# Patient Record
Sex: FEMALE | Race: BLACK OR AFRICAN AMERICAN | NOT HISPANIC OR LATINO
[De-identification: names, ages, dates, MRNs, and addresses within clinical notes are randomized per-mention and may not be internally consistent; named-entity substitution may affect disease eponyms.]

---

## 2017-01-06 ENCOUNTER — APPOINTMENT (OUTPATIENT)
Dept: INTERNAL MEDICINE | Facility: CLINIC | Age: 67
End: 2017-01-06

## 2017-01-06 VITALS
TEMPERATURE: 98.5 F | WEIGHT: 226 LBS | DIASTOLIC BLOOD PRESSURE: 80 MMHG | HEIGHT: 63 IN | SYSTOLIC BLOOD PRESSURE: 136 MMHG | BODY MASS INDEX: 40.04 KG/M2 | OXYGEN SATURATION: 98 % | HEART RATE: 104 BPM

## 2017-01-08 ENCOUNTER — FORM ENCOUNTER (OUTPATIENT)
Age: 67
End: 2017-01-08

## 2017-01-09 ENCOUNTER — OUTPATIENT (OUTPATIENT)
Dept: OUTPATIENT SERVICES | Facility: HOSPITAL | Age: 67
LOS: 1 days | End: 2017-01-09
Payer: COMMERCIAL

## 2017-01-09 ENCOUNTER — APPOINTMENT (OUTPATIENT)
Dept: RADIOLOGY | Facility: IMAGING CENTER | Age: 67
End: 2017-01-09

## 2017-01-09 DIAGNOSIS — J45.909 UNSPECIFIED ASTHMA, UNCOMPLICATED: ICD-10-CM

## 2017-01-09 PROCEDURE — 71046 X-RAY EXAM CHEST 2 VIEWS: CPT

## 2017-01-10 ENCOUNTER — APPOINTMENT (OUTPATIENT)
Dept: INTERNAL MEDICINE | Facility: CLINIC | Age: 67
End: 2017-01-10

## 2017-01-24 ENCOUNTER — APPOINTMENT (OUTPATIENT)
Dept: PEDIATRIC ALLERGY IMMUNOLOGY | Facility: CLINIC | Age: 67
End: 2017-01-24

## 2017-01-24 ENCOUNTER — APPOINTMENT (OUTPATIENT)
Dept: ALLERGY | Facility: CLINIC | Age: 67
End: 2017-01-24

## 2017-01-24 VITALS
HEIGHT: 63 IN | SYSTOLIC BLOOD PRESSURE: 160 MMHG | BODY MASS INDEX: 39.87 KG/M2 | DIASTOLIC BLOOD PRESSURE: 90 MMHG | RESPIRATION RATE: 16 BRPM | WEIGHT: 225 LBS | HEART RATE: 88 BPM

## 2017-01-24 RX ORDER — AZITHROMYCIN 250 MG/1
250 TABLET, FILM COATED ORAL
Qty: 1 | Refills: 0 | Status: DISCONTINUED | COMMUNITY
Start: 2017-01-06 | End: 2017-01-24

## 2017-01-31 ENCOUNTER — OTHER (OUTPATIENT)
Age: 67
End: 2017-01-31

## 2017-02-07 ENCOUNTER — APPOINTMENT (OUTPATIENT)
Dept: OPHTHALMOLOGY | Facility: CLINIC | Age: 67
End: 2017-02-07

## 2017-02-09 ENCOUNTER — APPOINTMENT (OUTPATIENT)
Dept: ALLERGY | Facility: CLINIC | Age: 67
End: 2017-02-09

## 2017-02-14 ENCOUNTER — APPOINTMENT (OUTPATIENT)
Dept: INTERNAL MEDICINE | Facility: CLINIC | Age: 67
End: 2017-02-14

## 2017-02-14 VITALS
BODY MASS INDEX: 39.34 KG/M2 | SYSTOLIC BLOOD PRESSURE: 146 MMHG | TEMPERATURE: 98.3 F | HEART RATE: 90 BPM | HEIGHT: 63 IN | OXYGEN SATURATION: 98 % | WEIGHT: 222 LBS | DIASTOLIC BLOOD PRESSURE: 88 MMHG

## 2017-02-14 VITALS — SYSTOLIC BLOOD PRESSURE: 120 MMHG | DIASTOLIC BLOOD PRESSURE: 80 MMHG

## 2017-03-13 ENCOUNTER — MEDICATION RENEWAL (OUTPATIENT)
Age: 67
End: 2017-03-13

## 2017-03-15 ENCOUNTER — MEDICATION RENEWAL (OUTPATIENT)
Age: 67
End: 2017-03-15

## 2017-03-22 ENCOUNTER — RX RENEWAL (OUTPATIENT)
Age: 67
End: 2017-03-22

## 2017-03-24 LAB
ALBUMIN SERPL ELPH-MCNC: 4.2 G/DL
ALP BLD-CCNC: 126 U/L
ALT SERPL-CCNC: 19 U/L
ANION GAP SERPL CALC-SCNC: 12 MMOL/L
AST SERPL-CCNC: 21 U/L
BILIRUB SERPL-MCNC: 0.5 MG/DL
BUN SERPL-MCNC: 10 MG/DL
CALCIUM SERPL-MCNC: 8.9 MG/DL
CHLORIDE SERPL-SCNC: 102 MMOL/L
CO2 SERPL-SCNC: 30 MMOL/L
CREAT SERPL-MCNC: 0.76 MG/DL
GLUCOSE SERPL-MCNC: 89 MG/DL
POTASSIUM SERPL-SCNC: 3.7 MMOL/L
PROT SERPL-MCNC: 7.3 G/DL
SODIUM SERPL-SCNC: 144 MMOL/L

## 2017-03-27 ENCOUNTER — RX RENEWAL (OUTPATIENT)
Age: 67
End: 2017-03-27

## 2017-04-05 LAB
BASOPHILS # BLD AUTO: 0.01 K/UL
BASOPHILS NFR BLD AUTO: 0.1 %
CHOLEST SERPL-MCNC: 197 MG/DL
CHOLEST/HDLC SERPL: 2.3 RATIO
EOSINOPHIL # BLD AUTO: 0.4 K/UL
EOSINOPHIL NFR BLD AUTO: 5.7 %
HBA1C MFR BLD HPLC: 6.3 %
HCT VFR BLD CALC: 40.1 %
HDLC SERPL-MCNC: 87 MG/DL
HGB BLD-MCNC: 12.2 G/DL
IMM GRANULOCYTES NFR BLD AUTO: 0.3 %
LDLC SERPL CALC-MCNC: 97 MG/DL
LYMPHOCYTES # BLD AUTO: 2.34 K/UL
LYMPHOCYTES NFR BLD AUTO: 33.1 %
MAN DIFF?: NORMAL
MCHC RBC-ENTMCNC: 29.1 PG
MCHC RBC-ENTMCNC: 30.4 GM/DL
MCV RBC AUTO: 95.7 FL
MONOCYTES # BLD AUTO: 0.29 K/UL
MONOCYTES NFR BLD AUTO: 4.1 %
NEUTROPHILS # BLD AUTO: 4.01 K/UL
NEUTROPHILS NFR BLD AUTO: 56.7 %
PLATELET # BLD AUTO: 251 K/UL
RBC # BLD: 4.19 M/UL
RBC # FLD: 15.1 %
TRIGL SERPL-MCNC: 67 MG/DL
WBC # FLD AUTO: 7.07 K/UL

## 2017-04-17 ENCOUNTER — APPOINTMENT (OUTPATIENT)
Dept: CARDIOLOGY | Facility: CLINIC | Age: 67
End: 2017-04-17

## 2017-04-28 ENCOUNTER — APPOINTMENT (OUTPATIENT)
Dept: INTERNAL MEDICINE | Facility: CLINIC | Age: 67
End: 2017-04-28

## 2017-04-28 ENCOUNTER — NON-APPOINTMENT (OUTPATIENT)
Age: 67
End: 2017-04-28

## 2017-04-28 VITALS
OXYGEN SATURATION: 94 % | HEART RATE: 93 BPM | DIASTOLIC BLOOD PRESSURE: 80 MMHG | SYSTOLIC BLOOD PRESSURE: 120 MMHG | TEMPERATURE: 98.9 F | HEIGHT: 63 IN | BODY MASS INDEX: 40.22 KG/M2 | WEIGHT: 227 LBS

## 2017-04-28 DIAGNOSIS — J06.9 ACUTE UPPER RESPIRATORY INFECTION, UNSPECIFIED: ICD-10-CM

## 2017-05-08 LAB
25(OH)D3 SERPL-MCNC: 43.1 NG/ML
ANION GAP SERPL CALC-SCNC: 12 MMOL/L
APPEARANCE: CLEAR
BILIRUBIN URINE: NEGATIVE
BLOOD URINE: NEGATIVE
BUN SERPL-MCNC: 16 MG/DL
CALCIUM SERPL-MCNC: 9.7 MG/DL
CHLORIDE SERPL-SCNC: 101 MMOL/L
CO2 SERPL-SCNC: 29 MMOL/L
COLOR: YELLOW
CREAT SERPL-MCNC: 0.92 MG/DL
GLUCOSE QUALITATIVE U: NORMAL MG/DL
GLUCOSE SERPL-MCNC: 103 MG/DL
KETONES URINE: NEGATIVE
LEUKOCYTE ESTERASE URINE: NEGATIVE
NITRITE URINE: NEGATIVE
PH URINE: 6
POTASSIUM SERPL-SCNC: 4.1 MMOL/L
PROTEIN URINE: NEGATIVE MG/DL
SODIUM SERPL-SCNC: 142 MMOL/L
SPECIFIC GRAVITY URINE: 1.01
TSH SERPL-ACNC: 0.9 UIU/ML
UROBILINOGEN URINE: NORMAL MG/DL

## 2017-05-12 ENCOUNTER — NON-APPOINTMENT (OUTPATIENT)
Age: 67
End: 2017-05-12

## 2017-05-12 ENCOUNTER — APPOINTMENT (OUTPATIENT)
Dept: CARDIOLOGY | Facility: CLINIC | Age: 67
End: 2017-05-12

## 2017-05-12 VITALS — DIASTOLIC BLOOD PRESSURE: 82 MMHG | SYSTOLIC BLOOD PRESSURE: 139 MMHG | HEART RATE: 106 BPM

## 2017-05-12 RX ORDER — TRIAMCINOLONE ACETONIDE 55 MCG
AEROSOL WITH ADAPTER (GRAM) NASAL
Refills: 0 | Status: DISCONTINUED | COMMUNITY
End: 2017-05-12

## 2017-05-18 ENCOUNTER — APPOINTMENT (OUTPATIENT)
Dept: CV DIAGNOSITCS | Facility: HOSPITAL | Age: 67
End: 2017-05-18

## 2017-05-18 ENCOUNTER — OUTPATIENT (OUTPATIENT)
Dept: OUTPATIENT SERVICES | Facility: HOSPITAL | Age: 67
LOS: 1 days | End: 2017-05-18
Payer: MEDICARE

## 2017-05-18 DIAGNOSIS — R06.2 WHEEZING: ICD-10-CM

## 2017-05-18 DIAGNOSIS — R07.9 CHEST PAIN, UNSPECIFIED: ICD-10-CM

## 2017-05-18 DIAGNOSIS — R60.9 EDEMA, UNSPECIFIED: ICD-10-CM

## 2017-05-18 PROCEDURE — 93018 CV STRESS TEST I&R ONLY: CPT

## 2017-05-18 PROCEDURE — 93016 CV STRESS TEST SUPVJ ONLY: CPT

## 2017-05-22 ENCOUNTER — MEDICATION RENEWAL (OUTPATIENT)
Age: 67
End: 2017-05-22

## 2017-05-22 ENCOUNTER — APPOINTMENT (OUTPATIENT)
Dept: OPHTHALMOLOGY | Facility: CLINIC | Age: 67
End: 2017-05-22

## 2017-05-22 DIAGNOSIS — H40.10X1 UNSPECIFIED OPEN-ANGLE GLAUCOMA, MILD STAGE: ICD-10-CM

## 2017-05-23 ENCOUNTER — RX RENEWAL (OUTPATIENT)
Age: 67
End: 2017-05-23

## 2017-05-26 ENCOUNTER — APPOINTMENT (OUTPATIENT)
Dept: PULMONOLOGY | Facility: CLINIC | Age: 67
End: 2017-05-26

## 2017-05-26 VITALS
BODY MASS INDEX: 39.87 KG/M2 | DIASTOLIC BLOOD PRESSURE: 70 MMHG | TEMPERATURE: 98.7 F | WEIGHT: 225 LBS | RESPIRATION RATE: 16 BRPM | SYSTOLIC BLOOD PRESSURE: 110 MMHG | HEIGHT: 63 IN | HEART RATE: 94 BPM

## 2017-05-26 RX ORDER — ETODOLAC 500 MG/1
500 TABLET, FILM COATED ORAL TWICE DAILY
Qty: 60 | Refills: 0 | Status: DISCONTINUED | COMMUNITY
End: 2017-05-26

## 2017-06-06 ENCOUNTER — APPOINTMENT (OUTPATIENT)
Dept: INTERNAL MEDICINE | Facility: CLINIC | Age: 67
End: 2017-06-06

## 2017-06-06 VITALS
HEIGHT: 63 IN | BODY MASS INDEX: 39.51 KG/M2 | OXYGEN SATURATION: 97 % | DIASTOLIC BLOOD PRESSURE: 80 MMHG | SYSTOLIC BLOOD PRESSURE: 130 MMHG | TEMPERATURE: 98.4 F | HEART RATE: 80 BPM | WEIGHT: 223 LBS

## 2017-06-06 DIAGNOSIS — L72.9 FOLLICULAR CYST OF THE SKIN AND SUBCUTANEOUS TISSUE, UNSPECIFIED: ICD-10-CM

## 2017-06-06 DIAGNOSIS — L02.419 CUTANEOUS ABSCESS OF LIMB, UNSPECIFIED: ICD-10-CM

## 2017-06-06 DIAGNOSIS — L08.9 FOLLICULAR CYST OF THE SKIN AND SUBCUTANEOUS TISSUE, UNSPECIFIED: ICD-10-CM

## 2017-06-12 ENCOUNTER — APPOINTMENT (OUTPATIENT)
Dept: ORTHOPEDIC SURGERY | Facility: CLINIC | Age: 67
End: 2017-06-12

## 2017-06-12 VITALS
SYSTOLIC BLOOD PRESSURE: 125 MMHG | HEART RATE: 105 BPM | WEIGHT: 223 LBS | BODY MASS INDEX: 39.51 KG/M2 | HEIGHT: 63 IN | DIASTOLIC BLOOD PRESSURE: 80 MMHG

## 2017-06-15 ENCOUNTER — RX RENEWAL (OUTPATIENT)
Age: 67
End: 2017-06-15

## 2017-07-20 DIAGNOSIS — Z12.12 ENCOUNTER FOR SCREENING FOR MALIGNANT NEOPLASM OF COLON: ICD-10-CM

## 2017-07-20 DIAGNOSIS — Z12.11 ENCOUNTER FOR SCREENING FOR MALIGNANT NEOPLASM OF COLON: ICD-10-CM

## 2017-07-28 ENCOUNTER — RX RENEWAL (OUTPATIENT)
Age: 67
End: 2017-07-28

## 2017-08-09 ENCOUNTER — FORM ENCOUNTER (OUTPATIENT)
Age: 67
End: 2017-08-09

## 2017-08-10 ENCOUNTER — OUTPATIENT (OUTPATIENT)
Dept: OUTPATIENT SERVICES | Facility: HOSPITAL | Age: 67
LOS: 1 days | End: 2017-08-10
Payer: COMMERCIAL

## 2017-08-10 ENCOUNTER — APPOINTMENT (OUTPATIENT)
Dept: MAMMOGRAPHY | Facility: IMAGING CENTER | Age: 67
End: 2017-08-10
Payer: MEDICARE

## 2017-08-10 ENCOUNTER — APPOINTMENT (OUTPATIENT)
Dept: ULTRASOUND IMAGING | Facility: IMAGING CENTER | Age: 67
End: 2017-08-10
Payer: MEDICARE

## 2017-08-10 DIAGNOSIS — Z00.8 ENCOUNTER FOR OTHER GENERAL EXAMINATION: ICD-10-CM

## 2017-08-10 PROCEDURE — 77063 BREAST TOMOSYNTHESIS BI: CPT | Mod: 26

## 2017-08-10 PROCEDURE — 77067 SCR MAMMO BI INCL CAD: CPT

## 2017-08-10 PROCEDURE — 77063 BREAST TOMOSYNTHESIS BI: CPT

## 2017-08-10 PROCEDURE — G0202: CPT | Mod: 26

## 2017-08-11 ENCOUNTER — RX RENEWAL (OUTPATIENT)
Age: 67
End: 2017-08-11

## 2017-08-11 ENCOUNTER — MEDICATION RENEWAL (OUTPATIENT)
Age: 67
End: 2017-08-11

## 2017-08-28 ENCOUNTER — APPOINTMENT (OUTPATIENT)
Dept: OPHTHALMOLOGY | Facility: CLINIC | Age: 67
End: 2017-08-28

## 2017-09-06 ENCOUNTER — APPOINTMENT (OUTPATIENT)
Dept: INTERNAL MEDICINE | Facility: CLINIC | Age: 67
End: 2017-09-06

## 2017-09-08 ENCOUNTER — APPOINTMENT (OUTPATIENT)
Dept: INTERNAL MEDICINE | Facility: CLINIC | Age: 67
End: 2017-09-08
Payer: MEDICARE

## 2017-09-08 VITALS
HEART RATE: 90 BPM | DIASTOLIC BLOOD PRESSURE: 80 MMHG | BODY MASS INDEX: 39.16 KG/M2 | WEIGHT: 221 LBS | TEMPERATURE: 98.4 F | HEIGHT: 63 IN | SYSTOLIC BLOOD PRESSURE: 130 MMHG | OXYGEN SATURATION: 98 %

## 2017-09-08 PROCEDURE — 99213 OFFICE O/P EST LOW 20 MIN: CPT

## 2017-09-11 LAB
ANION GAP SERPL CALC-SCNC: 16 MMOL/L
BASOPHILS # BLD AUTO: 0.01 K/UL
BASOPHILS NFR BLD AUTO: 0.1 %
BUN SERPL-MCNC: 21 MG/DL
CALCIUM SERPL-MCNC: 9.2 MG/DL
CHLORIDE SERPL-SCNC: 99 MMOL/L
CO2 SERPL-SCNC: 27 MMOL/L
CREAT SERPL-MCNC: 1.01 MG/DL
EOSINOPHIL # BLD AUTO: 0.34 K/UL
EOSINOPHIL NFR BLD AUTO: 4.3 %
GLUCOSE SERPL-MCNC: 108 MG/DL
HBA1C MFR BLD HPLC: 6.4 %
HCT VFR BLD CALC: 37.3 %
HGB BLD-MCNC: 11.5 G/DL
IMM GRANULOCYTES NFR BLD AUTO: 0.1 %
LYMPHOCYTES # BLD AUTO: 2.85 K/UL
LYMPHOCYTES NFR BLD AUTO: 35.7 %
MAN DIFF?: NORMAL
MCHC RBC-ENTMCNC: 28.7 PG
MCHC RBC-ENTMCNC: 30.8 GM/DL
MCV RBC AUTO: 93 FL
MONOCYTES # BLD AUTO: 0.38 K/UL
MONOCYTES NFR BLD AUTO: 4.8 %
NEUTROPHILS # BLD AUTO: 4.4 K/UL
NEUTROPHILS NFR BLD AUTO: 55 %
PLATELET # BLD AUTO: 272 K/UL
POTASSIUM SERPL-SCNC: 3.8 MMOL/L
RBC # BLD: 4.01 M/UL
RBC # FLD: 15.7 %
SODIUM SERPL-SCNC: 142 MMOL/L
WBC # FLD AUTO: 7.99 K/UL

## 2017-09-21 ENCOUNTER — RX RENEWAL (OUTPATIENT)
Age: 67
End: 2017-09-21

## 2017-10-15 ENCOUNTER — MOBILE ON CALL (OUTPATIENT)
Age: 67
End: 2017-10-15

## 2017-10-16 ENCOUNTER — RX RENEWAL (OUTPATIENT)
Age: 67
End: 2017-10-16

## 2017-10-25 ENCOUNTER — APPOINTMENT (OUTPATIENT)
Dept: GASTROENTEROLOGY | Facility: CLINIC | Age: 67
End: 2017-10-25

## 2017-10-27 ENCOUNTER — NON-APPOINTMENT (OUTPATIENT)
Age: 67
End: 2017-10-27

## 2017-10-27 ENCOUNTER — APPOINTMENT (OUTPATIENT)
Dept: CARDIOLOGY | Facility: CLINIC | Age: 67
End: 2017-10-27
Payer: MEDICARE

## 2017-10-27 VITALS — SYSTOLIC BLOOD PRESSURE: 144 MMHG | HEIGHT: 72 IN | DIASTOLIC BLOOD PRESSURE: 78 MMHG | HEART RATE: 97 BPM

## 2017-10-27 PROCEDURE — 99214 OFFICE O/P EST MOD 30 MIN: CPT

## 2017-10-27 PROCEDURE — 93000 ELECTROCARDIOGRAM COMPLETE: CPT

## 2017-10-27 RX ORDER — LOSARTAN POTASSIUM 50 MG/1
50 TABLET, FILM COATED ORAL DAILY
Qty: 135 | Refills: 3 | Status: DISCONTINUED | COMMUNITY
End: 2017-10-27

## 2017-10-27 RX ORDER — MULTIVITAMIN
TABLET ORAL
Refills: 0 | Status: DISCONTINUED | COMMUNITY
End: 2017-10-27

## 2017-10-30 ENCOUNTER — APPOINTMENT (OUTPATIENT)
Dept: OPHTHALMOLOGY | Facility: CLINIC | Age: 67
End: 2017-10-30
Payer: MEDICARE

## 2017-10-30 ENCOUNTER — APPOINTMENT (OUTPATIENT)
Dept: ORTHOPEDIC SURGERY | Facility: CLINIC | Age: 67
End: 2017-10-30

## 2017-10-30 PROCEDURE — 92012 INTRM OPH EXAM EST PATIENT: CPT

## 2017-11-07 ENCOUNTER — TRANSCRIPTION ENCOUNTER (OUTPATIENT)
Age: 67
End: 2017-11-07

## 2017-11-13 ENCOUNTER — APPOINTMENT (OUTPATIENT)
Dept: ORTHOPEDIC SURGERY | Facility: CLINIC | Age: 67
End: 2017-11-13
Payer: MEDICARE

## 2017-11-13 PROCEDURE — 99214 OFFICE O/P EST MOD 30 MIN: CPT

## 2017-11-20 ENCOUNTER — FORM ENCOUNTER (OUTPATIENT)
Age: 67
End: 2017-11-20

## 2017-11-21 ENCOUNTER — APPOINTMENT (OUTPATIENT)
Dept: MRI IMAGING | Facility: CLINIC | Age: 67
End: 2017-11-21
Payer: MEDICARE

## 2017-11-21 ENCOUNTER — OUTPATIENT (OUTPATIENT)
Dept: OUTPATIENT SERVICES | Facility: HOSPITAL | Age: 67
LOS: 1 days | End: 2017-11-21
Payer: MEDICARE

## 2017-11-21 DIAGNOSIS — M54.12 RADICULOPATHY, CERVICAL REGION: ICD-10-CM

## 2017-11-21 PROCEDURE — 72141 MRI NECK SPINE W/O DYE: CPT | Mod: 26

## 2017-11-21 PROCEDURE — 72141 MRI NECK SPINE W/O DYE: CPT

## 2017-11-27 ENCOUNTER — APPOINTMENT (OUTPATIENT)
Dept: ORTHOPEDIC SURGERY | Facility: CLINIC | Age: 67
End: 2017-11-27
Payer: MEDICARE

## 2017-11-27 PROCEDURE — 99214 OFFICE O/P EST MOD 30 MIN: CPT

## 2017-11-28 ENCOUNTER — APPOINTMENT (OUTPATIENT)
Dept: INTERNAL MEDICINE | Facility: CLINIC | Age: 67
End: 2017-11-28
Payer: MEDICARE

## 2017-11-28 ENCOUNTER — NON-APPOINTMENT (OUTPATIENT)
Age: 67
End: 2017-11-28

## 2017-11-28 VITALS
HEIGHT: 62 IN | WEIGHT: 228 LBS | BODY MASS INDEX: 41.96 KG/M2 | SYSTOLIC BLOOD PRESSURE: 127 MMHG | DIASTOLIC BLOOD PRESSURE: 79 MMHG | HEART RATE: 96 BPM

## 2017-11-28 DIAGNOSIS — B35.1 TINEA UNGUIUM: ICD-10-CM

## 2017-11-28 PROCEDURE — 99387 INIT PM E/M NEW PAT 65+ YRS: CPT | Mod: 25

## 2017-11-28 PROCEDURE — 90686 IIV4 VACC NO PRSV 0.5 ML IM: CPT

## 2017-11-28 PROCEDURE — 36415 COLL VENOUS BLD VENIPUNCTURE: CPT

## 2017-11-28 PROCEDURE — G0008: CPT

## 2017-11-28 PROCEDURE — 93000 ELECTROCARDIOGRAM COMPLETE: CPT

## 2017-11-30 ENCOUNTER — FORM ENCOUNTER (OUTPATIENT)
Age: 67
End: 2017-11-30

## 2017-11-30 LAB
25(OH)D3 SERPL-MCNC: 48.9 NG/ML
ALBUMIN SERPL ELPH-MCNC: 4.1 G/DL
ALP BLD-CCNC: 106 U/L
ALT SERPL-CCNC: 15 U/L
ANION GAP SERPL CALC-SCNC: 14 MMOL/L
AST SERPL-CCNC: 27 U/L
BILIRUB SERPL-MCNC: 0.4 MG/DL
BUN SERPL-MCNC: 19 MG/DL
CALCIUM SERPL-MCNC: 9.4 MG/DL
CHLORIDE SERPL-SCNC: 101 MMOL/L
CHOLEST SERPL-MCNC: 193 MG/DL
CHOLEST/HDLC SERPL: 2.4 RATIO
CO2 SERPL-SCNC: 29 MMOL/L
CREAT SERPL-MCNC: 1.51 MG/DL
GLUCOSE SERPL-MCNC: 75 MG/DL
HBA1C MFR BLD HPLC: 6.4 %
HDLC SERPL-MCNC: 80 MG/DL
LDLC SERPL CALC-MCNC: 102 MG/DL
POTASSIUM SERPL-SCNC: 4.2 MMOL/L
PROT SERPL-MCNC: 8.3 G/DL
SAVE SPECIMEN: NORMAL
SODIUM SERPL-SCNC: 144 MMOL/L
T3FREE SERPL-MCNC: 2.13 PG/ML
T4 SERPL-MCNC: 6.7 UG/DL
TRIGL SERPL-MCNC: 53 MG/DL
TSH SERPL-ACNC: 1.6 UIU/ML

## 2017-12-01 ENCOUNTER — OUTPATIENT (OUTPATIENT)
Dept: OUTPATIENT SERVICES | Facility: HOSPITAL | Age: 67
LOS: 1 days | End: 2017-12-01
Payer: COMMERCIAL

## 2017-12-01 ENCOUNTER — APPOINTMENT (OUTPATIENT)
Dept: MRI IMAGING | Facility: CLINIC | Age: 67
End: 2017-12-01
Payer: MEDICARE

## 2017-12-01 DIAGNOSIS — Z00.8 ENCOUNTER FOR OTHER GENERAL EXAMINATION: ICD-10-CM

## 2017-12-01 PROCEDURE — 72148 MRI LUMBAR SPINE W/O DYE: CPT | Mod: 26

## 2017-12-01 PROCEDURE — 72148 MRI LUMBAR SPINE W/O DYE: CPT

## 2017-12-04 ENCOUNTER — APPOINTMENT (OUTPATIENT)
Dept: ORTHOPEDIC SURGERY | Facility: CLINIC | Age: 67
End: 2017-12-04
Payer: MEDICARE

## 2017-12-04 VITALS — DIASTOLIC BLOOD PRESSURE: 82 MMHG | HEART RATE: 99 BPM | SYSTOLIC BLOOD PRESSURE: 140 MMHG

## 2017-12-04 PROCEDURE — 99214 OFFICE O/P EST MOD 30 MIN: CPT

## 2017-12-04 PROCEDURE — 72040 X-RAY EXAM NECK SPINE 2-3 VW: CPT

## 2017-12-11 ENCOUNTER — OUTPATIENT (OUTPATIENT)
Dept: OUTPATIENT SERVICES | Facility: HOSPITAL | Age: 67
LOS: 1 days | End: 2017-12-11
Payer: MEDICARE

## 2017-12-11 VITALS
HEART RATE: 96 BPM | WEIGHT: 225.97 LBS | TEMPERATURE: 99 F | DIASTOLIC BLOOD PRESSURE: 84 MMHG | OXYGEN SATURATION: 97 % | HEIGHT: 62 IN | RESPIRATION RATE: 14 BRPM | SYSTOLIC BLOOD PRESSURE: 128 MMHG

## 2017-12-11 DIAGNOSIS — M50.90 CERVICAL DISC DISORDER, UNSPECIFIED, UNSPECIFIED CERVICAL REGION: ICD-10-CM

## 2017-12-11 DIAGNOSIS — G47.30 SLEEP APNEA, UNSPECIFIED: ICD-10-CM

## 2017-12-11 DIAGNOSIS — M12.9 ARTHROPATHY, UNSPECIFIED: Chronic | ICD-10-CM

## 2017-12-11 DIAGNOSIS — M48.02 SPINAL STENOSIS, CERVICAL REGION: ICD-10-CM

## 2017-12-11 DIAGNOSIS — Z90.89 ACQUIRED ABSENCE OF OTHER ORGANS: Chronic | ICD-10-CM

## 2017-12-11 DIAGNOSIS — R06.02 SHORTNESS OF BREATH: ICD-10-CM

## 2017-12-11 LAB
BASOPHILS # BLD AUTO: 0.01 K/UL
BASOPHILS NFR BLD AUTO: 0.1 %
BLD GP AB SCN SERPL QL: NEGATIVE — SIGNIFICANT CHANGE UP
BUN SERPL-MCNC: 14 MG/DL — SIGNIFICANT CHANGE UP (ref 7–23)
CALCIUM SERPL-MCNC: 9 MG/DL — SIGNIFICANT CHANGE UP (ref 8.4–10.5)
CHLORIDE SERPL-SCNC: 99 MMOL/L — SIGNIFICANT CHANGE UP (ref 98–107)
CO2 SERPL-SCNC: 29 MMOL/L — SIGNIFICANT CHANGE UP (ref 22–31)
CREAT SERPL-MCNC: 0.96 MG/DL — SIGNIFICANT CHANGE UP (ref 0.5–1.3)
EOSINOPHIL # BLD AUTO: 0.35 K/UL
EOSINOPHIL NFR BLD AUTO: 4.3 %
GLUCOSE SERPL-MCNC: 115 MG/DL — HIGH (ref 70–99)
HBA1C BLD-MCNC: 6.5 % — HIGH (ref 4–5.6)
HCT VFR BLD CALC: 36.8 %
HCT VFR BLD CALC: 37.7 % — SIGNIFICANT CHANGE UP (ref 34.5–45)
HGB BLD-MCNC: 11.4 G/DL
HGB BLD-MCNC: 11.9 G/DL — SIGNIFICANT CHANGE UP (ref 11.5–15.5)
IMM GRANULOCYTES NFR BLD AUTO: 0.1 %
LYMPHOCYTES # BLD AUTO: 2.7 K/UL
LYMPHOCYTES NFR BLD AUTO: 32.9 %
MAN DIFF?: NORMAL
MCHC RBC-ENTMCNC: 28.7 PG — SIGNIFICANT CHANGE UP (ref 27–34)
MCHC RBC-ENTMCNC: 29.4 PG
MCHC RBC-ENTMCNC: 31 GM/DL
MCHC RBC-ENTMCNC: 31.6 % — LOW (ref 32–36)
MCV RBC AUTO: 91.1 FL — SIGNIFICANT CHANGE UP (ref 80–100)
MCV RBC AUTO: 94.8 FL
MONOCYTES # BLD AUTO: 0.42 K/UL
MONOCYTES NFR BLD AUTO: 5.1 %
NEUTROPHILS # BLD AUTO: 4.72 K/UL
NEUTROPHILS NFR BLD AUTO: 57.5 %
NRBC # FLD: 0 — SIGNIFICANT CHANGE UP
PLATELET # BLD AUTO: 259 K/UL — SIGNIFICANT CHANGE UP (ref 150–400)
PLATELET # BLD AUTO: 279 K/UL
PMV BLD: 10.2 FL — SIGNIFICANT CHANGE UP (ref 7–13)
POTASSIUM SERPL-MCNC: 3.9 MMOL/L — SIGNIFICANT CHANGE UP (ref 3.5–5.3)
POTASSIUM SERPL-SCNC: 3.9 MMOL/L — SIGNIFICANT CHANGE UP (ref 3.5–5.3)
RBC # BLD: 3.88 M/UL
RBC # BLD: 4.14 M/UL — SIGNIFICANT CHANGE UP (ref 3.8–5.2)
RBC # FLD: 14.6 % — HIGH (ref 10.3–14.5)
RBC # FLD: 15.6 %
RH IG SCN BLD-IMP: POSITIVE — SIGNIFICANT CHANGE UP
SODIUM SERPL-SCNC: 142 MMOL/L — SIGNIFICANT CHANGE UP (ref 135–145)
WBC # BLD: 7.05 K/UL — SIGNIFICANT CHANGE UP (ref 3.8–10.5)
WBC # FLD AUTO: 7.05 K/UL — SIGNIFICANT CHANGE UP (ref 3.8–10.5)
WBC # FLD AUTO: 8.21 K/UL

## 2017-12-11 PROCEDURE — 93010 ELECTROCARDIOGRAM REPORT: CPT

## 2017-12-11 RX ORDER — SODIUM CHLORIDE 9 MG/ML
1000 INJECTION, SOLUTION INTRAVENOUS
Qty: 0 | Refills: 0 | Status: DISCONTINUED | OUTPATIENT
Start: 2017-12-12 | End: 2017-12-12

## 2017-12-11 NOTE — ASU PATIENT PROFILE, ADULT - PMH
Asthma    Cataracts, bilateral    Cervical disc disease  December 2017  Depression  never hospitalized  Gastric ulcer    Glaucoma    Hypercholesterolemia    Hypertension    Obesity    Prediabetes  diagnosed in early 2017  Seasonal allergies    Sleep apnea  supposed to use device at night but doesn't

## 2017-12-11 NOTE — H&P PST ADULT - MUSCULOSKELETAL COMMENTS
cervical disc disease with b/l leg weakness, b/l arm/hands/shoulder numb ambulates with walker due to c/o numbness/weakness of legs and arms ambulates with walker due to b/l leg, arm, shoulder, hand weakness

## 2017-12-11 NOTE — H&P PST ADULT - MOTOR
ambulates with walker due to c/o numbness/weakness of legs and arms ambulates with walker due to b/l leg, arm, shoulder, hand weakness

## 2017-12-11 NOTE — H&P PST ADULT - ACTIVITY
at PAST, patient SOB ambulating with walker down the burger at PAST, patient SOB ambulating with walker down the burger witnessed by RN, Tana and NP, Aleyda VALLE

## 2017-12-11 NOTE — H&P PST ADULT - PROBLEM SELECTOR PLAN 3
Await cardiac evaluation from cardiologist due to METS less than 4, h/o prediabetes, htn, hypercholesterolemia Await cardiac evaluation from cardiologist due to METS less than 4, h/o prediabetes, htn, hypercholesterolemia  * Need to notify surgeon of pre op cardiac evaluation Await cardiac evaluation from cardiologist due to METS less than 4, h/o prediabetes, htn, hypercholesterolemia  * Need to notify surgeon of pre op cardiac evaluation--notified Santa at surgeon's office Await cardiac evaluation from cardiologist due to METS less than 4, h/o prediabetes, htn, hypercholesterolemia  * Need to notify surgeon of pre op cardiac evaluation--notified Santa at surgeon's office  ADDENDUM: PAST NP spoke with Dr. Weber who stated that patient needed the surgery. Discussed with Dr. Ayala. MD to look at the patient's most recent stress test and echo to make a decision if patient can have surgery tomorrow due to severity of her symptoms without a cardiac evaluation. However, await Medical evaluation with pcp

## 2017-12-11 NOTE — H&P PST ADULT - PSH
Arthropathy  2013, right knee replacement  Arthropathy  2006, left knee replacement  Arthropathy  2005, left hip replacement  S/P tonsillectomy

## 2017-12-11 NOTE — H&P PST ADULT - HISTORY OF PRESENT ILLNESS
This is a 66 y/o female who presents with progressively worsening symptomatology of cervical disc disease. Subsequent xrays and MRI confirmed pathology. Scheduled for C3-4 posterior cervical decompression and fusion on 12-11-17 This is a 68 y/o female who presents with progressively worsening symptomatology of cervical disc disease. Subsequent xrays and MRI confirmed pathology. Scheduled for C3-4 posterior cervical decompression and fusion on 12-12-17

## 2017-12-11 NOTE — H&P PST ADULT - RESPIRATORY AND THORAX COMMENTS
patient SOB ambulating down hallway when she came to PAST; c/o wheezing in early Summer -- placed on inhalers with no further episode patient SOB ambulating down hallway when she came to PAST witnessed by RN, Tana and NP, Aleyda VALLE; c/o wheezing in early Summer -- placed on inhalers with no further episode

## 2017-12-11 NOTE — H&P PST ADULT - PROBLEM SELECTOR PLAN 2
Await sleep study from Notified OR booking via fax of CHANTELL precautions  *Await sleep study from pulmonologist

## 2017-12-12 ENCOUNTER — APPOINTMENT (OUTPATIENT)
Dept: ORTHOPEDIC SURGERY | Facility: HOSPITAL | Age: 67
End: 2017-12-12

## 2017-12-12 ENCOUNTER — INPATIENT (INPATIENT)
Facility: HOSPITAL | Age: 67
LOS: 0 days | Discharge: HOME | End: 2017-12-12
Attending: ORTHOPAEDIC SURGERY | Admitting: ORTHOPAEDIC SURGERY

## 2017-12-12 ENCOUNTER — OUTPATIENT (OUTPATIENT)
Dept: OUTPATIENT SERVICES | Facility: HOSPITAL | Age: 67
LOS: 1 days | End: 2017-12-12

## 2017-12-12 VITALS
HEART RATE: 102 BPM | TEMPERATURE: 98 F | WEIGHT: 225.97 LBS | HEIGHT: 62 IN | RESPIRATION RATE: 15 BRPM | OXYGEN SATURATION: 96 % | DIASTOLIC BLOOD PRESSURE: 84 MMHG | SYSTOLIC BLOOD PRESSURE: 121 MMHG

## 2017-12-12 VITALS
SYSTOLIC BLOOD PRESSURE: 152 MMHG | RESPIRATION RATE: 17 BRPM | DIASTOLIC BLOOD PRESSURE: 76 MMHG | OXYGEN SATURATION: 98 % | HEART RATE: 78 BPM

## 2017-12-12 DIAGNOSIS — M12.9 ARTHROPATHY, UNSPECIFIED: Chronic | ICD-10-CM

## 2017-12-12 DIAGNOSIS — Z90.89 ACQUIRED ABSENCE OF OTHER ORGANS: Chronic | ICD-10-CM

## 2017-12-12 DIAGNOSIS — M48.02 SPINAL STENOSIS, CERVICAL REGION: ICD-10-CM

## 2017-12-12 LAB — GLUCOSE BLDC GLUCOMTR-MCNC: 72 MG/DL — SIGNIFICANT CHANGE UP (ref 70–99)

## 2017-12-12 RX ORDER — ACETAMINOPHEN 500 MG
1000 TABLET ORAL ONCE
Qty: 0 | Refills: 0 | Status: COMPLETED | OUTPATIENT
Start: 2017-12-12 | End: 2017-12-12

## 2017-12-12 RX ADMIN — Medication 400 MILLIGRAM(S): at 11:45

## 2017-12-12 NOTE — ASU DISCHARGE PLAN (ADULT/PEDIATRIC). - NOTIFY
Inability to Tolerate Liquids or Foods/Fever greater than 101/Numbness, color, or temperature change to extremity/Numbness, tingling

## 2017-12-12 NOTE — PROVIDER CONTACT NOTE (OTHER) - ASSESSMENT
patient stable to go to home via ambulance at 3pm.  [facility] set up for 3pm transport.  trip # [id number].  Son Jag spoken to and will be calling into his job to stay with his mother at home during the night.  son will open the door for the patient to enter the apartment.

## 2017-12-12 NOTE — ASU DISCHARGE PLAN (ADULT/PEDIATRIC). - MEDICATION SUMMARY - MEDICATIONS TO TAKE
I will START or STAY ON the medications listed below when I get home from the hospital:    Medications logged in computer by Aurea JEWELL  -- Indication: For Home med    Lodine 500 mg oral tablet  -- 1 tab(s) by mouth 2 times a day last dose 12/8  -- Indication: For Home med    Cozaar  -- 75 milligram(s) by mouth once a day  -- Indication: For Home med    losartan 50 mg oral tablet  -- 11/2 tab(s) by mouth once a day (75mg)  -- Indication: For Home med    Cymbalta 60 mg oral delayed release capsule  -- 1 cap(s) by mouth once a day in am  -- Indication: For Home med    ZyrTEC 10 mg oral tablet  -- 1 tab(s) by mouth once a day (at bedtime)  -- Indication: For Home med    Lipitor 20 mg oral tablet  -- 1 tab(s) by mouth once a day (at bedtime)  -- Indication: For Home med    Advair Diskus 250 mcg-50 mcg inhalation powder  -- 1 puff(s) inhaled 2 times a day  -- Indication: For Home med    Ventolin HFA 90 mcg/inh inhalation aerosol  -- 2 puff(s) inhaled 4 times a day, As Needed  -- Indication: For Home med    hydroCHLOROthiazide 12.5 mg oral capsule  -- 1 cap(s) by mouth once a day  -- Indication: For Home med    raNITIdine 150 mg oral capsule  -- 1 cap(s) by mouth 2 times a day  -- Indication: For Home med    montelukast 10 mg oral tablet  -- 1 tab(s) by mouth once a day (at bedtime)  -- Indication: For Home med    Travatan 0.004% ophthalmic solution  -- 1 drop(s) to each affected eye once a day (in the evening)  -- Indication: For Home med    dorzolamide 2% ophthalmic solution  -- 1 drop(s) to each affected eye 2 times a day  -- Indication: For Home med    Probiotic Formula oral capsule  -- 1 cap(s) by mouth once a day  -- Indication: For Home med    omeprazole 40 mg oral delayed release capsule  -- 1 cap(s) by mouth once a day  -- Indication: For Home med    fluticasone 50 mcg inhalation powder  -- 1 puff(s) inhaled 2 times a day  -- Indication: For Home med    Calcium 600+D oral tablet  -- 1 tab(s) by mouth once a day  -- Indication: For Home med    Cod Liver oral oil  -- orally once a day last dose 12/11  -- Indication: For Home med    Multiple Vitamins oral capsule  -- 1 cap(s) by mouth once a day last dose 12/11  -- Indication: For Home medh    Vitamin B Complex 100  -- 1 tab(s) injectable once a day  -- Indication: For Home med    Vitamin D3 2000 intl units oral tablet  -- 1 tab(s) by mouth once a day  -- Indication: For Home med    Vitamin C 1000 mg oral tablet  -- 1 tab(s) by mouth once a day  -- Indication: For Home med

## 2017-12-13 ENCOUNTER — MEDICATION RENEWAL (OUTPATIENT)
Age: 67
End: 2017-12-13

## 2017-12-15 ENCOUNTER — RX RENEWAL (OUTPATIENT)
Age: 67
End: 2017-12-15

## 2017-12-15 ENCOUNTER — APPOINTMENT (OUTPATIENT)
Dept: ORTHOPEDIC SURGERY | Facility: CLINIC | Age: 67
End: 2017-12-15
Payer: MEDICARE

## 2017-12-15 VITALS — SYSTOLIC BLOOD PRESSURE: 133 MMHG | DIASTOLIC BLOOD PRESSURE: 86 MMHG | HEART RATE: 101 BPM

## 2017-12-15 PROCEDURE — 99214 OFFICE O/P EST MOD 30 MIN: CPT | Mod: 25

## 2017-12-15 PROCEDURE — 20552 NJX 1/MLT TRIGGER POINT 1/2: CPT

## 2017-12-18 ENCOUNTER — FORM ENCOUNTER (OUTPATIENT)
Age: 67
End: 2017-12-18

## 2018-01-19 ENCOUNTER — RX RENEWAL (OUTPATIENT)
Age: 68
End: 2018-01-19

## 2018-01-26 ENCOUNTER — APPOINTMENT (OUTPATIENT)
Dept: CARDIOTHORACIC SURGERY | Facility: CLINIC | Age: 68
End: 2018-01-26
Payer: MEDICARE

## 2018-01-26 ENCOUNTER — NON-APPOINTMENT (OUTPATIENT)
Age: 68
End: 2018-01-26

## 2018-01-26 VITALS
HEIGHT: 62 IN | RESPIRATION RATE: 16 BRPM | BODY MASS INDEX: 40.81 KG/M2 | WEIGHT: 221.78 LBS | TEMPERATURE: 98.1 F | DIASTOLIC BLOOD PRESSURE: 83 MMHG | HEART RATE: 76 BPM | OXYGEN SATURATION: 97 % | SYSTOLIC BLOOD PRESSURE: 128 MMHG

## 2018-01-26 VITALS — DIASTOLIC BLOOD PRESSURE: 80 MMHG | SYSTOLIC BLOOD PRESSURE: 128 MMHG

## 2018-01-26 PROCEDURE — 93000 ELECTROCARDIOGRAM COMPLETE: CPT

## 2018-01-26 PROCEDURE — 99215 OFFICE O/P EST HI 40 MIN: CPT

## 2018-01-26 RX ORDER — TERBINAFINE HYDROCHLORIDE 250 MG/1
250 TABLET ORAL DAILY
Qty: 14 | Refills: 0 | Status: DISCONTINUED | COMMUNITY
Start: 2017-11-28 | End: 2018-01-26

## 2018-01-26 RX ORDER — ALBUTEROL SULFATE 90 UG/1
AEROSOL, METERED RESPIRATORY (INHALATION)
Refills: 0 | Status: DISCONTINUED | COMMUNITY
End: 2018-01-26

## 2018-01-26 RX ORDER — MAG HYDROX/ALUMINUM HYD/SIMETH 400-400-40
SUSPENSION, ORAL (FINAL DOSE FORM) ORAL
Refills: 0 | Status: DISCONTINUED | COMMUNITY
End: 2018-01-26

## 2018-01-26 RX ORDER — DULOXETINE HYDROCHLORIDE 30 MG/1
30 CAPSULE, DELAYED RELEASE PELLETS ORAL
Qty: 90 | Refills: 0 | Status: DISCONTINUED | COMMUNITY
Start: 2017-09-12

## 2018-01-31 ENCOUNTER — RX RENEWAL (OUTPATIENT)
Age: 68
End: 2018-01-31

## 2018-02-12 ENCOUNTER — MEDICATION RENEWAL (OUTPATIENT)
Age: 68
End: 2018-02-12

## 2018-02-12 ENCOUNTER — APPOINTMENT (OUTPATIENT)
Dept: ORTHOPEDIC SURGERY | Facility: CLINIC | Age: 68
End: 2018-02-12

## 2018-02-15 ENCOUNTER — OUTPATIENT (OUTPATIENT)
Dept: OUTPATIENT SERVICES | Facility: HOSPITAL | Age: 68
LOS: 1 days | End: 2018-02-15

## 2018-02-15 VITALS
DIASTOLIC BLOOD PRESSURE: 74 MMHG | SYSTOLIC BLOOD PRESSURE: 152 MMHG | HEIGHT: 62 IN | HEART RATE: 95 BPM | WEIGHT: 220.02 LBS | RESPIRATION RATE: 16 BRPM | OXYGEN SATURATION: 96 % | TEMPERATURE: 98 F

## 2018-02-15 DIAGNOSIS — G47.30 SLEEP APNEA, UNSPECIFIED: ICD-10-CM

## 2018-02-15 DIAGNOSIS — M48.02 SPINAL STENOSIS, CERVICAL REGION: ICD-10-CM

## 2018-02-15 DIAGNOSIS — I10 ESSENTIAL (PRIMARY) HYPERTENSION: ICD-10-CM

## 2018-02-15 DIAGNOSIS — J45.909 UNSPECIFIED ASTHMA, UNCOMPLICATED: ICD-10-CM

## 2018-02-15 DIAGNOSIS — M12.9 ARTHROPATHY, UNSPECIFIED: Chronic | ICD-10-CM

## 2018-02-15 DIAGNOSIS — Z90.89 ACQUIRED ABSENCE OF OTHER ORGANS: Chronic | ICD-10-CM

## 2018-02-15 LAB
BLD GP AB SCN SERPL QL: NEGATIVE — SIGNIFICANT CHANGE UP
BUN SERPL-MCNC: 14 MG/DL — SIGNIFICANT CHANGE UP (ref 7–23)
CALCIUM SERPL-MCNC: 9.2 MG/DL — SIGNIFICANT CHANGE UP (ref 8.4–10.5)
CHLORIDE SERPL-SCNC: 100 MMOL/L — SIGNIFICANT CHANGE UP (ref 98–107)
CO2 SERPL-SCNC: 31 MMOL/L — SIGNIFICANT CHANGE UP (ref 22–31)
CREAT SERPL-MCNC: 0.84 MG/DL — SIGNIFICANT CHANGE UP (ref 0.5–1.3)
GLUCOSE SERPL-MCNC: 112 MG/DL — HIGH (ref 70–99)
HBA1C BLD-MCNC: 6.4 % — HIGH (ref 4–5.6)
HCT VFR BLD CALC: 38.8 % — SIGNIFICANT CHANGE UP (ref 34.5–45)
HGB BLD-MCNC: 12.2 G/DL — SIGNIFICANT CHANGE UP (ref 11.5–15.5)
MCHC RBC-ENTMCNC: 29.4 PG — SIGNIFICANT CHANGE UP (ref 27–34)
MCHC RBC-ENTMCNC: 31.4 % — LOW (ref 32–36)
MCV RBC AUTO: 93.5 FL — SIGNIFICANT CHANGE UP (ref 80–100)
NRBC # FLD: 0 — SIGNIFICANT CHANGE UP
PLATELET # BLD AUTO: 259 K/UL — SIGNIFICANT CHANGE UP (ref 150–400)
PMV BLD: 10.1 FL — SIGNIFICANT CHANGE UP (ref 7–13)
POTASSIUM SERPL-MCNC: 4.7 MMOL/L — SIGNIFICANT CHANGE UP (ref 3.5–5.3)
POTASSIUM SERPL-SCNC: 4.7 MMOL/L — SIGNIFICANT CHANGE UP (ref 3.5–5.3)
RBC # BLD: 4.15 M/UL — SIGNIFICANT CHANGE UP (ref 3.8–5.2)
RBC # FLD: 14.5 % — SIGNIFICANT CHANGE UP (ref 10.3–14.5)
RH IG SCN BLD-IMP: POSITIVE — SIGNIFICANT CHANGE UP
SODIUM SERPL-SCNC: 142 MMOL/L — SIGNIFICANT CHANGE UP (ref 135–145)
WBC # BLD: 8.08 K/UL — SIGNIFICANT CHANGE UP (ref 3.8–10.5)
WBC # FLD AUTO: 8.08 K/UL — SIGNIFICANT CHANGE UP (ref 3.8–10.5)

## 2018-02-15 RX ORDER — SODIUM CHLORIDE 9 MG/ML
1000 INJECTION, SOLUTION INTRAVENOUS
Qty: 0 | Refills: 0 | Status: DISCONTINUED | OUTPATIENT
Start: 2018-02-27 | End: 2018-02-28

## 2018-02-15 NOTE — H&P PST ADULT - GASTROINTESTINAL DETAILS
no masses palpable/no guarding/no rebound tenderness/soft/no rigidity/nontender/no distention/bowel sounds normal

## 2018-02-15 NOTE — H&P PST ADULT - MUSCULOSKELETAL
details… detailed exam ROM intact/normal strength/no joint warmth/no calf tenderness/no joint swelling/no joint erythema

## 2018-02-15 NOTE — H&P PST ADULT - HISTORY OF PRESENT ILLNESS
67 year old female presents to presurgical testing with diagnosis of spinal stenosis, cervical region scheduled for C3-4 cervical decompression fusion for 2/27/18. Pt reports chronic cervical spinal pain, complaining of bilateral upper extremities numbness, tingling. Pt reports symptoms are limiting quality of life, and are persistent despite conservative management. Surgery was cancelled on 12/12/17 due to "monitoring problem" as per Dr Weber. Pt denies changes in health since last pretesting visit.

## 2018-02-15 NOTE — H&P PST ADULT - RS GEN PE MLT RESP DETAILS PC
good air movement/no chest wall tenderness/no intercostal retractions/no wheezes/no rhonchi/breath sounds equal/no rales/respirations non-labored/clear to auscultation bilaterally/airway patent

## 2018-02-15 NOTE — H&P PST ADULT - PROBLEM SELECTOR PLAN 3
Instructed to take cozaar and losartan on the morning of procedure with a sip of water. Cardiac clearance in chart, with echo and stress test.

## 2018-02-15 NOTE — H&P PST ADULT - NEGATIVE GENERAL SYMPTOMS
no weight loss/no sweating/no anorexia/no malaise/no fatigue/no polydipsia/no chills/no weight gain/no fever/no polyphagia/no polyuria

## 2018-02-15 NOTE — H&P PST ADULT - PMH
Asthma    Carpal tunnel syndrome on left    Cataracts, bilateral    Cervical disc disease  December 2017  Depression  never hospitalized  Gastric ulcer    Glaucoma    Hypercholesterolemia    Hypertension    Lumbar stenosis    Obesity    Prediabetes  diagnosed in early 2017, on diet control  Seasonal allergies    Sleep apnea  supposed to use device at night but doesn't  Spinal stenosis in cervical region

## 2018-02-15 NOTE — H&P PST ADULT - NEGATIVE OPHTHALMOLOGIC SYMPTOMS
no pain R/no photophobia/no loss of vision L/no blurred vision L/no diplopia/no blurred vision R/no discharge L/no pain L/no discharge R/no loss of vision R

## 2018-02-15 NOTE — H&P PST ADULT - NEGATIVE ENMT SYMPTOMS
no hearing difficulty/no vertigo/no nasal congestion/no sinus symptoms/no nose bleeds/no throat pain/no ear pain/no tinnitus/no dysphagia

## 2018-02-15 NOTE — H&P PST ADULT - PROBLEM SELECTOR PLAN 1
Pt is scheduled for C3-4 cervical decompression fusion for 2/27/18. Preop instructions, surgical scrub provided. Pt will take own omeprazole for GI prophylaxis. Pt stated understanding. Pending medical evaluation, pt has appt tomorrow 2/16/18.

## 2018-02-16 ENCOUNTER — APPOINTMENT (OUTPATIENT)
Dept: INTERNAL MEDICINE | Facility: CLINIC | Age: 68
End: 2018-02-16
Payer: MEDICARE

## 2018-02-16 VITALS
HEART RATE: 101 BPM | WEIGHT: 215 LBS | HEIGHT: 62 IN | SYSTOLIC BLOOD PRESSURE: 157 MMHG | BODY MASS INDEX: 39.56 KG/M2 | DIASTOLIC BLOOD PRESSURE: 96 MMHG

## 2018-02-16 VITALS — SYSTOLIC BLOOD PRESSURE: 152 MMHG | DIASTOLIC BLOOD PRESSURE: 90 MMHG

## 2018-02-16 LAB
BILIRUB UR QL STRIP: NORMAL
CLARITY UR: CLEAR
COLLECTION METHOD: NORMAL
GLUCOSE UR-MCNC: NORMAL
HCG UR QL: 0.2 EU/DL
HGB UR QL STRIP.AUTO: NORMAL
KETONES UR-MCNC: NORMAL
LEUKOCYTE ESTERASE UR QL STRIP: NORMAL
NITRITE UR QL STRIP: NORMAL
PH UR STRIP: 5.5
PROT UR STRIP-MCNC: NORMAL
SP GR UR STRIP: 1.02
SPECIMEN SOURCE: SIGNIFICANT CHANGE UP

## 2018-02-16 PROCEDURE — 99214 OFFICE O/P EST MOD 30 MIN: CPT | Mod: 25

## 2018-02-16 PROCEDURE — 81003 URINALYSIS AUTO W/O SCOPE: CPT | Mod: QW

## 2018-02-16 RX ORDER — KETOROLAC TROMETHAMINE 10 MG/1
10 TABLET, FILM COATED ORAL 3 TIMES DAILY
Qty: 60 | Refills: 0 | Status: DISCONTINUED | COMMUNITY
Start: 2018-01-31 | End: 2018-02-16

## 2018-02-16 RX ORDER — KETOROLAC TROMETHAMINE 10 MG/1
10 TABLET, FILM COATED ORAL 3 TIMES DAILY
Qty: 15 | Refills: 0 | Status: DISCONTINUED | COMMUNITY
Start: 2018-01-31 | End: 2018-02-16

## 2018-02-17 LAB — BACTERIA NPH CULT: SIGNIFICANT CHANGE UP

## 2018-02-27 ENCOUNTER — INPATIENT (INPATIENT)
Facility: HOSPITAL | Age: 68
LOS: 5 days | Discharge: NURSING, REHAB OR LONG-TERM CARE | End: 2018-03-05
Attending: ORTHOPAEDIC SURGERY | Admitting: ORTHOPAEDIC SURGERY
Payer: MEDICARE

## 2018-02-27 ENCOUNTER — APPOINTMENT (OUTPATIENT)
Dept: ORTHOPEDIC SURGERY | Facility: HOSPITAL | Age: 68
End: 2018-02-27

## 2018-02-27 VITALS
DIASTOLIC BLOOD PRESSURE: 66 MMHG | TEMPERATURE: 100 F | OXYGEN SATURATION: 96 % | RESPIRATION RATE: 19 BRPM | SYSTOLIC BLOOD PRESSURE: 125 MMHG | HEART RATE: 81 BPM | WEIGHT: 220.02 LBS | HEIGHT: 62 IN

## 2018-02-27 DIAGNOSIS — M12.9 ARTHROPATHY, UNSPECIFIED: Chronic | ICD-10-CM

## 2018-02-27 DIAGNOSIS — Z90.89 ACQUIRED ABSENCE OF OTHER ORGANS: Chronic | ICD-10-CM

## 2018-02-27 DIAGNOSIS — M48.02 SPINAL STENOSIS, CERVICAL REGION: ICD-10-CM

## 2018-02-27 LAB
BASE EXCESS BLDA CALC-SCNC: 4.1 MMOL/L — SIGNIFICANT CHANGE UP
BASE EXCESS BLDA CALC-SCNC: 4.4 MMOL/L — SIGNIFICANT CHANGE UP
BASOPHILS # BLD AUTO: 0.02 K/UL — SIGNIFICANT CHANGE UP (ref 0–0.2)
BASOPHILS NFR BLD AUTO: 0.2 % — SIGNIFICANT CHANGE UP (ref 0–2)
BUN SERPL-MCNC: 12 MG/DL — SIGNIFICANT CHANGE UP (ref 7–23)
CA-I BLDA-SCNC: 1.15 MMOL/L — SIGNIFICANT CHANGE UP (ref 1.15–1.29)
CA-I BLDA-SCNC: 1.16 MMOL/L — SIGNIFICANT CHANGE UP (ref 1.15–1.29)
CALCIUM SERPL-MCNC: 8.6 MG/DL — SIGNIFICANT CHANGE UP (ref 8.4–10.5)
CHLORIDE SERPL-SCNC: 100 MMOL/L — SIGNIFICANT CHANGE UP (ref 98–107)
CO2 SERPL-SCNC: 26 MMOL/L — SIGNIFICANT CHANGE UP (ref 22–31)
CREAT SERPL-MCNC: 0.81 MG/DL — SIGNIFICANT CHANGE UP (ref 0.5–1.3)
EOSINOPHIL # BLD AUTO: 0.04 K/UL — SIGNIFICANT CHANGE UP (ref 0–0.5)
EOSINOPHIL NFR BLD AUTO: 0.4 % — SIGNIFICANT CHANGE UP (ref 0–6)
GLUCOSE BLDA-MCNC: 95 MG/DL — SIGNIFICANT CHANGE UP (ref 70–99)
GLUCOSE BLDA-MCNC: 99 MG/DL — SIGNIFICANT CHANGE UP (ref 70–99)
GLUCOSE BLDC GLUCOMTR-MCNC: 85 MG/DL — SIGNIFICANT CHANGE UP (ref 70–99)
GLUCOSE SERPL-MCNC: 115 MG/DL — HIGH (ref 70–99)
HCO3 BLDA-SCNC: 28 MMOL/L — HIGH (ref 22–26)
HCO3 BLDA-SCNC: 29 MMOL/L — HIGH (ref 22–26)
HCT VFR BLD CALC: 36 % — SIGNIFICANT CHANGE UP (ref 34.5–45)
HCT VFR BLDA CALC: 32.5 % — LOW (ref 34.5–46.5)
HCT VFR BLDA CALC: 33.2 % — LOW (ref 34.5–46.5)
HGB BLD-MCNC: 11.7 G/DL — SIGNIFICANT CHANGE UP (ref 11.5–15.5)
HGB BLDA-MCNC: 10.5 G/DL — LOW (ref 11.5–15.5)
HGB BLDA-MCNC: 10.8 G/DL — LOW (ref 11.5–15.5)
IMM GRANULOCYTES # BLD AUTO: 0.03 # — SIGNIFICANT CHANGE UP
IMM GRANULOCYTES NFR BLD AUTO: 0.3 % — SIGNIFICANT CHANGE UP (ref 0–1.5)
LYMPHOCYTES # BLD AUTO: 1.68 K/UL — SIGNIFICANT CHANGE UP (ref 1–3.3)
LYMPHOCYTES # BLD AUTO: 17.9 % — SIGNIFICANT CHANGE UP (ref 13–44)
MCHC RBC-ENTMCNC: 30.3 PG — SIGNIFICANT CHANGE UP (ref 27–34)
MCHC RBC-ENTMCNC: 32.5 % — SIGNIFICANT CHANGE UP (ref 32–36)
MCV RBC AUTO: 93.3 FL — SIGNIFICANT CHANGE UP (ref 80–100)
MONOCYTES # BLD AUTO: 0.28 K/UL — SIGNIFICANT CHANGE UP (ref 0–0.9)
MONOCYTES NFR BLD AUTO: 3 % — SIGNIFICANT CHANGE UP (ref 2–14)
NEUTROPHILS # BLD AUTO: 7.32 K/UL — SIGNIFICANT CHANGE UP (ref 1.8–7.4)
NEUTROPHILS NFR BLD AUTO: 78.2 % — HIGH (ref 43–77)
NRBC # FLD: 0 — SIGNIFICANT CHANGE UP
PCO2 BLDA: 35 MMHG — SIGNIFICANT CHANGE UP (ref 32–48)
PCO2 BLDA: 43 MMHG — SIGNIFICANT CHANGE UP (ref 32–48)
PH BLDA: 7.44 PH — SIGNIFICANT CHANGE UP (ref 7.35–7.45)
PH BLDA: 7.51 PH — HIGH (ref 7.35–7.45)
PLATELET # BLD AUTO: 227 K/UL — SIGNIFICANT CHANGE UP (ref 150–400)
PMV BLD: 10.2 FL — SIGNIFICANT CHANGE UP (ref 7–13)
PO2 BLDA: 377 MMHG — HIGH (ref 83–108)
PO2 BLDA: 406 MMHG — HIGH (ref 83–108)
POTASSIUM BLDA-SCNC: 3.5 MMOL/L — SIGNIFICANT CHANGE UP (ref 3.4–4.5)
POTASSIUM BLDA-SCNC: 3.7 MMOL/L — SIGNIFICANT CHANGE UP (ref 3.4–4.5)
POTASSIUM SERPL-MCNC: 3.9 MMOL/L — SIGNIFICANT CHANGE UP (ref 3.5–5.3)
POTASSIUM SERPL-SCNC: 3.9 MMOL/L — SIGNIFICANT CHANGE UP (ref 3.5–5.3)
RBC # BLD: 3.86 M/UL — SIGNIFICANT CHANGE UP (ref 3.8–5.2)
RBC # FLD: 14.5 % — SIGNIFICANT CHANGE UP (ref 10.3–14.5)
SAO2 % BLDA: 100 % — HIGH (ref 95–99)
SAO2 % BLDA: 99.8 % — HIGH (ref 95–99)
SODIUM BLDA-SCNC: 138 MMOL/L — SIGNIFICANT CHANGE UP (ref 136–146)
SODIUM BLDA-SCNC: 139 MMOL/L — SIGNIFICANT CHANGE UP (ref 136–146)
SODIUM SERPL-SCNC: 139 MMOL/L — SIGNIFICANT CHANGE UP (ref 135–145)
WBC # BLD: 9.2 K/UL — SIGNIFICANT CHANGE UP (ref 3.8–10.5)
WBC # FLD AUTO: 9.2 K/UL — SIGNIFICANT CHANGE UP (ref 3.8–10.5)

## 2018-02-27 PROCEDURE — 63045 LAM FACETEC & FORAMOT CRV: CPT

## 2018-02-27 PROCEDURE — 63045 LAM FACETEC & FORAMOT CRV: CPT | Mod: 82

## 2018-02-27 PROCEDURE — 22842 INSERT SPINE FIXATION DEVICE: CPT | Mod: 82

## 2018-02-27 PROCEDURE — 22600 ARTHRD PST TQ 1NTRSPC CRV: CPT | Mod: 82

## 2018-02-27 PROCEDURE — 63048 LAM FACETEC &FORAMOT EA ADDL: CPT | Mod: 82

## 2018-02-27 PROCEDURE — 63048 LAM FACETEC &FORAMOT EA ADDL: CPT

## 2018-02-27 PROCEDURE — 22600 ARTHRD PST TQ 1NTRSPC CRV: CPT

## 2018-02-27 PROCEDURE — 22614 ARTHRD PST TQ 1NTRSPC EA ADD: CPT | Mod: 82

## 2018-02-27 PROCEDURE — 70360 X-RAY EXAM OF NECK: CPT | Mod: 26

## 2018-02-27 PROCEDURE — 22842 INSERT SPINE FIXATION DEVICE: CPT

## 2018-02-27 PROCEDURE — 22614 ARTHRD PST TQ 1NTRSPC EA ADD: CPT

## 2018-02-27 RX ORDER — HYDROMORPHONE HYDROCHLORIDE 2 MG/ML
0.5 INJECTION INTRAMUSCULAR; INTRAVENOUS; SUBCUTANEOUS
Qty: 0 | Refills: 0 | Status: DISCONTINUED | OUTPATIENT
Start: 2018-02-27 | End: 2018-02-28

## 2018-02-27 RX ORDER — OXYCODONE HYDROCHLORIDE 5 MG/1
5 TABLET ORAL
Qty: 0 | Refills: 0 | Status: DISCONTINUED | OUTPATIENT
Start: 2018-02-27 | End: 2018-02-28

## 2018-02-27 RX ORDER — DULOXETINE HYDROCHLORIDE 30 MG/1
60 CAPSULE, DELAYED RELEASE ORAL DAILY
Qty: 0 | Refills: 0 | Status: DISCONTINUED | OUTPATIENT
Start: 2018-02-27 | End: 2018-03-05

## 2018-02-27 RX ORDER — DOCUSATE SODIUM 100 MG
100 CAPSULE ORAL THREE TIMES A DAY
Qty: 0 | Refills: 0 | Status: DISCONTINUED | OUTPATIENT
Start: 2018-02-27 | End: 2018-03-05

## 2018-02-27 RX ORDER — HYDROMORPHONE HYDROCHLORIDE 2 MG/ML
1 INJECTION INTRAMUSCULAR; INTRAVENOUS; SUBCUTANEOUS EVERY 4 HOURS
Qty: 0 | Refills: 0 | Status: DISCONTINUED | OUTPATIENT
Start: 2018-02-27 | End: 2018-02-28

## 2018-02-27 RX ORDER — ACETAMINOPHEN 500 MG
650 TABLET ORAL EVERY 6 HOURS
Qty: 0 | Refills: 0 | Status: DISCONTINUED | OUTPATIENT
Start: 2018-02-27 | End: 2018-03-05

## 2018-02-27 RX ORDER — FAMOTIDINE 10 MG/ML
20 INJECTION INTRAVENOUS EVERY 12 HOURS
Qty: 0 | Refills: 0 | Status: DISCONTINUED | OUTPATIENT
Start: 2018-02-27 | End: 2018-02-28

## 2018-02-27 RX ORDER — SENNA PLUS 8.6 MG/1
2 TABLET ORAL AT BEDTIME
Qty: 0 | Refills: 0 | Status: DISCONTINUED | OUTPATIENT
Start: 2018-02-27 | End: 2018-03-05

## 2018-02-27 RX ORDER — HYDROMORPHONE HYDROCHLORIDE 2 MG/ML
30 INJECTION INTRAMUSCULAR; INTRAVENOUS; SUBCUTANEOUS
Qty: 0 | Refills: 0 | Status: DISCONTINUED | OUTPATIENT
Start: 2018-02-27 | End: 2018-02-28

## 2018-02-27 RX ORDER — SODIUM CHLORIDE 9 MG/ML
1000 INJECTION INTRAMUSCULAR; INTRAVENOUS; SUBCUTANEOUS
Qty: 0 | Refills: 0 | Status: DISCONTINUED | OUTPATIENT
Start: 2018-02-27 | End: 2018-02-28

## 2018-02-27 RX ORDER — ONDANSETRON 8 MG/1
4 TABLET, FILM COATED ORAL ONCE
Qty: 0 | Refills: 0 | Status: DISCONTINUED | OUTPATIENT
Start: 2018-02-27 | End: 2018-02-27

## 2018-02-27 RX ORDER — ALBUTEROL 90 UG/1
2 AEROSOL, METERED ORAL EVERY 6 HOURS
Qty: 0 | Refills: 0 | Status: DISCONTINUED | OUTPATIENT
Start: 2018-02-27 | End: 2018-03-01

## 2018-02-27 RX ORDER — BUDESONIDE AND FORMOTEROL FUMARATE DIHYDRATE 160; 4.5 UG/1; UG/1
2 AEROSOL RESPIRATORY (INHALATION)
Qty: 0 | Refills: 0 | Status: DISCONTINUED | OUTPATIENT
Start: 2018-02-27 | End: 2018-03-05

## 2018-02-27 RX ORDER — HYDROCHLOROTHIAZIDE 25 MG
12.5 TABLET ORAL DAILY
Qty: 0 | Refills: 0 | Status: DISCONTINUED | OUTPATIENT
Start: 2018-02-27 | End: 2018-03-02

## 2018-02-27 RX ORDER — LOSARTAN POTASSIUM 100 MG/1
50 TABLET, FILM COATED ORAL DAILY
Qty: 0 | Refills: 0 | Status: DISCONTINUED | OUTPATIENT
Start: 2018-02-27 | End: 2018-03-05

## 2018-02-27 RX ORDER — FLUTICASONE PROPIONATE 220 MCG
1 AEROSOL WITH ADAPTER (GRAM) INHALATION
Qty: 0 | Refills: 0 | Status: DISCONTINUED | OUTPATIENT
Start: 2018-02-27 | End: 2018-03-05

## 2018-02-27 RX ORDER — LOSARTAN POTASSIUM 100 MG/1
1 TABLET, FILM COATED ORAL
Qty: 0 | Refills: 0 | COMMUNITY

## 2018-02-27 RX ORDER — DIAZEPAM 5 MG
5 TABLET ORAL EVERY 12 HOURS
Qty: 0 | Refills: 0 | Status: DISCONTINUED | OUTPATIENT
Start: 2018-02-27 | End: 2018-03-05

## 2018-02-27 RX ORDER — ONDANSETRON 8 MG/1
4 TABLET, FILM COATED ORAL EVERY 6 HOURS
Qty: 0 | Refills: 0 | Status: DISCONTINUED | OUTPATIENT
Start: 2018-02-27 | End: 2018-03-05

## 2018-02-27 RX ORDER — OXYCODONE HYDROCHLORIDE 5 MG/1
10 TABLET ORAL
Qty: 0 | Refills: 0 | Status: DISCONTINUED | OUTPATIENT
Start: 2018-02-27 | End: 2018-02-28

## 2018-02-27 RX ORDER — ACETAMINOPHEN 500 MG
650 TABLET ORAL EVERY 6 HOURS
Qty: 0 | Refills: 0 | Status: DISCONTINUED | OUTPATIENT
Start: 2018-02-27 | End: 2018-02-28

## 2018-02-27 RX ORDER — ATORVASTATIN CALCIUM 80 MG/1
20 TABLET, FILM COATED ORAL AT BEDTIME
Qty: 0 | Refills: 0 | Status: DISCONTINUED | OUTPATIENT
Start: 2018-02-27 | End: 2018-03-05

## 2018-02-27 RX ORDER — DORZOLAMIDE HYDROCHLORIDE 20 MG/ML
1 SOLUTION/ DROPS OPHTHALMIC
Qty: 0 | Refills: 0 | Status: DISCONTINUED | OUTPATIENT
Start: 2018-02-27 | End: 2018-03-05

## 2018-02-27 RX ORDER — LORATADINE 10 MG/1
10 TABLET ORAL DAILY
Qty: 0 | Refills: 0 | Status: DISCONTINUED | OUTPATIENT
Start: 2018-02-27 | End: 2018-03-05

## 2018-02-27 RX ORDER — RANITIDINE HYDROCHLORIDE 150 MG/1
1 TABLET, FILM COATED ORAL
Qty: 0 | Refills: 0 | COMMUNITY

## 2018-02-27 RX ORDER — ONDANSETRON 8 MG/1
4 TABLET, FILM COATED ORAL EVERY 6 HOURS
Qty: 0 | Refills: 0 | Status: DISCONTINUED | OUTPATIENT
Start: 2018-02-27 | End: 2018-02-28

## 2018-02-27 RX ORDER — NALOXONE HYDROCHLORIDE 4 MG/.1ML
0.1 SPRAY NASAL
Qty: 0 | Refills: 0 | Status: DISCONTINUED | OUTPATIENT
Start: 2018-02-27 | End: 2018-02-28

## 2018-02-27 RX ORDER — FENTANYL CITRATE 50 UG/ML
25 INJECTION INTRAVENOUS
Qty: 0 | Refills: 0 | Status: DISCONTINUED | OUTPATIENT
Start: 2018-02-27 | End: 2018-02-27

## 2018-02-27 RX ORDER — MONTELUKAST 4 MG/1
10 TABLET, CHEWABLE ORAL AT BEDTIME
Qty: 0 | Refills: 0 | Status: DISCONTINUED | OUTPATIENT
Start: 2018-02-27 | End: 2018-03-05

## 2018-02-27 RX ORDER — HYDROMORPHONE HYDROCHLORIDE 2 MG/ML
0.5 INJECTION INTRAMUSCULAR; INTRAVENOUS; SUBCUTANEOUS
Qty: 0 | Refills: 0 | Status: DISCONTINUED | OUTPATIENT
Start: 2018-02-27 | End: 2018-02-27

## 2018-02-27 RX ORDER — LATANOPROST 0.05 MG/ML
1 SOLUTION/ DROPS OPHTHALMIC; TOPICAL AT BEDTIME
Qty: 0 | Refills: 0 | Status: DISCONTINUED | OUTPATIENT
Start: 2018-02-27 | End: 2018-03-05

## 2018-02-27 RX ORDER — CEFAZOLIN SODIUM 1 G
1000 VIAL (EA) INJECTION EVERY 8 HOURS
Qty: 0 | Refills: 0 | Status: COMPLETED | OUTPATIENT
Start: 2018-02-27 | End: 2018-02-28

## 2018-02-27 RX ADMIN — LATANOPROST 1 DROP(S): 0.05 SOLUTION/ DROPS OPHTHALMIC; TOPICAL at 22:49

## 2018-02-27 RX ADMIN — HYDROMORPHONE HYDROCHLORIDE 30 MILLILITER(S): 2 INJECTION INTRAMUSCULAR; INTRAVENOUS; SUBCUTANEOUS at 18:21

## 2018-02-27 RX ADMIN — Medication 1 PUFF(S): at 23:17

## 2018-02-27 RX ADMIN — HYDROMORPHONE HYDROCHLORIDE 0.5 MILLIGRAM(S): 2 INJECTION INTRAMUSCULAR; INTRAVENOUS; SUBCUTANEOUS at 18:21

## 2018-02-27 RX ADMIN — MONTELUKAST 10 MILLIGRAM(S): 4 TABLET, CHEWABLE ORAL at 23:44

## 2018-02-27 RX ADMIN — HYDROMORPHONE HYDROCHLORIDE 0.5 MILLIGRAM(S): 2 INJECTION INTRAMUSCULAR; INTRAVENOUS; SUBCUTANEOUS at 18:00

## 2018-02-27 RX ADMIN — HYDROMORPHONE HYDROCHLORIDE 0.5 MILLIGRAM(S): 2 INJECTION INTRAMUSCULAR; INTRAVENOUS; SUBCUTANEOUS at 17:48

## 2018-02-27 RX ADMIN — SODIUM CHLORIDE 100 MILLILITER(S): 9 INJECTION INTRAMUSCULAR; INTRAVENOUS; SUBCUTANEOUS at 17:48

## 2018-02-27 RX ADMIN — SODIUM CHLORIDE 100 MILLILITER(S): 9 INJECTION INTRAMUSCULAR; INTRAVENOUS; SUBCUTANEOUS at 21:46

## 2018-02-27 RX ADMIN — Medication 100 MILLIGRAM(S): at 22:49

## 2018-02-27 RX ADMIN — ATORVASTATIN CALCIUM 20 MILLIGRAM(S): 80 TABLET, FILM COATED ORAL at 22:49

## 2018-02-27 RX ADMIN — DORZOLAMIDE HYDROCHLORIDE 1 DROP(S): 20 SOLUTION/ DROPS OPHTHALMIC at 19:17

## 2018-02-27 RX ADMIN — SENNA PLUS 2 TABLET(S): 8.6 TABLET ORAL at 22:49

## 2018-02-27 RX ADMIN — HYDROMORPHONE HYDROCHLORIDE 30 MILLILITER(S): 2 INJECTION INTRAMUSCULAR; INTRAVENOUS; SUBCUTANEOUS at 21:37

## 2018-02-27 RX ADMIN — BUDESONIDE AND FORMOTEROL FUMARATE DIHYDRATE 2 PUFF(S): 160; 4.5 AEROSOL RESPIRATORY (INHALATION) at 20:30

## 2018-02-27 NOTE — BRIEF OPERATIVE NOTE - PROCEDURE
<<-----Click on this checkbox to enter Procedure Posterior cervical fusion with instrumentation  02/27/2018  C3-7  Active  ROB

## 2018-02-27 NOTE — ASU PREOP CHECKLIST - SELECT TESTS ORDERED
CBC/EKG/Nasal Culture/Type and Cross/Type and Screen/BMP CBC/Type and Screen/BMP/Nasal Culture/Type and Cross/POCT Blood Glucose/EKG

## 2018-02-28 ENCOUNTER — TRANSCRIPTION ENCOUNTER (OUTPATIENT)
Age: 68
End: 2018-02-28

## 2018-02-28 DIAGNOSIS — I10 ESSENTIAL (PRIMARY) HYPERTENSION: ICD-10-CM

## 2018-02-28 DIAGNOSIS — H40.9 UNSPECIFIED GLAUCOMA: ICD-10-CM

## 2018-02-28 DIAGNOSIS — R73.03 PREDIABETES: ICD-10-CM

## 2018-02-28 DIAGNOSIS — J45.909 UNSPECIFIED ASTHMA, UNCOMPLICATED: ICD-10-CM

## 2018-02-28 DIAGNOSIS — D72.829 ELEVATED WHITE BLOOD CELL COUNT, UNSPECIFIED: ICD-10-CM

## 2018-02-28 LAB
BASOPHILS # BLD AUTO: 0.03 K/UL — SIGNIFICANT CHANGE UP (ref 0–0.2)
BASOPHILS NFR BLD AUTO: 0.2 % — SIGNIFICANT CHANGE UP (ref 0–2)
BUN SERPL-MCNC: 13 MG/DL — SIGNIFICANT CHANGE UP (ref 7–23)
CALCIUM SERPL-MCNC: 8.5 MG/DL — SIGNIFICANT CHANGE UP (ref 8.4–10.5)
CHLORIDE SERPL-SCNC: 103 MMOL/L — SIGNIFICANT CHANGE UP (ref 98–107)
CO2 SERPL-SCNC: 22 MMOL/L — SIGNIFICANT CHANGE UP (ref 22–31)
CREAT SERPL-MCNC: 0.74 MG/DL — SIGNIFICANT CHANGE UP (ref 0.5–1.3)
EOSINOPHIL # BLD AUTO: 0 K/UL — SIGNIFICANT CHANGE UP (ref 0–0.5)
EOSINOPHIL NFR BLD AUTO: 0 % — SIGNIFICANT CHANGE UP (ref 0–6)
GLUCOSE SERPL-MCNC: 128 MG/DL — HIGH (ref 70–99)
HCT VFR BLD CALC: 38 % — SIGNIFICANT CHANGE UP (ref 34.5–45)
HGB BLD-MCNC: 11.5 G/DL — SIGNIFICANT CHANGE UP (ref 11.5–15.5)
IMM GRANULOCYTES # BLD AUTO: 0.11 # — SIGNIFICANT CHANGE UP
IMM GRANULOCYTES NFR BLD AUTO: 0.8 % — SIGNIFICANT CHANGE UP (ref 0–1.5)
LYMPHOCYTES # BLD AUTO: 0.92 K/UL — LOW (ref 1–3.3)
LYMPHOCYTES # BLD AUTO: 6.7 % — LOW (ref 13–44)
MCHC RBC-ENTMCNC: 28.5 PG — SIGNIFICANT CHANGE UP (ref 27–34)
MCHC RBC-ENTMCNC: 30.3 % — LOW (ref 32–36)
MCV RBC AUTO: 94.1 FL — SIGNIFICANT CHANGE UP (ref 80–100)
MONOCYTES # BLD AUTO: 0.68 K/UL — SIGNIFICANT CHANGE UP (ref 0–0.9)
MONOCYTES NFR BLD AUTO: 5 % — SIGNIFICANT CHANGE UP (ref 2–14)
NEUTROPHILS # BLD AUTO: 11.97 K/UL — HIGH (ref 1.8–7.4)
NEUTROPHILS NFR BLD AUTO: 87.3 % — HIGH (ref 43–77)
NRBC # FLD: 0 — SIGNIFICANT CHANGE UP
PLATELET # BLD AUTO: 168 K/UL — SIGNIFICANT CHANGE UP (ref 150–400)
PMV BLD: 11.1 FL — SIGNIFICANT CHANGE UP (ref 7–13)
POTASSIUM SERPL-MCNC: 4.7 MMOL/L — SIGNIFICANT CHANGE UP (ref 3.5–5.3)
POTASSIUM SERPL-SCNC: 4.7 MMOL/L — SIGNIFICANT CHANGE UP (ref 3.5–5.3)
RBC # BLD: 4.04 M/UL — SIGNIFICANT CHANGE UP (ref 3.8–5.2)
RBC # FLD: 14.5 % — SIGNIFICANT CHANGE UP (ref 10.3–14.5)
SODIUM SERPL-SCNC: 141 MMOL/L — SIGNIFICANT CHANGE UP (ref 135–145)
WBC # BLD: 13.71 K/UL — HIGH (ref 3.8–10.5)
WBC # FLD AUTO: 13.71 K/UL — HIGH (ref 3.8–10.5)

## 2018-02-28 PROCEDURE — 99223 1ST HOSP IP/OBS HIGH 75: CPT

## 2018-02-28 RX ORDER — OXYCODONE HYDROCHLORIDE 5 MG/1
5 TABLET ORAL
Qty: 0 | Refills: 0 | Status: DISCONTINUED | OUTPATIENT
Start: 2018-02-28 | End: 2018-03-02

## 2018-02-28 RX ORDER — HYDROMORPHONE HYDROCHLORIDE 2 MG/ML
1 INJECTION INTRAMUSCULAR; INTRAVENOUS; SUBCUTANEOUS EVERY 4 HOURS
Qty: 0 | Refills: 0 | Status: DISCONTINUED | OUTPATIENT
Start: 2018-02-28 | End: 2018-03-05

## 2018-02-28 RX ORDER — LACTOBACILLUS ACIDOPHILUS 100MM CELL
1 CAPSULE ORAL DAILY
Qty: 0 | Refills: 0 | Status: DISCONTINUED | OUTPATIENT
Start: 2018-02-28 | End: 2018-03-05

## 2018-02-28 RX ORDER — TIZANIDINE 4 MG/1
2 TABLET ORAL EVERY 6 HOURS
Qty: 0 | Refills: 0 | Status: COMPLETED | OUTPATIENT
Start: 2018-02-28 | End: 2018-03-02

## 2018-02-28 RX ORDER — ACETAMINOPHEN 500 MG
650 TABLET ORAL EVERY 6 HOURS
Qty: 0 | Refills: 0 | Status: COMPLETED | OUTPATIENT
Start: 2018-02-28 | End: 2018-03-02

## 2018-02-28 RX ORDER — FAMOTIDINE 10 MG/ML
20 INJECTION INTRAVENOUS
Qty: 0 | Refills: 0 | Status: DISCONTINUED | OUTPATIENT
Start: 2018-02-28 | End: 2018-03-05

## 2018-02-28 RX ORDER — OXYCODONE HYDROCHLORIDE 5 MG/1
10 TABLET ORAL
Qty: 0 | Refills: 0 | Status: DISCONTINUED | OUTPATIENT
Start: 2018-02-28 | End: 2018-03-02

## 2018-02-28 RX ORDER — ACETAMINOPHEN 500 MG
1000 TABLET ORAL ONCE
Qty: 0 | Refills: 0 | Status: COMPLETED | OUTPATIENT
Start: 2018-02-28 | End: 2018-02-28

## 2018-02-28 RX ADMIN — TIZANIDINE 2 MILLIGRAM(S): 4 TABLET ORAL at 23:55

## 2018-02-28 RX ADMIN — LATANOPROST 1 DROP(S): 0.05 SOLUTION/ DROPS OPHTHALMIC; TOPICAL at 21:27

## 2018-02-28 RX ADMIN — LORATADINE 10 MILLIGRAM(S): 10 TABLET ORAL at 12:05

## 2018-02-28 RX ADMIN — OXYCODONE HYDROCHLORIDE 10 MILLIGRAM(S): 5 TABLET ORAL at 16:59

## 2018-02-28 RX ADMIN — SENNA PLUS 2 TABLET(S): 8.6 TABLET ORAL at 21:26

## 2018-02-28 RX ADMIN — DORZOLAMIDE HYDROCHLORIDE 1 DROP(S): 20 SOLUTION/ DROPS OPHTHALMIC at 18:12

## 2018-02-28 RX ADMIN — Medication 100 MILLIGRAM(S): at 21:27

## 2018-02-28 RX ADMIN — FAMOTIDINE 20 MILLIGRAM(S): 10 INJECTION INTRAVENOUS at 18:11

## 2018-02-28 RX ADMIN — BUDESONIDE AND FORMOTEROL FUMARATE DIHYDRATE 2 PUFF(S): 160; 4.5 AEROSOL RESPIRATORY (INHALATION) at 08:52

## 2018-02-28 RX ADMIN — Medication 650 MILLIGRAM(S): at 18:11

## 2018-02-28 RX ADMIN — HYDROMORPHONE HYDROCHLORIDE 0.5 MILLIGRAM(S): 2 INJECTION INTRAMUSCULAR; INTRAVENOUS; SUBCUTANEOUS at 04:03

## 2018-02-28 RX ADMIN — Medication 12.5 MILLIGRAM(S): at 05:11

## 2018-02-28 RX ADMIN — Medication 650 MILLIGRAM(S): at 23:55

## 2018-02-28 RX ADMIN — Medication 1 PUFF(S): at 21:26

## 2018-02-28 RX ADMIN — Medication 100 MILLIGRAM(S): at 05:01

## 2018-02-28 RX ADMIN — OXYCODONE HYDROCHLORIDE 10 MILLIGRAM(S): 5 TABLET ORAL at 14:18

## 2018-02-28 RX ADMIN — Medication 1 TABLET(S): at 12:05

## 2018-02-28 RX ADMIN — BUDESONIDE AND FORMOTEROL FUMARATE DIHYDRATE 2 PUFF(S): 160; 4.5 AEROSOL RESPIRATORY (INHALATION) at 21:26

## 2018-02-28 RX ADMIN — DORZOLAMIDE HYDROCHLORIDE 1 DROP(S): 20 SOLUTION/ DROPS OPHTHALMIC at 05:11

## 2018-02-28 RX ADMIN — Medication 100 MILLIGRAM(S): at 22:56

## 2018-02-28 RX ADMIN — Medication 650 MILLIGRAM(S): at 18:12

## 2018-02-28 RX ADMIN — MONTELUKAST 10 MILLIGRAM(S): 4 TABLET, CHEWABLE ORAL at 21:26

## 2018-02-28 RX ADMIN — OXYCODONE HYDROCHLORIDE 10 MILLIGRAM(S): 5 TABLET ORAL at 17:54

## 2018-02-28 RX ADMIN — TIZANIDINE 2 MILLIGRAM(S): 4 TABLET ORAL at 18:11

## 2018-02-28 RX ADMIN — OXYCODONE HYDROCHLORIDE 10 MILLIGRAM(S): 5 TABLET ORAL at 10:39

## 2018-02-28 RX ADMIN — ATORVASTATIN CALCIUM 20 MILLIGRAM(S): 80 TABLET, FILM COATED ORAL at 21:27

## 2018-02-28 RX ADMIN — OXYCODONE HYDROCHLORIDE 10 MILLIGRAM(S): 5 TABLET ORAL at 21:27

## 2018-02-28 RX ADMIN — LOSARTAN POTASSIUM 50 MILLIGRAM(S): 100 TABLET, FILM COATED ORAL at 05:11

## 2018-02-28 RX ADMIN — Medication 1000 MILLIGRAM(S): at 11:30

## 2018-02-28 RX ADMIN — Medication 100 MILLIGRAM(S): at 05:11

## 2018-02-28 RX ADMIN — Medication 1 PUFF(S): at 08:53

## 2018-02-28 RX ADMIN — TIZANIDINE 2 MILLIGRAM(S): 4 TABLET ORAL at 12:05

## 2018-02-28 RX ADMIN — Medication 100 MILLIGRAM(S): at 13:45

## 2018-02-28 RX ADMIN — OXYCODONE HYDROCHLORIDE 10 MILLIGRAM(S): 5 TABLET ORAL at 13:53

## 2018-02-28 RX ADMIN — Medication 5 MILLIGRAM(S): at 19:51

## 2018-02-28 RX ADMIN — DULOXETINE HYDROCHLORIDE 60 MILLIGRAM(S): 30 CAPSULE, DELAYED RELEASE ORAL at 12:05

## 2018-02-28 RX ADMIN — OXYCODONE HYDROCHLORIDE 10 MILLIGRAM(S): 5 TABLET ORAL at 21:57

## 2018-02-28 RX ADMIN — SODIUM CHLORIDE 100 MILLILITER(S): 9 INJECTION INTRAMUSCULAR; INTRAVENOUS; SUBCUTANEOUS at 08:51

## 2018-02-28 RX ADMIN — OXYCODONE HYDROCHLORIDE 10 MILLIGRAM(S): 5 TABLET ORAL at 11:30

## 2018-02-28 RX ADMIN — Medication 400 MILLIGRAM(S): at 10:39

## 2018-02-28 RX ADMIN — HYDROMORPHONE HYDROCHLORIDE 30 MILLILITER(S): 2 INJECTION INTRAMUSCULAR; INTRAVENOUS; SUBCUTANEOUS at 07:29

## 2018-02-28 NOTE — DISCHARGE NOTE ADULT - INSTRUCTIONS
Make a follow up appointment with Dr. Weber. Call MD if you develop a fever, or if there is redness, swelling, drainage or pain not relieved by pain medication. No heavy lifting, bending, or straining to move your bowels. Take over the counter stool softeners as needed to prevent constipation which may be caused by pain medication. resume same diet as prior to surgery

## 2018-02-28 NOTE — PROGRESS NOTE ADULT - SUBJECTIVE AND OBJECTIVE BOX
Day _2__ of Anesthesia Pain Management Service    SUBJECTIVE:    Therapy:	  [ x] IV PCA	   [ ] Epidural           [ ] s/p Spinal Opoid              [ ] Postpartum infusion	  [ ] Patient controlled regional anesthesia (PCRA)    [ ] prn Analgesics    OBJECTIVE:   [x ] No new signs     [ ] Other:    Side Effects:  [ x] None			[ ] Other:    Assessment of Catheter Site:		[ ] Intact		[ ] Other:    ASSESSMENT/PLAN  [ ] Continue current therapy    [ x] Therapy changed to:    [ ] IV PCA       [ ] Epidural     [x ] prn Analgesics     Comments:

## 2018-02-28 NOTE — DISCHARGE NOTE ADULT - CARE PROVIDERS DIRECT ADDRESSES
[email or web address]

## 2018-02-28 NOTE — PHYSICAL THERAPY INITIAL EVALUATION ADULT - GENERAL OBSERVATIONS, REHAB EVAL
Patient received supine in bed; No apparent distress. (+0 pulse Ox, IV fluids, cervical collar donned

## 2018-02-28 NOTE — DISCHARGE NOTE ADULT - HOSPITAL COURSE
66 yo is s/p  above without any intraoperative complications.  Pt is weight bear as tolerated , doing well and stable for discharge home. call surgeon's office one week to make appt. D/C sutures/staples POD 14 66yo female is s/p C3-7 posterior spinal fusion 2/27/2018 without any intraoperative complications.  Patient is stable for discharge as per surgeon.  Patient is tolerating Physical Therapy: weight bearing as tolerated, gait training, and no heavy lifting or bending.  Aspen neck collar to be worn when out of bed.  If applicable, Staples/sutures to be removed on post op day #14 in surgeon's office.  NO ADVIL, ASPIRIN, MOTRIN, IBUPROFEN until instructed otherwise by surgeon.  Follow up with Dr. Weber's office in 1week. 66yo female is s/p C3-7 posterior spinal fusion 2/27/2018 without any intraoperative complications.  Patient is stable for discharge as per surgeon.  Patient is tolerating Physical Therapy: weight bearing as tolerated, gait training, and no heavy lifting or bending.  Aspen neck collar to be worn when out of bed.  If applicable, Staples/sutures to be removed on post op day #14 in surgeon's office.  NO ADVIL, ASPIRIN, MOTRIN, IBUPROFEN until instructed otherwise by surgeon.  Follow up with Dr. Weber's office in 1week.   follow up with your PMD for general continuity of care and management and because medications have been adjusted.  hydrochlorthiazide held by medical team due to low PO intake. losartan dose lowered in setting of lower BP post op.  resume patient home dose of 75mg if BP increases persistently.

## 2018-02-28 NOTE — DISCHARGE NOTE ADULT - MEDICATION SUMMARY - MEDICATIONS TO CHANGE
I will SWITCH the dose or number of times a day I take the medications listed below when I get home from the hospital:    Cozaar  -- 75 milligram(s) by mouth once a day

## 2018-02-28 NOTE — PROGRESS NOTE ADULT - SUBJECTIVE AND OBJECTIVE BOX
Day _2_ of Anesthesia Pain Management Service    Allergies  clindamycin (Other)  clindamycin (Unknown)  strawberry (Rash)      SUBJECTIVE: "I'm in a lot of pain. Pressing the button helps, but only for a short period of time."    Pain Scale Score	At rest: _9/10_ 	With Activity: ___ 	[ ] Refer to charted pain scores    THERAPY:    [ ] IV PCA Morphine		[ ] 5 mg/mL	[ ] 1 mg/mL  [X] IV PCA Hydromorphone	[ ] 5 mg/mL	[X] 1 mg/mL  [ ] IV PCA Fentanyl		[ ] 50 micrograms/mL    Demand dose _0.2mg_ lockout _6_ (minutes) Continuous Rate _0_ Total: _4.3mg_  Daily      MEDICATIONS  (STANDING):  acetaminophen   Tablet. 650 milliGRAM(s) Oral every 6 hours  acetaminophen  IVPB. 1000 milliGRAM(s) IV Intermittent once  atorvastatin 20 milliGRAM(s) Oral at bedtime  buDESOnide 160 MICROgram(s)/formoterol 4.5 MICROgram(s) Inhaler 2 Puff(s) Inhalation two times a day  docusate sodium 100 milliGRAM(s) Oral three times a day  dorzolamide 2% Ophthalmic Solution 1 Drop(s) Both EYES two times a day  DULoxetine 60 milliGRAM(s) Oral daily  famotidine    Tablet 20 milliGRAM(s) Oral two times a day  fluticasone propionate   44 MICROgram(s) HFA Inhaler 1 Puff(s) Inhalation two times a day  hydrochlorothiazide 12.5 milliGRAM(s) Oral daily  lactobacillus acidophilus 1 Tablet(s) Oral daily  latanoprost 0.005% Ophthalmic Solution 1 Drop(s) Both EYES at bedtime  loratadine 10 milliGRAM(s) Oral daily  losartan 50 milliGRAM(s) Oral daily  montelukast 10 milliGRAM(s) Oral at bedtime  senna 2 Tablet(s) Oral at bedtime  sodium chloride 0.9%. 1000 milliLiter(s) (100 mL/Hr) IV Continuous <Continuous>  tiZANidine 2 milliGRAM(s) Oral every 6 hours    MEDICATIONS  (PRN):  acetaminophen   Tablet 650 milliGRAM(s) Oral every 6 hours PRN For Temp greater than 38 C (100.4 F)  ALBUTerol    90 MICROgram(s) HFA Inhaler 2 Puff(s) Inhalation every 6 hours PRN Shortness of Breath and/or Wheezing  aluminum hydroxide/magnesium hydroxide/simethicone Suspension 30 milliLiter(s) Oral every 12 hours PRN Indigestion  diazepam    Tablet 5 milliGRAM(s) Oral every 12 hours PRN Anxiety  HYDROmorphone  Injectable 1 milliGRAM(s) IV Push every 4 hours PRN Severe Pain unrelieved by Oxycodone IR  ondansetron Injectable 4 milliGRAM(s) IV Push every 6 hours PRN Nausea  oxyCODONE    IR 5 milliGRAM(s) Oral every 3 hours PRN Moderate Pain (4 - 6)  oxyCODONE    IR 10 milliGRAM(s) Oral every 3 hours PRN Severe Pain (7 - 10)      OBJECTIVE: A&Ox3, NAD, sitting up in bed    Sedation Score:	[X] Alert	[ ] Drowsy	[ ] Arousable	[ ] Asleep	[ ] Unresponsive    Side Effects:	[X] None	[ ] Nausea	[ ] Vomiting	[ ] Pruritus  		  [ ] Weakness		[ ] Numbness	[ ] Other:                              11.5   13.71 )-----------( 168      ( 28 Feb 2018 05:30 )             38.0       02-28    141  |  103  |  13  ----------------------------<  128<H>  4.7   |  22  |  0.74    Ca    8.5      28 Feb 2018 05:30        ASSESSMENT/ PLAN    Therapy to  be:	[] Continue   [x] Discontinued   [x] Change to prn Analgesics    Documentation and Verification of current medications:  [X] Done	[ ] Not done, not eligible  [ ] Not done, reason not given    Comments:  IV Tylenol x 1 dose now. Oral Tylenol q6hrs thereafter.  Oxycodone PRN analgesia. 1st dose now; please offer q4hrs thereafter. Patient may refuse.  Tizanidine q6hrs x 2 days for spasms, PRN thereafter  IV Dilaudid PRN severe pain.

## 2018-02-28 NOTE — DISCHARGE NOTE ADULT - CONDITIONS AT DISCHARGE
stable stable. Pt. is afebrile and offers no complaints. In no acute distress. Neck dressing with collar: clean, dry and intact. Pt is ambulating with a walker, tolerating diet well, and voiding in adequate amounts.

## 2018-02-28 NOTE — DISCHARGE NOTE ADULT - MEDICATION SUMMARY - MEDICATIONS TO STOP TAKING
I will STOP taking the medications listed below when I get home from the hospital:    Lodine 500 mg oral tablet  -- 1 tab(s) by mouth 2 times a day last dose 2/15/18    hydroCHLOROthiazide 12.5 mg oral capsule  -- 1 cap(s) by mouth once a day    Cod Liver oral oil  -- orally once a day last dose 2/15/18    methylPREDNISolone 4 mg oral tablet  -- 1 tab(s) orally  tapered dose    Tylenol with Codeine #3 oral tablet  -- 1 tab(s) by mouth every 6 hours, As Needed for pain

## 2018-02-28 NOTE — PATIENT PROFILE ADULT. - ASSIST WITH
HISTORY OF PRESENT ILLNESS  Brenda Torres is a 47 y.o. female.   HPI        ROS    Physical Exam    ASSESSMENT and PLAN  {ASSESSMENT/PLAN:99554} walking/toileting/standing

## 2018-02-28 NOTE — OCCUPATIONAL THERAPY INITIAL EVALUATION ADULT - PLANNED THERAPY INTERVENTIONS, OT EVAL
balance training/motor coordination training/parent/caregiver training.../transfer training/ADL retraining/bed mobility training/strengthening

## 2018-02-28 NOTE — PROGRESS NOTE ADULT - SUBJECTIVE AND OBJECTIVE BOX
ANESTHESIA POSTOP CHECK    67y Female POSTOP DAY 1 S/P   [x ] General Anesthesia  [ ] Gaetano Anesthesia  [ ] MAC    Vital Signs Last 24 Hrs  T(C): 36.6 (28 Feb 2018 12:11), Max: 37 (27 Feb 2018 18:00)  T(F): 97.9 (28 Feb 2018 12:11), Max: 98.6 (27 Feb 2018 18:00)  HR: 101 (28 Feb 2018 12:11) (92 - 103)  BP: 135/65 (28 Feb 2018 12:11) (130/72 - 167/83)  BP(mean): 80 (28 Feb 2018 12:11) (80 - 86)  RR: 16 (28 Feb 2018 12:11) (10 - 18)  SpO2: 96% (28 Feb 2018 12:11) (96% - 100%)  I&O's Summary    27 Feb 2018 07:01  -  28 Feb 2018 07:00  --------------------------------------------------------  IN: 1000 mL / OUT: 1320 mL / NET: -320 mL    28 Feb 2018 07:01  -  28 Feb 2018 14:37  --------------------------------------------------------  IN: 900 mL / OUT: 525 mL / NET: 375 mL        [x ] NO APPARENT ANESTHESIA COMPLICATIONS      Comments:

## 2018-02-28 NOTE — DISCHARGE NOTE ADULT - PATIENT PORTAL LINK FT
You can access the [facility] Patient Portal, offered by [facility], by registering with the following website: [email or web address]

## 2018-02-28 NOTE — CONSULT NOTE ADULT - PROBLEM SELECTOR RECOMMENDATION 3
BP slightly high  Continue current BP regimen  Ensure pain controlled  Will monitor and adjust meds prn

## 2018-02-28 NOTE — PROGRESS NOTE ADULT - SUBJECTIVE AND OBJECTIVE BOX
ORTHO/SPINE PA NOTE    SHABANA COLVIN 67yFemale was seen laying in bed comfortable in cervical collar   c/o incisional pain.  Overall doing well.  Denies any CP, SOB, HA's.    Neck-  dressing clean/dry/intact; Hemovac sewn and draining/serosanguinous  BUE- motor 5/5 delts, bicep, tricep, intrinsics  BLE- motor 5/5 quad, TA, EHL, GS          NVI distally and symmetrical    A/P-67y Female POD#1   s/p C3-7 PSIF  -Analgesia  -PT/OT/OOB ambulate  -cont IS  -cont DVT PPX  -TOV  -check labs  -F/u internal med recs  -D/C planning home with PT  -D/w [facility] ORTHO/SPINE PA NOTE    SHABANA COLVIN 67yFemale was seen laying in bed comfortable in cervical collar   c/o incisional pain.  Overall doing well.  Denies any CP, SOB, HA's.    Neck-  dressing clean/dry/intact; Hemovac sewn and draining/serosanguinous  BUE- motor 5/5 delts, bicep, tricep, intrinsics  BLE- motor 5/5 quad, TA, EHL, GS          NVI distally and symmetrical    A/P-67y Female POD#1   s/p C3-7 PSIF  -Analgesia  -PT/OT/OOB ambulate  -cont IS  -cont DVT PPX  -TOV  -check labs  -F/u internal med recs  -D/C planning home with PT  -D/w [facility]  Patient was seen and examined on rounds today, I discussed and I grew with the above, Dr. Skip Weber

## 2018-02-28 NOTE — OCCUPATIONAL THERAPY INITIAL EVALUATION ADULT - PERTINENT HX OF CURRENT PROBLEM, REHAB EVAL
67 year old female presents to presurgical testing with diagnosis of spinal stenosis, cervical region scheduled for C3-4 cervical decompression fusion for 2/27/18. Pt reports chronic cervical spinal pain, complaining of bilateral upper extremities numbness, tingling.

## 2018-02-28 NOTE — DISCHARGE NOTE ADULT - PLAN OF CARE
ambulation without pain weight bear as tolerated   resume same diet as prior to surgery   Dr Weber 1 week call for appt [phone] pain control-> pain med may cause drowsiness, refrain from activities require decision making; physical therapy to help resume activities of daily living follow up with Dr Weber call for appointment when discharged home [phone]  weight bearing as tolerated   wear neck collar when up and ambulating

## 2018-02-28 NOTE — PROGRESS NOTE ADULT - SUBJECTIVE AND OBJECTIVE BOX
Pt doing well. Pain controlled. Denies nausea/vomitting. Denies headache. Denies chest pain.    Vital Signs Last 24 Hrs  T(C): 36.3 (28 Feb 2018 00:30), Max: 37.6 (27 Feb 2018 09:08)  T(F): 97.4 (28 Feb 2018 00:30), Max: 99.7 (27 Feb 2018 09:08)  HR: 102 (28 Feb 2018 00:30) (81 - 103)  BP: 141/88 (28 Feb 2018 00:30) (125/66 - 167/83)  BP(mean): --  RR: 18 (28 Feb 2018 00:30) (10 - 19)  SpO2: 97% (28 Feb 2018 00:30) (96% - 100%)    Exam:   NAD, Alert  Dressing C/D/I, HV w/SS output  B/L UE: 4/5 delt/tri/bi/wrist/; SILT VSF/VIF/FDWS                          11.7   9.20  )-----------( 227      ( 27 Feb 2018 17:20 )             36.0     02-27    139  |  100  |  12  ----------------------------<  115<H>  3.9   |  26  |  0.81    Ca    8.6      27 Feb 2018 17:20    67F s/p C3-7 PSIF    -pain control  -WBAT  -PT/oob  -DVT ppx

## 2018-02-28 NOTE — CONSULT NOTE ADULT - PROBLEM SELECTOR RECOMMENDATION 9
s/p cervical decompression POD#1  Pain control  HV drain monitoring and removal as per ortho  PT/OT  Woo in place- TOV once mobilizing more

## 2018-02-28 NOTE — DISCHARGE NOTE ADULT - CARE PLAN
Principal Discharge DX:	Cervical disc disease  Goal:	ambulation without pain  Assessment and plan of treatment:	weight bear as tolerated   resume same diet as prior to surgery   Dr Weber 1 week call for appt [phone] Principal Discharge DX:	Spinal stenosis in cervical region  Goal:	pain control-> pain med may cause drowsiness, refrain from activities require decision making; physical therapy to help resume activities of daily living  Assessment and plan of treatment:	follow up with Dr Weber call for appointment when discharged home [phone]  weight bearing as tolerated   wear neck collar when up and ambulating

## 2018-02-28 NOTE — PATIENT PROFILE ADULT. - VISION (WITH CORRECTIVE LENSES IF THE PATIENT USUALLY WEARS THEM):
1 pair glasses/Partially impaired: cannot see medication labels or newsprint, but can see obstacles in path, and the surrounding layout; can count fingers at arm's length

## 2018-02-28 NOTE — CONSULT NOTE ADULT - ASSESSMENT
67 year old female PM asthma, carpal tunnel presents to presurgical testing with diagnosis of spinal stenosis, cervical region scheduled for C3-4 cervical decompression fusion for 2/27/18 now post op day 1

## 2018-02-28 NOTE — CONSULT NOTE ADULT - PROBLEM SELECTOR RECOMMENDATION 2
Continue home meds, nebs prn  Cough could be related to asthma  Lungs sound clear on exam, tessalon pearles prn

## 2018-02-28 NOTE — DISCHARGE NOTE ADULT - CARE PROVIDER_API CALL
Skip Weber (MD; DC), Orthopaedic Surgery  [address] [place] [address]  Phone: [phone]  Fax: [phone]

## 2018-02-28 NOTE — DISCHARGE NOTE ADULT - MEDICATION SUMMARY - MEDICATIONS TO TAKE
I will START or STAY ON the medications listed below when I get home from the hospital:    Dilaudid 2 mg oral tablet  -- 1 tab(s) by mouth every 3 hours, As needed, Moderate Pain (4 - 6)  -- Indication: For Pain med    Dilaudid 4 mg oral tablet  -- 1 tab(s) by mouth every 3 hours, As needed, Severe Pain (7 - 10)  -- Indication: For Pain med    losartan 50 mg oral tablet  -- 1 tab(s) by mouth once a day  -- Indication: For Home med but dose decreased    Cymbalta 60 mg oral delayed release capsule  -- 1 cap(s) by mouth once a day in am  -- Indication: For Home med    ZyrTEC 10 mg oral tablet  -- 1 tab(s) by mouth once a day (at bedtime)  -- Indication: For Home med    Lipitor 20 mg oral tablet  -- 1 tab(s) by mouth once a day (at bedtime)  -- Indication: For Home med    benzonatate 100 mg oral capsule  -- 1 cap(s) by mouth 3 times a day, As needed, Cough  -- Indication: For Cough med    metoprolol tartrate 25 mg oral tablet  -- 1 tab(s) by mouth 2 times a day  -- Indication: For Home med    Advair Diskus 250 mcg-50 mcg inhalation powder  -- 1 puff(s) inhaled 2 times a day  -- Indication: For Home med    Ventolin HFA 90 mcg/inh inhalation aerosol  -- 2 puff(s) inhaled 4 times a day, As Needed  -- Indication: For Home med    docusate sodium 100 mg oral capsule  -- 1 cap(s) by mouth 3 times a day  -- Indication: For Stool softener    senna oral tablet  -- 2 tab(s) by mouth once a day (at bedtime), As Needed - for constipation  -- Indication: For Constipation treatment if needed    montelukast 10 mg oral tablet  -- 1 tab(s) by mouth once a day (at bedtime)  -- Indication: For Home med    Travatan 0.004% ophthalmic solution  -- 1 drop(s) to each affected eye once a day (in the evening)  -- Indication: For Home med    dorzolamide 2% ophthalmic solution  -- 1 drop(s) to each affected eye 2 times a day  -- Indication: For Home med    Probiotic Formula oral capsule  -- 1 cap(s) by mouth once a day  -- Indication: For Home med    omeprazole 40 mg oral delayed release capsule  -- 1 cap(s) by mouth once a day  -- Indication: For Home med    fluticasone 50 mcg inhalation powder  -- 1 puff(s) inhaled 2 times a day  -- Indication: For Home med    Calcium 600+D oral tablet  -- 1 tab(s) by mouth once a day  -- Indication: For Home med    Multiple Vitamins oral capsule  -- 1 cap(s) by mouth once a day last dose 2/15/18  -- Indication: For Home med    Vitamin D3 2000 intl units oral tablet  -- 1 tab(s) by mouth once a day  -- Indication: For Home med    Vitamin C 1000 mg oral tablet  -- 1 tab(s) by mouth once a day  -- Indication: For Home med

## 2018-03-01 DIAGNOSIS — E87.3 ALKALOSIS: ICD-10-CM

## 2018-03-01 LAB
BASOPHILS # BLD AUTO: 0.01 K/UL — SIGNIFICANT CHANGE UP (ref 0–0.2)
BASOPHILS NFR BLD AUTO: 0.1 % — SIGNIFICANT CHANGE UP (ref 0–2)
BUN SERPL-MCNC: 12 MG/DL — SIGNIFICANT CHANGE UP (ref 7–23)
CALCIUM SERPL-MCNC: 8.5 MG/DL — SIGNIFICANT CHANGE UP (ref 8.4–10.5)
CHLORIDE SERPL-SCNC: 97 MMOL/L — LOW (ref 98–107)
CO2 SERPL-SCNC: 33 MMOL/L — HIGH (ref 22–31)
CREAT SERPL-MCNC: 0.65 MG/DL — SIGNIFICANT CHANGE UP (ref 0.5–1.3)
EOSINOPHIL # BLD AUTO: 0.06 K/UL — SIGNIFICANT CHANGE UP (ref 0–0.5)
EOSINOPHIL NFR BLD AUTO: 0.5 % — SIGNIFICANT CHANGE UP (ref 0–6)
GLUCOSE SERPL-MCNC: 106 MG/DL — HIGH (ref 70–99)
HCT VFR BLD CALC: 34.3 % — LOW (ref 34.5–45)
HGB BLD-MCNC: 11 G/DL — LOW (ref 11.5–15.5)
IMM GRANULOCYTES # BLD AUTO: 0.04 # — SIGNIFICANT CHANGE UP
IMM GRANULOCYTES NFR BLD AUTO: 0.3 % — SIGNIFICANT CHANGE UP (ref 0–1.5)
LYMPHOCYTES # BLD AUTO: 2.55 K/UL — SIGNIFICANT CHANGE UP (ref 1–3.3)
LYMPHOCYTES # BLD AUTO: 21.2 % — SIGNIFICANT CHANGE UP (ref 13–44)
MCHC RBC-ENTMCNC: 30.1 PG — SIGNIFICANT CHANGE UP (ref 27–34)
MCHC RBC-ENTMCNC: 32.1 % — SIGNIFICANT CHANGE UP (ref 32–36)
MCV RBC AUTO: 94 FL — SIGNIFICANT CHANGE UP (ref 80–100)
MONOCYTES # BLD AUTO: 0.96 K/UL — HIGH (ref 0–0.9)
MONOCYTES NFR BLD AUTO: 8 % — SIGNIFICANT CHANGE UP (ref 2–14)
NEUTROPHILS # BLD AUTO: 8.41 K/UL — HIGH (ref 1.8–7.4)
NEUTROPHILS NFR BLD AUTO: 69.9 % — SIGNIFICANT CHANGE UP (ref 43–77)
NRBC # FLD: 0 — SIGNIFICANT CHANGE UP
PLATELET # BLD AUTO: 202 K/UL — SIGNIFICANT CHANGE UP (ref 150–400)
PMV BLD: 10.5 FL — SIGNIFICANT CHANGE UP (ref 7–13)
POTASSIUM SERPL-MCNC: 3.8 MMOL/L — SIGNIFICANT CHANGE UP (ref 3.5–5.3)
POTASSIUM SERPL-SCNC: 3.8 MMOL/L — SIGNIFICANT CHANGE UP (ref 3.5–5.3)
RBC # BLD: 3.65 M/UL — LOW (ref 3.8–5.2)
RBC # FLD: 14.4 % — SIGNIFICANT CHANGE UP (ref 10.3–14.5)
SODIUM SERPL-SCNC: 139 MMOL/L — SIGNIFICANT CHANGE UP (ref 135–145)
WBC # BLD: 12.03 K/UL — HIGH (ref 3.8–10.5)
WBC # FLD AUTO: 12.03 K/UL — HIGH (ref 3.8–10.5)

## 2018-03-01 PROCEDURE — 99233 SBSQ HOSP IP/OBS HIGH 50: CPT

## 2018-03-01 RX ORDER — IPRATROPIUM/ALBUTEROL SULFATE 18-103MCG
3 AEROSOL WITH ADAPTER (GRAM) INHALATION EVERY 6 HOURS
Qty: 0 | Refills: 0 | Status: DISCONTINUED | OUTPATIENT
Start: 2018-03-01 | End: 2018-03-05

## 2018-03-01 RX ADMIN — OXYCODONE HYDROCHLORIDE 10 MILLIGRAM(S): 5 TABLET ORAL at 14:00

## 2018-03-01 RX ADMIN — MONTELUKAST 10 MILLIGRAM(S): 4 TABLET, CHEWABLE ORAL at 21:49

## 2018-03-01 RX ADMIN — HYDROMORPHONE HYDROCHLORIDE 1 MILLIGRAM(S): 2 INJECTION INTRAMUSCULAR; INTRAVENOUS; SUBCUTANEOUS at 07:32

## 2018-03-01 RX ADMIN — DORZOLAMIDE HYDROCHLORIDE 1 DROP(S): 20 SOLUTION/ DROPS OPHTHALMIC at 17:17

## 2018-03-01 RX ADMIN — OXYCODONE HYDROCHLORIDE 10 MILLIGRAM(S): 5 TABLET ORAL at 22:22

## 2018-03-01 RX ADMIN — FAMOTIDINE 20 MILLIGRAM(S): 10 INJECTION INTRAVENOUS at 05:48

## 2018-03-01 RX ADMIN — Medication 650 MILLIGRAM(S): at 05:47

## 2018-03-01 RX ADMIN — OXYCODONE HYDROCHLORIDE 10 MILLIGRAM(S): 5 TABLET ORAL at 21:52

## 2018-03-01 RX ADMIN — OXYCODONE HYDROCHLORIDE 10 MILLIGRAM(S): 5 TABLET ORAL at 06:17

## 2018-03-01 RX ADMIN — OXYCODONE HYDROCHLORIDE 10 MILLIGRAM(S): 5 TABLET ORAL at 05:47

## 2018-03-01 RX ADMIN — Medication 1 TABLET(S): at 12:31

## 2018-03-01 RX ADMIN — DORZOLAMIDE HYDROCHLORIDE 1 DROP(S): 20 SOLUTION/ DROPS OPHTHALMIC at 05:47

## 2018-03-01 RX ADMIN — TIZANIDINE 2 MILLIGRAM(S): 4 TABLET ORAL at 12:30

## 2018-03-01 RX ADMIN — Medication 1 PUFF(S): at 09:53

## 2018-03-01 RX ADMIN — OXYCODONE HYDROCHLORIDE 10 MILLIGRAM(S): 5 TABLET ORAL at 13:01

## 2018-03-01 RX ADMIN — SENNA PLUS 2 TABLET(S): 8.6 TABLET ORAL at 21:51

## 2018-03-01 RX ADMIN — BUDESONIDE AND FORMOTEROL FUMARATE DIHYDRATE 2 PUFF(S): 160; 4.5 AEROSOL RESPIRATORY (INHALATION) at 09:53

## 2018-03-01 RX ADMIN — LORATADINE 10 MILLIGRAM(S): 10 TABLET ORAL at 12:30

## 2018-03-01 RX ADMIN — BUDESONIDE AND FORMOTEROL FUMARATE DIHYDRATE 2 PUFF(S): 160; 4.5 AEROSOL RESPIRATORY (INHALATION) at 21:50

## 2018-03-01 RX ADMIN — OXYCODONE HYDROCHLORIDE 10 MILLIGRAM(S): 5 TABLET ORAL at 17:18

## 2018-03-01 RX ADMIN — Medication 100 MILLIGRAM(S): at 21:49

## 2018-03-01 RX ADMIN — TIZANIDINE 2 MILLIGRAM(S): 4 TABLET ORAL at 05:46

## 2018-03-01 RX ADMIN — OXYCODONE HYDROCHLORIDE 10 MILLIGRAM(S): 5 TABLET ORAL at 18:00

## 2018-03-01 RX ADMIN — ALBUTEROL 2 PUFF(S): 90 AEROSOL, METERED ORAL at 07:43

## 2018-03-01 RX ADMIN — Medication 100 MILLIGRAM(S): at 05:46

## 2018-03-01 RX ADMIN — HYDROMORPHONE HYDROCHLORIDE 1 MILLIGRAM(S): 2 INJECTION INTRAMUSCULAR; INTRAVENOUS; SUBCUTANEOUS at 07:47

## 2018-03-01 RX ADMIN — Medication 650 MILLIGRAM(S): at 17:18

## 2018-03-01 RX ADMIN — OXYCODONE HYDROCHLORIDE 10 MILLIGRAM(S): 5 TABLET ORAL at 09:56

## 2018-03-01 RX ADMIN — Medication 650 MILLIGRAM(S): at 23:36

## 2018-03-01 RX ADMIN — Medication 100 MILLIGRAM(S): at 13:01

## 2018-03-01 RX ADMIN — FAMOTIDINE 20 MILLIGRAM(S): 10 INJECTION INTRAVENOUS at 17:19

## 2018-03-01 RX ADMIN — LATANOPROST 1 DROP(S): 0.05 SOLUTION/ DROPS OPHTHALMIC; TOPICAL at 21:50

## 2018-03-01 RX ADMIN — TIZANIDINE 2 MILLIGRAM(S): 4 TABLET ORAL at 17:18

## 2018-03-01 RX ADMIN — OXYCODONE HYDROCHLORIDE 10 MILLIGRAM(S): 5 TABLET ORAL at 10:55

## 2018-03-01 RX ADMIN — ATORVASTATIN CALCIUM 20 MILLIGRAM(S): 80 TABLET, FILM COATED ORAL at 21:51

## 2018-03-01 RX ADMIN — Medication 650 MILLIGRAM(S): at 12:30

## 2018-03-01 RX ADMIN — Medication 1 PUFF(S): at 21:49

## 2018-03-01 RX ADMIN — DULOXETINE HYDROCHLORIDE 60 MILLIGRAM(S): 30 CAPSULE, DELAYED RELEASE ORAL at 12:30

## 2018-03-01 RX ADMIN — Medication 100 MILLIGRAM(S): at 23:36

## 2018-03-01 RX ADMIN — Medication 12.5 MILLIGRAM(S): at 05:47

## 2018-03-01 RX ADMIN — TIZANIDINE 2 MILLIGRAM(S): 4 TABLET ORAL at 23:36

## 2018-03-01 RX ADMIN — LOSARTAN POTASSIUM 50 MILLIGRAM(S): 100 TABLET, FILM COATED ORAL at 05:46

## 2018-03-01 NOTE — PROGRESS NOTE ADULT - PROBLEM SELECTOR PLAN 7
Pt noted to have rising bicarb. ABG from 2/27 showed no evidence of CO2 retention, pt had mild metabolic alkalosis  This could be 2/2 HCTZ, pt is constipated and has no diarrhea/vomiting  Pt is mentating well  Monitor bicarb and if continues to rise, hold HCTZ and check ABG Pt noted to have rising bicarb. ABG from 2/27 showed no evidence of CO2 retention, pt had mild metabolic alkalosis  This could be 2/2 HCTZ  Pt is has no diarrhea/vomiting and is mentating well  Monitor bicarb and if continues to rise, hold HCTZ and check ABG

## 2018-03-01 NOTE — PROGRESS NOTE ADULT - SUBJECTIVE AND OBJECTIVE BOX
Patient seen and examined. No acute events overnight. Pain is controlled. Walked with PT. denies new numbness/tingling/paresthesias/weakness. Denies headaches. Denies fevers/chills. No other complaints at this time.    HEALTH ISSUES - PROBLEM Dx:  Leukocytosis, unspecified type: Leukocytosis, unspecified type  Glaucoma of both eyes, unspecified glaucoma type: Glaucoma of both eyes, unspecified glaucoma type  Prediabetes: Prediabetes  Hypertension, unspecified type: Hypertension, unspecified type  Asthma, unspecified asthma severity, unspecified whether complicated, unspecified whether persistent: Asthma, unspecified asthma severity, unspecified whether complicated, unspecified whether persistent  Asthma: Asthma  Hypertension: Hypertension  Sleep apnea: Sleep apnea  Spinal stenosis in cervical region: Spinal stenosis in cervical region          MEDICATIONS  (STANDING):  acetaminophen   Tablet. 650 milliGRAM(s) Oral every 6 hours  atorvastatin 20 milliGRAM(s) Oral at bedtime  buDESOnide 160 MICROgram(s)/formoterol 4.5 MICROgram(s) Inhaler 2 Puff(s) Inhalation two times a day  docusate sodium 100 milliGRAM(s) Oral three times a day  dorzolamide 2% Ophthalmic Solution 1 Drop(s) Both EYES two times a day  DULoxetine 60 milliGRAM(s) Oral daily  famotidine    Tablet 20 milliGRAM(s) Oral two times a day  fluticasone propionate   44 MICROgram(s) HFA Inhaler 1 Puff(s) Inhalation two times a day  hydrochlorothiazide 12.5 milliGRAM(s) Oral daily  lactobacillus acidophilus 1 Tablet(s) Oral daily  latanoprost 0.005% Ophthalmic Solution 1 Drop(s) Both EYES at bedtime  loratadine 10 milliGRAM(s) Oral daily  losartan 50 milliGRAM(s) Oral daily  montelukast 10 milliGRAM(s) Oral at bedtime  senna 2 Tablet(s) Oral at bedtime  tiZANidine 2 milliGRAM(s) Oral every 6 hours      Allergies    clindamycin (Other)  clindamycin (Unknown)  strawberry (Rash)    Intolerances        PAST MEDICAL & SURGICAL HISTORY:  Carpal tunnel syndrome on left  Lumbar stenosis  Spinal stenosis in cervical region  Cervical disc disease: December 2017  Gastric ulcer  Sleep apnea: supposed to use device at night but doesn&#x27;t  Obesity  Cataracts, bilateral  Glaucoma  Depression: never hospitalized  Asthma  Seasonal allergies  Hypercholesterolemia  Hypertension  Prediabetes: diagnosed in early 2017, on diet control  S/P tonsillectomy  Arthropathy: 2013, right knee replacement  Arthropathy: 2006, left knee replacement  Arthropathy: 2005, left hip replacement                            11.5   13.71 )-----------( 168      ( 28 Feb 2018 05:30 )             38.0       28 Feb 2018 05:30    141    |  103    |  13     ----------------------------<  128    4.7     |  22     |  0.74     Ca    8.5        28 Feb 2018 05:30                Vital Signs Last 24 Hrs  T(C): 36.9 (03-01-18 @ 05:41), Max: 37.1 (02-28-18 @ 21:15)  T(F): 98.4 (03-01-18 @ 05:41), Max: 98.7 (02-28-18 @ 21:15)  HR: 98 (03-01-18 @ 05:41) (98 - 105)  BP: 145/74 (03-01-18 @ 05:41) (130/72 - 162/82)  BP(mean): 80 (02-28-18 @ 12:11) (80 - 86)  RR: 16 (03-01-18 @ 05:41) (16 - 18)  SpO2: 98% (03-01-18 @ 05:41) (94% - 98%)    Gen: NAD    Spine PE:  Dressing clean dry intact  HV drain serosang out put: 0/45  BL hoffmans    Motor:                   C5                C6              C7               C8           T1   R            5/5                5/5            5/5             5/5          5/5  L             5/5               5/5             5/5             5/5          5/5                L2             L3             L4               L5            S1  R         5/5           5/5          5/5             5/5           5/5  L          5/5          5/5           5/5             5/5           5/5    Sensory:            C5         C6         C7      C8       T1        (0=absent, 1=impaired, 2=normal, NT=not testable)  R         2            2           2        2         2  L          2            2           2        2         2               L2          L3         L4      L5       S1         (0=absent, 1=impaired, 2=normal, NT=not testable)  R         2            2            2        2        2  L          2            2           2        2         2      A/P: 67y Female POD2 C3-7  Pain control  Cont HV drain  WBAT/PT/OT/OOB  [place] collar  FU Labs  SCDs  Medical co-management appreciated  dispo planning Patient seen and examined. No acute events overnight. Pain is controlled. Walked with PT. denies new numbness/tingling/paresthesias/weakness. Denies headaches. Denies fevers/chills. No other complaints at this time.    HEALTH ISSUES - PROBLEM Dx:  Leukocytosis, unspecified type: Leukocytosis, unspecified type  Glaucoma of both eyes, unspecified glaucoma type: Glaucoma of both eyes, unspecified glaucoma type  Prediabetes: Prediabetes  Hypertension, unspecified type: Hypertension, unspecified type  Asthma, unspecified asthma severity, unspecified whether complicated, unspecified whether persistent: Asthma, unspecified asthma severity, unspecified whether complicated, unspecified whether persistent  Asthma: Asthma  Hypertension: Hypertension  Sleep apnea: Sleep apnea  Spinal stenosis in cervical region: Spinal stenosis in cervical region          MEDICATIONS  (STANDING):  acetaminophen   Tablet. 650 milliGRAM(s) Oral every 6 hours  atorvastatin 20 milliGRAM(s) Oral at bedtime  buDESOnide 160 MICROgram(s)/formoterol 4.5 MICROgram(s) Inhaler 2 Puff(s) Inhalation two times a day  docusate sodium 100 milliGRAM(s) Oral three times a day  dorzolamide 2% Ophthalmic Solution 1 Drop(s) Both EYES two times a day  DULoxetine 60 milliGRAM(s) Oral daily  famotidine    Tablet 20 milliGRAM(s) Oral two times a day  fluticasone propionate   44 MICROgram(s) HFA Inhaler 1 Puff(s) Inhalation two times a day  hydrochlorothiazide 12.5 milliGRAM(s) Oral daily  lactobacillus acidophilus 1 Tablet(s) Oral daily  latanoprost 0.005% Ophthalmic Solution 1 Drop(s) Both EYES at bedtime  loratadine 10 milliGRAM(s) Oral daily  losartan 50 milliGRAM(s) Oral daily  montelukast 10 milliGRAM(s) Oral at bedtime  senna 2 Tablet(s) Oral at bedtime  tiZANidine 2 milliGRAM(s) Oral every 6 hours      Allergies    clindamycin (Other)  clindamycin (Unknown)  strawberry (Rash)    Intolerances        PAST MEDICAL & SURGICAL HISTORY:  Carpal tunnel syndrome on left  Lumbar stenosis  Spinal stenosis in cervical region  Cervical disc disease: December 2017  Gastric ulcer  Sleep apnea: supposed to use device at night but doesn&#x27;t  Obesity  Cataracts, bilateral  Glaucoma  Depression: never hospitalized  Asthma  Seasonal allergies  Hypercholesterolemia  Hypertension  Prediabetes: diagnosed in early 2017, on diet control  S/P tonsillectomy  Arthropathy: 2013, right knee replacement  Arthropathy: 2006, left knee replacement  Arthropathy: 2005, left hip replacement                            11.5   13.71 )-----------( 168      ( 28 Feb 2018 05:30 )             38.0       28 Feb 2018 05:30    141    |  103    |  13     ----------------------------<  128    4.7     |  22     |  0.74     Ca    8.5        28 Feb 2018 05:30                Vital Signs Last 24 Hrs  T(C): 36.9 (03-01-18 @ 05:41), Max: 37.1 (02-28-18 @ 21:15)  T(F): 98.4 (03-01-18 @ 05:41), Max: 98.7 (02-28-18 @ 21:15)  HR: 98 (03-01-18 @ 05:41) (98 - 105)  BP: 145/74 (03-01-18 @ 05:41) (130/72 - 162/82)  BP(mean): 80 (02-28-18 @ 12:11) (80 - 86)  RR: 16 (03-01-18 @ 05:41) (16 - 18)  SpO2: 98% (03-01-18 @ 05:41) (94% - 98%)    Gen: NAD    Spine PE:  Dressing clean dry intact  HV drain serosang out put: 0/45  BL hoffmans    Motor:                   C5                C6              C7               C8           T1   R            5/5                5/5            5/5             5/5          5/5  L             5/5               5/5             5/5             5/5          5/5                L2             L3             L4               L5            S1  R         5/5           5/5          5/5             5/5           5/5  L          5/5          5/5           5/5             5/5           5/5    Sensory:            C5         C6         C7      C8       T1        (0=absent, 1=impaired, 2=normal, NT=not testable)  R         2            2           2        2         2  L          2            2           2        2         2               L2          L3         L4      L5       S1         (0=absent, 1=impaired, 2=normal, NT=not testable)  R         2            2            2        2        2  L          2            2           2        2         2      A/P: 67y Female POD2 C3-7  Pain control  Cont HV drain  WBAT/PT/OT/OOB  [place] collar  FU Labs  SCDs  Medical co-management appreciated  dispo planning  Patient seen and examined today. I discussed and agree with the above, Skip Greenwood

## 2018-03-01 NOTE — PROGRESS NOTE ADULT - PROBLEM SELECTOR PLAN 2
Albuterol INH changed to duonebs Albuterol INH changed to duonebs  Continue home meds, nebs prn  Cough could be related to asthma  Lungs sound clear on exam, tessalon pearles prn. Albuterol INH changed to duonebs  Continue home meds, nebs prn  Lungs sound clear on exam,   tessalon everardo prn.

## 2018-03-01 NOTE — PROGRESS NOTE ADULT - SUBJECTIVE AND OBJECTIVE BOX
Patient is a 67y old  Female who presents with a chief complaint of C3-7 PSF 2/27/18 (28 Feb 2018 08:31)      SUBJECTIVE / OVERNIGHT EVENTS: No overnight events. Pt reports uncontrolled pain overnight now better controlled w/ dilaudid. This am felt nauseous and a little lightheaded after eating then taking dilaudid and standing. vitals checked and stable. Continues to have dry cough, no fevers, chills. Passing gas. No BM    MEDICATIONS  (STANDING): reviewed  acetaminophen   Tablet. 650 milliGRAM(s) Oral every 6 hours  atorvastatin 20 milliGRAM(s) Oral at bedtime  buDESOnide 160 MICROgram(s)/formoterol 4.5 MICROgram(s) Inhaler 2 Puff(s) Inhalation two times a day  docusate sodium 100 milliGRAM(s) Oral three times a day  dorzolamide 2% Ophthalmic Solution 1 Drop(s) Both EYES two times a day  DULoxetine 60 milliGRAM(s) Oral daily  famotidine    Tablet 20 milliGRAM(s) Oral two times a day  fluticasone propionate   44 MICROgram(s) HFA Inhaler 1 Puff(s) Inhalation two times a day  hydrochlorothiazide 12.5 milliGRAM(s) Oral daily  lactobacillus acidophilus 1 Tablet(s) Oral daily  latanoprost 0.005% Ophthalmic Solution 1 Drop(s) Both EYES at bedtime  loratadine 10 milliGRAM(s) Oral daily  losartan 50 milliGRAM(s) Oral daily  montelukast 10 milliGRAM(s) Oral at bedtime  senna 2 Tablet(s) Oral at bedtime  tiZANidine 2 milliGRAM(s) Oral every 6 hours    MEDICATIONS  (PRN):  acetaminophen   Tablet 650 milliGRAM(s) Oral every 6 hours PRN For Temp greater than 38 C (100.4 F)  ALBUTerol/ipratropium for Nebulization 3 milliLiter(s) Nebulizer every 6 hours PRN Bronchospasm  aluminum hydroxide/magnesium hydroxide/simethicone Suspension 30 milliLiter(s) Oral every 12 hours PRN Indigestion  benzonatate 100 milliGRAM(s) Oral three times a day PRN Cough  diazepam    Tablet 5 milliGRAM(s) Oral every 12 hours PRN Anxiety  HYDROmorphone  Injectable 1 milliGRAM(s) IV Push every 4 hours PRN Severe Pain unrelieved by Oxycodone IR  ondansetron Injectable 4 milliGRAM(s) IV Push every 6 hours PRN Nausea  oxyCODONE    IR 5 milliGRAM(s) Oral every 3 hours PRN Moderate Pain (4 - 6)  oxyCODONE    IR 10 milliGRAM(s) Oral every 3 hours PRN Severe Pain (7 - 10)        CAPILLARY BLOOD GLUCOSE        I&O's Summary    28 Feb 2018 07:01  -  01 Mar 2018 07:00  --------------------------------------------------------  IN: 1200 mL / OUT: 850 mL / NET: 350 mL    Vital Signs Last 24 Hrs  T(C): 36.7 (01 Mar 2018 09:32), Max: 37.1 (28 Feb 2018 21:15)  T(F): 98.1 (01 Mar 2018 09:32), Max: 98.7 (28 Feb 2018 21:15)  HR: 100 (01 Mar 2018 09:32) (97 - 105)  BP: 135/82 (01 Mar 2018 09:32) (133/73 - 162/82)  BP(mean): 80 (28 Feb 2018 12:11) (80 - 80)  RR: 16 (01 Mar 2018 09:32) (16 - 16)  SpO2: 97% (01 Mar 2018 09:32) (94% - 98%)    PHYSICAL EXAM:  GENERAL: obese, Well-developed, well nourished, No acute distress  HEAD:  Atraumatic, Normocephalic  EYEENT: EOMI, PERRLA, conjunctiva and sclera clear  NECK: C- collar in place, cervical dressing in place HV w/ serosanguinous drainage  CHEST/LUNG: Clear to auscultation bilaterally  HEART: S1/S2, Regular rate and rhythm;  ABDOMEN: Nondistended, NABS, soft,  nontender,   EXTREMITIES:  2+ Peripheral Pulses, no clubbing, cyanosis, or edema  PSYCH: AAOx3, sleepy but arousable  NEUROLOGY: CN II-XII intact, 5/5 strength in upper and lower extremities  SKIN: No rashes or lesions    LABS: reviewed    rise in bicarb noted                        11.0   12.03 )-----------( 202      ( 01 Mar 2018 06:30 )             34.3     03-01    139  |  97<L>  |  12  ----------------------------<  106<H>  3.8   |  33<H>  |  0.65    Ca    8.5      01 Mar 2018 06:30      RADIOLOGY & ADDITIONAL TESTS:    Imaging Personally Reviewed:    Consultant(s) Notes Reviewed:      Care Discussed with Consultants/Other Providers:

## 2018-03-01 NOTE — PROGRESS NOTE ADULT - PROBLEM SELECTOR PLAN 3
BP slightly high  Continue current BP regimen  Ensure pain controlled  Will monitor and adjust meds prn. BP controlled  Continue current BP regimen  Ensure pain controlled

## 2018-03-02 LAB
ALBUMIN SERPL ELPH-MCNC: 3.2 G/DL — LOW (ref 3.3–5)
ALP SERPL-CCNC: 83 U/L — SIGNIFICANT CHANGE UP (ref 40–120)
ALT FLD-CCNC: 17 U/L — SIGNIFICANT CHANGE UP (ref 4–33)
APTT BLD: 25.2 SEC — LOW (ref 27.5–37.4)
AST SERPL-CCNC: 26 U/L — SIGNIFICANT CHANGE UP (ref 4–32)
BASE EXCESS BLDA CALC-SCNC: 10.3 MMOL/L — SIGNIFICANT CHANGE UP
BASOPHILS # BLD AUTO: 0.02 K/UL — SIGNIFICANT CHANGE UP (ref 0–0.2)
BASOPHILS NFR BLD AUTO: 0.2 % — SIGNIFICANT CHANGE UP (ref 0–2)
BILIRUB SERPL-MCNC: 0.4 MG/DL — SIGNIFICANT CHANGE UP (ref 0.2–1.2)
BLD GP AB SCN SERPL QL: NEGATIVE — SIGNIFICANT CHANGE UP
BUN SERPL-MCNC: 12 MG/DL — SIGNIFICANT CHANGE UP (ref 7–23)
BUN SERPL-MCNC: 12 MG/DL — SIGNIFICANT CHANGE UP (ref 7–23)
CA-I BLDA-SCNC: 1.13 MMOL/L — LOW (ref 1.15–1.29)
CALCIUM SERPL-MCNC: 8.6 MG/DL — SIGNIFICANT CHANGE UP (ref 8.4–10.5)
CALCIUM SERPL-MCNC: 8.7 MG/DL — SIGNIFICANT CHANGE UP (ref 8.4–10.5)
CHLORIDE SERPL-SCNC: 91 MMOL/L — LOW (ref 98–107)
CHLORIDE SERPL-SCNC: 92 MMOL/L — LOW (ref 98–107)
CO2 SERPL-SCNC: 30 MMOL/L — SIGNIFICANT CHANGE UP (ref 22–31)
CO2 SERPL-SCNC: 34 MMOL/L — HIGH (ref 22–31)
CREAT SERPL-MCNC: 0.6 MG/DL — SIGNIFICANT CHANGE UP (ref 0.5–1.3)
CREAT SERPL-MCNC: 0.62 MG/DL — SIGNIFICANT CHANGE UP (ref 0.5–1.3)
EOSINOPHIL # BLD AUTO: 0.16 K/UL — SIGNIFICANT CHANGE UP (ref 0–0.5)
EOSINOPHIL NFR BLD AUTO: 1.5 % — SIGNIFICANT CHANGE UP (ref 0–6)
GLUCOSE BLDA-MCNC: 128 MG/DL — HIGH (ref 70–99)
GLUCOSE BLDC GLUCOMTR-MCNC: 113 MG/DL — HIGH (ref 70–99)
GLUCOSE SERPL-MCNC: 112 MG/DL — HIGH (ref 70–99)
GLUCOSE SERPL-MCNC: 115 MG/DL — HIGH (ref 70–99)
HCO3 BLDA-SCNC: 33 MMOL/L — HIGH (ref 22–26)
HCT VFR BLD CALC: 34.2 % — LOW (ref 34.5–45)
HCT VFR BLD CALC: 35.6 % — SIGNIFICANT CHANGE UP (ref 34.5–45)
HCT VFR BLDA CALC: 36.7 % — SIGNIFICANT CHANGE UP (ref 34.5–46.5)
HGB BLD-MCNC: 11.2 G/DL — LOW (ref 11.5–15.5)
HGB BLD-MCNC: 11.4 G/DL — LOW (ref 11.5–15.5)
HGB BLDA-MCNC: 11.9 G/DL — SIGNIFICANT CHANGE UP (ref 11.5–15.5)
IMM GRANULOCYTES # BLD AUTO: 0.02 # — SIGNIFICANT CHANGE UP
IMM GRANULOCYTES NFR BLD AUTO: 0.2 % — SIGNIFICANT CHANGE UP (ref 0–1.5)
INR BLD: 1.05 — SIGNIFICANT CHANGE UP (ref 0.88–1.17)
LACTATE BLDA-SCNC: 0.8 MMOL/L — SIGNIFICANT CHANGE UP (ref 0.5–2)
LYMPHOCYTES # BLD AUTO: 2.12 K/UL — SIGNIFICANT CHANGE UP (ref 1–3.3)
LYMPHOCYTES # BLD AUTO: 20.2 % — SIGNIFICANT CHANGE UP (ref 13–44)
MAGNESIUM SERPL-MCNC: 1.7 MG/DL — SIGNIFICANT CHANGE UP (ref 1.6–2.6)
MCHC RBC-ENTMCNC: 28.8 PG — SIGNIFICANT CHANGE UP (ref 27–34)
MCHC RBC-ENTMCNC: 30.4 PG — SIGNIFICANT CHANGE UP (ref 27–34)
MCHC RBC-ENTMCNC: 31.5 % — LOW (ref 32–36)
MCHC RBC-ENTMCNC: 33.3 % — SIGNIFICANT CHANGE UP (ref 32–36)
MCV RBC AUTO: 91.2 FL — SIGNIFICANT CHANGE UP (ref 80–100)
MCV RBC AUTO: 91.5 FL — SIGNIFICANT CHANGE UP (ref 80–100)
MONOCYTES # BLD AUTO: 0.88 K/UL — SIGNIFICANT CHANGE UP (ref 0–0.9)
MONOCYTES NFR BLD AUTO: 8.4 % — SIGNIFICANT CHANGE UP (ref 2–14)
NEUTROPHILS # BLD AUTO: 7.29 K/UL — SIGNIFICANT CHANGE UP (ref 1.8–7.4)
NEUTROPHILS NFR BLD AUTO: 69.5 % — SIGNIFICANT CHANGE UP (ref 43–77)
NRBC # FLD: 0 — SIGNIFICANT CHANGE UP
NRBC # FLD: 0 — SIGNIFICANT CHANGE UP
PCO2 BLDA: 49 MMHG — HIGH (ref 32–48)
PH BLDA: 7.46 PH — HIGH (ref 7.35–7.45)
PLATELET # BLD AUTO: 216 K/UL — SIGNIFICANT CHANGE UP (ref 150–400)
PLATELET # BLD AUTO: 226 K/UL — SIGNIFICANT CHANGE UP (ref 150–400)
PMV BLD: 10 FL — SIGNIFICANT CHANGE UP (ref 7–13)
PMV BLD: 9.9 FL — SIGNIFICANT CHANGE UP (ref 7–13)
PO2 BLDA: 84 MMHG — SIGNIFICANT CHANGE UP (ref 83–108)
POTASSIUM BLDA-SCNC: 3.2 MMOL/L — LOW (ref 3.4–4.5)
POTASSIUM SERPL-MCNC: 3.6 MMOL/L — SIGNIFICANT CHANGE UP (ref 3.5–5.3)
POTASSIUM SERPL-MCNC: 3.9 MMOL/L — SIGNIFICANT CHANGE UP (ref 3.5–5.3)
POTASSIUM SERPL-SCNC: 3.6 MMOL/L — SIGNIFICANT CHANGE UP (ref 3.5–5.3)
POTASSIUM SERPL-SCNC: 3.9 MMOL/L — SIGNIFICANT CHANGE UP (ref 3.5–5.3)
PROT SERPL-MCNC: 7.5 G/DL — SIGNIFICANT CHANGE UP (ref 6–8.3)
PROTHROM AB SERPL-ACNC: 12.1 SEC — SIGNIFICANT CHANGE UP (ref 9.8–13.1)
RBC # BLD: 3.75 M/UL — LOW (ref 3.8–5.2)
RBC # BLD: 3.89 M/UL — SIGNIFICANT CHANGE UP (ref 3.8–5.2)
RBC # FLD: 14.2 % — SIGNIFICANT CHANGE UP (ref 10.3–14.5)
RBC # FLD: 14.3 % — SIGNIFICANT CHANGE UP (ref 10.3–14.5)
RH IG SCN BLD-IMP: POSITIVE — SIGNIFICANT CHANGE UP
SAO2 % BLDA: 96.6 % — SIGNIFICANT CHANGE UP (ref 95–99)
SODIUM BLDA-SCNC: 134 MMOL/L — LOW (ref 136–146)
SODIUM SERPL-SCNC: 135 MMOL/L — SIGNIFICANT CHANGE UP (ref 135–145)
SODIUM SERPL-SCNC: 136 MMOL/L — SIGNIFICANT CHANGE UP (ref 135–145)
WBC # BLD: 10.49 K/UL — SIGNIFICANT CHANGE UP (ref 3.8–10.5)
WBC # BLD: 9.86 K/UL — SIGNIFICANT CHANGE UP (ref 3.8–10.5)
WBC # FLD AUTO: 10.49 K/UL — SIGNIFICANT CHANGE UP (ref 3.8–10.5)
WBC # FLD AUTO: 9.86 K/UL — SIGNIFICANT CHANGE UP (ref 3.8–10.5)

## 2018-03-02 PROCEDURE — 99233 SBSQ HOSP IP/OBS HIGH 50: CPT

## 2018-03-02 RX ORDER — SODIUM CHLORIDE 9 MG/ML
1000 INJECTION, SOLUTION INTRAVENOUS ONCE
Qty: 0 | Refills: 0 | Status: COMPLETED | OUTPATIENT
Start: 2018-03-02 | End: 2018-03-02

## 2018-03-02 RX ORDER — HYDROMORPHONE HYDROCHLORIDE 2 MG/ML
1 INJECTION INTRAMUSCULAR; INTRAVENOUS; SUBCUTANEOUS
Qty: 0 | Refills: 0 | Status: DISCONTINUED | OUTPATIENT
Start: 2018-03-02 | End: 2018-03-05

## 2018-03-02 RX ORDER — HYDROMORPHONE HYDROCHLORIDE 2 MG/ML
2 INJECTION INTRAMUSCULAR; INTRAVENOUS; SUBCUTANEOUS
Qty: 0 | Refills: 0 | Status: DISCONTINUED | OUTPATIENT
Start: 2018-03-02 | End: 2018-03-05

## 2018-03-02 RX ORDER — HYDROMORPHONE HYDROCHLORIDE 2 MG/ML
4 INJECTION INTRAMUSCULAR; INTRAVENOUS; SUBCUTANEOUS
Qty: 0 | Refills: 0 | Status: DISCONTINUED | OUTPATIENT
Start: 2018-03-02 | End: 2018-03-05

## 2018-03-02 RX ORDER — ACETAMINOPHEN 500 MG
650 TABLET ORAL EVERY 6 HOURS
Qty: 0 | Refills: 0 | Status: DISCONTINUED | OUTPATIENT
Start: 2018-03-02 | End: 2018-03-05

## 2018-03-02 RX ADMIN — DORZOLAMIDE HYDROCHLORIDE 1 DROP(S): 20 SOLUTION/ DROPS OPHTHALMIC at 18:36

## 2018-03-02 RX ADMIN — SENNA PLUS 2 TABLET(S): 8.6 TABLET ORAL at 21:57

## 2018-03-02 RX ADMIN — LATANOPROST 1 DROP(S): 0.05 SOLUTION/ DROPS OPHTHALMIC; TOPICAL at 21:58

## 2018-03-02 RX ADMIN — SODIUM CHLORIDE 1000 MILLILITER(S): 9 INJECTION, SOLUTION INTRAVENOUS at 18:56

## 2018-03-02 RX ADMIN — Medication 650 MILLIGRAM(S): at 06:14

## 2018-03-02 RX ADMIN — MONTELUKAST 10 MILLIGRAM(S): 4 TABLET, CHEWABLE ORAL at 21:57

## 2018-03-02 RX ADMIN — HYDROMORPHONE HYDROCHLORIDE 2 MILLIGRAM(S): 2 INJECTION INTRAMUSCULAR; INTRAVENOUS; SUBCUTANEOUS at 22:27

## 2018-03-02 RX ADMIN — BUDESONIDE AND FORMOTEROL FUMARATE DIHYDRATE 2 PUFF(S): 160; 4.5 AEROSOL RESPIRATORY (INHALATION) at 21:57

## 2018-03-02 RX ADMIN — Medication 12.5 MILLIGRAM(S): at 06:15

## 2018-03-02 RX ADMIN — OXYCODONE HYDROCHLORIDE 10 MILLIGRAM(S): 5 TABLET ORAL at 02:54

## 2018-03-02 RX ADMIN — Medication 5 MILLIGRAM(S): at 03:57

## 2018-03-02 RX ADMIN — HYDROMORPHONE HYDROCHLORIDE 2 MILLIGRAM(S): 2 INJECTION INTRAMUSCULAR; INTRAVENOUS; SUBCUTANEOUS at 18:36

## 2018-03-02 RX ADMIN — HYDROMORPHONE HYDROCHLORIDE 2 MILLIGRAM(S): 2 INJECTION INTRAMUSCULAR; INTRAVENOUS; SUBCUTANEOUS at 21:57

## 2018-03-02 RX ADMIN — TIZANIDINE 2 MILLIGRAM(S): 4 TABLET ORAL at 06:14

## 2018-03-02 RX ADMIN — FAMOTIDINE 20 MILLIGRAM(S): 10 INJECTION INTRAVENOUS at 18:36

## 2018-03-02 RX ADMIN — Medication 1 PUFF(S): at 21:57

## 2018-03-02 RX ADMIN — DORZOLAMIDE HYDROCHLORIDE 1 DROP(S): 20 SOLUTION/ DROPS OPHTHALMIC at 06:16

## 2018-03-02 RX ADMIN — DULOXETINE HYDROCHLORIDE 60 MILLIGRAM(S): 30 CAPSULE, DELAYED RELEASE ORAL at 11:58

## 2018-03-02 RX ADMIN — Medication 1 PUFF(S): at 11:38

## 2018-03-02 RX ADMIN — FAMOTIDINE 20 MILLIGRAM(S): 10 INJECTION INTRAVENOUS at 06:16

## 2018-03-02 RX ADMIN — ATORVASTATIN CALCIUM 20 MILLIGRAM(S): 80 TABLET, FILM COATED ORAL at 21:57

## 2018-03-02 RX ADMIN — Medication 100 MILLIGRAM(S): at 21:52

## 2018-03-02 RX ADMIN — Medication 1 TABLET(S): at 11:58

## 2018-03-02 RX ADMIN — OXYCODONE HYDROCHLORIDE 10 MILLIGRAM(S): 5 TABLET ORAL at 03:24

## 2018-03-02 RX ADMIN — Medication 100 MILLIGRAM(S): at 06:15

## 2018-03-02 RX ADMIN — Medication 10 MILLIGRAM(S): at 15:58

## 2018-03-02 RX ADMIN — LORATADINE 10 MILLIGRAM(S): 10 TABLET ORAL at 11:58

## 2018-03-02 RX ADMIN — Medication 100 MILLIGRAM(S): at 11:58

## 2018-03-02 RX ADMIN — LOSARTAN POTASSIUM 50 MILLIGRAM(S): 100 TABLET, FILM COATED ORAL at 06:14

## 2018-03-02 RX ADMIN — BUDESONIDE AND FORMOTEROL FUMARATE DIHYDRATE 2 PUFF(S): 160; 4.5 AEROSOL RESPIRATORY (INHALATION) at 11:38

## 2018-03-02 NOTE — PROGRESS NOTE ADULT - PROBLEM SELECTOR PLAN 3
BP controlled  Continue current BP regimen  Ensure pain controlled BP controlled  Hold HCTZ , pt developing alkalosis  Ensure pain controlled  Monitor tachycardia w/ better pain control BP controlled  Hold HCTZ , pt developing alkalosis  Monitor BP off HCTZ. Can add amlodipine 5mg if becomes hypertensive  Ensure pain controlled  Monitor tachycardia w/ better pain control BP controlled  Hold HCTZ , pt developing alkalosis  Monitor BP off HCTZ. Can add amlodipine 5mg if becomes hypertensive  Ensure pain controlled  Monitor tachycardia w/ pain control

## 2018-03-02 NOTE — PROGRESS NOTE ADULT - SUBJECTIVE AND OBJECTIVE BOX
Patient is a 67y old  Female who presents with a chief complaint of C3-7 PSF 2/27/18 (28 Feb 2018 08:31)      SUBJECTIVE / OVERNIGHT EVENTS: No overnight events. Pt reports she has been sleeping a lot this am and feels sleepy. She reports she has been urinating a lot and wet the bed this am. Pain better controlled on dilaudid. Reports her strength in arms feel about the same. Eating well. No nausea/vomiting. Cough improving    MEDICATIONS  (STANDING):  atorvastatin 20 milliGRAM(s) Oral at bedtime  buDESOnide 160 MICROgram(s)/formoterol 4.5 MICROgram(s) Inhaler 2 Puff(s) Inhalation two times a day  docusate sodium 100 milliGRAM(s) Oral three times a day  dorzolamide 2% Ophthalmic Solution 1 Drop(s) Both EYES two times a day  DULoxetine 60 milliGRAM(s) Oral daily  famotidine    Tablet 20 milliGRAM(s) Oral two times a day  fluticasone propionate   44 MICROgram(s) HFA Inhaler 1 Puff(s) Inhalation two times a day  hydrochlorothiazide 12.5 milliGRAM(s) Oral daily  lactobacillus acidophilus 1 Tablet(s) Oral daily  latanoprost 0.005% Ophthalmic Solution 1 Drop(s) Both EYES at bedtime  loratadine 10 milliGRAM(s) Oral daily  losartan 50 milliGRAM(s) Oral daily  montelukast 10 milliGRAM(s) Oral at bedtime  senna 2 Tablet(s) Oral at bedtime    MEDICATIONS  (PRN):  acetaminophen   Tablet 650 milliGRAM(s) Oral every 6 hours PRN For Temp greater than 38 C (100.4 F)  ALBUTerol/ipratropium for Nebulization 3 milliLiter(s) Nebulizer every 6 hours PRN Bronchospasm  aluminum hydroxide/magnesium hydroxide/simethicone Suspension 30 milliLiter(s) Oral every 12 hours PRN Indigestion  benzonatate 100 milliGRAM(s) Oral three times a day PRN Cough  diazepam    Tablet 5 milliGRAM(s) Oral every 12 hours PRN Anxiety  HYDROmorphone   Tablet 2 milliGRAM(s) Oral every 3 hours PRN Moderate Pain (4 - 6)  HYDROmorphone   Tablet 4 milliGRAM(s) Oral every 3 hours PRN Severe Pain (7 - 10)  HYDROmorphone  Injectable 1 milliGRAM(s) IV Push every 3 hours PRN breakthrough  HYDROmorphone  Injectable 1 milliGRAM(s) IV Push every 4 hours PRN Severe Pain unrelieved by Oxycodone IR  ondansetron Injectable 4 milliGRAM(s) IV Push every 6 hours PRN Nausea        CAPILLARY BLOOD GLUCOSE        I&O's Summary    01 Mar 2018 07:01  -  02 Mar 2018 07:00  --------------------------------------------------------  IN: 0 mL / OUT: 12 mL / NET: -12 mL    Vital Signs Last 24 Hrs  T(C): 37.2 (02 Mar 2018 10:01), Max: 37.2 (02 Mar 2018 10:01)  T(F): 99 (02 Mar 2018 10:01), Max: 99 (02 Mar 2018 10:01)  HR: 100 (02 Mar 2018 10:01) (99 - 109)  BP: 128/75 (02 Mar 2018 10:01) (110/73 - 144/75)  BP(mean): --  RR: 17 (02 Mar 2018 10:01) (14 - 17)  SpO2: 96% (02 Mar 2018 10:01) (93% - 96%)    PHYSICAL EXAM:  GENERAL: Obese, Somnolent but easily arousable, NAD  HEENT: EOMI, PERRLA, conjunctiva and sclera clear  NECK: Supple,   CHEST/LUNG: Clear to auscultation bilaterally;   HEART: S1/S2, Regular rate and rhythm;  ABDOMEN: Nondistended, NABS, soft,  nontender, no organomegaly, no peritoneal signs  EXTREMITIES:  2+ Peripheral Pulses, no clubbing, cyanosis, or edema  PSYCH: AAOx3, normal affect  NEUROLOGY: CN II-XII intact, 5/5 strength in upper and lower extremities  SKIN: No rashes or lesions    LABS: reviewed                        11.2   10.49 )-----------( 226      ( 02 Mar 2018 07:20 )             35.6     03-02    136  |  92<L>  |  12  ----------------------------<  112<H>  3.6   |  34<H>  |  0.62    Ca    8.6      02 Mar 2018 07:20      RADIOLOGY & ADDITIONAL TESTS:    Imaging Personally Reviewed:      Consultant(s) Notes Reviewed:      Care Discussed with Consultants/Other Providers: Patient is a 67y old  Female who presents with a chief complaint of C3-7 PSF 2/27/18 (28 Feb 2018 08:31)      SUBJECTIVE / OVERNIGHT EVENTS: No overnight events. Pt reports she has been sleeping a lot this am and feels sleepy. She reports she has been urinating a lot and wet the bed this am. Pain better controlled on dilaudid. Reports her strength in arms feel about the same. Eating well. No nausea/vomiting. Cough improving    MEDICATIONS  (STANDING):  atorvastatin 20 milliGRAM(s) Oral at bedtime  buDESOnide 160 MICROgram(s)/formoterol 4.5 MICROgram(s) Inhaler 2 Puff(s) Inhalation two times a day  docusate sodium 100 milliGRAM(s) Oral three times a day  dorzolamide 2% Ophthalmic Solution 1 Drop(s) Both EYES two times a day  DULoxetine 60 milliGRAM(s) Oral daily  famotidine    Tablet 20 milliGRAM(s) Oral two times a day  fluticasone propionate   44 MICROgram(s) HFA Inhaler 1 Puff(s) Inhalation two times a day  hydrochlorothiazide 12.5 milliGRAM(s) Oral daily  lactobacillus acidophilus 1 Tablet(s) Oral daily  latanoprost 0.005% Ophthalmic Solution 1 Drop(s) Both EYES at bedtime  loratadine 10 milliGRAM(s) Oral daily  losartan 50 milliGRAM(s) Oral daily  montelukast 10 milliGRAM(s) Oral at bedtime  senna 2 Tablet(s) Oral at bedtime    MEDICATIONS  (PRN):  acetaminophen   Tablet 650 milliGRAM(s) Oral every 6 hours PRN For Temp greater than 38 C (100.4 F)  ALBUTerol/ipratropium for Nebulization 3 milliLiter(s) Nebulizer every 6 hours PRN Bronchospasm  aluminum hydroxide/magnesium hydroxide/simethicone Suspension 30 milliLiter(s) Oral every 12 hours PRN Indigestion  benzonatate 100 milliGRAM(s) Oral three times a day PRN Cough  diazepam    Tablet 5 milliGRAM(s) Oral every 12 hours PRN Anxiety  HYDROmorphone   Tablet 2 milliGRAM(s) Oral every 3 hours PRN Moderate Pain (4 - 6)  HYDROmorphone   Tablet 4 milliGRAM(s) Oral every 3 hours PRN Severe Pain (7 - 10)  HYDROmorphone  Injectable 1 milliGRAM(s) IV Push every 3 hours PRN breakthrough  HYDROmorphone  Injectable 1 milliGRAM(s) IV Push every 4 hours PRN Severe Pain unrelieved by Oxycodone IR  ondansetron Injectable 4 milliGRAM(s) IV Push every 6 hours PRN Nausea        CAPILLARY BLOOD GLUCOSE        I&O's Summary    01 Mar 2018 07:01  -  02 Mar 2018 07:00  --------------------------------------------------------  IN: 0 mL / OUT: 12 mL / NET: -12 mL    Vital Signs Last 24 Hrs  T(C): 37.2 (02 Mar 2018 10:01), Max: 37.2 (02 Mar 2018 10:01)  T(F): 99 (02 Mar 2018 10:01), Max: 99 (02 Mar 2018 10:01)  HR: 100 (02 Mar 2018 10:01) (99 - 109)  BP: 128/75 (02 Mar 2018 10:01) (110/73 - 144/75)  BP(mean): --  RR: 17 (02 Mar 2018 10:01) (14 - 17)  SpO2: 96% (02 Mar 2018 10:01) (93% - 96%)    PHYSICAL EXAM:  GENERAL: Obese, Somnolent but easily arousable, NAD  HEENT: EOMI, PERRLA, conjunctiva and sclera clear  NECK: Supple, dressing c/d/i  CHEST/LUNG: Clear to auscultation bilaterally;   HEART: S1/S2, Regular rate and rhythm;  ABDOMEN: Nondistended, NABS, soft,  nontender, no peritoneal signs  EXTREMITIES:  2+ Peripheral Pulses, no clubbing, cyanosis, or edema  PSYCH: AAOx3, normal affect  NEUROLOGY: CN II-XII intact, 5/5 strength in upper and lower extremities      LABS: reviewed                        11.2   10.49 )-----------( 226      ( 02 Mar 2018 07:20 )             35.6     03-02    136  |  92<L>  |  12  ----------------------------<  112<H>  3.6   |  34<H>  |  0.62    Ca    8.6      02 Mar 2018 07:20      RADIOLOGY & ADDITIONAL TESTS:    Imaging Personally Reviewed:      Consultant(s) Notes Reviewed:      Care Discussed with Consultants/Other Providers:

## 2018-03-02 NOTE — PROGRESS NOTE ADULT - PROBLEM SELECTOR PLAN 7
Pt noted to have rising bicarb. ABG from 2/27 showed no evidence of CO2 retention, pt had mild metabolic alkalosis  This could be 2/2 HCTZ  Pt is has no diarrhea/vomiting and is mentating well  Monitor bicarb and if continues to rise, hold HCTZ and check ABG Pt noted to have rising bicarb. ABG from 2/27 showed no evidence of CO2 retention, pt had mild metabolic alkalosis  This could be 2/2 HCTZ  Pt is has no diarrhea/vomiting and is mentating well  Hold HCTZ, check VBG/ABG to ensure pt has no CO2 retention

## 2018-03-02 NOTE — PROGRESS NOTE ADULT - PROBLEM SELECTOR PLAN 2
Albuterol INH changed to duonebs  Continue home meds, nebs prn  Lungs sound clear on exam,   tessalon everardo prn.

## 2018-03-02 NOTE — PROGRESS NOTE ADULT - SUBJECTIVE AND OBJECTIVE BOX
ORTHO PROGRESS NOTE     Patient c/p ain back of head and shoulders, taken off PCA was placed on oxycodone.  Walking with physical therapy       Vital Signs Last 24 Hrs  T(C): 36.8 (02 Mar 2018 06:11), Max: 36.9 (01 Mar 2018 21:44)  T(F): 98.3 (02 Mar 2018 06:11), Max: 98.4 (01 Mar 2018 21:44)  HR: 104 (02 Mar 2018 06:11) (97 - 109)  BP: 138/73 (02 Mar 2018 06:11) (110/73 - 144/75)  BP(mean): --  RR: 14 (02 Mar 2018 06:11) (14 - 16)  SpO2: 95% (02 Mar 2018 06:11) (93% - 97%)      Neck: Incision clean/ dry/intact HV= 5/12-> d/c'd              PAULIE drain:     UE: Deltoids: 5/5         Biceps: 5/5         Triceps: 5/5          Wrist ext: 5/5         : 5/5      A/P :  Stable              PT-WBAT             Pain control- oral meds changed to dilaudid             Incentive spirometer             Follow up AM labs ORTHO PROGRESS NOTE     Patient c/p ain back of head and shoulders, taken off PCA was placed on oxycodone.  Walking with physical therapy       Vital Signs Last 24 Hrs  T(C): 36.8 (02 Mar 2018 06:11), Max: 36.9 (01 Mar 2018 21:44)  T(F): 98.3 (02 Mar 2018 06:11), Max: 98.4 (01 Mar 2018 21:44)  HR: 104 (02 Mar 2018 06:11) (97 - 109)  BP: 138/73 (02 Mar 2018 06:11) (110/73 - 144/75)  BP(mean): --  RR: 14 (02 Mar 2018 06:11) (14 - 16)  SpO2: 95% (02 Mar 2018 06:11) (93% - 97%)      Neck: Incision clean/ dry/intact HV= 5/12-> d/c'd              PAULIE drain:     UE: Deltoids: 5/5         Biceps: 5/5         Triceps: 5/5          Wrist ext: 5/5         : 5/5      A/P :  Stable              PT-WBAT             Pain control- oral meds changed to dilaudid             Incentive spirometer             Follow up AM labs   Patient is doing well, she was examined on rounds today, I discussed and I agree with the above, Dr. Skip Weber

## 2018-03-03 DIAGNOSIS — R00.0 TACHYCARDIA, UNSPECIFIED: ICD-10-CM

## 2018-03-03 PROCEDURE — 99233 SBSQ HOSP IP/OBS HIGH 50: CPT

## 2018-03-03 RX ADMIN — LORATADINE 10 MILLIGRAM(S): 10 TABLET ORAL at 14:27

## 2018-03-03 RX ADMIN — Medication 1 PUFF(S): at 22:16

## 2018-03-03 RX ADMIN — LOSARTAN POTASSIUM 50 MILLIGRAM(S): 100 TABLET, FILM COATED ORAL at 06:19

## 2018-03-03 RX ADMIN — ATORVASTATIN CALCIUM 20 MILLIGRAM(S): 80 TABLET, FILM COATED ORAL at 22:17

## 2018-03-03 RX ADMIN — BUDESONIDE AND FORMOTEROL FUMARATE DIHYDRATE 2 PUFF(S): 160; 4.5 AEROSOL RESPIRATORY (INHALATION) at 10:02

## 2018-03-03 RX ADMIN — FAMOTIDINE 20 MILLIGRAM(S): 10 INJECTION INTRAVENOUS at 06:19

## 2018-03-03 RX ADMIN — Medication 100 MILLIGRAM(S): at 14:27

## 2018-03-03 RX ADMIN — SENNA PLUS 2 TABLET(S): 8.6 TABLET ORAL at 22:17

## 2018-03-03 RX ADMIN — HYDROMORPHONE HYDROCHLORIDE 2 MILLIGRAM(S): 2 INJECTION INTRAMUSCULAR; INTRAVENOUS; SUBCUTANEOUS at 22:47

## 2018-03-03 RX ADMIN — FAMOTIDINE 20 MILLIGRAM(S): 10 INJECTION INTRAVENOUS at 17:42

## 2018-03-03 RX ADMIN — DORZOLAMIDE HYDROCHLORIDE 1 DROP(S): 20 SOLUTION/ DROPS OPHTHALMIC at 17:42

## 2018-03-03 RX ADMIN — LATANOPROST 1 DROP(S): 0.05 SOLUTION/ DROPS OPHTHALMIC; TOPICAL at 22:17

## 2018-03-03 RX ADMIN — Medication 1 TABLET(S): at 14:27

## 2018-03-03 RX ADMIN — Medication 1 PUFF(S): at 10:04

## 2018-03-03 RX ADMIN — HYDROMORPHONE HYDROCHLORIDE 2 MILLIGRAM(S): 2 INJECTION INTRAMUSCULAR; INTRAVENOUS; SUBCUTANEOUS at 22:17

## 2018-03-03 RX ADMIN — MONTELUKAST 10 MILLIGRAM(S): 4 TABLET, CHEWABLE ORAL at 22:17

## 2018-03-03 RX ADMIN — DORZOLAMIDE HYDROCHLORIDE 1 DROP(S): 20 SOLUTION/ DROPS OPHTHALMIC at 06:18

## 2018-03-03 RX ADMIN — BUDESONIDE AND FORMOTEROL FUMARATE DIHYDRATE 2 PUFF(S): 160; 4.5 AEROSOL RESPIRATORY (INHALATION) at 22:16

## 2018-03-03 RX ADMIN — DULOXETINE HYDROCHLORIDE 60 MILLIGRAM(S): 30 CAPSULE, DELAYED RELEASE ORAL at 14:27

## 2018-03-03 RX ADMIN — Medication 100 MILLIGRAM(S): at 22:17

## 2018-03-03 NOTE — PROGRESS NOTE ADULT - SUBJECTIVE AND OBJECTIVE BOX
Patient is a 67y old  Female who presents with a chief complaint of C3-7 PSF 2/27/18    SUBJECTIVE / OVERNIGHT EVENTS:    feels ok today  had vasovagal event yesterday, today was able to move to chair with PT, no recurrence  denies CP or SOB  poor appetite  pain is controlled w/ pain meds   no new weakness, R hand remains more numb than left same as pre-op    MEDICATIONS  (STANDING):  atorvastatin 20 milliGRAM(s) Oral at bedtime  buDESOnide 160 MICROgram(s)/formoterol 4.5 MICROgram(s) Inhaler 2 Puff(s) Inhalation two times a day  docusate sodium 100 milliGRAM(s) Oral three times a day  dorzolamide 2% Ophthalmic Solution 1 Drop(s) Both EYES two times a day  DULoxetine 60 milliGRAM(s) Oral daily  famotidine    Tablet 20 milliGRAM(s) Oral two times a day  fluticasone propionate   44 MICROgram(s) HFA Inhaler 1 Puff(s) Inhalation two times a day  lactobacillus acidophilus 1 Tablet(s) Oral daily  latanoprost 0.005% Ophthalmic Solution 1 Drop(s) Both EYES at bedtime  loratadine 10 milliGRAM(s) Oral daily  losartan 50 milliGRAM(s) Oral daily  montelukast 10 milliGRAM(s) Oral at bedtime  senna 2 Tablet(s) Oral at bedtime    MEDICATIONS  (PRN):  acetaminophen   Tablet 650 milliGRAM(s) Oral every 6 hours PRN For Temp greater than 38 C (100.4 F)  acetaminophen   Tablet. 650 milliGRAM(s) Oral every 6 hours PRN Mild Pain (1 - 3)  ALBUTerol/ipratropium for Nebulization 3 milliLiter(s) Nebulizer every 6 hours PRN Bronchospasm  aluminum hydroxide/magnesium hydroxide/simethicone Suspension 30 milliLiter(s) Oral every 12 hours PRN Indigestion  benzonatate 100 milliGRAM(s) Oral three times a day PRN Cough  diazepam    Tablet 5 milliGRAM(s) Oral every 12 hours PRN Anxiety  HYDROmorphone   Tablet 2 milliGRAM(s) Oral every 3 hours PRN Moderate Pain (4 - 6)  HYDROmorphone   Tablet 4 milliGRAM(s) Oral every 3 hours PRN Severe Pain (7 - 10)  HYDROmorphone  Injectable 1 milliGRAM(s) IV Push every 3 hours PRN breakthrough  HYDROmorphone  Injectable 1 milliGRAM(s) IV Push every 4 hours PRN Severe Pain unrelieved by Oxycodone IR  ondansetron Injectable 4 milliGRAM(s) IV Push every 6 hours PRN Nausea    T(C): 37.5 (03-03-18 @ 17:42), Max: 37.7 (03-03-18 @ 06:15)  HR: 120 (03-03-18 @ 17:42) (111 - 120)  BP: 141/88 (03-03-18 @ 17:42) (132/74 - 151/84)  RR: 18 (03-03-18 @ 17:42) (16 - 18)  SpO2: 98% (03-03-18 @ 17:42) (96% - 100%)    I&O's Summary    02 Mar 2018 07:01  -  03 Mar 2018 07:00  --------------------------------------------------------  IN: 0 mL / OUT: 0 mL / NET: 0 mL    PHYSICAL EXAM:  GENERAL: Obese, NAD and conversant wearing collar   HEENT: EOMI, PERRLA, conjunctiva and sclera clear  CHEST/LUNG: Clear to auscultation bilaterally  HEART: S1/S2, Regular rate and rhythm  ABDOMEN: Nondistended, NABS, soft,  nontender, no peritoneal signs  EXTREMITIES:  2+ Peripheral Pulses, no clubbing, cyanosis, or edema  PSYCH: AAOx3, normal affect  NEUROLOGY: nonfocal     LABS:                        11.4   9.86  )-----------( 216      ( 02 Mar 2018 11:25 )             34.2     03-02    135  |  91<L>  |  12  ----------------------------<  115<H>  3.9   |  30  |  0.60    Ca    8.7      02 Mar 2018 11:25  Mg     1.7     03-02    TPro  7.5  /  Alb  3.2<L>  /  TBili  0.4  /  DBili  x   /  AST  26  /  ALT  17  /  AlkPhos  83  03-02    PT/INR - ( 02 Mar 2018 11:31 )   PT: 12.1 SEC;   INR: 1.05     PTT - ( 02 Mar 2018 11:31 )  PTT:25.2 SEC    ABG - ( 02 Mar 2018 11:25 )  pH: 7.46  /  pCO2: 49    /  pO2: 84    / HCO3: 33    / Base Excess: 10.3  /  SaO2: 96.6          Care Discussed with Consultants/Other Providers: ortho resident

## 2018-03-03 NOTE — PROGRESS NOTE ADULT - PROBLEM SELECTOR PLAN 3
Pt with worsening tachycardia, no hypoxia   -would fluid challege  -check orthostatics  -check TSH  -continue to monitor Pt with worsening tachycardia, no hypoxia   -would fluid challenge  -check orthostatics  -check TSH  -continue to monitor

## 2018-03-03 NOTE — PROGRESS NOTE ADULT - SUBJECTIVE AND OBJECTIVE BOX
ORTHO PROGRESS NOTE     Patient seen and examined  RRT called yesterday for vasovagal  Tachy to 120s, given 1L bolus yesterday with some improvement  Denies CP/SOB  Pain well ctonrolled    T(C): 37.7 (03-03-18 @ 06:15), Max: 37.8 (03-02-18 @ 18:40)  HR: 116 (03-03-18 @ 06:15) (100 - 129)  BP: 150/84 (03-03-18 @ 06:15) (111/82 - 151/84)  RR: 16 (03-03-18 @ 06:15) (16 - 18)  SpO2: 96% (03-03-18 @ 06:15) (96% - 100%)  Wt(kg): --                          11.4   9.86  )-----------( 216      ( 02 Mar 2018 11:25 )             34.2     03-02    135  |  91<L>  |  12  ----------------------------<  115<H>  3.9   |  30  |  0.60    Ca    8.7      02 Mar 2018 11:25  Mg     1.7     03-02    TPro  7.5  /  Alb  3.2<L>  /  TBili  0.4  /  DBili  x   /  AST  26  /  ALT  17  /  AlkPhos  83  03-02    PT/INR - ( 02 Mar 2018 11:31 )   PT: 12.1 SEC;   INR: 1.05          PTT - ( 02 Mar 2018 11:31 )  PTT:25.2 SEC    Neck: Incision clean/ dry/intact HV= 5/12-> d/c'd              PAULIE drain:     UE: Deltoids: 5/5         Biceps: 5/5         Triceps: 5/5          Wrist ext: 5/5         : 5/5      A/P :  Stable              PT-WBAT             Pain control- oral meds changed to dilaudid             Incentive spirometer             Follow up AM labs ORTHO PROGRESS NOTE     Patient seen and examined  RRT called yesterday for vasovagal  Tachy to 120s, given 1L bolus yesterday with some improvement  Denies CP/SOB  Pain well ctonrolled    T(C): 37.7 (03-03-18 @ 06:15), Max: 37.8 (03-02-18 @ 18:40)  HR: 116 (03-03-18 @ 06:15) (100 - 129)  BP: 150/84 (03-03-18 @ 06:15) (111/82 - 151/84)  RR: 16 (03-03-18 @ 06:15) (16 - 18)  SpO2: 96% (03-03-18 @ 06:15) (96% - 100%)  Wt(kg): --                          11.4   9.86  )-----------( 216      ( 02 Mar 2018 11:25 )             34.2     03-02    135  |  91<L>  |  12  ----------------------------<  115<H>  3.9   |  30  |  0.60    Ca    8.7      02 Mar 2018 11:25  Mg     1.7     03-02    TPro  7.5  /  Alb  3.2<L>  /  TBili  0.4  /  DBili  x   /  AST  26  /  ALT  17  /  AlkPhos  83  03-02    PT/INR - ( 02 Mar 2018 11:31 )   PT: 12.1 SEC;   INR: 1.05          PTT - ( 02 Mar 2018 11:31 )  PTT:25.2 SEC    Neck: Incision clean/ dry/intact HV= 5/12-> d/c'd              PAULIE drain:     UE: Deltoids: 5/5         Biceps: 5/5         Triceps: 5/5          Wrist ext: 5/5         : 5/5      A/P :  Stable              PT-WBAT             Pain control- oral meds changed to dilaudid             Incentive spirometer             Follow up AM labs   I agree and I discussed with the above, Dr. Skip Weber

## 2018-03-03 NOTE — PROGRESS NOTE ADULT - PROBLEM SELECTOR PLAN 4
BP controlled  Hold HCTZ , pt developing alkalosis  Monitor BP off HCTZ. Can add amlodipine 5mg if becomes hypertensive; will not add anything given recent syncopal event  check orthostatic   Ensure pain controlled  Monitor tachycardia w/ pain control BP controlled  Hold HCTZ , pt developing alkalosis  c/w losartan  Can add amlodipine 5mg if becomes hypertensive; will not add anything given recent syncopal event  check orthostatic   Ensure pain controlled  Monitor tachycardia w/ pain control

## 2018-03-04 LAB
BUN SERPL-MCNC: 20 MG/DL — SIGNIFICANT CHANGE UP (ref 7–23)
CALCIUM SERPL-MCNC: 9.1 MG/DL — SIGNIFICANT CHANGE UP (ref 8.4–10.5)
CHLORIDE SERPL-SCNC: 91 MMOL/L — LOW (ref 98–107)
CK SERPL-CCNC: 106 U/L — SIGNIFICANT CHANGE UP (ref 25–170)
CO2 SERPL-SCNC: 31 MMOL/L — SIGNIFICANT CHANGE UP (ref 22–31)
CREAT SERPL-MCNC: 0.75 MG/DL — SIGNIFICANT CHANGE UP (ref 0.5–1.3)
GLUCOSE SERPL-MCNC: 108 MG/DL — HIGH (ref 70–99)
HCT VFR BLD CALC: 37.5 % — SIGNIFICANT CHANGE UP (ref 34.5–45)
HGB BLD-MCNC: 12.4 G/DL — SIGNIFICANT CHANGE UP (ref 11.5–15.5)
MCHC RBC-ENTMCNC: 29.3 PG — SIGNIFICANT CHANGE UP (ref 27–34)
MCHC RBC-ENTMCNC: 33.1 % — SIGNIFICANT CHANGE UP (ref 32–36)
MCV RBC AUTO: 88.7 FL — SIGNIFICANT CHANGE UP (ref 80–100)
NRBC # FLD: 0 — SIGNIFICANT CHANGE UP
PLATELET # BLD AUTO: 263 K/UL — SIGNIFICANT CHANGE UP (ref 150–400)
PMV BLD: 10 FL — SIGNIFICANT CHANGE UP (ref 7–13)
POTASSIUM SERPL-MCNC: 3.4 MMOL/L — LOW (ref 3.5–5.3)
POTASSIUM SERPL-SCNC: 3.4 MMOL/L — LOW (ref 3.5–5.3)
RBC # BLD: 4.23 M/UL — SIGNIFICANT CHANGE UP (ref 3.8–5.2)
RBC # FLD: 14.4 % — SIGNIFICANT CHANGE UP (ref 10.3–14.5)
SODIUM SERPL-SCNC: 137 MMOL/L — SIGNIFICANT CHANGE UP (ref 135–145)
TSH SERPL-MCNC: 0.76 UIU/ML — SIGNIFICANT CHANGE UP (ref 0.27–4.2)
WBC # BLD: 12.43 K/UL — HIGH (ref 3.8–10.5)
WBC # FLD AUTO: 12.43 K/UL — HIGH (ref 3.8–10.5)

## 2018-03-04 PROCEDURE — 99233 SBSQ HOSP IP/OBS HIGH 50: CPT

## 2018-03-04 RX ORDER — POTASSIUM CHLORIDE 20 MEQ
20 PACKET (EA) ORAL ONCE
Qty: 0 | Refills: 0 | Status: COMPLETED | OUTPATIENT
Start: 2018-03-04 | End: 2018-03-04

## 2018-03-04 RX ORDER — SODIUM CHLORIDE 9 MG/ML
1000 INJECTION, SOLUTION INTRAVENOUS ONCE
Qty: 0 | Refills: 0 | Status: COMPLETED | OUTPATIENT
Start: 2018-03-04 | End: 2018-03-04

## 2018-03-04 RX ADMIN — ATORVASTATIN CALCIUM 20 MILLIGRAM(S): 80 TABLET, FILM COATED ORAL at 21:59

## 2018-03-04 RX ADMIN — FAMOTIDINE 20 MILLIGRAM(S): 10 INJECTION INTRAVENOUS at 16:59

## 2018-03-04 RX ADMIN — HYDROMORPHONE HYDROCHLORIDE 1 MILLIGRAM(S): 2 INJECTION INTRAMUSCULAR; INTRAVENOUS; SUBCUTANEOUS at 01:24

## 2018-03-04 RX ADMIN — SENNA PLUS 2 TABLET(S): 8.6 TABLET ORAL at 21:59

## 2018-03-04 RX ADMIN — HYDROMORPHONE HYDROCHLORIDE 2 MILLIGRAM(S): 2 INJECTION INTRAMUSCULAR; INTRAVENOUS; SUBCUTANEOUS at 09:30

## 2018-03-04 RX ADMIN — Medication 100 MILLIGRAM(S): at 11:42

## 2018-03-04 RX ADMIN — HYDROMORPHONE HYDROCHLORIDE 4 MILLIGRAM(S): 2 INJECTION INTRAMUSCULAR; INTRAVENOUS; SUBCUTANEOUS at 17:58

## 2018-03-04 RX ADMIN — LORATADINE 10 MILLIGRAM(S): 10 TABLET ORAL at 11:42

## 2018-03-04 RX ADMIN — FAMOTIDINE 20 MILLIGRAM(S): 10 INJECTION INTRAVENOUS at 05:42

## 2018-03-04 RX ADMIN — Medication 1 PUFF(S): at 21:58

## 2018-03-04 RX ADMIN — DORZOLAMIDE HYDROCHLORIDE 1 DROP(S): 20 SOLUTION/ DROPS OPHTHALMIC at 05:42

## 2018-03-04 RX ADMIN — HYDROMORPHONE HYDROCHLORIDE 4 MILLIGRAM(S): 2 INJECTION INTRAMUSCULAR; INTRAVENOUS; SUBCUTANEOUS at 13:31

## 2018-03-04 RX ADMIN — Medication 1 TABLET(S): at 11:42

## 2018-03-04 RX ADMIN — HYDROMORPHONE HYDROCHLORIDE 4 MILLIGRAM(S): 2 INJECTION INTRAMUSCULAR; INTRAVENOUS; SUBCUTANEOUS at 16:58

## 2018-03-04 RX ADMIN — Medication 100 MILLIGRAM(S): at 22:00

## 2018-03-04 RX ADMIN — DORZOLAMIDE HYDROCHLORIDE 1 DROP(S): 20 SOLUTION/ DROPS OPHTHALMIC at 16:59

## 2018-03-04 RX ADMIN — Medication 200 MILLIGRAM(S): at 22:57

## 2018-03-04 RX ADMIN — HYDROMORPHONE HYDROCHLORIDE 2 MILLIGRAM(S): 2 INJECTION INTRAMUSCULAR; INTRAVENOUS; SUBCUTANEOUS at 08:49

## 2018-03-04 RX ADMIN — LATANOPROST 1 DROP(S): 0.05 SOLUTION/ DROPS OPHTHALMIC; TOPICAL at 21:59

## 2018-03-04 RX ADMIN — HYDROMORPHONE HYDROCHLORIDE 4 MILLIGRAM(S): 2 INJECTION INTRAMUSCULAR; INTRAVENOUS; SUBCUTANEOUS at 21:59

## 2018-03-04 RX ADMIN — Medication 5 MILLIGRAM(S): at 11:42

## 2018-03-04 RX ADMIN — BUDESONIDE AND FORMOTEROL FUMARATE DIHYDRATE 2 PUFF(S): 160; 4.5 AEROSOL RESPIRATORY (INHALATION) at 21:58

## 2018-03-04 RX ADMIN — HYDROMORPHONE HYDROCHLORIDE 4 MILLIGRAM(S): 2 INJECTION INTRAMUSCULAR; INTRAVENOUS; SUBCUTANEOUS at 22:00

## 2018-03-04 RX ADMIN — SODIUM CHLORIDE 1000 MILLILITER(S): 9 INJECTION, SOLUTION INTRAVENOUS at 10:33

## 2018-03-04 RX ADMIN — LOSARTAN POTASSIUM 50 MILLIGRAM(S): 100 TABLET, FILM COATED ORAL at 05:42

## 2018-03-04 RX ADMIN — Medication 100 MILLIGRAM(S): at 05:42

## 2018-03-04 RX ADMIN — MONTELUKAST 10 MILLIGRAM(S): 4 TABLET, CHEWABLE ORAL at 21:59

## 2018-03-04 RX ADMIN — Medication 1 PUFF(S): at 08:50

## 2018-03-04 RX ADMIN — BUDESONIDE AND FORMOTEROL FUMARATE DIHYDRATE 2 PUFF(S): 160; 4.5 AEROSOL RESPIRATORY (INHALATION) at 08:49

## 2018-03-04 RX ADMIN — DULOXETINE HYDROCHLORIDE 60 MILLIGRAM(S): 30 CAPSULE, DELAYED RELEASE ORAL at 11:42

## 2018-03-04 RX ADMIN — HYDROMORPHONE HYDROCHLORIDE 4 MILLIGRAM(S): 2 INJECTION INTRAMUSCULAR; INTRAVENOUS; SUBCUTANEOUS at 14:30

## 2018-03-04 RX ADMIN — HYDROMORPHONE HYDROCHLORIDE 1 MILLIGRAM(S): 2 INJECTION INTRAMUSCULAR; INTRAVENOUS; SUBCUTANEOUS at 00:54

## 2018-03-04 RX ADMIN — Medication 20 MILLIEQUIVALENT(S): at 11:41

## 2018-03-04 NOTE — PROGRESS NOTE ADULT - SUBJECTIVE AND OBJECTIVE BOX
Patient is a 67y old  Female who presents with a chief complaint of C3-7 PSF 2/27/18    SUBJECTIVE / OVERNIGHT EVENTS:    No overnight events, able to get to chair  has generalized body pain  no CP, no SOB, ate ensure, still not great appetite   voiding, incontinence (not new to her)  no new numbness    MEDICATIONS  (STANDING):  atorvastatin 20 milliGRAM(s) Oral at bedtime  buDESOnide 160 MICROgram(s)/formoterol 4.5 MICROgram(s) Inhaler 2 Puff(s) Inhalation two times a day  docusate sodium 100 milliGRAM(s) Oral three times a day  dorzolamide 2% Ophthalmic Solution 1 Drop(s) Both EYES two times a day  DULoxetine 60 milliGRAM(s) Oral daily  famotidine    Tablet 20 milliGRAM(s) Oral two times a day  fluticasone propionate   44 MICROgram(s) HFA Inhaler 1 Puff(s) Inhalation two times a day  lactobacillus acidophilus 1 Tablet(s) Oral daily  latanoprost 0.005% Ophthalmic Solution 1 Drop(s) Both EYES at bedtime  loratadine 10 milliGRAM(s) Oral daily  losartan 50 milliGRAM(s) Oral daily  montelukast 10 milliGRAM(s) Oral at bedtime  senna 2 Tablet(s) Oral at bedtime    MEDICATIONS  (PRN):  acetaminophen   Tablet 650 milliGRAM(s) Oral every 6 hours PRN For Temp greater than 38 C (100.4 F)  acetaminophen   Tablet. 650 milliGRAM(s) Oral every 6 hours PRN Mild Pain (1 - 3)  ALBUTerol/ipratropium for Nebulization 3 milliLiter(s) Nebulizer every 6 hours PRN Bronchospasm  aluminum hydroxide/magnesium hydroxide/simethicone Suspension 30 milliLiter(s) Oral every 12 hours PRN Indigestion  benzonatate 100 milliGRAM(s) Oral three times a day PRN Cough  diazepam    Tablet 5 milliGRAM(s) Oral every 12 hours PRN Anxiety  HYDROmorphone   Tablet 2 milliGRAM(s) Oral every 3 hours PRN Moderate Pain (4 - 6)  HYDROmorphone   Tablet 4 milliGRAM(s) Oral every 3 hours PRN Severe Pain (7 - 10)  HYDROmorphone  Injectable 1 milliGRAM(s) IV Push every 3 hours PRN breakthrough  HYDROmorphone  Injectable 1 milliGRAM(s) IV Push every 4 hours PRN Severe Pain unrelieved by Oxycodone IR  ondansetron Injectable 4 milliGRAM(s) IV Push every 6 hours PRN Nausea    T(C): 37.3 (03-04-18 @ 13:14), Max: 37.5 (03-03-18 @ 17:42)  HR: 112 (03-04-18 @ 13:14) (110 - 130)  BP: 118/63 (03-04-18 @ 13:14) (108/58 - 141/88)  RR: 16 (03-04-18 @ 13:14) (16 - 18)  SpO2: 95% (03-04-18 @ 13:14) (95% - 98%)    I&O's Summary    03 Mar 2018 07:01  -  04 Mar 2018 07:00  --------------------------------------------------------  IN: 0 mL / OUT: 250 mL / NET: -250 mL    04 Mar 2018 07:01  -  04 Mar 2018 16:29  --------------------------------------------------------  IN: 0 mL / OUT: 150 mL / NET: -150 mL    PHYSICAL EXAM:  GENERAL: Obese, NAD and conversant wearing collar   HEENT: EOMI, PERRLA, conjunctiva and sclera clear  CHEST/LUNG: chest congestion, coarse BS suggestive of secretions in larger airways   HEART: S1/S2, Regular rate and rhythm  ABDOMEN: Nondistended, NABS, soft,  nontender, no peritoneal signs  EXTREMITIES:  2+ Peripheral Pulses, no clubbing, cyanosis, or edema  PSYCH: AAOx3, normal affect  NEUROLOGY: nonfocal     LABS:                        12.4   12.43 )-----------( 263      ( 04 Mar 2018 05:50 )             37.5     03-04    137  |  91<L>  |  20  ----------------------------<  108<H>  3.4<L>   |  31  |  0.75    Ca    9.1      04 Mar 2018 05:50      Care Discussed with Consultants/Other Providers: ortho resident

## 2018-03-04 NOTE — PROGRESS NOTE ADULT - PROBLEM SELECTOR PLAN 3
Pt with worsening tachycardia, no hypoxia, legs symmetric; poor PO, rising BUN/bicarb possibly dry  -fluid challenge today  -check TSH  -continue to monitor

## 2018-03-04 NOTE — PROGRESS NOTE ADULT - PROBLEM SELECTOR PLAN 2
Albuterol INH changed to duonebs  Continue home meds, nebs prn  tessalon pearles prn  start guaifenesin   advised to use incentive spirometer   if persists or febrile check cxr

## 2018-03-04 NOTE — PROGRESS NOTE ADULT - SUBJECTIVE AND OBJECTIVE BOX
No acute events overnight. Pain well controlled with pain medications.    Vital Signs Last 24 Hrs  T(C): 36.7 (04 Mar 2018 05:34), Max: 37.5 (03 Mar 2018 17:42)  T(F): 98.1 (04 Mar 2018 05:34), Max: 99.5 (03 Mar 2018 17:42)  HR: 112 (04 Mar 2018 05:34) (110 - 120)  BP: 133/69 (04 Mar 2018 05:34) (117/77 - 141/88)  BP(mean): --  RR: 18 (04 Mar 2018 05:34) (18 - 18)  SpO2: 95% (04 Mar 2018 05:34) (95% - 99%)    Exam:  Gen: NAD  Motor: 5/5 AIN/PIN/Median/Radial/Ulnar nerve distributions  Sensory: SILT Median/Radial/Ulnar nerve distributions, mild decreased sensation over R thumb/index finger  Dressing: Clean, Dry, Intact    A/P: 67 year old female s/p C3-7 PSF  - Pain Control  - Regular Diet  - PT/OT, OOB  - Discharge Planning No acute events overnight. Pain well controlled with pain medications.    Vital Signs Last 24 Hrs  T(C): 36.7 (04 Mar 2018 05:34), Max: 37.5 (03 Mar 2018 17:42)  T(F): 98.1 (04 Mar 2018 05:34), Max: 99.5 (03 Mar 2018 17:42)  HR: 112 (04 Mar 2018 05:34) (110 - 120)  BP: 133/69 (04 Mar 2018 05:34) (117/77 - 141/88)  BP(mean): --  RR: 18 (04 Mar 2018 05:34) (18 - 18)  SpO2: 95% (04 Mar 2018 05:34) (95% - 99%)    Exam:  Gen: NAD  Motor: 5/5 AIN/PIN/Median/Radial/Ulnar nerve distributions  Sensory: SILT Median/Radial/Ulnar nerve distributions, mild decreased sensation over R thumb/index finger  Dressing: Clean, Dry, Intact    A/P: 67 year old female s/p C3-7 PSF  - Pain Control  - Regular Diet  - PT/OT, OOB  - Discharge Planning   I agree and I discussed with the above, Dr. Skip Weber

## 2018-03-05 VITALS
DIASTOLIC BLOOD PRESSURE: 69 MMHG | OXYGEN SATURATION: 95 % | RESPIRATION RATE: 17 BRPM | SYSTOLIC BLOOD PRESSURE: 148 MMHG | TEMPERATURE: 99 F | HEART RATE: 117 BPM

## 2018-03-05 LAB
BUN SERPL-MCNC: 22 MG/DL — SIGNIFICANT CHANGE UP (ref 7–23)
CALCIUM SERPL-MCNC: 8.9 MG/DL — SIGNIFICANT CHANGE UP (ref 8.4–10.5)
CHLORIDE SERPL-SCNC: 95 MMOL/L — LOW (ref 98–107)
CO2 SERPL-SCNC: 33 MMOL/L — HIGH (ref 22–31)
CREAT SERPL-MCNC: 0.78 MG/DL — SIGNIFICANT CHANGE UP (ref 0.5–1.3)
GLUCOSE SERPL-MCNC: 121 MG/DL — HIGH (ref 70–99)
HCT VFR BLD CALC: 37.6 % — SIGNIFICANT CHANGE UP (ref 34.5–45)
HGB BLD-MCNC: 11.8 G/DL — SIGNIFICANT CHANGE UP (ref 11.5–15.5)
MCHC RBC-ENTMCNC: 28.6 PG — SIGNIFICANT CHANGE UP (ref 27–34)
MCHC RBC-ENTMCNC: 31.4 % — LOW (ref 32–36)
MCV RBC AUTO: 91.3 FL — SIGNIFICANT CHANGE UP (ref 80–100)
NRBC # FLD: 0 — SIGNIFICANT CHANGE UP
PLATELET # BLD AUTO: 276 K/UL — SIGNIFICANT CHANGE UP (ref 150–400)
PMV BLD: 9.8 FL — SIGNIFICANT CHANGE UP (ref 7–13)
POTASSIUM SERPL-MCNC: 3.9 MMOL/L — SIGNIFICANT CHANGE UP (ref 3.5–5.3)
POTASSIUM SERPL-SCNC: 3.9 MMOL/L — SIGNIFICANT CHANGE UP (ref 3.5–5.3)
RBC # BLD: 4.12 M/UL — SIGNIFICANT CHANGE UP (ref 3.8–5.2)
RBC # FLD: 14.3 % — SIGNIFICANT CHANGE UP (ref 10.3–14.5)
SODIUM SERPL-SCNC: 138 MMOL/L — SIGNIFICANT CHANGE UP (ref 135–145)
WBC # BLD: 11 K/UL — HIGH (ref 3.8–10.5)
WBC # FLD AUTO: 11 K/UL — HIGH (ref 3.8–10.5)

## 2018-03-05 PROCEDURE — 99233 SBSQ HOSP IP/OBS HIGH 50: CPT

## 2018-03-05 RX ORDER — DOCUSATE SODIUM 100 MG
1 CAPSULE ORAL
Qty: 0 | Refills: 0 | DISCHARGE
Start: 2018-03-05

## 2018-03-05 RX ORDER — METOPROLOL TARTRATE 50 MG
25 TABLET ORAL
Qty: 0 | Refills: 0 | Status: DISCONTINUED | OUTPATIENT
Start: 2018-03-05 | End: 2018-03-05

## 2018-03-05 RX ORDER — LOSARTAN POTASSIUM 100 MG/1
1 TABLET, FILM COATED ORAL
Qty: 0 | Refills: 0 | DISCHARGE
Start: 2018-03-05

## 2018-03-05 RX ORDER — HYDROMORPHONE HYDROCHLORIDE 2 MG/ML
1 INJECTION INTRAMUSCULAR; INTRAVENOUS; SUBCUTANEOUS
Qty: 0 | Refills: 0 | DISCHARGE
Start: 2018-03-05

## 2018-03-05 RX ORDER — ACETAMINOPHEN WITH CODEINE 300MG-30MG
1 TABLET ORAL
Qty: 0 | Refills: 0 | COMMUNITY

## 2018-03-05 RX ORDER — LOSARTAN POTASSIUM 100 MG/1
75 TABLET, FILM COATED ORAL
Qty: 0 | Refills: 0 | COMMUNITY

## 2018-03-05 RX ORDER — METOPROLOL TARTRATE 50 MG
1 TABLET ORAL
Qty: 0 | Refills: 0 | DISCHARGE
Start: 2018-03-05

## 2018-03-05 RX ORDER — ETODOLAC 400 MG/1
1 TABLET, FILM COATED ORAL
Qty: 0 | Refills: 0 | COMMUNITY

## 2018-03-05 RX ORDER — SENNA PLUS 8.6 MG/1
2 TABLET ORAL
Qty: 0 | Refills: 0 | DISCHARGE
Start: 2018-03-05

## 2018-03-05 RX ADMIN — Medication 100 MILLIGRAM(S): at 05:44

## 2018-03-05 RX ADMIN — Medication 1 TABLET(S): at 11:56

## 2018-03-05 RX ADMIN — BUDESONIDE AND FORMOTEROL FUMARATE DIHYDRATE 2 PUFF(S): 160; 4.5 AEROSOL RESPIRATORY (INHALATION) at 09:00

## 2018-03-05 RX ADMIN — HYDROMORPHONE HYDROCHLORIDE 4 MILLIGRAM(S): 2 INJECTION INTRAMUSCULAR; INTRAVENOUS; SUBCUTANEOUS at 14:50

## 2018-03-05 RX ADMIN — LORATADINE 10 MILLIGRAM(S): 10 TABLET ORAL at 11:56

## 2018-03-05 RX ADMIN — HYDROMORPHONE HYDROCHLORIDE 4 MILLIGRAM(S): 2 INJECTION INTRAMUSCULAR; INTRAVENOUS; SUBCUTANEOUS at 09:00

## 2018-03-05 RX ADMIN — Medication 200 MILLIGRAM(S): at 13:59

## 2018-03-05 RX ADMIN — DULOXETINE HYDROCHLORIDE 60 MILLIGRAM(S): 30 CAPSULE, DELAYED RELEASE ORAL at 11:56

## 2018-03-05 RX ADMIN — HYDROMORPHONE HYDROCHLORIDE 2 MILLIGRAM(S): 2 INJECTION INTRAMUSCULAR; INTRAVENOUS; SUBCUTANEOUS at 03:12

## 2018-03-05 RX ADMIN — HYDROMORPHONE HYDROCHLORIDE 4 MILLIGRAM(S): 2 INJECTION INTRAMUSCULAR; INTRAVENOUS; SUBCUTANEOUS at 05:47

## 2018-03-05 RX ADMIN — HYDROMORPHONE HYDROCHLORIDE 4 MILLIGRAM(S): 2 INJECTION INTRAMUSCULAR; INTRAVENOUS; SUBCUTANEOUS at 18:10

## 2018-03-05 RX ADMIN — FAMOTIDINE 20 MILLIGRAM(S): 10 INJECTION INTRAVENOUS at 05:44

## 2018-03-05 RX ADMIN — DORZOLAMIDE HYDROCHLORIDE 1 DROP(S): 20 SOLUTION/ DROPS OPHTHALMIC at 05:44

## 2018-03-05 RX ADMIN — Medication 1 PUFF(S): at 09:01

## 2018-03-05 RX ADMIN — LOSARTAN POTASSIUM 50 MILLIGRAM(S): 100 TABLET, FILM COATED ORAL at 05:44

## 2018-03-05 RX ADMIN — HYDROMORPHONE HYDROCHLORIDE 4 MILLIGRAM(S): 2 INJECTION INTRAMUSCULAR; INTRAVENOUS; SUBCUTANEOUS at 09:50

## 2018-03-05 RX ADMIN — Medication 200 MILLIGRAM(S): at 05:44

## 2018-03-05 RX ADMIN — HYDROMORPHONE HYDROCHLORIDE 4 MILLIGRAM(S): 2 INJECTION INTRAMUSCULAR; INTRAVENOUS; SUBCUTANEOUS at 14:03

## 2018-03-05 RX ADMIN — Medication 100 MILLIGRAM(S): at 13:59

## 2018-03-05 NOTE — PROGRESS NOTE ADULT - SUBJECTIVE AND OBJECTIVE BOX
Ortho Progress Note  SHABANA COLVIN   MRN-[id number]    67yFemale is s/p C3-7 PSF POD#6 w/out any c/o except for Aspen collar being uncomfortable.  Resting comfortably, NAD, ANO x 3    Vital Signs Last 24 Hrs  T(C): 37.2 (05 Mar 2018 05:40), Max: 37.4 (04 Mar 2018 17:55)  T(F): 99 (05 Mar 2018 05:40), Max: 99.4 (04 Mar 2018 17:55)  HR: 109 (05 Mar 2018 05:40) (109 - 130)  BP: 137/82 (05 Mar 2018 05:40) (107/53 - 140/75)  BP(mean): --  RR: 16 (05 Mar 2018 05:40) (16 - 16)  SpO2: 95% (05 Mar 2018 05:40) (94% - 97%)  clindamycin (Other)  clindamycin (Unknown)  strawberry (Rash)      S/P C3-7 PSF  [place] collar in place  Cervical wound dressing is C/D/I  motor BUE intact 5/5 against resistance symmetrically  sensation BUE intact to light touch                          12.4   12.43 )-----------( 263      ( 04 Mar 2018 05:50 )             37.5     03-04    137  |  91<L>  |  20  ----------------------------<  108<H>  3.4<L>   |  31  |  0.75    Ca    9.1      04 Mar 2018 05:50        A/P Ortho Stable s/p C3-7 PSF POD#6  continue Aspen collar when patient is out of bed - may have off while sleeping in bed  continue Physical Therapy BID: out of bed for gait training  OHOS note appreciated  discharge planning to Rehab when bed is available Ortho Progress Note  SHABANA COLVIN   MRN-[id number]    67yFemale is s/p C3-7 PSF POD#6 w/out any c/o except for Aspen collar being uncomfortable.  Resting comfortably, NAD, ANO x 3    Vital Signs Last 24 Hrs  T(C): 37.2 (05 Mar 2018 05:40), Max: 37.4 (04 Mar 2018 17:55)  T(F): 99 (05 Mar 2018 05:40), Max: 99.4 (04 Mar 2018 17:55)  HR: 109 (05 Mar 2018 05:40) (109 - 130)  BP: 137/82 (05 Mar 2018 05:40) (107/53 - 140/75)  BP(mean): --  RR: 16 (05 Mar 2018 05:40) (16 - 16)  SpO2: 95% (05 Mar 2018 05:40) (94% - 97%)  clindamycin (Other)  clindamycin (Unknown)  strawberry (Rash)      S/P C3-7 PSF  [place] collar in place  Cervical wound dressing is C/D/I  motor BUE intact 5/5 against resistance symmetrically  sensation BUE intact to light touch                          12.4   12.43 )-----------( 263      ( 04 Mar 2018 05:50 )             37.5     03-04    137  |  91<L>  |  20  ----------------------------<  108<H>  3.4<L>   |  31  |  0.75    Ca    9.1      04 Mar 2018 05:50        A/P Ortho Stable s/p C3-7 PSF POD#6  continue Aspen collar when patient is out of bed - may have off while sleeping in bed  continue Physical Therapy BID: out of bed for gait training  OHOS note appreciated  discharge planning to Rehab when bed is available  I agree I discussed with the team, Dr. Skip Weber

## 2018-03-05 NOTE — PROGRESS NOTE ADULT - PROBLEM SELECTOR PLAN 5
Monitor serum glucose
Monitor serum glucose
Continue eye drops
Monitor serum glucose
Continue eye drops

## 2018-03-05 NOTE — PROGRESS NOTE ADULT - PROBLEM SELECTOR PROBLEM 7
Leukocytosis, unspecified type
Metabolic alkalosis
Metabolic alkalosis

## 2018-03-05 NOTE — PROGRESS NOTE ADULT - PROBLEM SELECTOR PROBLEM 5
Prediabetes
Prediabetes
Glaucoma of both eyes, unspecified glaucoma type
Glaucoma of both eyes, unspecified glaucoma type
Prediabetes

## 2018-03-05 NOTE — PROGRESS NOTE ADULT - PROBLEM SELECTOR PROBLEM 2
Asthma, unspecified asthma severity, unspecified whether complicated, unspecified whether persistent

## 2018-03-05 NOTE — PROGRESS NOTE ADULT - PROBLEM SELECTOR PROBLEM 4
Hypertension, unspecified type
Hypertension, unspecified type
Prediabetes
Hypertension, unspecified type
Prediabetes

## 2018-03-05 NOTE — PROGRESS NOTE ADULT - PROBLEM SELECTOR PLAN 4
BP controlled on losartan 50mg daily. Reviewed outpatient antihypertensive regimen. Pt is on losartan 75mg daily, HCTZ 12.5mg daily, and metoprolol 25mg BID  - continue to hold HCTZ since pt was developing contraction alkalosis from diuretic  - c/w current regimen of losartan 50mg daily. Can likely increase to home dose of 75mg if pt needs better BP control while off HCTZ.   - resume metoprolol 25mg BID

## 2018-03-05 NOTE — PROGRESS NOTE ADULT - PROBLEM SELECTOR PLAN 6
Continue eye drops
Reactive 2/2 surgery  No acute sign of infection. Monitor fever curve
Reactive 2/2 surgery  No acute sign of infection. Monitor fever curve

## 2018-03-05 NOTE — PROGRESS NOTE ADULT - PROVIDER SPECIALTY LIST ADULT
Anesthesia
Anesthesia
Hospitalist
Orthopedics
Pain Medicine
Hospitalist
Hospitalist

## 2018-03-05 NOTE — PROGRESS NOTE ADULT - ASSESSMENT
66 yo F with asthma, carpal tunnel, spinal stenosis of cervical region scheduled for C3-4 cervical decompression fusion for 2/27/18 now post op day 5
67 year old female PM asthma, carpal tunnel presents to presurgical testing with diagnosis of spinal stenosis, cervical region scheduled for C3-4 cervical decompression fusion for 2/27/18 now post op day 2
67 year old female PM asthma, carpal tunnel presents to presurgical testing with diagnosis of spinal stenosis, cervical region scheduled for C3-4 cervical decompression fusion for 2/27/18 now post op day 3
66 yo F with asthma, carpal tunnel, spinal stenosis of cervical region scheduled for C3-4 cervical decompression fusion for 2/27/18 now post op day 4
Patient is a 68 yo F with asthma, carpal tunnel, spinal stenosis of cervical region s/p C3-4 cervical decompression and posterior spinal fusion on 2/27/18 now POD#6, found to have persistent tachycardia likely 2/2 mild dehydration and being of home dose of beta blocker. Patient otherwise hemodynamically stable.

## 2018-03-05 NOTE — PROGRESS NOTE ADULT - PROBLEM SELECTOR PROBLEM 1
Spinal stenosis in cervical region

## 2018-03-05 NOTE — PROGRESS NOTE ADULT - PROBLEM SELECTOR PLAN 8
Pt noted to have rising bicarb. ABG from 2/27 showed no evidence of CO2 retention, pt had mild metabolic alkalosis, thought 2/2 HCTZ  -c/w holding HCTZ  -mild rentention on ABG this am, c/w nebs  -if persistent can get RT to check NIF/VC
Pt noted to have rising bicarb. ABG from 2/27 showed no evidence of CO2 retention, pt had mild metabolic alkalosis, thought 2/2 HCTZ  -c/w holding HCTZ    DISPO: Patient is medically stable to be discharged to rehab facility today.
Pt noted to have rising bicarb. ABG from 2/27 showed no evidence of CO2 retention, pt had mild metabolic alkalosis, thought 2/2 HCTZ  -c/w holding HCTZ  -mild rentention on ABG, c/w nebs

## 2018-03-05 NOTE — PROGRESS NOTE ADULT - PROBLEM SELECTOR PLAN 7
Reactive 2/2 surgery  No acute sign of infection. Monitor fever curve  Improving spontaneously w/o intervention

## 2018-03-05 NOTE — PROGRESS NOTE ADULT - PROBLEM SELECTOR PLAN 1
s/p cervical decompression POD#3  Aspen collar as per ortho  Pain control. Monitor tachycardia  PT/OT
s/p cervical decompression POD#5  Aspen collar as per ortho  Pain control  PT/OT
s/p cervical decompression POD#2  Aspen collar as per ortho  Pain control  HV drain monitoring and removal as per ortho  PT/OT  passed TOV voiding well
s/p cervical decompression POD#4  Aspen collar as per ortho  Pain control  PT/OT
s/p cervical decompression and PSF POD#6. Some discomfort with Aspen collar, but otherwise no complications  - continue [place] collar as per ortho  - Pain control  - PT/OT --> pt planned to be discharged to rehab

## 2018-03-05 NOTE — PROGRESS NOTE ADULT - PROBLEM SELECTOR PROBLEM 6
Glaucoma of both eyes, unspecified glaucoma type
Glaucoma of both eyes, unspecified glaucoma type
Leukocytosis, unspecified type
Glaucoma of both eyes, unspecified glaucoma type
Leukocytosis, unspecified type

## 2018-03-05 NOTE — PROGRESS NOTE ADULT - SUBJECTIVE AND OBJECTIVE BOX
CHIEF COMPLAINT: f/u     SUBJECTIVE / OVERNIGHT EVENTS:   Patient seen and examined. No acute events overnight. Patient still complaints of discomfort from Aspen collar but tolerable on current pain regimen. Patient with neck discomfort when she coughs. Patient reports no chest pain, SOB, or palpitations despite persistent tachycardia. Patient admits to not eating and drinking as much as she should. Pt w/ no n/v or abdominal pain    MEDICATIONS  (STANDING):  atorvastatin 20 milliGRAM(s) Oral at bedtime  buDESOnide 160 MICROgram(s)/formoterol 4.5 MICROgram(s) Inhaler 2 Puff(s) Inhalation two times a day  docusate sodium 100 milliGRAM(s) Oral three times a day  dorzolamide 2% Ophthalmic Solution 1 Drop(s) Both EYES two times a day  DULoxetine 60 milliGRAM(s) Oral daily  famotidine    Tablet 20 milliGRAM(s) Oral two times a day  fluticasone propionate   44 MICROgram(s) HFA Inhaler 1 Puff(s) Inhalation two times a day  guaiFENesin    Syrup 200 milliGRAM(s) Oral every 8 hours  lactobacillus acidophilus 1 Tablet(s) Oral daily  latanoprost 0.005% Ophthalmic Solution 1 Drop(s) Both EYES at bedtime  loratadine 10 milliGRAM(s) Oral daily  losartan 50 milliGRAM(s) Oral daily  montelukast 10 milliGRAM(s) Oral at bedtime  senna 2 Tablet(s) Oral at bedtime    MEDICATIONS  (PRN):  acetaminophen   Tablet 650 milliGRAM(s) Oral every 6 hours PRN For Temp greater than 38 C (100.4 F)  acetaminophen   Tablet. 650 milliGRAM(s) Oral every 6 hours PRN Mild Pain (1 - 3)  ALBUTerol/ipratropium for Nebulization 3 milliLiter(s) Nebulizer every 6 hours PRN Bronchospasm  aluminum hydroxide/magnesium hydroxide/simethicone Suspension 30 milliLiter(s) Oral every 12 hours PRN Indigestion  benzonatate 100 milliGRAM(s) Oral three times a day PRN Cough  diazepam    Tablet 5 milliGRAM(s) Oral every 12 hours PRN Anxiety  HYDROmorphone   Tablet 2 milliGRAM(s) Oral every 3 hours PRN Moderate Pain (4 - 6)  HYDROmorphone   Tablet 4 milliGRAM(s) Oral every 3 hours PRN Severe Pain (7 - 10)  HYDROmorphone  Injectable 1 milliGRAM(s) IV Push every 3 hours PRN breakthrough  HYDROmorphone  Injectable 1 milliGRAM(s) IV Push every 4 hours PRN Severe Pain unrelieved by Oxycodone IR  ondansetron Injectable 4 milliGRAM(s) IV Push every 6 hours PRN Nausea      VITALS:  T(F): 98.6 (03-05-18 @ 13:57), Max: 99.4 (03-04-18 @ 17:55)  HR: 111 (03-05-18 @ 13:57) (105 - 113)  BP: 137/60 (03-05-18 @ 13:57) (107/53 - 140/75)  RR: 16 (03-05-18 @ 13:57) (16 - 16)  SpO2: 95% (03-05-18 @ 13:57)        CAPILLARY BLOOD GLUCOSE    PHYSICAL EXAM:  GENERAL: Obese woman in NAD, laying in bed  EYES: EOMI, normal conjunctiva and sclera clear  CHEST/LUNG: Normal respiratory effort. Patient with coarse BS  HEART: Regular rhythm and tachycardic; No murmurs, rubs, or gallops  ABDOMEN: Soft, Nontender, Nondistended; Bowel sounds present  EXTREMITIES:  2+ Peripheral Pulses, No clubbing, cyanosis, or edema  PSYCH: AAOx3          LABS:              11.8                 138  | 33   | 22           11.00 >-----------< 276     ------------------------< 121                   37.6                 3.9  | 95   | 0.78                                         Ca 8.9   Mg x     Ph x              [x ] Consultant(s) Notes Reviewed: Yes  [x] Care Discussed with Consultants/Other Providers: Orthopedic PA - discussed

## 2018-03-05 NOTE — PROGRESS NOTE ADULT - PROBLEM SELECTOR PLAN 3
Pt with persistent tachycardia, no hypoxia, legs symmetric making acute DVT not likely as etiology. Some improvement in HR with IV hydration. Suspect due to mild dehydration in addition to not being on home dose of metoprolol 25mg BID.  - spoke with pt --> informed her she needs to increase oral hydration. Pt expressed understanding and was agreeable   - resume home dose of metoprolol 25mg BID  - HOLD home dose of HCTZ 12.5mg in the setting of poor PO intake --> pt to f/u with PMD to know when or if she needs to resume  -continue to monitor

## 2018-03-13 ENCOUNTER — APPOINTMENT (OUTPATIENT)
Dept: OPHTHALMOLOGY | Facility: CLINIC | Age: 68
End: 2018-03-13

## 2018-03-16 ENCOUNTER — APPOINTMENT (OUTPATIENT)
Dept: ORTHOPEDIC SURGERY | Facility: CLINIC | Age: 68
End: 2018-03-16
Payer: MEDICARE

## 2018-03-16 PROCEDURE — 99024 POSTOP FOLLOW-UP VISIT: CPT

## 2018-03-16 PROCEDURE — 72040 X-RAY EXAM NECK SPINE 2-3 VW: CPT

## 2018-03-22 ENCOUNTER — RX RENEWAL (OUTPATIENT)
Age: 68
End: 2018-03-22

## 2018-03-23 ENCOUNTER — APPOINTMENT (OUTPATIENT)
Dept: GASTROENTEROLOGY | Facility: CLINIC | Age: 68
End: 2018-03-23

## 2018-04-11 ENCOUNTER — APPOINTMENT (OUTPATIENT)
Dept: INTERNAL MEDICINE | Facility: CLINIC | Age: 68
End: 2018-04-11
Payer: MEDICARE

## 2018-04-11 VITALS
DIASTOLIC BLOOD PRESSURE: 86 MMHG | SYSTOLIC BLOOD PRESSURE: 120 MMHG | HEIGHT: 62 IN | WEIGHT: 215 LBS | HEART RATE: 123 BPM | BODY MASS INDEX: 39.56 KG/M2

## 2018-04-11 PROCEDURE — 99214 OFFICE O/P EST MOD 30 MIN: CPT | Mod: 25

## 2018-04-11 PROCEDURE — 36415 COLL VENOUS BLD VENIPUNCTURE: CPT

## 2018-04-11 RX ORDER — METHYLPREDNISOLONE 4 MG/1
4 TABLET ORAL
Qty: 1 | Refills: 0 | Status: DISCONTINUED | COMMUNITY
Start: 2018-02-02 | End: 2018-04-11

## 2018-04-13 ENCOUNTER — MEDICATION RENEWAL (OUTPATIENT)
Age: 68
End: 2018-04-13

## 2018-04-13 LAB
ALBUMIN SERPL ELPH-MCNC: 3.9 G/DL
ALP BLD-CCNC: 87 U/L
ALT SERPL-CCNC: 4 U/L
ANION GAP SERPL CALC-SCNC: 17 MMOL/L
AST SERPL-CCNC: 20 U/L
BASOPHILS # BLD AUTO: 0.02 K/UL
BASOPHILS NFR BLD AUTO: 0.4 %
BILIRUB SERPL-MCNC: 0.3 MG/DL
BUN SERPL-MCNC: 7 MG/DL
CALCIUM SERPL-MCNC: 9.7 MG/DL
CHLORIDE SERPL-SCNC: 102 MMOL/L
CHOLEST SERPL-MCNC: 191 MG/DL
CHOLEST/HDLC SERPL: 2.7 RATIO
CO2 SERPL-SCNC: 21 MMOL/L
CREAT SERPL-MCNC: 0.83 MG/DL
EOSINOPHIL # BLD AUTO: 0.07 K/UL
EOSINOPHIL NFR BLD AUTO: 1.5 %
GLUCOSE SERPL-MCNC: 107 MG/DL
HBA1C MFR BLD HPLC: 6.2 %
HCT VFR BLD CALC: 38.9 %
HDLC SERPL-MCNC: 70 MG/DL
HGB BLD-MCNC: 12.1 G/DL
IMM GRANULOCYTES NFR BLD AUTO: 0 %
LDLC SERPL CALC-MCNC: 106 MG/DL
LYMPHOCYTES # BLD AUTO: 2.19 K/UL
LYMPHOCYTES NFR BLD AUTO: 45.5 %
MAN DIFF?: NORMAL
MCHC RBC-ENTMCNC: 28.9 PG
MCHC RBC-ENTMCNC: 31.1 GM/DL
MCV RBC AUTO: 92.8 FL
MONOCYTES # BLD AUTO: 0.31 K/UL
MONOCYTES NFR BLD AUTO: 6.4 %
NEUTROPHILS # BLD AUTO: 2.22 K/UL
NEUTROPHILS NFR BLD AUTO: 46.2 %
PLATELET # BLD AUTO: 319 K/UL
POTASSIUM SERPL-SCNC: 3 MMOL/L
PROT SERPL-MCNC: 8.2 G/DL
RBC # BLD: 4.19 M/UL
RBC # FLD: 14.2 %
SAVE SPECIMEN: NORMAL
SODIUM SERPL-SCNC: 140 MMOL/L
TRIGL SERPL-MCNC: 76 MG/DL
TSH SERPL-ACNC: 0.69 UIU/ML
WBC # FLD AUTO: 4.81 K/UL

## 2018-04-18 ENCOUNTER — MEDICATION RENEWAL (OUTPATIENT)
Age: 68
End: 2018-04-18

## 2018-04-20 ENCOUNTER — APPOINTMENT (OUTPATIENT)
Dept: ORTHOPEDIC SURGERY | Facility: CLINIC | Age: 68
End: 2018-04-20
Payer: MEDICARE

## 2018-04-20 PROCEDURE — 99024 POSTOP FOLLOW-UP VISIT: CPT

## 2018-04-20 PROCEDURE — 72040 X-RAY EXAM NECK SPINE 2-3 VW: CPT

## 2018-05-09 ENCOUNTER — RX RENEWAL (OUTPATIENT)
Age: 68
End: 2018-05-09

## 2018-05-11 ENCOUNTER — APPOINTMENT (OUTPATIENT)
Dept: INTERNAL MEDICINE | Facility: CLINIC | Age: 68
End: 2018-05-11
Payer: MEDICARE

## 2018-05-11 VITALS
HEART RATE: 98 BPM | BODY MASS INDEX: 37.19 KG/M2 | SYSTOLIC BLOOD PRESSURE: 137 MMHG | HEIGHT: 61 IN | DIASTOLIC BLOOD PRESSURE: 85 MMHG | WEIGHT: 197 LBS

## 2018-05-11 PROCEDURE — 36415 COLL VENOUS BLD VENIPUNCTURE: CPT

## 2018-05-11 PROCEDURE — 99214 OFFICE O/P EST MOD 30 MIN: CPT | Mod: 25

## 2018-05-14 LAB
ALBUMIN SERPL ELPH-MCNC: 4 G/DL
ALP BLD-CCNC: 101 U/L
ALT SERPL-CCNC: 17 U/L
ANION GAP SERPL CALC-SCNC: 18 MMOL/L
AST SERPL-CCNC: 27 U/L
BASOPHILS # BLD AUTO: 0.01 K/UL
BASOPHILS NFR BLD AUTO: 0.1 %
BILIRUB SERPL-MCNC: 0.3 MG/DL
BUN SERPL-MCNC: 10 MG/DL
CALCIUM SERPL-MCNC: 9.7 MG/DL
CHLORIDE SERPL-SCNC: 101 MMOL/L
CO2 SERPL-SCNC: 24 MMOL/L
CREAT SERPL-MCNC: 0.89 MG/DL
EOSINOPHIL # BLD AUTO: 0.38 K/UL
EOSINOPHIL NFR BLD AUTO: 5 %
GLUCOSE SERPL-MCNC: 72 MG/DL
HCT VFR BLD CALC: 38.4 %
HGB BLD-MCNC: 11.6 G/DL
IMM GRANULOCYTES NFR BLD AUTO: 0.3 %
LYMPHOCYTES # BLD AUTO: 2.38 K/UL
LYMPHOCYTES NFR BLD AUTO: 31.6 %
MAN DIFF?: NORMAL
MCHC RBC-ENTMCNC: 28.6 PG
MCHC RBC-ENTMCNC: 30.2 GM/DL
MCV RBC AUTO: 94.8 FL
MONOCYTES # BLD AUTO: 0.41 K/UL
MONOCYTES NFR BLD AUTO: 5.4 %
NEUTROPHILS # BLD AUTO: 4.33 K/UL
NEUTROPHILS NFR BLD AUTO: 57.6 %
PLATELET # BLD AUTO: 358 K/UL
POTASSIUM SERPL-SCNC: 3.5 MMOL/L
PROT SERPL-MCNC: 8.3 G/DL
RBC # BLD: 4.05 M/UL
RBC # FLD: 15.9 %
SAVE SPECIMEN: NORMAL
SODIUM SERPL-SCNC: 143 MMOL/L
WBC # FLD AUTO: 7.53 K/UL

## 2018-05-18 ENCOUNTER — RX RENEWAL (OUTPATIENT)
Age: 68
End: 2018-05-18

## 2018-05-21 ENCOUNTER — MESSAGE (OUTPATIENT)
Age: 68
End: 2018-05-21

## 2018-05-21 ENCOUNTER — RX RENEWAL (OUTPATIENT)
Age: 68
End: 2018-05-21

## 2018-05-21 RX ORDER — POTASSIUM CHLORIDE 750 MG/1
10 TABLET, FILM COATED, EXTENDED RELEASE ORAL TWICE DAILY
Qty: 60 | Refills: 0 | Status: DISCONTINUED | COMMUNITY
Start: 2018-04-18 | End: 2018-05-21

## 2018-05-22 ENCOUNTER — RX RENEWAL (OUTPATIENT)
Age: 68
End: 2018-05-22

## 2018-05-22 ENCOUNTER — MESSAGE (OUTPATIENT)
Age: 68
End: 2018-05-22

## 2018-05-23 ENCOUNTER — MEDICATION RENEWAL (OUTPATIENT)
Age: 68
End: 2018-05-23

## 2018-05-30 ENCOUNTER — RX RENEWAL (OUTPATIENT)
Age: 68
End: 2018-05-30

## 2018-05-30 ENCOUNTER — MEDICATION RENEWAL (OUTPATIENT)
Age: 68
End: 2018-05-30

## 2018-06-08 ENCOUNTER — APPOINTMENT (OUTPATIENT)
Dept: GASTROENTEROLOGY | Facility: CLINIC | Age: 68
End: 2018-06-08
Payer: MEDICARE

## 2018-06-08 VITALS
HEIGHT: 61 IN | SYSTOLIC BLOOD PRESSURE: 168 MMHG | BODY MASS INDEX: 37.19 KG/M2 | OXYGEN SATURATION: 98 % | HEART RATE: 97 BPM | DIASTOLIC BLOOD PRESSURE: 98 MMHG | WEIGHT: 197 LBS

## 2018-06-08 PROCEDURE — 99204 OFFICE O/P NEW MOD 45 MIN: CPT

## 2018-06-11 ENCOUNTER — RX RENEWAL (OUTPATIENT)
Age: 68
End: 2018-06-11

## 2018-06-17 NOTE — DISCHARGE NOTE ADULT - ADDITIONAL INSTRUCTIONS
17.9 post surgical care  66yo female is s/p C3-7 posterior spinal fusion 2/27/2018 without any intraoperative complications.  Patient is stable for discharge as per surgeon.  Patient is tolerating Physical Therapy: weight bearing as tolerated, gait training, and no heavy lifting or bending.  Aspen neck collar to be worn when out of bed.  If applicable, Staples/sutures to be removed on post op day #14 in surgeon's office.  NO ADVIL, ASPIRIN, MOTRIN, IBUPROFEN until instructed otherwise by surgeon.  Follow up with Dr. Weber's office in 1week. post surgical care  66yo female is s/p C3-7 posterior spinal fusion 2/27/2018 without any intraoperative complications.  Patient is stable for discharge as per surgeon.  Patient is tolerating Physical Therapy: weight bearing as tolerated, gait training, and no heavy lifting or bending.  Aspen neck collar to be worn when out of bed.  If applicable, Staples/sutures to be removed on post op day #14 in surgeon's office.  NO ADVIL, ASPIRIN, MOTRIN, IBUPROFEN until instructed otherwise by surgeon.  Follow up with Dr. Weber's office in 1week.   follow up with your PMD for general continuity of care and management and because medications have been adjusted.  hydrochlorthiazide held by medical team due to low PO intake. losartan dose lowered in setting of lower BP post op.  resume patient home dose of 75mg if BP increases persistently.

## 2018-06-20 ENCOUNTER — APPOINTMENT (OUTPATIENT)
Dept: OPHTHALMOLOGY | Facility: CLINIC | Age: 68
End: 2018-06-20
Payer: MEDICARE

## 2018-06-20 PROCEDURE — ZZZZZ: CPT

## 2018-06-20 PROCEDURE — 92012 INTRM OPH EXAM EST PATIENT: CPT

## 2018-06-20 PROCEDURE — 92083 EXTENDED VISUAL FIELD XM: CPT

## 2018-06-20 RX ORDER — DICLOFENAC SODIUM 75 MG/1
75 TABLET, DELAYED RELEASE ORAL
Qty: 40 | Refills: 1 | Status: DISCONTINUED | COMMUNITY
Start: 2018-04-20 | End: 2018-06-20

## 2018-07-11 ENCOUNTER — APPOINTMENT (OUTPATIENT)
Dept: INTERNAL MEDICINE | Facility: CLINIC | Age: 68
End: 2018-07-11
Payer: MEDICARE

## 2018-07-11 ENCOUNTER — LABORATORY RESULT (OUTPATIENT)
Age: 68
End: 2018-07-11

## 2018-07-11 VITALS
HEART RATE: 98 BPM | SYSTOLIC BLOOD PRESSURE: 136 MMHG | BODY MASS INDEX: 38.89 KG/M2 | WEIGHT: 206 LBS | DIASTOLIC BLOOD PRESSURE: 84 MMHG | HEIGHT: 61 IN

## 2018-07-11 PROCEDURE — 36415 COLL VENOUS BLD VENIPUNCTURE: CPT

## 2018-07-11 PROCEDURE — 99214 OFFICE O/P EST MOD 30 MIN: CPT | Mod: 25

## 2018-07-11 RX ORDER — MELOXICAM 15 MG/1
15 TABLET ORAL
Qty: 90 | Refills: 0 | Status: DISCONTINUED | COMMUNITY
Start: 2018-06-20 | End: 2018-07-11

## 2018-07-11 RX ORDER — CYCLOBENZAPRINE HYDROCHLORIDE 10 MG/1
10 TABLET, FILM COATED ORAL
Qty: 30 | Refills: 1 | Status: DISCONTINUED | COMMUNITY
Start: 2018-01-31 | End: 2018-07-11

## 2018-07-11 RX ORDER — LEVOFLOXACIN 500 MG/1
500 TABLET, FILM COATED ORAL DAILY
Qty: 7 | Refills: 0 | Status: DISCONTINUED | COMMUNITY
Start: 2018-04-11 | End: 2018-07-11

## 2018-07-11 RX ORDER — TERBINAFINE HYDROCHLORIDE 0.84 G/125ML
1 LIQUID TOPICAL TWICE DAILY
Qty: 125 | Refills: 0 | Status: DISCONTINUED | COMMUNITY
Start: 2017-11-28 | End: 2018-07-11

## 2018-07-12 NOTE — ASSESSMENT
[FreeTextEntry1] : Diarrhea improved, C diff resolved, hx of severe electrolyte abnormalities, recheck CMP.  Replete as necessary, continue potassium for now.\par \par [facility].  \par Blood pressure under better control.  \par Advair 500/50 seems to be helping much better than her old 250/50 dose.

## 2018-07-12 NOTE — HISTORY OF PRESENT ILLNESS
[de-identified] : Patient did see GI recommended not to do colonoscopy ,return in 2 years.  \par \par Severe constipation GI reccomended fiber.  I had recommended Benefiber over the phone.  \par \par Going to [facility].  Seeing Dr. Weber having wrist issues.  Right hand stiff.  \par \par Diclofinac helped not long enough meloicam did not help.  \par \par BM better with fiber supplements.  Gained 10 lbs back.  \par \par Amlodipine 500/50

## 2018-07-13 ENCOUNTER — MESSAGE (OUTPATIENT)
Age: 68
End: 2018-07-13

## 2018-07-13 LAB
25(OH)D3 SERPL-MCNC: 45.3 NG/ML
ALBUMIN SERPL ELPH-MCNC: 4.4 G/DL
ALP BLD-CCNC: 114 U/L
ALT SERPL-CCNC: 14 U/L
ANION GAP SERPL CALC-SCNC: 15 MMOL/L
AST SERPL-CCNC: 33 U/L
BASOPHILS # BLD AUTO: 0.01 K/UL
BASOPHILS NFR BLD AUTO: 0.1 %
BILIRUB SERPL-MCNC: 0.2 MG/DL
BUN SERPL-MCNC: 12 MG/DL
CALCIUM SERPL-MCNC: 9.6 MG/DL
CHLORIDE SERPL-SCNC: 102 MMOL/L
CHOLEST SERPL-MCNC: 214 MG/DL
CHOLEST/HDLC SERPL: 2.8 RATIO
CO2 SERPL-SCNC: 27 MMOL/L
CREAT SERPL-MCNC: 0.94 MG/DL
EOSINOPHIL # BLD AUTO: 0.64 K/UL
EOSINOPHIL NFR BLD AUTO: 7.9 %
GLUCOSE SERPL-MCNC: 77 MG/DL
HBA1C MFR BLD HPLC: 6.1 %
HCT VFR BLD CALC: 38.7 %
HDLC SERPL-MCNC: 77 MG/DL
HGB BLD-MCNC: 11.7 G/DL
IMM GRANULOCYTES NFR BLD AUTO: 0.1 %
LDLC SERPL CALC-MCNC: 122 MG/DL
LYMPHOCYTES # BLD AUTO: 2.75 K/UL
LYMPHOCYTES NFR BLD AUTO: 34 %
MAN DIFF?: NORMAL
MCHC RBC-ENTMCNC: 28.3 PG
MCHC RBC-ENTMCNC: 30.2 GM/DL
MCV RBC AUTO: 93.5 FL
MONOCYTES # BLD AUTO: 0.35 K/UL
MONOCYTES NFR BLD AUTO: 4.3 %
NEUTROPHILS # BLD AUTO: 4.32 K/UL
NEUTROPHILS NFR BLD AUTO: 53.6 %
PLATELET # BLD AUTO: 306 K/UL
POTASSIUM SERPL-SCNC: 4.3 MMOL/L
PROT SERPL-MCNC: 8.5 G/DL
RBC # BLD: 4.14 M/UL
RBC # FLD: 16 %
SODIUM SERPL-SCNC: 144 MMOL/L
TRIGL SERPL-MCNC: 75 MG/DL
TSH SERPL-ACNC: 1.49 UIU/ML
WBC # FLD AUTO: 8.08 K/UL

## 2018-07-16 PROBLEM — R73.03 PREDIABETES: Chronic | Status: ACTIVE | Noted: 2017-12-11

## 2018-07-20 ENCOUNTER — APPOINTMENT (OUTPATIENT)
Dept: ORTHOPEDIC SURGERY | Facility: CLINIC | Age: 68
End: 2018-07-20
Payer: MEDICARE

## 2018-07-20 VITALS
DIASTOLIC BLOOD PRESSURE: 73 MMHG | HEART RATE: 81 BPM | WEIGHT: 206 LBS | SYSTOLIC BLOOD PRESSURE: 113 MMHG | HEIGHT: 61 IN | BODY MASS INDEX: 38.89 KG/M2

## 2018-07-20 PROBLEM — E78.00 PURE HYPERCHOLESTEROLEMIA, UNSPECIFIED: Chronic | Status: ACTIVE | Noted: 2017-12-11

## 2018-07-20 PROBLEM — G56.02 CARPAL TUNNEL SYNDROME, LEFT UPPER LIMB: Chronic | Status: ACTIVE | Noted: 2018-02-15

## 2018-07-20 PROBLEM — J30.2 OTHER SEASONAL ALLERGIC RHINITIS: Chronic | Status: ACTIVE | Noted: 2017-12-11

## 2018-07-20 PROBLEM — G47.30 SLEEP APNEA, UNSPECIFIED: Chronic | Status: ACTIVE | Noted: 2017-12-11

## 2018-07-20 PROBLEM — M48.02 SPINAL STENOSIS, CERVICAL REGION: Chronic | Status: ACTIVE | Noted: 2018-02-15

## 2018-07-20 PROBLEM — M50.90 CERVICAL DISC DISORDER, UNSPECIFIED, UNSPECIFIED CERVICAL REGION: Chronic | Status: ACTIVE | Noted: 2017-12-11

## 2018-07-20 PROBLEM — I10 ESSENTIAL (PRIMARY) HYPERTENSION: Chronic | Status: ACTIVE | Noted: 2017-12-11

## 2018-07-20 PROBLEM — H40.9 UNSPECIFIED GLAUCOMA: Chronic | Status: ACTIVE | Noted: 2017-12-11

## 2018-07-20 PROBLEM — K25.9 GASTRIC ULCER, UNSPECIFIED AS ACUTE OR CHRONIC, WITHOUT HEMORRHAGE OR PERFORATION: Chronic | Status: ACTIVE | Noted: 2017-12-11

## 2018-07-20 PROBLEM — M48.061 SPINAL STENOSIS, LUMBAR REGION WITHOUT NEUROGENIC CLAUDICATION: Chronic | Status: ACTIVE | Noted: 2018-02-15

## 2018-07-20 PROBLEM — F32.9 MAJOR DEPRESSIVE DISORDER, SINGLE EPISODE, UNSPECIFIED: Chronic | Status: ACTIVE | Noted: 2017-12-11

## 2018-07-20 PROBLEM — J45.909 UNSPECIFIED ASTHMA, UNCOMPLICATED: Chronic | Status: ACTIVE | Noted: 2017-12-11

## 2018-07-20 PROBLEM — E66.9 OBESITY, UNSPECIFIED: Chronic | Status: ACTIVE | Noted: 2017-12-11

## 2018-07-20 PROCEDURE — 72040 X-RAY EXAM NECK SPINE 2-3 VW: CPT

## 2018-07-20 PROCEDURE — 99214 OFFICE O/P EST MOD 30 MIN: CPT

## 2018-08-07 ENCOUNTER — RX RENEWAL (OUTPATIENT)
Age: 68
End: 2018-08-07

## 2018-08-09 ENCOUNTER — APPOINTMENT (OUTPATIENT)
Dept: OPHTHALMOLOGY | Facility: CLINIC | Age: 68
End: 2018-08-09
Payer: MEDICARE

## 2018-08-09 PROCEDURE — 92133 CPTRZD OPH DX IMG PST SGM ON: CPT

## 2018-08-09 PROCEDURE — 92014 COMPRE OPH EXAM EST PT 1/>: CPT

## 2018-08-10 ENCOUNTER — MEDICATION RENEWAL (OUTPATIENT)
Age: 68
End: 2018-08-10

## 2018-09-03 ENCOUNTER — FORM ENCOUNTER (OUTPATIENT)
Age: 68
End: 2018-09-03

## 2018-09-04 ENCOUNTER — OUTPATIENT (OUTPATIENT)
Dept: OUTPATIENT SERVICES | Facility: HOSPITAL | Age: 68
LOS: 1 days | End: 2018-09-04
Payer: COMMERCIAL

## 2018-09-04 ENCOUNTER — RX RENEWAL (OUTPATIENT)
Age: 68
End: 2018-09-04

## 2018-09-04 ENCOUNTER — APPOINTMENT (OUTPATIENT)
Dept: MAMMOGRAPHY | Facility: IMAGING CENTER | Age: 68
End: 2018-09-04
Payer: MEDICARE

## 2018-09-04 DIAGNOSIS — M12.9 ARTHROPATHY, UNSPECIFIED: Chronic | ICD-10-CM

## 2018-09-04 DIAGNOSIS — Z90.89 ACQUIRED ABSENCE OF OTHER ORGANS: Chronic | ICD-10-CM

## 2018-09-04 DIAGNOSIS — Z00.00 ENCOUNTER FOR GENERAL ADULT MEDICAL EXAMINATION WITHOUT ABNORMAL FINDINGS: ICD-10-CM

## 2018-09-04 PROCEDURE — 77063 BREAST TOMOSYNTHESIS BI: CPT

## 2018-09-04 PROCEDURE — 77063 BREAST TOMOSYNTHESIS BI: CPT | Mod: 26

## 2018-09-04 PROCEDURE — 77067 SCR MAMMO BI INCL CAD: CPT | Mod: 26

## 2018-09-04 PROCEDURE — 77067 SCR MAMMO BI INCL CAD: CPT

## 2018-09-12 ENCOUNTER — RX RENEWAL (OUTPATIENT)
Age: 68
End: 2018-09-12

## 2018-10-01 ENCOUNTER — RX RENEWAL (OUTPATIENT)
Age: 68
End: 2018-10-01

## 2018-10-08 ENCOUNTER — RX RENEWAL (OUTPATIENT)
Age: 68
End: 2018-10-08

## 2018-10-10 ENCOUNTER — MESSAGE (OUTPATIENT)
Age: 68
End: 2018-10-10

## 2018-10-10 ENCOUNTER — APPOINTMENT (OUTPATIENT)
Dept: INTERNAL MEDICINE | Facility: CLINIC | Age: 68
End: 2018-10-10
Payer: MEDICARE

## 2018-10-10 VITALS
WEIGHT: 200 LBS | DIASTOLIC BLOOD PRESSURE: 78 MMHG | HEART RATE: 96 BPM | BODY MASS INDEX: 37.76 KG/M2 | HEIGHT: 61 IN | SYSTOLIC BLOOD PRESSURE: 118 MMHG

## 2018-10-10 DIAGNOSIS — Z86.19 PERSONAL HISTORY OF OTHER INFECTIOUS AND PARASITIC DISEASES: ICD-10-CM

## 2018-10-10 PROCEDURE — 99215 OFFICE O/P EST HI 40 MIN: CPT

## 2018-10-10 RX ORDER — KETOROLAC TROMETHAMINE 10 MG/1
10 TABLET, FILM COATED ORAL 3 TIMES DAILY
Qty: 30 | Refills: 0 | Status: DISCONTINUED | COMMUNITY
Start: 2018-01-31 | End: 2018-10-10

## 2018-10-10 NOTE — ASSESSMENT
[FreeTextEntry1] : C diff resulted no recurrent diarrhea, celsa t still having some dribbling and skid marks.  INformed her that this is probably form some decreased clearance of the sphincter canal, recommended kegel exercises twice a day until resolution. Hx of diabetes and hy\par Hypertension well controlled continue amlodipine 5mg.\par Hyperlipidemia continue lipitor.  \par Back pain, continue gabapentin and cymbalta. \par Chronic asthma continue advair 500/50.\par \par Spent > 40 minutes in direct patient care and and addressed all questions and concerns.  >50% of this time was in direct face to face contact with patient during exam and counseling.

## 2018-10-10 NOTE — HISTORY OF PRESENT ILLNESS
[de-identified] : Complains of a problem with the bowl oozing out some incontinence only a small amount\par Also a left ear pain only in the morning.  Not diarrhea not gel or wet consistency.  \par \par last colonoscopy 2010, found polyps.  \par \par nurse.  saw thrush in mouth was given an antifungal already.

## 2018-10-10 NOTE — PHYSICAL EXAM

## 2018-10-18 ENCOUNTER — RX RENEWAL (OUTPATIENT)
Age: 68
End: 2018-10-18

## 2018-10-19 ENCOUNTER — APPOINTMENT (OUTPATIENT)
Dept: ORTHOPEDIC SURGERY | Facility: CLINIC | Age: 68
End: 2018-10-19
Payer: MEDICARE

## 2018-10-19 VITALS
DIASTOLIC BLOOD PRESSURE: 84 MMHG | HEART RATE: 80 BPM | HEIGHT: 61 IN | WEIGHT: 200 LBS | BODY MASS INDEX: 37.76 KG/M2 | SYSTOLIC BLOOD PRESSURE: 131 MMHG

## 2018-10-19 PROCEDURE — 72040 X-RAY EXAM NECK SPINE 2-3 VW: CPT

## 2018-10-19 PROCEDURE — 99214 OFFICE O/P EST MOD 30 MIN: CPT

## 2018-10-24 ENCOUNTER — APPOINTMENT (OUTPATIENT)
Dept: GASTROENTEROLOGY | Facility: CLINIC | Age: 68
End: 2018-10-24

## 2018-10-31 ENCOUNTER — APPOINTMENT (OUTPATIENT)
Dept: GASTROENTEROLOGY | Facility: CLINIC | Age: 68
End: 2018-10-31

## 2018-11-06 ENCOUNTER — RX RENEWAL (OUTPATIENT)
Age: 68
End: 2018-11-06

## 2018-11-06 ENCOUNTER — MESSAGE (OUTPATIENT)
Age: 68
End: 2018-11-06

## 2018-11-06 ENCOUNTER — MEDICATION RENEWAL (OUTPATIENT)
Age: 68
End: 2018-11-06

## 2018-11-08 ENCOUNTER — APPOINTMENT (OUTPATIENT)
Dept: OPHTHALMOLOGY | Facility: CLINIC | Age: 68
End: 2018-11-08

## 2018-11-15 ENCOUNTER — APPOINTMENT (OUTPATIENT)
Dept: PULMONOLOGY | Facility: CLINIC | Age: 68
End: 2018-11-15

## 2018-11-15 ENCOUNTER — RX RENEWAL (OUTPATIENT)
Age: 68
End: 2018-11-15

## 2018-11-15 RX ORDER — CLOTRIMAZOLE 10 MG/1
10 LOZENGE ORAL DAILY
Qty: 30 | Refills: 1 | Status: DISCONTINUED | COMMUNITY
Start: 2018-04-11 | End: 2018-11-15

## 2018-11-15 RX ORDER — FLUTICASONE PROPIONATE 50 UG/1
50 SPRAY, METERED NASAL
Qty: 16 | Refills: 2 | Status: DISCONTINUED | COMMUNITY
Start: 2017-09-08 | End: 2018-11-15

## 2018-11-16 ENCOUNTER — APPOINTMENT (OUTPATIENT)
Dept: INTERNAL MEDICINE | Facility: CLINIC | Age: 68
End: 2018-11-16
Payer: MEDICARE

## 2018-11-16 VITALS
DIASTOLIC BLOOD PRESSURE: 79 MMHG | TEMPERATURE: 98.4 F | BODY MASS INDEX: 38.71 KG/M2 | SYSTOLIC BLOOD PRESSURE: 148 MMHG | HEART RATE: 81 BPM | HEIGHT: 61 IN | WEIGHT: 205 LBS

## 2018-11-16 PROCEDURE — 99214 OFFICE O/P EST MOD 30 MIN: CPT

## 2018-11-19 NOTE — PHYSICAL EXAM
[No Acute Distress] : no acute distress [Well Nourished] : well nourished [Well Developed] : well developed [Well-Appearing] : well-appearing [Normal Sclera/Conjunctiva] : normal sclera/conjunctiva [PERRL] : pupils equal round and reactive to light [EOMI] : extraocular movements intact [Normal Outer Ear/Nose] : the outer ears and nose were normal in appearance [Normal Oropharynx] : the oropharynx was normal [No JVD] : no jugular venous distention [Supple] : supple [No Lymphadenopathy] : no lymphadenopathy [Thyroid Normal, No Nodules] : the thyroid was normal and there were no nodules present [No Respiratory Distress] : no respiratory distress  [Clear to Auscultation] : lungs were clear to auscultation bilaterally [No Accessory Muscle Use] : no accessory muscle use [Normal Rate] : normal rate  [Regular Rhythm] : with a regular rhythm [Normal S1, S2] : normal S1 and S2 [No Murmur] : no murmur heard [No Carotid Bruits] : no carotid bruits [No Abdominal Bruit] : a ~M bruit was not heard ~T in the abdomen [No Varicosities] : no varicosities [Pedal Pulses Present] : the pedal pulses are present [No Edema] : there was no peripheral edema [No Extremity Clubbing/Cyanosis] : no extremity clubbing/cyanosis [No Palpable Aorta] : no palpable aorta [Soft] : abdomen soft [Non Tender] : non-tender [No HSM] : no HSM [Normal Bowel Sounds] : normal bowel sounds [No CVA Tenderness] : no CVA  tenderness [No Spinal Tenderness] : no spinal tenderness [No Rash] : no rash [Normal Gait] : normal gait [Coordination Grossly Intact] : coordination grossly intact [No Focal Deficits] : no focal deficits [Deep Tendon Reflexes (DTR)] : deep tendon reflexes were 2+ and symmetric [Speech Grossly Normal] : speech grossly normal [Normal Affect] : the affect was normal [Normal Mood] : the mood was normal [Normal Insight/Judgement] : insight and judgment were intact [de-identified] : increased bowel sounds.   [de-identified] : leg weakness walks with walker.

## 2018-11-19 NOTE — HISTORY OF PRESENT ILLNESS
[de-identified] : Patient presents for routine follow up.  \par Had a talk with her psychiatrist who recommended her to get a home health aide.  \par \par Has been having chills for about a week.   Finger tips got cold.  Freezing feeling.  \par \par Also very gassy and bloated.

## 2018-11-19 NOTE — ASSESSMENT
[FreeTextEntry1] : Patient suffering from increased gassiness and bloating, soft stools, not the diarrhea that she had months ago, but enough to worry and bother her. Reccomended patient try to follow a low fodmaps diet. Gave her a pamphlet about how to do this.\par \par Patient also seems to have a mild viral URI, should be self limiting. SHould resolve over time asked her to let me now if situation worstens. \par \par Can attempt to try to get her a a home health aide. Can put in the paperwork.  \par \par Spent > 25 minutes in direct patient care and and addressed all questions and concerns.  >50% of this time was in direct face to face contact with patient during exam and counseling.

## 2018-11-26 ENCOUNTER — APPOINTMENT (OUTPATIENT)
Dept: ORTHOPEDIC SURGERY | Facility: CLINIC | Age: 68
End: 2018-11-26
Payer: MEDICARE

## 2018-11-26 VITALS
HEART RATE: 76 BPM | DIASTOLIC BLOOD PRESSURE: 81 MMHG | SYSTOLIC BLOOD PRESSURE: 137 MMHG | HEIGHT: 61 IN | BODY MASS INDEX: 38.71 KG/M2 | WEIGHT: 205 LBS

## 2018-11-26 DIAGNOSIS — M65.321 TRIGGER FINGER, RIGHT INDEX FINGER: ICD-10-CM

## 2018-11-26 DIAGNOSIS — M19.049 PRIMARY OSTEOARTHRITIS, UNSPECIFIED HAND: ICD-10-CM

## 2018-11-26 PROCEDURE — 99214 OFFICE O/P EST MOD 30 MIN: CPT | Mod: 25

## 2018-11-26 PROCEDURE — 73130 X-RAY EXAM OF HAND: CPT | Mod: RT

## 2018-11-26 PROCEDURE — 20526 THER INJECTION CARP TUNNEL: CPT | Mod: 59,RT

## 2018-11-28 ENCOUNTER — APPOINTMENT (OUTPATIENT)
Dept: GASTROENTEROLOGY | Facility: CLINIC | Age: 68
End: 2018-11-28
Payer: MEDICARE

## 2018-11-28 VITALS
DIASTOLIC BLOOD PRESSURE: 73 MMHG | WEIGHT: 202 LBS | SYSTOLIC BLOOD PRESSURE: 113 MMHG | BODY MASS INDEX: 38.14 KG/M2 | HEART RATE: 77 BPM | HEIGHT: 61 IN

## 2018-11-28 DIAGNOSIS — Z12.11 ENCOUNTER FOR SCREENING FOR MALIGNANT NEOPLASM OF COLON: ICD-10-CM

## 2018-11-28 PROCEDURE — 99204 OFFICE O/P NEW MOD 45 MIN: CPT

## 2018-12-14 ENCOUNTER — APPOINTMENT (OUTPATIENT)
Dept: PULMONOLOGY | Facility: CLINIC | Age: 68
End: 2018-12-14
Payer: MEDICARE

## 2018-12-14 VITALS
DIASTOLIC BLOOD PRESSURE: 80 MMHG | HEIGHT: 61 IN | BODY MASS INDEX: 39.46 KG/M2 | SYSTOLIC BLOOD PRESSURE: 140 MMHG | OXYGEN SATURATION: 95 % | HEART RATE: 80 BPM | WEIGHT: 209 LBS | TEMPERATURE: 99 F | RESPIRATION RATE: 18 BRPM

## 2018-12-14 PROCEDURE — 99214 OFFICE O/P EST MOD 30 MIN: CPT

## 2018-12-14 RX ORDER — ACETAMINOPHEN AND CODEINE 300; 30 MG/1; MG/1
300-30 TABLET ORAL
Qty: 40 | Refills: 0 | Status: DISCONTINUED | COMMUNITY
Start: 2018-01-31 | End: 2018-12-14

## 2019-01-10 ENCOUNTER — APPOINTMENT (OUTPATIENT)
Dept: OPHTHALMOLOGY | Facility: CLINIC | Age: 69
End: 2019-01-10
Payer: MEDICARE

## 2019-01-10 PROCEDURE — 92012 INTRM OPH EXAM EST PATIENT: CPT

## 2019-01-18 ENCOUNTER — MESSAGE (OUTPATIENT)
Age: 69
End: 2019-01-18

## 2019-01-18 ENCOUNTER — RX RENEWAL (OUTPATIENT)
Age: 69
End: 2019-01-18

## 2019-01-25 ENCOUNTER — APPOINTMENT (OUTPATIENT)
Dept: CARDIOTHORACIC SURGERY | Facility: CLINIC | Age: 69
End: 2019-01-25

## 2019-02-04 ENCOUNTER — RX RENEWAL (OUTPATIENT)
Age: 69
End: 2019-02-04

## 2019-02-06 ENCOUNTER — APPOINTMENT (OUTPATIENT)
Dept: INTERNAL MEDICINE | Facility: CLINIC | Age: 69
End: 2019-02-06
Payer: MEDICARE

## 2019-02-06 VITALS
HEART RATE: 69 BPM | WEIGHT: 208 LBS | HEIGHT: 61.5 IN | BODY MASS INDEX: 38.77 KG/M2 | SYSTOLIC BLOOD PRESSURE: 109 MMHG | DIASTOLIC BLOOD PRESSURE: 69 MMHG

## 2019-02-06 PROCEDURE — 36415 COLL VENOUS BLD VENIPUNCTURE: CPT

## 2019-02-06 PROCEDURE — 99214 OFFICE O/P EST MOD 30 MIN: CPT | Mod: 25

## 2019-02-06 RX ORDER — LOPERAMIDE HYDROCHLORIDE 2 MG/1
2 CAPSULE ORAL 4 TIMES DAILY
Qty: 120 | Refills: 11 | Status: DISCONTINUED | COMMUNITY
Start: 2018-11-28 | End: 2019-02-06

## 2019-02-06 NOTE — REVIEW OF SYSTEMS
[Negative] : Psychiatric [FreeTextEntry9] : back pain, BILATERAL mild -moderate 2+ pitting edema.   [de-identified] : n

## 2019-02-06 NOTE — PHYSICAL EXAM
[No Acute Distress] : no acute distress [Well Nourished] : well nourished [Well Developed] : well developed [Well-Appearing] : well-appearing [Normal Sclera/Conjunctiva] : normal sclera/conjunctiva [PERRL] : pupils equal round and reactive to light [EOMI] : extraocular movements intact [Normal Outer Ear/Nose] : the outer ears and nose were normal in appearance [Normal Oropharynx] : the oropharynx was normal [No JVD] : no jugular venous distention [Supple] : supple [No Lymphadenopathy] : no lymphadenopathy [Thyroid Normal, No Nodules] : the thyroid was normal and there were no nodules present [No Respiratory Distress] : no respiratory distress  [Clear to Auscultation] : lungs were clear to auscultation bilaterally [No Accessory Muscle Use] : no accessory muscle use [Normal Rate] : normal rate  [Regular Rhythm] : with a regular rhythm [Normal S1, S2] : normal S1 and S2 [No Murmur] : no murmur heard [Soft] : abdomen soft [Non Tender] : non-tender [No HSM] : no HSM [Normal Bowel Sounds] : normal bowel sounds [No CVA Tenderness] : no CVA  tenderness [No Spinal Tenderness] : no spinal tenderness [No Rash] : no rash [Normal Gait] : normal gait [Speech Grossly Normal] : speech grossly normal [Normal Affect] : the affect was normal [Normal Mood] : the mood was normal [Normal Insight/Judgement] : insight and judgment were intact [de-identified] : 2 + mild pitting edema.   [de-identified] : increased bowel sounds.   [de-identified] : leg weakness walks with walker.

## 2019-02-06 NOTE — ASSESSMENT
[FreeTextEntry1] : Patient seeing GI for diarrhea and dyspepsia, on sucralfate.  Recommended strongly she avoid spicy foods and try to avoid wheats and glutens.  \par \par Patient having diarrhea still never returned to normal, taking loperamide. Reccomended she take 2 tablets at a time works better that way rather than being spread out throughout the day.  \par \par hx of hyperlipidemia continue atorvastatin 20mg.\par BP normotensive continue amlodipine 5mg.  and HCTZS 12.5mg\par \par Leg edema associated with eating salty foods, asked patient to be aware of salt content, some foods like cheese and sauces have a tremendous sult burden in them even if this not obvious to the taste.  Reccomended she take furosemide 20mg daily ofr 3 days before going to PRN, the HCTZ diuresis does not appear sufficient.  \par \par Informed patient that I will be transferring to a different department with the [facility] and can no longer be their primary.  There are alternative offices which they can continue their care at within the health system complete withe same medical records. \par

## 2019-02-06 NOTE — HISTORY OF PRESENT ILLNESS
[de-identified] : Patient had a spicy noodle and had diaphoreses chest pains abdominal pains next day had provolone cheese at a [employer], and developed then leg swelling.  No pain.  No history of heart disease or kidney problems.  \par \par Completed 5 days of prednisone from Dr Faust a couple of months ago.   no complaints of shortness of breath anymore.  \par \par Had diarrhea and took 2 pills twice a day of loperamide, and sucralfate for dyspepsia.  Diarrhea better now.  \par \par Thinking about [place]ica Uzair diet.  \par \par Patient seeing a new cardiologist.   Needed transport\par Also had issues getting to [place] for a cataract surgery. however also having problems with transport.\par \par Going to a see a new dentist soon.

## 2019-02-08 LAB
25(OH)D3 SERPL-MCNC: 45.7 NG/ML
ALBUMIN SERPL ELPH-MCNC: 4 G/DL
ALP BLD-CCNC: 96 U/L
ALT SERPL-CCNC: 15 U/L
ANION GAP SERPL CALC-SCNC: 11 MMOL/L
AST SERPL-CCNC: 26 U/L
BASOPHILS # BLD AUTO: 0.01 K/UL
BASOPHILS NFR BLD AUTO: 0.1 %
BILIRUB SERPL-MCNC: 0.3 MG/DL
BUN SERPL-MCNC: 21 MG/DL
CALCIUM SERPL-MCNC: 9.6 MG/DL
CHLORIDE SERPL-SCNC: 98 MMOL/L
CHOLEST SERPL-MCNC: 191 MG/DL
CHOLEST/HDLC SERPL: 3.3 RATIO
CO2 SERPL-SCNC: 30 MMOL/L
CREAT SERPL-MCNC: 0.74 MG/DL
EOSINOPHIL # BLD AUTO: 0.89 K/UL
EOSINOPHIL NFR BLD AUTO: 12.7 %
GLUCOSE SERPL-MCNC: 88 MG/DL
HBA1C MFR BLD HPLC: 6.3 %
HCT VFR BLD CALC: 36.7 %
HDLC SERPL-MCNC: 58 MG/DL
HGB BLD-MCNC: 11.2 G/DL
IMM GRANULOCYTES NFR BLD AUTO: 0.1 %
LDLC SERPL CALC-MCNC: 116 MG/DL
LYMPHOCYTES # BLD AUTO: 2.54 K/UL
LYMPHOCYTES NFR BLD AUTO: 36.1 %
MAN DIFF?: NORMAL
MCHC RBC-ENTMCNC: 28.5 PG
MCHC RBC-ENTMCNC: 30.5 GM/DL
MCV RBC AUTO: 93.4 FL
MONOCYTES # BLD AUTO: 0.41 K/UL
MONOCYTES NFR BLD AUTO: 5.8 %
NEUTROPHILS # BLD AUTO: 3.17 K/UL
NEUTROPHILS NFR BLD AUTO: 45.2 %
PLATELET # BLD AUTO: 293 K/UL
POTASSIUM SERPL-SCNC: 4 MMOL/L
PROT SERPL-MCNC: 7.7 G/DL
RBC # BLD: 3.93 M/UL
RBC # FLD: 14.8 %
SAVE SPECIMEN: NORMAL
SODIUM SERPL-SCNC: 139 MMOL/L
T3FREE SERPL-MCNC: 4.96 PG/ML
T4 SERPL-MCNC: 6.5 UG/DL
TRIGL SERPL-MCNC: 87 MG/DL
TSH SERPL-ACNC: 1.63 UIU/ML
URATE SERPL-MCNC: 4.1 MG/DL
WBC # FLD AUTO: 7.03 K/UL

## 2019-02-11 ENCOUNTER — MESSAGE (OUTPATIENT)
Age: 69
End: 2019-02-11

## 2019-02-11 ENCOUNTER — RX RENEWAL (OUTPATIENT)
Age: 69
End: 2019-02-11

## 2019-02-13 ENCOUNTER — MESSAGE (OUTPATIENT)
Age: 69
End: 2019-02-13

## 2019-02-14 ENCOUNTER — FORM ENCOUNTER (OUTPATIENT)
Age: 69
End: 2019-02-14

## 2019-02-15 ENCOUNTER — APPOINTMENT (OUTPATIENT)
Dept: RADIOLOGY | Facility: HOSPITAL | Age: 69
End: 2019-02-15

## 2019-02-15 ENCOUNTER — OUTPATIENT (OUTPATIENT)
Dept: OUTPATIENT SERVICES | Facility: HOSPITAL | Age: 69
LOS: 1 days | End: 2019-02-15
Payer: MEDICARE

## 2019-02-15 DIAGNOSIS — M12.9 ARTHROPATHY, UNSPECIFIED: Chronic | ICD-10-CM

## 2019-02-15 DIAGNOSIS — Z90.89 ACQUIRED ABSENCE OF OTHER ORGANS: Chronic | ICD-10-CM

## 2019-02-15 DIAGNOSIS — D72.1 EOSINOPHILIA: ICD-10-CM

## 2019-02-15 PROCEDURE — 71046 X-RAY EXAM CHEST 2 VIEWS: CPT | Mod: 26

## 2019-02-19 ENCOUNTER — RX RENEWAL (OUTPATIENT)
Age: 69
End: 2019-02-19

## 2019-02-21 ENCOUNTER — MEDICATION RENEWAL (OUTPATIENT)
Age: 69
End: 2019-02-21

## 2019-02-21 DIAGNOSIS — R93.89 ABNORMAL FINDINGS ON DIAGNOSTIC IMAGING OF OTHER SPECIFIED BODY STRUCTURES: ICD-10-CM

## 2019-02-25 ENCOUNTER — FORM ENCOUNTER (OUTPATIENT)
Age: 69
End: 2019-02-25

## 2019-02-26 ENCOUNTER — OUTPATIENT (OUTPATIENT)
Dept: OUTPATIENT SERVICES | Facility: HOSPITAL | Age: 69
LOS: 1 days | End: 2019-02-26
Payer: MEDICARE

## 2019-02-26 ENCOUNTER — MESSAGE (OUTPATIENT)
Age: 69
End: 2019-02-26

## 2019-02-26 ENCOUNTER — APPOINTMENT (OUTPATIENT)
Dept: CT IMAGING | Facility: IMAGING CENTER | Age: 69
End: 2019-02-26
Payer: MEDICARE

## 2019-02-26 DIAGNOSIS — M12.9 ARTHROPATHY, UNSPECIFIED: Chronic | ICD-10-CM

## 2019-02-26 DIAGNOSIS — R93.89 ABNORMAL FINDINGS ON DIAGNOSTIC IMAGING OF OTHER SPECIFIED BODY STRUCTURES: ICD-10-CM

## 2019-02-26 DIAGNOSIS — Z90.89 ACQUIRED ABSENCE OF OTHER ORGANS: Chronic | ICD-10-CM

## 2019-02-26 LAB
A FLAVUS AB FLD QL: NEGATIVE
A FUMIGATUS AB FLD QL: NEGATIVE
A NIGER AB FLD QL: NEGATIVE
B MICROTI AB SER QL: NORMAL
BABESIA ANTIBODIES, IGG: NORMAL
BABESIA ANTIBODIES, IGM: NORMAL
DEPRECATED KAPPA LC FREE/LAMBDA SER: 1.93 RATIO
DEPRECATED O AND P PREP STL: ABNORMAL
H PYLORI AG STL QL: NOT DETECTED
IGA SER QL IEP: 217 MG/DL
IGE SER-MCNC: 208 IU/ML
IGG SER QL IEP: 1364 MG/DL
IGM SER QL IEP: 49 MG/DL
KAPPA LC CSF-MCNC: 1.11 MG/DL
KAPPA LC SERPL-MCNC: 2.14 MG/DL
O+P BLD/T WRIGHT STN: NORMAL
STRONGYLOIDES AB SER IA-ACNC: NEGATIVE
TRYPTASE: 5.8 NG/ML

## 2019-02-26 PROCEDURE — 71250 CT THORAX DX C-: CPT | Mod: 26

## 2019-02-26 PROCEDURE — 71250 CT THORAX DX C-: CPT

## 2019-02-27 ENCOUNTER — MESSAGE (OUTPATIENT)
Age: 69
End: 2019-02-27

## 2019-02-27 ENCOUNTER — MEDICATION RENEWAL (OUTPATIENT)
Age: 69
End: 2019-02-27

## 2019-02-28 LAB
ANA PAT FLD IF-IMP: ABNORMAL
ANA SER IF-ACNC: ABNORMAL
M TB IFN-G BLD-IMP: NEGATIVE
QUANTIFERON TB PLUS MITOGEN MINUS NIL: 4.22 IU/ML
QUANTIFERON TB PLUS NIL: 0.06 IU/ML
QUANTIFERON TB PLUS TB1 MINUS NIL: -0.04 IU/ML
QUANTIFERON TB PLUS TB2 MINUS NIL: -0.04 IU/ML

## 2019-03-01 ENCOUNTER — RX RENEWAL (OUTPATIENT)
Age: 69
End: 2019-03-01

## 2019-03-04 LAB

## 2019-03-06 ENCOUNTER — APPOINTMENT (OUTPATIENT)
Dept: INTERNAL MEDICINE | Facility: CLINIC | Age: 69
End: 2019-03-06
Payer: MEDICARE

## 2019-03-06 VITALS
SYSTOLIC BLOOD PRESSURE: 111 MMHG | HEART RATE: 86 BPM | HEIGHT: 61.5 IN | TEMPERATURE: 98.7 F | DIASTOLIC BLOOD PRESSURE: 74 MMHG | OXYGEN SATURATION: 95 % | BODY MASS INDEX: 38.4 KG/M2 | WEIGHT: 206 LBS

## 2019-03-06 DIAGNOSIS — I99.8 OTHER DISORDER OF CIRCULATORY SYSTEM: ICD-10-CM

## 2019-03-06 PROCEDURE — 99387 INIT PM E/M NEW PAT 65+ YRS: CPT | Mod: 25

## 2019-03-06 PROCEDURE — 99213 OFFICE O/P EST LOW 20 MIN: CPT | Mod: 25

## 2019-03-06 RX ORDER — CYCLOBENZAPRINE HYDROCHLORIDE 10 MG/1
10 TABLET, FILM COATED ORAL 3 TIMES DAILY
Qty: 20 | Refills: 0 | Status: DISCONTINUED | COMMUNITY
Start: 2018-11-16 | End: 2019-03-06

## 2019-03-06 RX ORDER — FLUTICASONE PROPIONATE 50 UG/1
50 SPRAY, METERED NASAL TWICE DAILY
Qty: 1 | Refills: 3 | Status: DISCONTINUED | COMMUNITY
Start: 2018-10-10 | End: 2019-03-06

## 2019-03-06 RX ORDER — MONTELUKAST 10 MG/1
10 TABLET, FILM COATED ORAL
Refills: 3 | Status: DISCONTINUED | COMMUNITY
Start: 2017-05-26 | End: 2019-03-06

## 2019-03-06 RX ORDER — PREDNISONE 20 MG/1
20 TABLET ORAL DAILY
Qty: 5 | Refills: 0 | Status: DISCONTINUED | COMMUNITY
Start: 2018-12-14 | End: 2019-03-06

## 2019-03-06 NOTE — HISTORY OF PRESENT ILLNESS
[FreeTextEntry1] : establish care, swollen ankles [de-identified] : 67yo F presents to establish care, c/o swollen ankles. She was seen by prior PCP Dr Castillo last month and was started on lasix PRN for leg swelling which she states did not provide much relief. She states that she has had intermittent left ankle swelling since left knee surgery in 2006. But for the past year she has noted swelling in both of her ankles which occurs intermittently. States the swelling is not present when she wakes up in the morning but as the day progresses she notices it more. Denies pain with walking, no claudication symptoms. Denies CP, SOB, TRENT. \par She had full blood work done in Feb 2019 which showed eosinophilia for which she has been seen by hematology last week. She was advised she is anemic and had other testing done, she has follow up scheduled for next week with hematology.

## 2019-03-06 NOTE — PHYSICAL EXAM
[No Acute Distress] : no acute distress [Well Nourished] : well nourished [Well Developed] : well developed [Well-Appearing] : well-appearing [No Respiratory Distress] : no respiratory distress  [Clear to Auscultation] : lungs were clear to auscultation bilaterally [Normal Rate] : normal rate  [Regular Rhythm] : with a regular rhythm [Normal S1, S2] : normal S1 and S2 [Soft] : abdomen soft [Non Tender] : non-tender [Non-distended] : non-distended [Grossly Normal Strength/Tone] : grossly normal strength/tone [Normal Gait] : normal gait [Coordination Grossly Intact] : coordination grossly intact [No Focal Deficits] : no focal deficits [Normal Affect] : the affect was normal [Alert and Oriented x3] : oriented to person, place, and time [de-identified] : trace non pitting edema b/l LE, Homans sign negative

## 2019-03-06 NOTE — PLAN
[FreeTextEntry1] : HCM\par -recent blood work by prior PCP Feb 2019\par -routine cardio f/u scheduled for next month\par \par LE swelling likely due to venous insufficiency\par -recommend compression stockings, keep legs elevated\par -continue furosemide 20mg daily\par -referral to vascular surgery provided\par \par Eosinophilia\par -continue workup as per hematology\par \par Follow up in 3 months

## 2019-03-06 NOTE — REVIEW OF SYSTEMS
[Lower Ext Edema] : lower extremity edema [Joint Pain] : joint pain [Fever] : no fever [Chills] : no chills [Fatigue] : no fatigue [Vision Problems] : vision problems [Chest Pain] : no chest pain [Palpitations] : no palpitations [Shortness Of Breath] : no shortness of breath [Cough] : no cough [Dyspnea on Exertion] : no dyspnea on exertion [Abdominal Pain] : no abdominal pain [Nausea] : no nausea [Constipation] : no constipation [Diarrhea] : diarrhea [Vomiting] : no vomiting [Joint Swelling] : joint swelling [Back Pain] : back pain [Headache] : no headache [Dizziness] : no dizziness [FreeTextEntry3] : left cataract [FreeTextEntry9] : bilateral knee pain

## 2019-03-06 NOTE — HEALTH RISK ASSESSMENT
[] : No [Change in mental status noted] : No change in mental status noted [Language] : denies difficulty with language [None] : None [With Family] : lives with family [Retired] : retired [Feels Safe at Home] : Feels safe at home [Some assistance needed] : walking [Reports changes in hearing] : Reports no changes in hearing [Reports changes in vision] : Reports changes in vision [Smoke Detector] : smoke detector [Carbon Monoxide Detector] : carbon monoxide detector [Seat Belt] :  uses seat belt [MammogramDate] : 01/18 [PapSmearDate] : 01/14 [ColonoscopyDate] : 01/10 [ColonoscopyComments] : small hyperplastic polyp, diverticulosis and internal hemorrhoids [FreeTextEntry8] : uses walker [de-identified] : left eye cataract- being followed by ophtho

## 2019-03-07 ENCOUNTER — LABORATORY RESULT (OUTPATIENT)
Age: 69
End: 2019-03-07

## 2019-03-08 LAB
BASOPHILS # BLD AUTO: 0.03 K/UL
BASOPHILS NFR BLD AUTO: 0.4 %
EOSINOPHIL # BLD AUTO: 0.58 K/UL
EOSINOPHIL NFR BLD AUTO: 6.9 %
ERYTHROCYTE [SEDIMENTATION RATE] IN BLOOD BY WESTERGREN METHOD: 99 MM/HR
HCT VFR BLD CALC: 37.9 %
HGB BLD-MCNC: 11.5 G/DL
IGE SER-MCNC: 171 IU/ML
IMM GRANULOCYTES NFR BLD AUTO: 0.2 %
LYMPHOCYTES # BLD AUTO: 2.28 K/UL
LYMPHOCYTES NFR BLD AUTO: 27.1 %
MAN DIFF?: NORMAL
MCHC RBC-ENTMCNC: 28.4 PG
MCHC RBC-ENTMCNC: 30.3 GM/DL
MCV RBC AUTO: 93.6 FL
MONOCYTES # BLD AUTO: 0.69 K/UL
MONOCYTES NFR BLD AUTO: 8.2 %
NEUTROPHILS # BLD AUTO: 4.82 K/UL
NEUTROPHILS NFR BLD AUTO: 57.2 %
PLATELET # BLD AUTO: 284 K/UL
RBC # BLD: 4.05 M/UL
RBC # FLD: 14.7 %
WBC # FLD AUTO: 8.42 K/UL

## 2019-03-09 ENCOUNTER — TRANSCRIPTION ENCOUNTER (OUTPATIENT)
Age: 69
End: 2019-03-09

## 2019-03-11 ENCOUNTER — TRANSCRIPTION ENCOUNTER (OUTPATIENT)
Age: 69
End: 2019-03-11

## 2019-03-11 ENCOUNTER — MEDICATION RENEWAL (OUTPATIENT)
Age: 69
End: 2019-03-11

## 2019-03-11 LAB
ALBUMIN MFR SERPL ELPH: 50.8 %
ALBUMIN SERPL-MCNC: 3.8 G/DL
ALBUMIN/GLOB SERPL: 1 RATIO
ALPHA1 GLOB MFR SERPL ELPH: 4.9 %
ALPHA1 GLOB SERPL ELPH-MCNC: 0.4 G/DL
ALPHA2 GLOB MFR SERPL ELPH: 13.6 %
ALPHA2 GLOB SERPL ELPH-MCNC: 1 G/DL
B-GLOBULIN MFR SERPL ELPH: 12.3 %
B-GLOBULIN SERPL ELPH-MCNC: 0.9 G/DL
DEPRECATED KAPPA LC FREE/LAMBDA SER: 1.78 RATIO
GAMMA GLOB FLD ELPH-MCNC: 1.4 G/DL
GAMMA GLOB MFR SERPL ELPH: 18.4 %
IGA SER QL IEP: 225 MG/DL
IGG SER QL IEP: 1513 MG/DL
IGM SER QL IEP: 54 MG/DL
INTERPRETATION SERPL IEP-IMP: NORMAL
KAPPA LC CSF-MCNC: 1.29 MG/DL
KAPPA LC SERPL-MCNC: 2.3 MG/DL
M PROTEIN SPEC IFE-MCNC: NORMAL
PROT SERPL-MCNC: 7.5 G/DL
PROT SERPL-MCNC: 7.5 G/DL

## 2019-03-12 LAB
A FLAVUS AB FLD QL: NEGATIVE
A FUMIGATUS AB FLD QL: NEGATIVE
A NIGER AB FLD QL: NEGATIVE

## 2019-03-19 LAB
A FUMIGATUS IGE QN: 0.41 KUA/L
ASPERGILLUS FLAVUS PRECIPITINS: NEGATIVE
ASPERGILLUS FUMIGATES PRECIPTINS: NEGATIVE
ASPERGILLUS NIGER PRECIPITINS: NEGATIVE
DEPRECATED A FUMIGATUS IGE RAST QL: 1

## 2019-03-20 ENCOUNTER — APPOINTMENT (OUTPATIENT)
Dept: VASCULAR SURGERY | Facility: CLINIC | Age: 69
End: 2019-03-20
Payer: MEDICARE

## 2019-03-20 ENCOUNTER — APPOINTMENT (OUTPATIENT)
Age: 69
End: 2019-03-20
Payer: MEDICARE

## 2019-03-20 VITALS
HEART RATE: 75 BPM | DIASTOLIC BLOOD PRESSURE: 76 MMHG | BODY MASS INDEX: 38.4 KG/M2 | HEIGHT: 61.5 IN | SYSTOLIC BLOOD PRESSURE: 122 MMHG | WEIGHT: 206 LBS

## 2019-03-20 PROCEDURE — 93970 EXTREMITY STUDY: CPT

## 2019-03-20 PROCEDURE — 99202 OFFICE O/P NEW SF 15 MIN: CPT

## 2019-03-20 RX ORDER — MULTIVITAMIN
TABLET ORAL
Refills: 0 | Status: ACTIVE | COMMUNITY

## 2019-03-20 RX ORDER — FLUTICASONE PROPIONATE AND SALMETEROL 50; 500 UG/1; UG/1
500-50 POWDER RESPIRATORY (INHALATION) DAILY
Qty: 1 | Refills: 3 | Status: DISCONTINUED | COMMUNITY
Start: 2018-04-11 | End: 2019-03-20

## 2019-03-20 NOTE — HISTORY OF PRESENT ILLNESS
[FreeTextEntry1] : 69 yo female with history of htn, hld, sleep apnea, osteoarthritis s/p b/l hip replacement and bilateral knee replacement surgery presents for evaluation of b/l lower extremity edema.  pt states that she has had intermittent left lower extremity edema since her left knee surgery but has noted that over the past month she has now developed more persistent b/l lower extremity swelling.  pt states that there is no pain associated with the lower extremity edema.  she states that her walking is limited secondary to balance issues and her recent spinal surgery.  pt states that the edema improves with elevation and is not present in the morning after her legs are elevated overnight.  \par pt denies any history of dvt, ulcers or claudications.

## 2019-03-20 NOTE — CONSULT LETTER
[Dear  ___] : Dear  [unfilled], [Consult Letter:] : I had the pleasure of evaluating your patient, [unfilled]. [Referral Closing:] : Thank you very much for seeing this patient.  If you have any questions, please do not hesitate to contact me. [Please see my note below.] : Please see my note below. [Sincerely,] : Sincerely, [FreeTextEntry3] : Brittny Reddy PA-C\par [facility]\par

## 2019-03-20 NOTE — PHYSICAL EXAM
[JVD] : no jugular venous distention  [2+] : left 2+ [Ankle Swelling (On Exam)] : present [Ankle Swelling Bilaterally] : bilaterally  [Ankle Swelling On The Right] : mild [Varicose Veins Of Lower Extremities] : not present [] : not present [No Rash or Lesion] : No rash or lesion [Alert] : alert [Skin Ulcer] : no ulcer [Calm] : calm [de-identified] : appears well [de-identified] : no calf tenderness \par motor intact b/l lower extremities, muscle strength 5/5 with dorsi and plantar flexion\par  [de-identified] : sensation intact b/l lower extremities

## 2019-03-20 NOTE — REVIEW OF SYSTEMS
[Fever] : no fever [Chills] : no chills [Recent Weight Loss (___ Lbs)] : recent [unfilled] ~Ulb weight loss [Leg Claudication] : no intermittent leg claudication [Chest Pain] : no chest pain [Lower Ext Edema] : lower extremity edema [Shortness Of Breath] : no shortness of breath [As Noted in HPI] : as noted in HPI [Negative] : Neurological [FreeTextEntry5] : denies any history of chf

## 2019-03-20 NOTE — ASSESSMENT
[FreeTextEntry1] : 67 yo female with history of htn, hld, sleep apnea, osteoarthritis s/p b/l hip replacement and bilateral knee replacement surgery presents for evaluation of b/l lower extremity persistent edema x 1 month\par venous duplex shows no evidence of dvt or insufficiency\par pt advised that side effect of amlodipine is lower extremity edema and to follow up with her pcp regarding possibly changing to different anti-hypertensive medication\par pt to follow up with hematology (pt has results )\par recommend elevation and follow up as needed

## 2019-03-27 ENCOUNTER — RX CHANGE (OUTPATIENT)
Age: 69
End: 2019-03-27

## 2019-04-05 ENCOUNTER — RX RENEWAL (OUTPATIENT)
Age: 69
End: 2019-04-05

## 2019-04-11 ENCOUNTER — APPOINTMENT (OUTPATIENT)
Dept: OPHTHALMOLOGY | Facility: CLINIC | Age: 69
End: 2019-04-11
Payer: MEDICARE

## 2019-04-11 DIAGNOSIS — H40.9 UNSPECIFIED GLAUCOMA: ICD-10-CM

## 2019-04-11 PROCEDURE — 92012 INTRM OPH EXAM EST PATIENT: CPT

## 2019-04-11 RX ORDER — BRINZOLAMIDE 10 MG/ML
1 SUSPENSION/ DROPS OPHTHALMIC
Qty: 1 | Refills: 3 | Status: COMPLETED | COMMUNITY
Start: 2018-08-10 | End: 2019-04-11

## 2019-04-12 ENCOUNTER — APPOINTMENT (OUTPATIENT)
Dept: CARDIOTHORACIC SURGERY | Facility: CLINIC | Age: 69
End: 2019-04-12

## 2019-04-15 ENCOUNTER — APPOINTMENT (OUTPATIENT)
Dept: ORTHOPEDIC SURGERY | Facility: CLINIC | Age: 69
End: 2019-04-15
Payer: MEDICARE

## 2019-04-15 VITALS
DIASTOLIC BLOOD PRESSURE: 69 MMHG | HEART RATE: 86 BPM | WEIGHT: 206 LBS | HEIGHT: 61.5 IN | BODY MASS INDEX: 38.4 KG/M2 | SYSTOLIC BLOOD PRESSURE: 113 MMHG

## 2019-04-15 DIAGNOSIS — M25.552 PAIN IN LEFT HIP: ICD-10-CM

## 2019-04-15 PROCEDURE — 99214 OFFICE O/P EST MOD 30 MIN: CPT

## 2019-04-15 PROCEDURE — 73502 X-RAY EXAM HIP UNI 2-3 VIEWS: CPT | Mod: LT

## 2019-04-15 PROCEDURE — 73562 X-RAY EXAM OF KNEE 3: CPT | Mod: LT,RT

## 2019-04-17 ENCOUNTER — RX RENEWAL (OUTPATIENT)
Age: 69
End: 2019-04-17

## 2019-04-26 ENCOUNTER — APPOINTMENT (OUTPATIENT)
Dept: ORTHOPEDIC SURGERY | Facility: CLINIC | Age: 69
End: 2019-04-26
Payer: MEDICARE

## 2019-04-26 VITALS
DIASTOLIC BLOOD PRESSURE: 81 MMHG | WEIGHT: 206 LBS | SYSTOLIC BLOOD PRESSURE: 132 MMHG | HEIGHT: 61.5 IN | BODY MASS INDEX: 38.4 KG/M2 | HEART RATE: 81 BPM

## 2019-04-26 PROCEDURE — 72040 X-RAY EXAM NECK SPINE 2-3 VW: CPT

## 2019-04-26 PROCEDURE — 99214 OFFICE O/P EST MOD 30 MIN: CPT

## 2019-04-26 NOTE — HISTORY OF PRESENT ILLNESS
[de-identified] : post-op 14 months-PCF-doing well\par Has weakness in knees bilaterally, started in November  - has been dragging her left foot - bilateral TKR: left THR in 2005, left TKR in 2006 with Dr. Villalpando and right TKR in 2013 with Dr. Carver\par Left knee rin\par Sent for PT by Dr. Anthony - lower extremity strengthening. \par Dr. Anthony sent for additional imaging. \par Has not started PT yet for the knees, only started for the shoulders. \par C/o bilateral shoulder pain - describes it as "heaviness" on her shoulders. \par Taking Diclofenac DQ for DJD. \par Taking Amlodipine.  \par She is doing fantastic he states the surgery helped her tremendously with both her arm and leg symptoms.\par No fever chills sweats nausea vomiting no bowel,  no recent weight loss or gain no night pain. This history is in addition to the intake form that I personally reviewed.  \par

## 2019-04-26 NOTE — PHYSICAL EXAM
[de-identified] : Incision healed. + bilateral Roach's, pain in right shoulder\par Otherwise, 5 out of 5 motor strength, sensation is intact and symmetrical full range of motion flexion extension and rotation, no palpatory tenderness full range of motion of hips knees shoulders and elbows (all four extremities), no atrophy, negative straight leg raise, no pathological reflexes, no swelling, normal ambulation, no apparent distress skin is intact, no palpable lymph nodes, no upper or lower extremity instability, alert and oriented x3 and normal mood. Normal finger-to nose test. + hyperreflexia and no clonus. Ambulating normally.\par Positive impingement sign. [de-identified] : AP/lat cervical-adequate PCF-reviewed with the patient. \par \par AP, lateral, and sunrise x-rays of both knees on 04/15/2019 obtained from Dr. Anthony: Demonstrate bilateral total knee replacement. The prostheses are in place with no evidence of gross loosening.AP and lateral x-rays of the left hip demonstrate total hip replacement in good position. Previous x-rays are not available but there is no evidence of gross loosening.

## 2019-04-26 NOTE — CONSULT LETTER
[Dear  ___] : Dear  [unfilled], [Referral Letter:] : I am referring [unfilled] to you for further evaluation.  My most recent evaluation follows. [Please see my note below.] : Please see my note below. [Referral Closing:] : Thank you very much for seeing this patient.  If you have any questions, please do not hesitate to contact me. [Sincerely,] : Sincerely, [FreeTextEntry2] : Mat Us MD\par Pain Medicine, Neurology, Headache Medicine\par Phone: [phone] \par [facility] [place] [facility] [address] [place] [address], [address] [place], [place] [id number]  [FreeTextEntry3] : Skip Weber MD

## 2019-04-26 NOTE — ADDENDUM
[FreeTextEntry1] : This note was authored by Lucie Villar working as a medical scribe for Dr. Skip Weber. The note was reviewed, edited, and revised by Dr. Skip Weber whom is in agreement with the exam findings, imaging findings, and treatment plan. 04/26/2019.

## 2019-04-26 NOTE — DISCUSSION/SUMMARY
[de-identified] : 14 months s/p PCF-doing well.\par Continue with PT. \par Voltaren. \par Referred to Mat Us MD\par We discussed all options.\par She'll continue with home therapy.\par F/U 6 months. \par All questions were answered, all alternatives discussed and the patient is in complete agreement with that plan. Follow-up appointment as instructed. Any issues and the patient will call or come in sooner.

## 2019-05-05 ENCOUNTER — APPOINTMENT (OUTPATIENT)
Dept: SLEEP CENTER | Facility: CLINIC | Age: 69
End: 2019-05-05

## 2019-05-21 ENCOUNTER — APPOINTMENT (OUTPATIENT)
Dept: OPHTHALMOLOGY | Facility: CLINIC | Age: 69
End: 2019-05-21
Payer: MEDICARE

## 2019-05-21 PROCEDURE — 92136 OPHTHALMIC BIOMETRY: CPT

## 2019-05-21 PROCEDURE — 92014 COMPRE OPH EXAM EST PT 1/>: CPT

## 2019-05-24 ENCOUNTER — MESSAGE (OUTPATIENT)
Age: 69
End: 2019-05-24

## 2019-06-05 ENCOUNTER — APPOINTMENT (OUTPATIENT)
Dept: INTERNAL MEDICINE | Facility: CLINIC | Age: 69
End: 2019-06-05
Payer: MEDICARE

## 2019-06-05 VITALS
SYSTOLIC BLOOD PRESSURE: 105 MMHG | HEIGHT: 61 IN | BODY MASS INDEX: 38.89 KG/M2 | OXYGEN SATURATION: 93 % | TEMPERATURE: 98.5 F | WEIGHT: 206 LBS | DIASTOLIC BLOOD PRESSURE: 70 MMHG | HEART RATE: 96 BPM

## 2019-06-05 PROCEDURE — 99213 OFFICE O/P EST LOW 20 MIN: CPT

## 2019-06-05 RX ORDER — AMLODIPINE BESYLATE 5 MG/1
5 TABLET ORAL DAILY
Qty: 90 | Refills: 3 | Status: DISCONTINUED | COMMUNITY
Start: 2018-05-25 | End: 2019-06-05

## 2019-06-05 RX ORDER — DICLOFENAC SODIUM 75 MG/1
75 TABLET, DELAYED RELEASE ORAL
Qty: 60 | Refills: 1 | Status: DISCONTINUED | COMMUNITY
Start: 2018-06-25 | End: 2019-06-05

## 2019-06-05 RX ORDER — FUROSEMIDE 20 MG/1
20 TABLET ORAL
Qty: 30 | Refills: 3 | Status: DISCONTINUED | COMMUNITY
Start: 2019-02-06 | End: 2019-06-05

## 2019-06-05 RX ORDER — CHOLECALCIFEROL (VITAMIN D3)
CRYSTALS MISCELLANEOUS
Refills: 0 | Status: DISCONTINUED | COMMUNITY
End: 2019-06-05

## 2019-06-05 NOTE — PLAN
[FreeTextEntry1] : HTN\par -well controlled on HCTZ 25mg daily\par -continue metoprolol \par \par Postitive SILVIANO, elevated ESR on labs in Feb \par -given above labs and generalized fatigue, joint pain would recommend rheumatology evaluation\par \par Breast skin rash\par -likely fungal\par -nystatin cream prescribed\par \par Routine follow up in 4 months or sooner as needed

## 2019-06-05 NOTE — PHYSICAL EXAM
[No Acute Distress] : no acute distress [Well Nourished] : well nourished [Normal Sclera/Conjunctiva] : normal sclera/conjunctiva [Well Developed] : well developed [Normal Outer Ear/Nose] : the outer ears and nose were normal in appearance [Normal Oropharynx] : the oropharynx was normal [Supple] : supple [Clear to Auscultation] : lungs were clear to auscultation bilaterally [No Respiratory Distress] : no respiratory distress  [Regular Rhythm] : with a regular rhythm [No Focal Deficits] : no focal deficits [Normal Rate] : normal rate  [Alert and Oriented x3] : oriented to person, place, and time [de-identified] : dermatitis fungal appearing underneath left breast, very mild under right breast

## 2019-06-05 NOTE — HISTORY OF PRESENT ILLNESS
[FreeTextEntry1] : follow up [de-identified] : Patient presents for routine follow up. She does admit to still feeling sluggish and tired. She has been through a lot of stressors lately due to personal family issues. She sees hematology Dr Yao regularly, she was started on iron for anemia and has regular follow up with him next month. Denies acute complaints of HA, CP, palp. She has cardiology and pulmonology follow up next month.\par She does note a rash underneath both breasts, worse under left breast. She has been using hydrocortisone cream without relief.

## 2019-06-16 ENCOUNTER — OUTPATIENT (OUTPATIENT)
Dept: OUTPATIENT SERVICES | Facility: HOSPITAL | Age: 69
LOS: 1 days | End: 2019-06-16
Payer: COMMERCIAL

## 2019-06-16 ENCOUNTER — APPOINTMENT (OUTPATIENT)
Dept: SLEEP CENTER | Facility: CLINIC | Age: 69
End: 2019-06-16
Payer: MEDICARE

## 2019-06-16 DIAGNOSIS — Z90.89 ACQUIRED ABSENCE OF OTHER ORGANS: Chronic | ICD-10-CM

## 2019-06-16 DIAGNOSIS — M12.9 ARTHROPATHY, UNSPECIFIED: Chronic | ICD-10-CM

## 2019-06-16 PROCEDURE — 95811 POLYSOM 6/>YRS CPAP 4/> PARM: CPT

## 2019-06-16 PROCEDURE — 95811 POLYSOM 6/>YRS CPAP 4/> PARM: CPT | Mod: 26

## 2019-06-17 DIAGNOSIS — G47.33 OBSTRUCTIVE SLEEP APNEA (ADULT) (PEDIATRIC): ICD-10-CM

## 2019-07-02 ENCOUNTER — RX RENEWAL (OUTPATIENT)
Age: 69
End: 2019-07-02

## 2019-07-05 ENCOUNTER — APPOINTMENT (OUTPATIENT)
Dept: PULMONOLOGY | Facility: CLINIC | Age: 69
End: 2019-07-05
Payer: MEDICARE

## 2019-07-05 ENCOUNTER — MEDICATION RENEWAL (OUTPATIENT)
Age: 69
End: 2019-07-05

## 2019-07-05 VITALS
HEIGHT: 61 IN | RESPIRATION RATE: 15 BRPM | WEIGHT: 203 LBS | DIASTOLIC BLOOD PRESSURE: 69 MMHG | BODY MASS INDEX: 38.33 KG/M2 | OXYGEN SATURATION: 94 % | HEART RATE: 79 BPM | SYSTOLIC BLOOD PRESSURE: 107 MMHG | TEMPERATURE: 98.5 F

## 2019-07-05 PROCEDURE — 99215 OFFICE O/P EST HI 40 MIN: CPT

## 2019-07-05 NOTE — HISTORY OF PRESENT ILLNESS
[FreeTextEntry1] : This is a 68 y/o F non smoker, h/o obesity HTN hypercholesterolemia, s/p spinal fusion c3-7 on 2/27/18, OA s/p left hip surgery and left shoulder problem/pain, asthma and CHANTELL.\par \par Pt was dx'ed asthma 20 yrs ago, \par She is on Advair 500, Ventolin Singulair, Flonase for asthma\par PPI and Ranitidine 150 mg po bid and carafate for GERD\par \par She has + severe CHANTELL (AHI of 80), dx'ed 3/29/2010\par Underwent PAP titration in lab on 6/16/19 - report not received by me. \par \par Asthma - per patient, is under control. Mild sputum, worse lying down.  [Snoring] : no snoring [Frequent Nocturnal Awakening] : no nocturnal awakening [Awakes with Headache] : no headache upon awakening [Unintentional Sleep while Active] : no unintentional sleep while active [Awakening With Dry Mouth] : no dry mouth upon awakening [DIS] : no DIS [DMS] : no DMS [Unusual Sleep Behavior] : no unusual sleep behavior [Hypersomnolence] : no hypersomnolence [Cataplexy] : no cataplexy [Sleep Paralysis] : no sleep paralysis [Hypnagogic Hallucinations] : no hallucinations when falling asleep [Hypnopompic Hallucinations] : no hallucinations when awakening [Lower Extremity Discomfort] : no lower extremity discomfort in evening or at bedtime [Oxygen] : the patient uses no supplemental oxygen

## 2019-07-05 NOTE — REVIEW OF SYSTEMS
[Recent Wt Gain (___ Lbs)] : no recent weight gain [Sputum] : not coughing up ~M sputum [Nasal Congestion] : no nasal congestion [Panic Attacks] : no panic attacks [Difficulty Maintaining Sleep] : no difficulty maintaining sleep [Snoring] : no snoring [FreeTextEntry3] : gained 10 lbs in 4-5mo [FreeTextEntry7] : heartburn controlled

## 2019-07-05 NOTE — ASSESSMENT
[FreeTextEntry1] : The patient is a 69 year old female with a history of CHANTELL diagnosed 10 years ago, moderate severity, AHI of 19.8, was prescribed CPAP of 7 but didn't like it and returned it. She also has hx of asthma. patient has increased use of Ventolin nightly for past 2 weeks. Wheezing and cough occurring at night when lying down. Reports intermittent chills, but no documented fevers. S/p spinal fusion of c3-c7 and in rehab afterwards for 1 month.  Developed bronchitis per pt while in rehab that continued after discharge.  In April saw PCP DR. Castillo and prescribed Levaquin for 1 week and increased Advair from 250 to 500. \par \par pulmonary-asthma - currently well controlled. \par \par Continue Singulair, Reduce Advair 500/50 to 250/50, FLonase.\par \par Sleep - patient with severe CHANTELL on repeat sleep study AHI of 80. Had PAP titration. report not read yet, requested report from sleep lab and will f/u with pt. \par \par Pt to have cataract surgery. She does not know what kind of anesthesia she will be getting. I advised her to bring her PAP and let her doctor know if she is getting conscious sedation. Pt is optimized from a respiratory standpoint for anesthesia. Asthma is controlled. once I get her PAP report, then I can order her own device for her.

## 2019-07-07 PROBLEM — Z86.19 HISTORY OF CLOSTRIDIUM DIFFICILE COLITIS: Status: RESOLVED | Noted: 2018-10-10 | Resolved: 2019-07-07

## 2019-07-10 ENCOUNTER — APPOINTMENT (OUTPATIENT)
Dept: INTERNAL MEDICINE | Facility: CLINIC | Age: 69
End: 2019-07-10
Payer: MEDICARE

## 2019-07-10 ENCOUNTER — NON-APPOINTMENT (OUTPATIENT)
Age: 69
End: 2019-07-10

## 2019-07-10 VITALS
SYSTOLIC BLOOD PRESSURE: 104 MMHG | HEART RATE: 77 BPM | BODY MASS INDEX: 38.33 KG/M2 | WEIGHT: 203 LBS | OXYGEN SATURATION: 94 % | DIASTOLIC BLOOD PRESSURE: 66 MMHG | HEIGHT: 61 IN

## 2019-07-10 PROCEDURE — 99214 OFFICE O/P EST MOD 30 MIN: CPT | Mod: 25

## 2019-07-10 PROCEDURE — 36415 COLL VENOUS BLD VENIPUNCTURE: CPT

## 2019-07-10 PROCEDURE — 93000 ELECTROCARDIOGRAM COMPLETE: CPT

## 2019-07-10 RX ORDER — DICLOFENAC SODIUM 75 MG/1
75 TABLET, DELAYED RELEASE ORAL
Qty: 1 | Refills: 1 | Status: DISCONTINUED | COMMUNITY
Start: 2019-04-26 | End: 2019-07-10

## 2019-07-10 NOTE — PHYSICAL EXAM
[Well Nourished] : well nourished [No Acute Distress] : no acute distress [Well Developed] : well developed [No Respiratory Distress] : no respiratory distress  [Well-Appearing] : well-appearing [Clear to Auscultation] : lungs were clear to auscultation bilaterally [No Accessory Muscle Use] : no accessory muscle use [Normal Rate] : normal rate  [Regular Rhythm] : with a regular rhythm [Normal S1, S2] : normal S1 and S2 [Soft] : abdomen soft [No Edema] : there was no peripheral edema [Non Tender] : non-tender [Non-distended] : non-distended [Normal Bowel Sounds] : normal bowel sounds [No CVA Tenderness] : no CVA  tenderness [Grossly Normal Strength/Tone] : grossly normal strength/tone [Coordination Grossly Intact] : coordination grossly intact [No Focal Deficits] : no focal deficits [Normal Affect] : the affect was normal [Alert and Oriented x3] : oriented to person, place, and time

## 2019-07-11 LAB
ANION GAP SERPL CALC-SCNC: 12 MMOL/L
APTT BLD: 32 SEC
BASOPHILS # BLD AUTO: 0.02 K/UL
BASOPHILS NFR BLD AUTO: 0.3 %
BUN SERPL-MCNC: 23 MG/DL
CALCIUM SERPL-MCNC: 8.8 MG/DL
CHLORIDE SERPL-SCNC: 101 MMOL/L
CO2 SERPL-SCNC: 29 MMOL/L
CREAT SERPL-MCNC: 1.06 MG/DL
EOSINOPHIL # BLD AUTO: 0.37 K/UL
EOSINOPHIL NFR BLD AUTO: 5.3 %
GLUCOSE SERPL-MCNC: 118 MG/DL
HCT VFR BLD CALC: 36 %
HGB BLD-MCNC: 11 G/DL
IMM GRANULOCYTES NFR BLD AUTO: 0.1 %
INR PPP: 1.05 RATIO
LYMPHOCYTES # BLD AUTO: 2.66 K/UL
LYMPHOCYTES NFR BLD AUTO: 38 %
MAN DIFF?: NORMAL
MCHC RBC-ENTMCNC: 28.4 PG
MCHC RBC-ENTMCNC: 30.6 GM/DL
MCV RBC AUTO: 92.8 FL
MONOCYTES # BLD AUTO: 0.51 K/UL
MONOCYTES NFR BLD AUTO: 7.3 %
NEUTROPHILS # BLD AUTO: 3.43 K/UL
NEUTROPHILS NFR BLD AUTO: 49 %
PLATELET # BLD AUTO: 264 K/UL
POTASSIUM SERPL-SCNC: 3.5 MMOL/L
PT BLD: 12 SEC
RBC # BLD: 3.88 M/UL
RBC # FLD: 15 %
SODIUM SERPL-SCNC: 142 MMOL/L
WBC # FLD AUTO: 7 K/UL

## 2019-07-12 ENCOUNTER — NON-APPOINTMENT (OUTPATIENT)
Age: 69
End: 2019-07-12

## 2019-07-12 ENCOUNTER — APPOINTMENT (OUTPATIENT)
Dept: CARDIOTHORACIC SURGERY | Facility: CLINIC | Age: 69
End: 2019-07-12
Payer: MEDICARE

## 2019-07-12 VITALS
TEMPERATURE: 98 F | BODY MASS INDEX: 39.08 KG/M2 | OXYGEN SATURATION: 98 % | SYSTOLIC BLOOD PRESSURE: 124 MMHG | DIASTOLIC BLOOD PRESSURE: 78 MMHG | RESPIRATION RATE: 16 BRPM | HEART RATE: 72 BPM | HEIGHT: 61 IN | WEIGHT: 207 LBS

## 2019-07-12 PROCEDURE — 99214 OFFICE O/P EST MOD 30 MIN: CPT

## 2019-07-12 PROCEDURE — 93000 ELECTROCARDIOGRAM COMPLETE: CPT

## 2019-07-12 NOTE — HISTORY OF PRESENT ILLNESS
[FreeTextEntry1] : Liz presents for cardiac follow up prior to right cataract surgery next week.  She had C spine surgery in 2018 and is still experiencing a constellation of neurologic symptoms, though improving.  She has CTS which is painful.  She is in PT for her knees, which she describes as "weak".  She has spinal stenosis.  She had peripheral edema a year ago and reports having a negative duplex--at which time one of her BP medications was stopped (Amlodipine). \par \par She reports no cardiac issues since her prior visit.  She has no angina, no dyspnea, and no syncope.  She reports no recent changes to any of her cardiac medications.  She has recently confirmed CHANTELL and is starting CPAP in the near future.

## 2019-07-12 NOTE — PHYSICAL EXAM
[General Appearance - Well Developed] : well developed [Normal Appearance] : normal appearance [Well Groomed] : well groomed [General Appearance - In No Acute Distress] : no acute distress [General Appearance - Well Nourished] : well nourished [Eyelids - No Xanthelasma] : the eyelids demonstrated no xanthelasmas [No Oral Pallor] : no oral pallor [No Oral Cyanosis] : no oral cyanosis [Normal Jugular Venous V Waves Present] : normal jugular venous V waves present [No Jugular Venous Verdugo A Waves] : no jugular venous verdugo A waves [Respiration, Rhythm And Depth] : normal respiratory rhythm and effort [Exaggerated Use Of Accessory Muscles For Inspiration] : no accessory muscle use [Auscultation Breath Sounds / Voice Sounds] : lungs were clear to auscultation bilaterally [Heart Rate And Rhythm] : heart rate and rhythm were normal [Arterial Pulses Normal] : the arterial pulses were normal [Murmurs] : no murmurs present [Heart Sounds] : normal S1 and S2 [Bowel Sounds] : normal bowel sounds [Abdomen Tenderness] : non-tender [Abdomen Soft] : soft [Nail Clubbing] : no clubbing of the fingernails [Petechial Hemorrhages (___cm)] : no petechial hemorrhages [Cyanosis, Localized] : no localized cyanosis [Skin Color & Pigmentation] : normal skin color and pigmentation [] : no rash [Skin Lesions] : no skin lesions [No Venous Stasis] : no venous stasis [No Skin Ulcers] : no skin ulcer [No Xanthoma] : no  xanthoma was observed [Affect] : the affect was normal [Oriented To Time, Place, And Person] : oriented to person, place, and time [Mood] : the mood was normal [No Anxiety] : not feeling anxious [FreeTextEntry1] : walks with a cane

## 2019-07-12 NOTE — REVIEW OF SYSTEMS
[Lower Ext Edema] : lower extremity edema [Negative] : Psychiatric [Shortness Of Breath] : no shortness of breath [Dyspnea on exertion] : not dyspnea during exertion [Chest  Pressure] : no chest pressure [Chest Pain] : no chest pain [Palpitations] : no palpitations [Easy Bleeding] : no tendency for easy bleeding [Swollen Glands] : no swollen glands [Easy Bruising] : no tendency for easy bruising [Swollen Glands In The Neck] : no swollen glands in the neck

## 2019-07-12 NOTE — DISCUSSION/SUMMARY
[FreeTextEntry1] : Liz appears to be stable from a cardiac perspective.  Her BP is at goal.  She had a normal stress echo in 2017.  I am making no changes to her regimen.  From a cardiac perspective, she may proceed with surgery as planned.  She will check with her eye surgeon if and when to stop aspirin prior to her procedure.  Today's note will be faxed to Dr Ravindra Bell at [facility].

## 2019-07-24 RX ORDER — TIZANIDINE 4 MG/1
4 TABLET ORAL EVERY 8 HOURS
Qty: 90 | Refills: 3 | Status: DISCONTINUED | COMMUNITY
Start: 2018-04-23 | End: 2019-07-24

## 2019-07-29 ENCOUNTER — RX CHANGE (OUTPATIENT)
Age: 69
End: 2019-07-29

## 2019-08-02 NOTE — HISTORY OF PRESENT ILLNESS
[No Pertinent Cardiac History] : no history of aortic stenosis, atrial fibrillation, coronary artery disease, recent myocardial infarction, or implantable device/pacemaker [Asthma] : asthma [Sleep Apnea] : sleep apnea [No Adverse Anesthesia Reaction] : no adverse anesthesia reaction in self or family member [(Patient denies any chest pain, claudication, dyspnea on exertion, orthopnea, palpitations or syncope)] : Patient denies any chest pain, claudication, dyspnea on exertion, orthopnea, palpitations or syncope [COPD] : no COPD [Smoker] : not a smoker [Chronic Kidney Disease] : no chronic kidney disease [Chronic Anticoagulation] : no chronic anticoagulation [Diabetes] : no diabetes [FreeTextEntry3] : Dr Bell [FreeTextEntry1] : cataract right eye [FreeTextEntry2] : 8/5/19 [FreeTextEntry4] : Patient presents for pre operative exam prior to scheduled right cataract procedure. She was seen by pulmonary Dr Faust last week and cleared for procedure. She has h/o sleep apnea, will bring her machine to surgery per pulmonary recommendation. She has appointment with cardiology on Friday.\par Today she denies any acute complaints, no HA, dizziness, CP, palp, SOB, TRENT, abd pain, urinary complaints. \par

## 2019-08-02 NOTE — PLAN
[FreeTextEntry1] : Pre operative exam\par -check labs- CBC, BMP, PT/INR, PTT\par -EKG today no acute issues- has cardiology appointment on Friday as well\par -medically optimized pending review of above \par -will complete and fax paperwork to surgeon when results available

## 2019-08-02 NOTE — REVIEW OF SYSTEMS
[Fever] : no fever [Chills] : no chills [Palpitations] : no palpitations [Chest Pain] : no chest pain [Lower Ext Edema] : no lower extremity edema [Shortness Of Breath] : no shortness of breath [Wheezing] : no wheezing [Cough] : no cough [Dyspnea on Exertion] : no dyspnea on exertion [Dysuria] : no dysuria [Abdominal Pain] : no abdominal pain [Hematuria] : no hematuria [Frequency] : no frequency [Headache] : no headache [Dizziness] : no dizziness

## 2019-08-02 NOTE — ADDENDUM
[FreeTextEntry1] : Blood work reviewed, patient is medically optimized for procedure. She was also seen by cardiology and cleared for scheduled procedure.

## 2019-08-05 ENCOUNTER — APPOINTMENT (OUTPATIENT)
Dept: OPHTHALMOLOGY | Facility: AMBULATORY SURGERY CENTER | Age: 69
End: 2019-08-05

## 2019-08-12 ENCOUNTER — MEDICATION RENEWAL (OUTPATIENT)
Age: 69
End: 2019-08-12

## 2019-08-21 ENCOUNTER — APPOINTMENT (OUTPATIENT)
Dept: PAIN MANAGEMENT | Facility: CLINIC | Age: 69
End: 2019-08-21
Payer: MEDICARE

## 2019-08-21 VITALS
HEART RATE: 89 BPM | WEIGHT: 203 LBS | BODY MASS INDEX: 38.33 KG/M2 | HEIGHT: 61 IN | DIASTOLIC BLOOD PRESSURE: 75 MMHG | SYSTOLIC BLOOD PRESSURE: 115 MMHG

## 2019-08-21 PROCEDURE — 99204 OFFICE O/P NEW MOD 45 MIN: CPT

## 2019-08-23 ENCOUNTER — MEDICATION RENEWAL (OUTPATIENT)
Age: 69
End: 2019-08-23

## 2019-08-28 ENCOUNTER — MEDICATION RENEWAL (OUTPATIENT)
Age: 69
End: 2019-08-28

## 2019-09-12 ENCOUNTER — APPOINTMENT (OUTPATIENT)
Dept: INTERNAL MEDICINE | Facility: CLINIC | Age: 69
End: 2019-09-12
Payer: MEDICARE

## 2019-09-12 VITALS
HEIGHT: 61 IN | DIASTOLIC BLOOD PRESSURE: 60 MMHG | HEART RATE: 93 BPM | BODY MASS INDEX: 38.51 KG/M2 | OXYGEN SATURATION: 96 % | WEIGHT: 204 LBS | SYSTOLIC BLOOD PRESSURE: 110 MMHG

## 2019-09-12 DIAGNOSIS — R76.8 OTHER SPECIFIED ABNORMAL IMMUNOLOGICAL FINDINGS IN SERUM: ICD-10-CM

## 2019-09-12 PROCEDURE — 99213 OFFICE O/P EST LOW 20 MIN: CPT

## 2019-09-12 NOTE — PLAN
[FreeTextEntry1] : Joint pain, swelling\par -positive SILVIANO, elevated ESR in past\par -check DEXA\par -recommend rheumatology evaluation- referral provided\par -discussed that diclofenac not meant to be long term medication, states pain management did not offer alternative at this time and she requires refill\par -to follow up with Dr Anthony for knees

## 2019-09-12 NOTE — HISTORY OF PRESENT ILLNESS
[FreeTextEntry1] : follow up [de-identified] : Patient presents for follow up and to discuss painful joints. She sees multiple other providers for her back, knees and hands but recently went to pain specialist who recommended arthritis workup. She states her last DEXA was years ago, she cannot recall when but has never been told of osteoporosis. States she has always been told she has osteoarthritis. She has intermittent hand swelling and pain; hand surgeon has given her injections which she feels does not help the swelling. She also gets stiffness in her arms and elbows. She has chronic knee pains; Dr Anthony recommended PT which she feels did not help her.\par

## 2019-09-12 NOTE — PHYSICAL EXAM
[No Acute Distress] : no acute distress [Well Nourished] : well nourished [Well Developed] : well developed [No Respiratory Distress] : no respiratory distress  [No Accessory Muscle Use] : no accessory muscle use [Clear to Auscultation] : lungs were clear to auscultation bilaterally [Regular Rhythm] : with a regular rhythm [Normal Rate] : normal rate  [Normal S1, S2] : normal S1 and S2 [No Focal Deficits] : no focal deficits [Alert and Oriented x3] : oriented to person, place, and time

## 2019-09-23 ENCOUNTER — FORM ENCOUNTER (OUTPATIENT)
Age: 69
End: 2019-09-23

## 2019-09-24 ENCOUNTER — APPOINTMENT (OUTPATIENT)
Dept: RADIOLOGY | Facility: IMAGING CENTER | Age: 69
End: 2019-09-24
Payer: MEDICARE

## 2019-09-24 ENCOUNTER — OUTPATIENT (OUTPATIENT)
Dept: OUTPATIENT SERVICES | Facility: HOSPITAL | Age: 69
LOS: 1 days | End: 2019-09-24
Payer: COMMERCIAL

## 2019-09-24 DIAGNOSIS — Z13.820 ENCOUNTER FOR SCREENING FOR OSTEOPOROSIS: ICD-10-CM

## 2019-09-24 DIAGNOSIS — M12.9 ARTHROPATHY, UNSPECIFIED: Chronic | ICD-10-CM

## 2019-09-24 DIAGNOSIS — Z90.89 ACQUIRED ABSENCE OF OTHER ORGANS: Chronic | ICD-10-CM

## 2019-09-24 PROCEDURE — 77080 DXA BONE DENSITY AXIAL: CPT | Mod: 26

## 2019-09-24 PROCEDURE — 77080 DXA BONE DENSITY AXIAL: CPT

## 2019-09-26 ENCOUNTER — APPOINTMENT (OUTPATIENT)
Dept: OPHTHALMOLOGY | Facility: CLINIC | Age: 69
End: 2019-09-26
Payer: MEDICARE

## 2019-09-26 ENCOUNTER — NON-APPOINTMENT (OUTPATIENT)
Age: 69
End: 2019-09-26

## 2019-09-26 PROCEDURE — 92014 COMPRE OPH EXAM EST PT 1/>: CPT

## 2019-09-26 NOTE — PHYSICAL EXAM
[General Appearance - Alert] : alert [Affect] : the affect was normal [Person] : oriented to person [Place] : oriented to place [Motor Strength] : muscle strength was normal in all four extremities [Time] : oriented to time [Sensation Tactile Decrease] : light touch was intact [Outer Ear] : the ears and nose were normal in appearance [Sclera] : the sclera and conjunctiva were normal [Neck Appearance] : the appearance of the neck was normal [] : no rash [FreeTextEntry8] : antalgic gait

## 2019-09-26 NOTE — HISTORY OF PRESENT ILLNESS
[FreeTextEntry1] : 70 y/o RH female in [facility] until 1990's sh4e was dx DDD. Initially placed on Lodine 500 mg bid. Since that time, patient developed progressive chronic back pain extending to hip to shoulders. She was dx with spinal stenosis. However, co pain worsened with sitting and minimal improved with ambulation. PT helped transiently to alleviate pain. 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 \par \par Patient underwent left knee arthroscopic surgery in 1995. She then began to co bilateral hip pain undergoing left hip replacement in 2005. In 2006, left knee replaced and in 2013 right knee improvement. Overall, improvement was partial. She reports her left knee and left ankle "buckling". In 2018, underwent cervical fusion. \par \par Placed on Vicodin no relief. \par \par She was in PT for past year and was doing very well until the dennys of this year, she has progressively got worse.   She also notes right shoulder has limited mobility. \par \par Diclofenac 75 mgs bid. \par \par Tramadol and zanaflex caused AE's.

## 2019-09-26 NOTE — ASSESSMENT
[FreeTextEntry1] : Chronic diffuse pain\par Non focal neuro exam\par \par Would recommend she resume PT\par Will dc pain team prior to considering treatment options.

## 2019-09-27 ENCOUNTER — MEDICATION RENEWAL (OUTPATIENT)
Age: 69
End: 2019-09-27

## 2019-10-02 NOTE — ASU PREOP CHECKLIST - AS BP NONINV SITE
Rm 7    Chief Complaint   Patient presents with    Complete Physical      no concerns per pt   pt would like to discuss lab results from 8/20/19  Pt is fasting    1. Have you been to the ER, urgent care clinic since your last visit? Hospitalized since your last visit? No    2. Have you seen or consulted any other health care providers outside of the [address] [place] [address] since your last visit? Include any pap smears or colon screening.  No    Health Maintenance Due   Topic Date Due    Shingrix Vaccine Age 49> (1 of 2) 11/09/2013    FOBT Q 1 YEAR AGE 50-75  11/09/2013    PAP AKA CERVICAL CYTOLOGY  04/11/2017     3 most recent PHQ Screens 8/6/2019   Little interest or pleasure in doing things Not at all   Feeling down, depressed, irritable, or hopeless Not at all   Total Score PHQ 2 0       Learning Assessment 8/6/2019   PRIMARY LEARNER Patient   HIGHEST LEVEL OF EDUCATION - PRIMARY LEARNER  -   BARRIERS PRIMARY LEARNER NONE   CO-LEARNER CAREGIVER -   PRIMARY LANGUAGE ENGLISH    NEED -   LEARNER PREFERENCE PRIMARY READING     LISTENING     DEMONSTRATION     VIDEOS   LEARNING SPECIAL TOPICS -   ANSWERED BY patient   RELATIONSHIP SELF left upper arm

## 2019-10-07 ENCOUNTER — APPOINTMENT (OUTPATIENT)
Dept: INTERNAL MEDICINE | Facility: CLINIC | Age: 69
End: 2019-10-07

## 2019-10-14 ENCOUNTER — RX RENEWAL (OUTPATIENT)
Age: 69
End: 2019-10-14

## 2019-10-16 NOTE — H&P PST ADULT - OPHTHALMOLOGIC COMMENTS
Lev,    Potassium is now normal, I recommend continuing the banana consumption.    Sheryl Levy, CNP wears glasses

## 2019-10-18 ENCOUNTER — MEDICATION RENEWAL (OUTPATIENT)
Age: 69
End: 2019-10-18

## 2019-10-25 ENCOUNTER — MOBILE ON CALL (OUTPATIENT)
Age: 69
End: 2019-10-25

## 2019-11-20 ENCOUNTER — NON-APPOINTMENT (OUTPATIENT)
Age: 69
End: 2019-11-20

## 2019-11-20 ENCOUNTER — APPOINTMENT (OUTPATIENT)
Dept: INTERNAL MEDICINE | Facility: CLINIC | Age: 69
End: 2019-11-20
Payer: MEDICARE

## 2019-11-20 VITALS
HEART RATE: 84 BPM | HEIGHT: 61 IN | TEMPERATURE: 98.3 F | WEIGHT: 195 LBS | BODY MASS INDEX: 36.82 KG/M2 | DIASTOLIC BLOOD PRESSURE: 63 MMHG | SYSTOLIC BLOOD PRESSURE: 100 MMHG | OXYGEN SATURATION: 98 %

## 2019-11-20 LAB
ALBUMIN SERPL ELPH-MCNC: 3.8 G/DL
ALP BLD-CCNC: 90 U/L
ALT SERPL-CCNC: 15 U/L
ANION GAP SERPL CALC-SCNC: 14 MMOL/L
APTT BLD: 29.9 SEC
AST SERPL-CCNC: 16 U/L
BASOPHILS # BLD AUTO: 0.01 K/UL
BASOPHILS NFR BLD AUTO: 0.1 %
BILIRUB SERPL-MCNC: 0.3 MG/DL
BUN SERPL-MCNC: 20 MG/DL
CALCIUM SERPL-MCNC: 9 MG/DL
CHLORIDE SERPL-SCNC: 97 MMOL/L
CO2 SERPL-SCNC: 30 MMOL/L
CREAT SERPL-MCNC: 1.03 MG/DL
EOSINOPHIL # BLD AUTO: 0.27 K/UL
EOSINOPHIL NFR BLD AUTO: 2.7 %
GLUCOSE SERPL-MCNC: 117 MG/DL
HCT VFR BLD CALC: 36.1 %
HGB BLD-MCNC: 11.2 G/DL
IMM GRANULOCYTES NFR BLD AUTO: 0.2 %
INR PPP: 1.1 RATIO
LYMPHOCYTES # BLD AUTO: 3.41 K/UL
LYMPHOCYTES NFR BLD AUTO: 33.6 %
MAN DIFF?: NORMAL
MCHC RBC-ENTMCNC: 28.9 PG
MCHC RBC-ENTMCNC: 31 GM/DL
MCV RBC AUTO: 93.3 FL
MONOCYTES # BLD AUTO: 0.57 K/UL
MONOCYTES NFR BLD AUTO: 5.6 %
NEUTROPHILS # BLD AUTO: 5.87 K/UL
NEUTROPHILS NFR BLD AUTO: 57.8 %
PLATELET # BLD AUTO: 274 K/UL
POTASSIUM SERPL-SCNC: 2.9 MMOL/L
PROT SERPL-MCNC: 7.1 G/DL
PT BLD: 12.6 SEC
RBC # BLD: 3.87 M/UL
RBC # FLD: 14.4 %
SODIUM SERPL-SCNC: 141 MMOL/L
WBC # FLD AUTO: 10.15 K/UL

## 2019-11-20 PROCEDURE — 93000 ELECTROCARDIOGRAM COMPLETE: CPT

## 2019-11-20 PROCEDURE — 99214 OFFICE O/P EST MOD 30 MIN: CPT | Mod: 25

## 2019-11-20 PROCEDURE — 36415 COLL VENOUS BLD VENIPUNCTURE: CPT

## 2019-11-20 RX ORDER — DICLOFENAC SODIUM 75 MG/1
75 TABLET, DELAYED RELEASE ORAL
Qty: 60 | Refills: 0 | Status: DISCONTINUED | COMMUNITY
Start: 2019-08-01 | End: 2019-11-20

## 2019-11-20 RX ORDER — DICLOFENAC SODIUM 75 MG/1
75 TABLET, DELAYED RELEASE ORAL
Qty: 60 | Refills: 0 | Status: DISCONTINUED | COMMUNITY
Start: 2019-06-24 | End: 2019-11-20

## 2019-11-20 NOTE — HISTORY OF PRESENT ILLNESS
[No Pertinent Cardiac History] : no history of aortic stenosis, atrial fibrillation, coronary artery disease, recent myocardial infarction, or implantable device/pacemaker [Asthma] : asthma [Sleep Apnea] : sleep apnea [No Adverse Anesthesia Reaction] : no adverse anesthesia reaction in self or family member [(Patient denies any chest pain, claudication, dyspnea on exertion, orthopnea, palpitations or syncope)] : Patient denies any chest pain, claudication, dyspnea on exertion, orthopnea, palpitations or syncope [Chronic Anticoagulation] : no chronic anticoagulation [Chronic Kidney Disease] : no chronic kidney disease [Diabetes] : no diabetes [FreeTextEntry1] : left cataract  [FreeTextEntry2] : 12/11/19 [FreeTextEntry4] : Patient presents for medical clearance prior to scheduled left cataract procedure. She has no acute concerns or complaints today. Denies any HA, dizziness, CP, palp, SOB, TRENT. \par She is currently taking prednisone 10mg daily for PMR- she has rheumatology follow up tomorrow, will discuss continuing steroid therapy. Notes significant improvement in her body aches and joint pain since being on medication.\par She was supposed to have this procedure over the summer, had cardiology and pulmonology clearances at that time. Due to scheduling conflict she had to postpone until now. [FreeTextEntry3] : Dr Bell

## 2019-11-20 NOTE — PLAN
[FreeTextEntry1] : Pre operative exam prior to scheduled procedure left eye cataract 12/11/19\par -check labs\par -EKG reviewed- stress ECHO 2017 normal, last cardio evaluation with Dr Mirza 7/19\par -advised by pulm to bring CPAP machine to procedure\par -hold HCTZ AM of procedure\par -hold any NSAIDs 1 week prior\par -medically optimized pending review of blood work- will complete and send paperwork

## 2019-11-20 NOTE — PHYSICAL EXAM
[No Acute Distress] : no acute distress [Well Nourished] : well nourished [Well Developed] : well developed [No Respiratory Distress] : no respiratory distress  [No Accessory Muscle Use] : no accessory muscle use [Clear to Auscultation] : lungs were clear to auscultation bilaterally [Normal Rate] : normal rate  [Regular Rhythm] : with a regular rhythm [Normal S1, S2] : normal S1 and S2 [No Edema] : there was no peripheral edema [Coordination Grossly Intact] : coordination grossly intact [No Focal Deficits] : no focal deficits [Normal Affect] : the affect was normal [Alert and Oriented x3] : oriented to person, place, and time

## 2019-11-22 LAB
25(OH)D3 SERPL-MCNC: 58.2 NG/ML
CHOLEST SERPL-MCNC: 201 MG/DL
CHOLEST/HDLC SERPL: 3 RATIO
HDLC SERPL-MCNC: 67 MG/DL
LDLC SERPL CALC-MCNC: 104 MG/DL
TRIGL SERPL-MCNC: 150 MG/DL
TSH SERPL-ACNC: 1.48 UIU/ML

## 2019-11-25 ENCOUNTER — APPOINTMENT (OUTPATIENT)
Dept: INTERNAL MEDICINE | Facility: CLINIC | Age: 69
End: 2019-11-25
Payer: MEDICARE

## 2019-11-25 LAB
ANION GAP SERPL CALC-SCNC: 13 MMOL/L
BUN SERPL-MCNC: 17 MG/DL
CALCIUM SERPL-MCNC: 9 MG/DL
CHLORIDE SERPL-SCNC: 102 MMOL/L
CO2 SERPL-SCNC: 28 MMOL/L
CREAT SERPL-MCNC: 0.96 MG/DL
GLUCOSE SERPL-MCNC: 97 MG/DL
POTASSIUM SERPL-SCNC: 4.2 MMOL/L
SODIUM SERPL-SCNC: 143 MMOL/L

## 2019-11-25 PROCEDURE — 36415 COLL VENOUS BLD VENIPUNCTURE: CPT

## 2019-12-11 ENCOUNTER — NON-APPOINTMENT (OUTPATIENT)
Age: 69
End: 2019-12-11

## 2019-12-11 ENCOUNTER — OUTPATIENT (OUTPATIENT)
Dept: OUTPATIENT SERVICES | Facility: HOSPITAL | Age: 69
LOS: 1 days | Discharge: HOME | End: 2019-12-11
Payer: MEDICARE

## 2019-12-11 ENCOUNTER — APPOINTMENT (OUTPATIENT)
Dept: OPHTHALMOLOGY | Facility: AMBULATORY SURGERY CENTER | Age: 69
End: 2019-12-11

## 2019-12-11 DIAGNOSIS — M12.9 ARTHROPATHY, UNSPECIFIED: Chronic | ICD-10-CM

## 2019-12-11 DIAGNOSIS — Z90.89 ACQUIRED ABSENCE OF OTHER ORGANS: Chronic | ICD-10-CM

## 2019-12-11 PROCEDURE — 66984 XCAPSL CTRC RMVL W/O ECP: CPT | Mod: LT

## 2019-12-12 ENCOUNTER — APPOINTMENT (OUTPATIENT)
Dept: OPHTHALMOLOGY | Facility: CLINIC | Age: 69
End: 2019-12-12
Payer: MEDICARE

## 2019-12-12 ENCOUNTER — NON-APPOINTMENT (OUTPATIENT)
Age: 69
End: 2019-12-12

## 2019-12-12 PROCEDURE — 99024 POSTOP FOLLOW-UP VISIT: CPT

## 2019-12-16 ENCOUNTER — NON-APPOINTMENT (OUTPATIENT)
Age: 69
End: 2019-12-16

## 2019-12-16 ENCOUNTER — APPOINTMENT (OUTPATIENT)
Dept: OPHTHALMOLOGY | Facility: CLINIC | Age: 69
End: 2019-12-16
Payer: MEDICARE

## 2019-12-16 PROCEDURE — 99024 POSTOP FOLLOW-UP VISIT: CPT

## 2020-01-06 ENCOUNTER — RX RENEWAL (OUTPATIENT)
Age: 70
End: 2020-01-06

## 2020-01-07 ENCOUNTER — FORM ENCOUNTER (OUTPATIENT)
Age: 70
End: 2020-01-07

## 2020-01-08 ENCOUNTER — APPOINTMENT (OUTPATIENT)
Dept: MAMMOGRAPHY | Facility: IMAGING CENTER | Age: 70
End: 2020-01-08
Payer: MEDICARE

## 2020-01-08 ENCOUNTER — OUTPATIENT (OUTPATIENT)
Dept: OUTPATIENT SERVICES | Facility: HOSPITAL | Age: 70
LOS: 1 days | End: 2020-01-08
Payer: COMMERCIAL

## 2020-01-08 DIAGNOSIS — Z12.39 ENCOUNTER FOR OTHER SCREENING FOR MALIGNANT NEOPLASM OF BREAST: ICD-10-CM

## 2020-01-08 DIAGNOSIS — Z90.89 ACQUIRED ABSENCE OF OTHER ORGANS: Chronic | ICD-10-CM

## 2020-01-08 DIAGNOSIS — M12.9 ARTHROPATHY, UNSPECIFIED: Chronic | ICD-10-CM

## 2020-01-08 PROCEDURE — 77067 SCR MAMMO BI INCL CAD: CPT

## 2020-01-08 PROCEDURE — 77063 BREAST TOMOSYNTHESIS BI: CPT

## 2020-01-08 PROCEDURE — 77067 SCR MAMMO BI INCL CAD: CPT | Mod: 26

## 2020-01-08 PROCEDURE — 77063 BREAST TOMOSYNTHESIS BI: CPT | Mod: 26

## 2020-01-13 ENCOUNTER — NON-APPOINTMENT (OUTPATIENT)
Age: 70
End: 2020-01-13

## 2020-01-13 ENCOUNTER — APPOINTMENT (OUTPATIENT)
Dept: OPHTHALMOLOGY | Facility: CLINIC | Age: 70
End: 2020-01-13
Payer: MEDICARE

## 2020-01-13 PROCEDURE — 92136 OPHTHALMIC BIOMETRY: CPT | Mod: RT

## 2020-01-13 PROCEDURE — 99024 POSTOP FOLLOW-UP VISIT: CPT

## 2020-02-19 ENCOUNTER — APPOINTMENT (OUTPATIENT)
Dept: INTERNAL MEDICINE | Facility: CLINIC | Age: 70
End: 2020-02-19

## 2020-02-19 ENCOUNTER — NON-APPOINTMENT (OUTPATIENT)
Age: 70
End: 2020-02-19

## 2020-02-19 ENCOUNTER — APPOINTMENT (OUTPATIENT)
Dept: INTERNAL MEDICINE | Facility: CLINIC | Age: 70
End: 2020-02-19
Payer: MEDICARE

## 2020-02-19 VITALS
OXYGEN SATURATION: 95 % | BODY MASS INDEX: 38.14 KG/M2 | SYSTOLIC BLOOD PRESSURE: 111 MMHG | WEIGHT: 202 LBS | HEART RATE: 72 BPM | TEMPERATURE: 98 F | DIASTOLIC BLOOD PRESSURE: 68 MMHG | HEIGHT: 61 IN

## 2020-02-19 LAB
ANION GAP SERPL CALC-SCNC: 14 MMOL/L
BASOPHILS # BLD AUTO: 0.03 K/UL
BASOPHILS NFR BLD AUTO: 0.2 %
BUN SERPL-MCNC: 20 MG/DL
CALCIUM SERPL-MCNC: 9.1 MG/DL
CHLORIDE SERPL-SCNC: 95 MMOL/L
CO2 SERPL-SCNC: 31 MMOL/L
CREAT SERPL-MCNC: 1.12 MG/DL
EOSINOPHIL # BLD AUTO: 0.16 K/UL
EOSINOPHIL NFR BLD AUTO: 1.3 %
GLUCOSE SERPL-MCNC: 117 MG/DL
HCT VFR BLD CALC: 40 %
HGB BLD-MCNC: 11.9 G/DL
IMM GRANULOCYTES NFR BLD AUTO: 0.2 %
LYMPHOCYTES # BLD AUTO: 2.19 K/UL
LYMPHOCYTES NFR BLD AUTO: 17.9 %
MAN DIFF?: NORMAL
MCHC RBC-ENTMCNC: 28.2 PG
MCHC RBC-ENTMCNC: 29.8 GM/DL
MCV RBC AUTO: 94.8 FL
MONOCYTES # BLD AUTO: 0.68 K/UL
MONOCYTES NFR BLD AUTO: 5.6 %
NEUTROPHILS # BLD AUTO: 9.13 K/UL
NEUTROPHILS NFR BLD AUTO: 74.8 %
PLATELET # BLD AUTO: 261 K/UL
POTASSIUM SERPL-SCNC: 3.9 MMOL/L
RBC # BLD: 4.22 M/UL
RBC # FLD: 14 %
SODIUM SERPL-SCNC: 141 MMOL/L
WBC # FLD AUTO: 12.22 K/UL

## 2020-02-19 PROCEDURE — 93000 ELECTROCARDIOGRAM COMPLETE: CPT

## 2020-02-19 PROCEDURE — 99214 OFFICE O/P EST MOD 30 MIN: CPT | Mod: 25

## 2020-02-19 PROCEDURE — 36415 COLL VENOUS BLD VENIPUNCTURE: CPT

## 2020-02-19 RX ORDER — POTASSIUM CHLORIDE 1500 MG/1
20 TABLET, EXTENDED RELEASE ORAL DAILY
Qty: 14 | Refills: 0 | Status: DISCONTINUED | COMMUNITY
Start: 2018-04-13 | End: 2020-02-19

## 2020-02-19 NOTE — PLAN
[FreeTextEntry1] : Pre operative exam prior to cataract procedure\par -EKG reviewed- NSR\par -check CBC, BMP\par -medically optimized pending review of results\par -post op follow up\par -HTN well controlled\par -hold any aspirin or NSAIDs x 1 week prior

## 2020-02-19 NOTE — PHYSICAL EXAM
[No Acute Distress] : no acute distress [Well Nourished] : well nourished [Well Developed] : well developed [Well-Appearing] : well-appearing [No Respiratory Distress] : no respiratory distress  [No Accessory Muscle Use] : no accessory muscle use [Clear to Auscultation] : lungs were clear to auscultation bilaterally [Normal Rate] : normal rate  [Regular Rhythm] : with a regular rhythm [Normal S1, S2] : normal S1 and S2 [No Edema] : there was no peripheral edema [Coordination Grossly Intact] : coordination grossly intact [No Focal Deficits] : no focal deficits [Normal Affect] : the affect was normal [Alert and Oriented x3] : oriented to person, place, and time

## 2020-02-19 NOTE — HISTORY OF PRESENT ILLNESS
[No Pertinent Cardiac History] : no history of aortic stenosis, atrial fibrillation, coronary artery disease, recent myocardial infarction, or implantable device/pacemaker [Asthma] : asthma [Sleep Apnea] : sleep apnea [No Adverse Anesthesia Reaction] : no adverse anesthesia reaction in self or family member [(Patient denies any chest pain, claudication, dyspnea on exertion, orthopnea, palpitations or syncope)] : Patient denies any chest pain, claudication, dyspnea on exertion, orthopnea, palpitations or syncope [COPD] : no COPD [Smoker] : not a smoker [Chronic Anticoagulation] : no chronic anticoagulation [Chronic Kidney Disease] : no chronic kidney disease [Diabetes] : no diabetes [FreeTextEntry1] : right eye cataract [FreeTextEntry2] : 2/26/20 [FreeTextEntry3] : Dr Bell [FreeTextEntry4] : Patient presents for medical clearance prior to right cataract surgery. She has no acute complaints today. Notes that she tripped the other day in her room and fell, had slight bruise on her head which has resolved. Denies any HA, dizziness, CP, palp, TRENT, SOB, abd pain. She had left cataract procedure done in December and it went well.\par She is currently on daily prednisone for PMR as per rheumatologist.

## 2020-02-25 ENCOUNTER — TRANSCRIPTION ENCOUNTER (OUTPATIENT)
Age: 70
End: 2020-02-25

## 2020-02-26 ENCOUNTER — OUTPATIENT (OUTPATIENT)
Dept: OUTPATIENT SERVICES | Facility: HOSPITAL | Age: 70
LOS: 1 days | Discharge: HOME | End: 2020-02-26
Payer: MEDICARE

## 2020-02-26 ENCOUNTER — NON-APPOINTMENT (OUTPATIENT)
Age: 70
End: 2020-02-26

## 2020-02-26 ENCOUNTER — APPOINTMENT (OUTPATIENT)
Dept: OPHTHALMOLOGY | Facility: AMBULATORY SURGERY CENTER | Age: 70
End: 2020-02-26

## 2020-02-26 DIAGNOSIS — M12.9 ARTHROPATHY, UNSPECIFIED: Chronic | ICD-10-CM

## 2020-02-26 DIAGNOSIS — Z90.89 ACQUIRED ABSENCE OF OTHER ORGANS: Chronic | ICD-10-CM

## 2020-02-26 PROCEDURE — 66984 XCAPSL CTRC RMVL W/O ECP: CPT | Mod: RT,79

## 2020-02-27 ENCOUNTER — NON-APPOINTMENT (OUTPATIENT)
Age: 70
End: 2020-02-27

## 2020-02-27 ENCOUNTER — APPOINTMENT (OUTPATIENT)
Dept: OPHTHALMOLOGY | Facility: CLINIC | Age: 70
End: 2020-02-27
Payer: MEDICARE

## 2020-02-27 PROCEDURE — 99024 POSTOP FOLLOW-UP VISIT: CPT

## 2020-03-02 ENCOUNTER — APPOINTMENT (OUTPATIENT)
Dept: OPHTHALMOLOGY | Facility: CLINIC | Age: 70
End: 2020-03-02
Payer: MEDICARE

## 2020-03-02 ENCOUNTER — NON-APPOINTMENT (OUTPATIENT)
Age: 70
End: 2020-03-02

## 2020-03-02 PROCEDURE — 99024 POSTOP FOLLOW-UP VISIT: CPT

## 2020-03-06 ENCOUNTER — APPOINTMENT (OUTPATIENT)
Dept: CARDIOTHORACIC SURGERY | Facility: CLINIC | Age: 70
End: 2020-03-06

## 2020-03-23 ENCOUNTER — APPOINTMENT (OUTPATIENT)
Dept: INTERNAL MEDICINE | Facility: CLINIC | Age: 70
End: 2020-03-23

## 2020-03-31 ENCOUNTER — RX CHANGE (OUTPATIENT)
Age: 70
End: 2020-03-31

## 2020-03-31 ENCOUNTER — TRANSCRIPTION ENCOUNTER (OUTPATIENT)
Age: 70
End: 2020-03-31

## 2020-04-03 ENCOUNTER — APPOINTMENT (OUTPATIENT)
Dept: ORTHOPEDIC SURGERY | Facility: CLINIC | Age: 70
End: 2020-04-03

## 2020-04-05 ENCOUNTER — RX RENEWAL (OUTPATIENT)
Age: 70
End: 2020-04-05

## 2020-04-07 ENCOUNTER — RX RENEWAL (OUTPATIENT)
Age: 70
End: 2020-04-07

## 2020-04-21 ENCOUNTER — RX RENEWAL (OUTPATIENT)
Age: 70
End: 2020-04-21

## 2020-04-22 ENCOUNTER — RX RENEWAL (OUTPATIENT)
Age: 70
End: 2020-04-22

## 2020-04-27 ENCOUNTER — APPOINTMENT (OUTPATIENT)
Dept: CARDIOTHORACIC SURGERY | Facility: CLINIC | Age: 70
End: 2020-04-27

## 2020-04-27 ENCOUNTER — APPOINTMENT (OUTPATIENT)
Dept: CARDIOTHORACIC SURGERY | Facility: CLINIC | Age: 70
End: 2020-04-27
Payer: MEDICARE

## 2020-04-27 VITALS — HEART RATE: 73 BPM | SYSTOLIC BLOOD PRESSURE: 119 MMHG | DIASTOLIC BLOOD PRESSURE: 72 MMHG

## 2020-04-27 PROCEDURE — 99442: CPT

## 2020-04-27 RX ORDER — DULOXETINE HYDROCHLORIDE 30 MG/1
30 CAPSULE, DELAYED RELEASE ORAL DAILY
Refills: 0 | Status: ACTIVE | COMMUNITY
Start: 2020-04-27

## 2020-04-27 RX ORDER — BRINZOLAMIDE 10 MG/ML
1 SUSPENSION/ DROPS OPHTHALMIC TWICE DAILY
Qty: 1 | Refills: 3 | Status: DISCONTINUED | COMMUNITY
Start: 2019-04-17 | End: 2020-04-27

## 2020-04-27 RX ORDER — LOPERAMIDE HYDROCHLORIDE 2 MG/1
2 CAPSULE ORAL
Refills: 0 | Status: DISCONTINUED | COMMUNITY
End: 2020-04-27

## 2020-04-27 RX ORDER — RANITIDINE 150 MG/1
150 TABLET ORAL
Qty: 180 | Refills: 0 | Status: DISCONTINUED | COMMUNITY
Start: 2018-05-21 | End: 2020-04-27

## 2020-04-27 NOTE — REVIEW OF SYSTEMS
[Feeling Fatigued] : feeling fatigued [Lower Ext Edema] : lower extremity edema [Joint Pain] : joint pain [Joint Stiffness] : joint stiffness [Limb Weakness (Paresis)] : limb weakness [Numbness (Hypesthesia)] : numbness [Tingling (Paresthesia)] : tingling [Negative] : Heme/Lymph [Fever] : no fever [Chills] : no chills [Dyspnea on exertion] : not dyspnea during exertion [Shortness Of Breath] : no shortness of breath [Chest  Pressure] : no chest pressure [Chest Pain] : no chest pain [Palpitations] : no palpitations [Cough] : no cough [Change in Appetite] : no change in appetite [Dizziness] : no dizziness

## 2020-04-27 NOTE — HISTORY OF PRESENT ILLNESS
[FreeTextEntry2] : Liz Alvarez [FreeTextEntry1] : \par SHABANA COLVIN is a 69 year old female on the telephone (could not connect via computer for a telehealth visit) due to the COVID-19 pandemic 9 months after she was last seen. At her last visit, she was stable from a cardiac perspective and her BP was at goal.  Today she reports feeling overall OK but is suffering with seasonal allergies.  She is staying indoors due to COVID but does go to the store every couple of weeks and, when she does go out, she will wear gloves and mask.  She was recently diagnosed with Polymyalgia rheumatica by a rheumatologist, Dr. Champagne. She is taking Prednisone 5 mg BID and now taking Percocet 2x a day for bilateral shoulder pain. She has a hard time lifting her arm and states her shoulders are "frozen" with numbness in the fingertips of all 10 fingers.  She "can't use" her left hand.  She did participate in PT "for a long time" after her spine surgery but no longer goes and has not been exercising at home.  \par \par She takes her BP at home and has been getting 4/21 - 119/70, 68; 4/24 118/73, 73; 4/25 126/70, 77; 4/27 119/72, 73. She is not wearing the CPAP that was prescribed due to "issues" and states her breathing was "weird" with the machine.  She  tried several masks but none worked.  She finally stopped using it b/c she couldn't breath with it.  She made the ordering physician, Dr. Faust aware of this.  \par \par She currently denies fever, chills, cough, palpitations, angina, dyspnea, dizziness, lightheadedness, syncope. Her ankles do tend to swell off an on and has been present for several years. Today, the right is worse than the left.  She does report that the edema improved greatly since stopping the Amlodipine.  Her energy level is "OK" but a little bit sluggish.  She tried to take naps during the day. Her appetite are good. Denies bowel or bladder problems and takes a probiotic daily.  Her balance is off due to spine surgery in 2018 and has to use a walker to ambulate.  She doesn't exercise on a regular basis.  \par \par 2/19/2020: BUN/Creat - 20/1.12, K - 3.9\par 11/20/2019 FLP TC - 201, TG - 150, LDL - 104, HDL - 67                                      \par PCP: Dr. Caal\par

## 2020-04-27 NOTE — DISCUSSION/SUMMARY
[FreeTextEntry1] : Mrs. Alvarez appears to be doing well from a cardiac perspective with no new symptoms though recently diagnosed with PMR and suffering with pain and dysfunction due to this. She checks her blood pressure frequently and is at goal.  Will check a fasting lipid panel and CMP. Her last LDL was 104 in November. This is not urgent and she can get this done when COVID-19 pandemic has lessened. Will discuss visit with Dr. Mirza. Will follow-up in 3-6 months or sooner if needed.

## 2020-05-04 ENCOUNTER — APPOINTMENT (OUTPATIENT)
Dept: ORTHOPEDIC SURGERY | Facility: CLINIC | Age: 70
End: 2020-05-04
Payer: MEDICARE

## 2020-05-04 VITALS
HEIGHT: 61 IN | SYSTOLIC BLOOD PRESSURE: 120 MMHG | WEIGHT: 202 LBS | BODY MASS INDEX: 38.14 KG/M2 | DIASTOLIC BLOOD PRESSURE: 70 MMHG | HEART RATE: 83 BPM

## 2020-05-04 VITALS — TEMPERATURE: 97.8 F

## 2020-05-04 PROCEDURE — 72040 X-RAY EXAM NECK SPINE 2-3 VW: CPT

## 2020-05-04 PROCEDURE — 99214 OFFICE O/P EST MOD 30 MIN: CPT

## 2020-05-04 NOTE — HISTORY OF PRESENT ILLNESS
[Stable] : stable [de-identified] : post-op 14 months-PCF-doing well\par Has weakness in knees bilaterally, started in November  - has been dragging her left foot - bilateral TKR: left THR in 2005, left TKR in 2006 with Dr. Villalpando and right TKR in 2013 with Dr. Carver\par Patient doing well.\par No issues with fine motor skills.\par Sent for PT by Dr. Anthony - lower extremity strengthening. \par Dr. Anthony sent for additional imaging. \par Has not started PT yet for the knees, only started for the shoulders. \par C/o bilateral shoulder pain - describes it as "heaviness" on her shoulders. \par Taking Diclofenac DQ for DJD. \par Taking Amlodipine.  \par RA doctor placed on Prednisone and Percocet\par She is doing fantastic he states the surgery helped her tremendously with both her arm and leg symptoms.\par No fever chills sweats nausea vomiting no bowel,  no recent weight loss or gain no night pain. This history is in addition to the intake form that I personally reviewed.  \par

## 2020-05-04 NOTE — DISCUSSION/SUMMARY
[de-identified] : 14 months s/p PCF-doing well.\par Continue with PT. \par Voltaren. \par Surgery helped a lot.\par We discussed all options.\par She'll continue with home therapy.\par F/U 12 months.\par All questions were answered, all alternatives discussed and the patient is in complete agreement with that plan. Follow-up appointment as instructed. Any issues and the patient will call or come in sooner.

## 2020-05-04 NOTE — PHYSICAL EXAM
[Walker] : ambulates with walker [Normal] : Gait: normal [de-identified] : AP/lat cervical- 4/5/2020-reveals adequate PCF-reviewed with the patient. \par \par AP, lateral, and sunrise x-rays of both knees on 04/15/2019 obtained from Dr. Anthony: Demonstrate bilateral total knee replacement. The prostheses are in place with no evidence of gross loosening.AP and lateral x-rays of the left hip demonstrate total hip replacement in good position. Previous x-rays are not available but there is no evidence of gross loosening. [de-identified] : Incision healed. + bilateral Roach's, pain in right shoulder\par Otherwise, 5 out of 5 motor strength, sensation is intact and symmetrical full range of motion flexion extension and rotation, no palpatory tenderness full range of motion of hips knees shoulders and elbows (all four extremities), no atrophy, negative straight leg raise, no pathological reflexes, no swelling, normal ambulation, no apparent distress skin is intact, no palpable lymph nodes, no upper or lower extremity instability, alert and oriented x3 and normal mood. Normal finger-to nose test. + hyperreflexia and no clonus. Ambulating normally with walker.\par

## 2020-05-04 NOTE — CONSULT LETTER
[Dear  ___] : Dear  [unfilled], [Referral Letter:] : I am referring [unfilled] to you for further evaluation.  My most recent evaluation follows. [Please see my note below.] : Please see my note below. [Referral Closing:] : Thank you very much for seeing this patient.  If you have any questions, please do not hesitate to contact me. [Sincerely,] : Sincerely, [FreeTextEntry2] : Mat Us MD\par Pain Medicine, Neurology, Headache Medicine\par Phone: [phone] \par [facility] [place] a [address] [place] [address], [address] [place], [place] [id number]  [FreeTextEntry3] : Skip Weber MD

## 2020-05-19 ENCOUNTER — NON-APPOINTMENT (OUTPATIENT)
Age: 70
End: 2020-05-19

## 2020-05-19 PROCEDURE — 92014 COMPRE OPH EXAM EST PT 1/>: CPT | Mod: 24

## 2020-05-20 ENCOUNTER — APPOINTMENT (OUTPATIENT)
Dept: OPHTHALMOLOGY | Facility: CLINIC | Age: 70
End: 2020-05-20
Payer: MEDICARE

## 2020-06-08 ENCOUNTER — APPOINTMENT (OUTPATIENT)
Dept: ORTHOPEDIC SURGERY | Facility: CLINIC | Age: 70
End: 2020-06-08
Payer: MEDICARE

## 2020-06-08 VITALS — TEMPERATURE: 97.5 F

## 2020-06-08 PROCEDURE — 99214 OFFICE O/P EST MOD 30 MIN: CPT

## 2020-06-08 NOTE — ASSESSMENT
[FreeTextEntry1] : 70 year old female presents with right greater than left ulnar sided forearm pain and diffuse clumsiness in the hands without signs of peripheral nerve compression, but with hyperreflexia. We talked about the nature of the condition and treatment options. An EMG was ordered to help differentiate between chronic cervical nerve root involvement versus peripheral nerve compression.  There might be some component of double crush. We will talk following her EMG with Dr. Davey.

## 2020-06-08 NOTE — PHYSICAL EXAM
[de-identified] : Patient is alert, oriented and in no acute distress. Affect and general appearance are normal and patient is able to answer questions appropriately. On the right, good motion between elbow, wrist and fingers. Her  strength is diminished on the right more than the left. She has a weakly positive Froment's sign. No intrinsic atrophy. No obvious exacerbation with elbow hyperflexion. Negative Tinel's at the ulnar nerve of the elbow. Slightly positive Tinel's test with hyperflexion of the elbow at the median nerve. Negative Phalen’s test, Durkan carpal compression test.  Positive Lotus's test bilaterally. Thenar muscle strength and bulk is good bilaterally. \par \par Neurologic: Median, ulnar, and radial motor and sensory are intact.\par Skin: No cyanosis, clubbing, edema or rashes.\par Vascular: Radial pulses intact.\par Lymphatic: No streaking or epitrochlear adenopathy.

## 2020-06-08 NOTE — ADDENDUM
[FreeTextEntry1] : I, Byron Taylor acted solely as a scribe for Dr. Madai Ahumada on 06/08/2020 . \par \par All medical record entries made by the Scribe were at my, Dr. Madai Ahumada, direction and personally dictated by me on 06/08/2020. I have personally reviewed the chart and agree that the record accurately reflects my personal performance of the history, physical exam, assessment and plan.

## 2020-06-08 NOTE — HISTORY OF PRESENT ILLNESS
[FreeTextEntry1] : 70 year old female presents with complaints of Right posterior shoulder, forearm and ulnar sided pain worsening x 1-2 months. she underwent previous cervical spine fusion with Dr Weber 2/28/19. She is currently under the care of  a rheumatologist and is taking 20mg prednisone BID and Oxycodone for fibromyalgia rheumatica.  She is unsure of what is causing her pain. She has difficulty gripping turning door handles and difficulty with IADs. She also notes numbness in her fingers.

## 2020-06-23 ENCOUNTER — APPOINTMENT (OUTPATIENT)
Dept: INTERNAL MEDICINE | Facility: CLINIC | Age: 70
End: 2020-06-23
Payer: MEDICARE

## 2020-06-23 VITALS
HEIGHT: 61 IN | WEIGHT: 207 LBS | SYSTOLIC BLOOD PRESSURE: 114 MMHG | TEMPERATURE: 98.7 F | OXYGEN SATURATION: 94 % | DIASTOLIC BLOOD PRESSURE: 70 MMHG | BODY MASS INDEX: 39.08 KG/M2 | HEART RATE: 95 BPM

## 2020-06-23 PROCEDURE — 36415 COLL VENOUS BLD VENIPUNCTURE: CPT

## 2020-06-23 PROCEDURE — 99214 OFFICE O/P EST MOD 30 MIN: CPT | Mod: 25

## 2020-06-23 RX ORDER — OXYCODONE HYDROCHLORIDE AND ACETAMINOPHEN 5; 325 MG/1; MG/1
5-325 TABLET ORAL
Refills: 0 | Status: DISCONTINUED | COMMUNITY
Start: 2020-04-20 | End: 2020-06-23

## 2020-06-23 NOTE — HISTORY OF PRESENT ILLNESS
[FreeTextEntry1] : multiple issues [de-identified] : Patient here for follow up, has multiple concerns. \par She has been struggling with her son, he deals with psychiatric issues and this adds stress for her. They are having arguments which causes concerns at home.\par She sees rheumatology regularly, was on oxycontin recently for chronic pains as well as prednisone. She did not feel it was helping her enough so she stopped those and is now only on tramadol. She now has right arm pain, which travels along her whole arm and goes down to her hand. She has seen Dr Weber initially who advised it may be a pinched nerve; also hand surgeon Dr Ahumada, advised to have an EMG which is scheduled for August. \par She complains of feet swelling in the past 3-4 days. She takes daily HCTZ. She has been elevating her legs, does not have compression stockings. She stopped her prednisone about a week ago, ankle swelling began 1-2 days after.

## 2020-06-23 NOTE — PLAN
[FreeTextEntry1] : Ankle swelling\par -could be related to recent cessation of prednisone last week\par -per rheumatologist she will be restarting- script has been sent to pharmacy she just needs to pick it up\par -compression stockings, leg elevation\par -s/p vascular workup last year\par \par Carpal tunnel, arm pain\par -currently undergoing ortho workup\par -EMG scheduled for later this summer\par \par Follow up in 3 months

## 2020-06-23 NOTE — PHYSICAL EXAM
[No Acute Distress] : no acute distress [Well Nourished] : well nourished [Well Developed] : well developed [No Respiratory Distress] : no respiratory distress  [No Accessory Muscle Use] : no accessory muscle use [Clear to Auscultation] : lungs were clear to auscultation bilaterally [Normal Rate] : normal rate  [Regular Rhythm] : with a regular rhythm [Normal S1, S2] : normal S1 and S2 [No Focal Deficits] : no focal deficits [Alert and Oriented x3] : oriented to person, place, and time [de-identified] : bilateral ankle swelling

## 2020-06-23 NOTE — REVIEW OF SYSTEMS
[Joint Pain] : joint pain [Joint Swelling] : joint swelling [Muscle Pain] : muscle pain [Negative] : Neurological

## 2020-06-24 LAB
25(OH)D3 SERPL-MCNC: 50.2 NG/ML
ALBUMIN SERPL ELPH-MCNC: 3.8 G/DL
ALP BLD-CCNC: 88 U/L
ALT SERPL-CCNC: 20 U/L
ANION GAP SERPL CALC-SCNC: 14 MMOL/L
AST SERPL-CCNC: 24 U/L
BASOPHILS # BLD AUTO: 0.02 K/UL
BASOPHILS NFR BLD AUTO: 0.2 %
BILIRUB SERPL-MCNC: 0.4 MG/DL
BUN SERPL-MCNC: 12 MG/DL
CALCIUM SERPL-MCNC: 9 MG/DL
CHLORIDE SERPL-SCNC: 95 MMOL/L
CHOLEST SERPL-MCNC: 203 MG/DL
CHOLEST/HDLC SERPL: 3 RATIO
CO2 SERPL-SCNC: 32 MMOL/L
CREAT SERPL-MCNC: 1.02 MG/DL
EOSINOPHIL # BLD AUTO: 0.17 K/UL
EOSINOPHIL NFR BLD AUTO: 1.9 %
ESTIMATED AVERAGE GLUCOSE: 143 MG/DL
GLUCOSE SERPL-MCNC: 112 MG/DL
HBA1C MFR BLD HPLC: 6.6 %
HCT VFR BLD CALC: 37.6 %
HDLC SERPL-MCNC: 68 MG/DL
HGB BLD-MCNC: 11.5 G/DL
IMM GRANULOCYTES NFR BLD AUTO: 0.3 %
LDLC SERPL CALC-MCNC: 110 MG/DL
LYMPHOCYTES # BLD AUTO: 3 K/UL
LYMPHOCYTES NFR BLD AUTO: 33.4 %
MAN DIFF?: NORMAL
MCHC RBC-ENTMCNC: 28.6 PG
MCHC RBC-ENTMCNC: 30.6 GM/DL
MCV RBC AUTO: 93.5 FL
MONOCYTES # BLD AUTO: 0.53 K/UL
MONOCYTES NFR BLD AUTO: 5.9 %
NEUTROPHILS # BLD AUTO: 5.22 K/UL
NEUTROPHILS NFR BLD AUTO: 58.3 %
PLATELET # BLD AUTO: 254 K/UL
POTASSIUM SERPL-SCNC: 3.4 MMOL/L
PROT SERPL-MCNC: 6.9 G/DL
RBC # BLD: 4.02 M/UL
RBC # FLD: 14.7 %
SODIUM SERPL-SCNC: 142 MMOL/L
TRIGL SERPL-MCNC: 124 MG/DL
TSH SERPL-ACNC: 1.44 UIU/ML
WBC # FLD AUTO: 8.97 K/UL

## 2020-07-06 ENCOUNTER — NON-APPOINTMENT (OUTPATIENT)
Age: 70
End: 2020-07-06

## 2020-07-06 ENCOUNTER — APPOINTMENT (OUTPATIENT)
Dept: CARDIOTHORACIC SURGERY | Facility: CLINIC | Age: 70
End: 2020-07-06
Payer: MEDICARE

## 2020-07-06 VITALS
HEIGHT: 61 IN | OXYGEN SATURATION: 95 % | DIASTOLIC BLOOD PRESSURE: 76 MMHG | BODY MASS INDEX: 38.89 KG/M2 | TEMPERATURE: 99.1 F | HEART RATE: 76 BPM | RESPIRATION RATE: 18 BRPM | WEIGHT: 206 LBS | SYSTOLIC BLOOD PRESSURE: 113 MMHG

## 2020-07-06 PROCEDURE — 93000 ELECTROCARDIOGRAM COMPLETE: CPT

## 2020-07-06 PROCEDURE — 99215 OFFICE O/P EST HI 40 MIN: CPT

## 2020-07-08 LAB
ALBUMIN SERPL ELPH-MCNC: 3.9 G/DL
ALP BLD-CCNC: 90 U/L
ALT SERPL-CCNC: 17 U/L
ANION GAP SERPL CALC-SCNC: 13 MMOL/L
AST SERPL-CCNC: 19 U/L
BILIRUB SERPL-MCNC: 0.2 MG/DL
BUN SERPL-MCNC: 16 MG/DL
CALCIUM SERPL-MCNC: 9.3 MG/DL
CHLORIDE SERPL-SCNC: 96 MMOL/L
CO2 SERPL-SCNC: 32 MMOL/L
CREAT SERPL-MCNC: 1.07 MG/DL
GLUCOSE SERPL-MCNC: 85 MG/DL
NT-PROBNP SERPL-MCNC: 163 PG/ML
POTASSIUM SERPL-SCNC: 3.9 MMOL/L
PROT SERPL-MCNC: 7.2 G/DL
SODIUM SERPL-SCNC: 142 MMOL/L

## 2020-07-08 NOTE — HISTORY OF PRESENT ILLNESS
[FreeTextEntry1] : \par SHABANA COLVIN is a 70year old female here in the office about 2.5 months after her last phone visit. She called the office 4 days ago reporting increase in swelling in her legs, ankles and feet and comes in today for an evaluation.  She states this seemed to worsen about 3 weeks ago and reports no changes in her diet. She did see her rheumatologist, Dr. Champagne, on June 15th and told him that she was gaining weight, about 11#, and the prednisone was decreased to once daily and she was on Percocet which was discontinued and Tramadol started.  She did not notice any changes in weight with the changes in the medication.  She would get swelling in one of her ankles every few weeks every once in a while but never both at the same time and as bad as it is now.  She elevates her feet during the day. The edema is better in the morning though not gone and worst at night. \par \par She currently denies fever, chills, cough, palpitations, angina, dyspnea, dizziness, lightheadedness, or syncope. Her energy is good. Her appetite is suppressed due to the Tramadol.  Denies bowel problems.  Last week, she had problems getting to the bathroom in time which lasted about 3 or 4 days but this is better now though now wears adult diapers at night in case she has an accident.  She doesn't take her BP at home on a regular basis. She doesn't exercise on a regular basis but does walk to the store.  She hasn't done much since the pandemic.  She was recently diagnosed with T2DM but hasn't yet started on medication as her PCP would like to discuss with rheumatology as she is on chronic prednisone for PMR. She had an episode of right sided sciatica about 2-3 weeks ago after the edema seemed to worsen. \par \par 6/23/2020: BUN/Creat - 12/1.02, K - 3.4. Na - 142, TSH - 1.44, TC - 203, TG - 124, LDL - 110, HDL - 68, A1c - 6.6%                                      \par PCP: Dr. Caal\par Rheum - Dr. Champagne\par

## 2020-07-08 NOTE — DISCUSSION/SUMMARY
[FreeTextEntry1] : Mrs. Alvarez comes to the office for an unscheduled office visit due to peripheral edema which began about 3 weeks ago. She does have 1+ edema of the lower legs without JVD and with clear lungs. She feels her abdomen is larger and her weight is up about 11 #.  She has no other symptoms suggestive of HF or fluid overload. Will get BNP and CMP. Will have her take an extra dose of HCTZ tomorrow morning (50 mg total) as she states she needs to get her sleep and not be up all night urinating.  Will discuss with Dr. Mirza tomorrow when lab results received and call her with the plan.

## 2020-07-08 NOTE — PHYSICAL EXAM
[Pericardial Rub] : no pericardial rub [Click] : no click [Right Carotid Bruit] : no bruit heard over the right carotid [Bruit] : no bruit heard [Left Carotid Bruit] : no bruit heard over the left carotid [Rt] : no varicose veins of the right leg [Lt] : no varicose veins of the left leg [FreeTextEntry1] : gait slow and steady with the use of a walker

## 2020-07-08 NOTE — REVIEW OF SYSTEMS
[Fever] : no fever [Headache] : no headache [Chills] : no chills [Feeling Fatigued] : not feeling fatigued [Dyspnea on exertion] : not dyspnea during exertion [Shortness Of Breath] : no shortness of breath [Chest  Pressure] : no chest pressure [Palpitations] : no palpitations [Chest Pain] : no chest pain [Dizziness] : no dizziness [Cough] : no cough

## 2020-07-15 ENCOUNTER — APPOINTMENT (OUTPATIENT)
Dept: NEUROLOGY | Facility: CLINIC | Age: 70
End: 2020-07-15
Payer: MEDICARE

## 2020-07-15 VITALS — TEMPERATURE: 97.3 F

## 2020-07-15 PROCEDURE — 95885 MUSC TST DONE W/NERV TST LIM: CPT

## 2020-07-15 PROCEDURE — 95910 NRV CNDJ TEST 7-8 STUDIES: CPT

## 2020-08-27 ENCOUNTER — NON-APPOINTMENT (OUTPATIENT)
Age: 70
End: 2020-08-27

## 2020-08-27 ENCOUNTER — APPOINTMENT (OUTPATIENT)
Dept: OPHTHALMOLOGY | Facility: CLINIC | Age: 70
End: 2020-08-27
Payer: MEDICARE

## 2020-08-27 PROCEDURE — 92014 COMPRE OPH EXAM EST PT 1/>: CPT

## 2020-08-27 PROCEDURE — 92133 CPTRZD OPH DX IMG PST SGM ON: CPT

## 2020-09-23 ENCOUNTER — APPOINTMENT (OUTPATIENT)
Dept: INTERNAL MEDICINE | Facility: CLINIC | Age: 70
End: 2020-09-23
Payer: MEDICARE

## 2020-09-23 VITALS
HEART RATE: 79 BPM | DIASTOLIC BLOOD PRESSURE: 69 MMHG | TEMPERATURE: 97.9 F | SYSTOLIC BLOOD PRESSURE: 107 MMHG | OXYGEN SATURATION: 95 % | BODY MASS INDEX: 38.14 KG/M2 | HEIGHT: 61 IN | WEIGHT: 202 LBS

## 2020-09-23 PROCEDURE — G0439: CPT | Mod: 25

## 2020-09-23 PROCEDURE — 36415 COLL VENOUS BLD VENIPUNCTURE: CPT

## 2020-09-23 PROCEDURE — G0008: CPT

## 2020-09-23 PROCEDURE — 90662 IIV NO PRSV INCREASED AG IM: CPT

## 2020-09-23 RX ORDER — NYSTATIN 100000 [USP'U]/G
100000 CREAM TOPICAL
Qty: 15 | Refills: 0 | Status: DISCONTINUED | COMMUNITY
Start: 2019-06-05 | End: 2020-09-23

## 2020-09-23 NOTE — PHYSICAL EXAM
[No Acute Distress] : no acute distress [Well Nourished] : well nourished [Well Developed] : well developed [Well-Appearing] : well-appearing [Normal Sclera/Conjunctiva] : normal sclera/conjunctiva [PERRL] : pupils equal round and reactive to light [Normal Outer Ear/Nose] : the outer ears and nose were normal in appearance [No Respiratory Distress] : no respiratory distress  [No Accessory Muscle Use] : no accessory muscle use [Clear to Auscultation] : lungs were clear to auscultation bilaterally [Normal Rate] : normal rate  [Regular Rhythm] : with a regular rhythm [Normal S1, S2] : normal S1 and S2 [No Edema] : there was no peripheral edema [Soft] : abdomen soft [Non Tender] : non-tender [Non-distended] : non-distended [Normal Bowel Sounds] : normal bowel sounds [No CVA Tenderness] : no CVA  tenderness [Grossly Normal Strength/Tone] : grossly normal strength/tone [Coordination Grossly Intact] : coordination grossly intact [No Focal Deficits] : no focal deficits [Normal Gait] : normal gait [Normal Affect] : the affect was normal [Alert and Oriented x3] : oriented to person, place, and time [Normal Insight/Judgement] : insight and judgment were intact

## 2020-09-23 NOTE — PLAN
[FreeTextEntry1] : HCM\par -routine labs follow up results\par -A1C 6.6% last check, likely 2/2 daily prednisone- if remains higher will start metformin\par -depression screen negative\par -mammogram 1/20\par -DEXA 9/19\par -in processed of scheduling GI visit for colonoscopy\par -flu shot today\par -UTD PNA vaccines\par \par HTN\par -controlled\par -continue current meds\par \par PMR\par -on daily prednisone\par -rheumatology follow up

## 2020-09-23 NOTE — HISTORY OF PRESENT ILLNESS
[FreeTextEntry1] : CPE [de-identified] : Patient presents for CPE. \par She sees her rheumatologist Dr Champagne regularly, still on prednisone 5mg daily. She recently was given injection in her right shoulder for bursitis, states it only provided temporary relief. She has follow up scheduled in December.\par Her lower extremity swelling has completely resolved, states she noticed a change earlier this month. Last visit with cardiologist was in July 2020. No CP, palp, SOB, TRENT. No other acute complaints.

## 2020-09-23 NOTE — HEALTH RISK ASSESSMENT
[No] : In the past 12 months have you used drugs other than those required for medical reasons? No [0] : 2) Feeling down, depressed, or hopeless: Not at all (0) [Patient reported mammogram was normal] : Patient reported mammogram was normal [Patient reported bone density results were normal] : Patient reported bone density results were normal [None] : None [With Family] : lives with family [Feels Safe at Home] : Feels safe at home [Fully functional (using the telephone, shopping, preparing meals, housekeeping, doing laundry, using] : Fully functional and needs no help or supervision to perform IADLs (using the telephone, shopping, preparing meals, housekeeping, doing laundry, using transportation, managing medications and managing finances) [Independent] : managing finances [Some assistance needed] : using transportation [] : No [STK4Orwcf] : 0 [EyeExamDate] : 06/20 [Change in mental status noted] : No change in mental status noted [Language] : denies difficulty with language [Reports changes in hearing] : Reports no changes in hearing [Reports changes in vision] : Reports no changes in vision [Reports changes in dental health] : Reports no changes in dental health [MammogramDate] : 01/20 [BoneDensityDate] : 09/19 [ColonoscopyComments] : scheduling with GI [de-identified] : with son [FreeTextEntry8] : uses walker/cane when outside [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

## 2020-09-24 LAB
25(OH)D3 SERPL-MCNC: 78.2 NG/ML
ALBUMIN SERPL ELPH-MCNC: 3.9 G/DL
ALP BLD-CCNC: 96 U/L
ALT SERPL-CCNC: 14 U/L
ANION GAP SERPL CALC-SCNC: 10 MMOL/L
AST SERPL-CCNC: 16 U/L
BASOPHILS # BLD AUTO: 0.02 K/UL
BASOPHILS NFR BLD AUTO: 0.2 %
BILIRUB SERPL-MCNC: 0.3 MG/DL
BUN SERPL-MCNC: 26 MG/DL
CALCIUM SERPL-MCNC: 9 MG/DL
CHLORIDE SERPL-SCNC: 99 MMOL/L
CHOLEST SERPL-MCNC: 194 MG/DL
CHOLEST/HDLC SERPL: 2.3 RATIO
CO2 SERPL-SCNC: 32 MMOL/L
CREAT SERPL-MCNC: 0.93 MG/DL
EOSINOPHIL # BLD AUTO: 0.12 K/UL
EOSINOPHIL NFR BLD AUTO: 1.4 %
ESTIMATED AVERAGE GLUCOSE: 131 MG/DL
GLUCOSE SERPL-MCNC: 118 MG/DL
HBA1C MFR BLD HPLC: 6.2 %
HCT VFR BLD CALC: 37.9 %
HDLC SERPL-MCNC: 84 MG/DL
HGB BLD-MCNC: 11.7 G/DL
IMM GRANULOCYTES NFR BLD AUTO: 0.1 %
LDLC SERPL CALC-MCNC: 99 MG/DL
LYMPHOCYTES # BLD AUTO: 1.62 K/UL
LYMPHOCYTES NFR BLD AUTO: 18.6 %
MAN DIFF?: NORMAL
MCHC RBC-ENTMCNC: 29.4 PG
MCHC RBC-ENTMCNC: 30.9 GM/DL
MCV RBC AUTO: 95.2 FL
MONOCYTES # BLD AUTO: 0.39 K/UL
MONOCYTES NFR BLD AUTO: 4.5 %
NEUTROPHILS # BLD AUTO: 6.55 K/UL
NEUTROPHILS NFR BLD AUTO: 75.2 %
PLATELET # BLD AUTO: 253 K/UL
POTASSIUM SERPL-SCNC: 4.1 MMOL/L
PROT SERPL-MCNC: 7.1 G/DL
RBC # BLD: 3.98 M/UL
RBC # FLD: 14.2 %
SARS-COV-2 IGG SERPL IA-ACNC: 0.09 INDEX
SARS-COV-2 IGG SERPL QL IA: NEGATIVE
SODIUM SERPL-SCNC: 141 MMOL/L
TRIGL SERPL-MCNC: 58 MG/DL
TSH SERPL-ACNC: 0.72 UIU/ML
WBC # FLD AUTO: 8.71 K/UL

## 2020-09-24 RX ORDER — OMEPRAZOLE 40 MG/1
40 CAPSULE, DELAYED RELEASE ORAL DAILY
Qty: 90 | Refills: 3 | Status: DISCONTINUED | COMMUNITY
Start: 2018-10-10 | End: 2020-09-24

## 2020-11-17 ENCOUNTER — NON-APPOINTMENT (OUTPATIENT)
Age: 70
End: 2020-11-17

## 2020-12-16 PROBLEM — Z12.11 ENCOUNTER FOR SCREENING COLONOSCOPY: Status: RESOLVED | Noted: 2018-06-08 | Resolved: 2020-12-16

## 2020-12-23 ENCOUNTER — APPOINTMENT (OUTPATIENT)
Dept: INTERNAL MEDICINE | Facility: CLINIC | Age: 70
End: 2020-12-23
Payer: MEDICARE

## 2020-12-23 VITALS
HEIGHT: 61 IN | BODY MASS INDEX: 38.33 KG/M2 | TEMPERATURE: 98.9 F | SYSTOLIC BLOOD PRESSURE: 104 MMHG | OXYGEN SATURATION: 94 % | HEART RATE: 94 BPM | DIASTOLIC BLOOD PRESSURE: 65 MMHG | WEIGHT: 203 LBS

## 2020-12-23 PROCEDURE — 99072 ADDL SUPL MATRL&STAF TM PHE: CPT

## 2020-12-23 PROCEDURE — 99213 OFFICE O/P EST LOW 20 MIN: CPT

## 2020-12-23 NOTE — PLAN
[FreeTextEntry1] : HTN controlled on meds\par PMR on prednisone daily, rheum follow up\par Mammo 1/20- script provided\par Follow up 3-4 months

## 2020-12-23 NOTE — PHYSICAL EXAM
[No Acute Distress] : no acute distress [Well Nourished] : well nourished [Well Developed] : well developed [No Respiratory Distress] : no respiratory distress  [No Accessory Muscle Use] : no accessory muscle use [Normal Rate] : normal rate  [Regular Rhythm] : with a regular rhythm [Normal S1, S2] : normal S1 and S2 [Alert and Oriented x3] : oriented to person, place, and time

## 2020-12-23 NOTE — HISTORY OF PRESENT ILLNESS
[de-identified] : Patient here for routine follow up.\par She was Dr Champagne Monday- she had an injection in her right shoulder and doing well. \par No acute concerns today.

## 2021-01-13 ENCOUNTER — APPOINTMENT (OUTPATIENT)
Dept: VASCULAR SURGERY | Facility: CLINIC | Age: 71
End: 2021-01-13
Payer: MEDICARE

## 2021-01-13 VITALS — TEMPERATURE: 95.7 F

## 2021-01-13 PROCEDURE — 99072 ADDL SUPL MATRL&STAF TM PHE: CPT

## 2021-01-13 PROCEDURE — 93970 EXTREMITY STUDY: CPT

## 2021-01-13 PROCEDURE — 99212 OFFICE O/P EST SF 10 MIN: CPT

## 2021-01-13 NOTE — PHYSICAL EXAM
[2+] : left 2+ [Ankle Swelling (On Exam)] : present [Ankle Swelling Bilaterally] : bilaterally  [Ankle Swelling On The Right] : mild [No Rash or Lesion] : No rash or lesion [Alert] : alert [Calm] : calm [JVD] : no jugular venous distention  [Varicose Veins Of Lower Extremities] : not present [] : not present [Skin Ulcer] : no ulcer [de-identified] : appears well  [de-identified] : mild calf tenderness

## 2021-01-13 NOTE — HISTORY OF PRESENT ILLNESS
[FreeTextEntry1] : 69 yo female with history of htn, hld, sleep apnea, osteoarthritis s/p b/l hip replacement and bilateral knee replacement surgery presents for evaluation of b/l lower extremity edema.  pt states that she doesn’t rember her initial evaluation almost 2 years ago.  she states that she had 2 episodes of edema that she was concerned about one occurred at the end of the summer and the other a few weeks ago.  pt states that the edema resolved independently.   pt denies any history of orthopnea, increased salt intake or prolonged standing.  pt denies any history of dvt\par pt was unable to wear compression stockings

## 2021-01-13 NOTE — ASSESSMENT
[FreeTextEntry1] : 69 yo female with history of htn, hld, sleep apnea, osteoarthritis s/p b/l hip replacement and bilateral knee replacement surgery presents for evaluation of b/l lower extremity edema\par \par venous duplex shows no evidence of dvt/svt, reflux noted in proximal calf of the gsv bilaterally only and small in size \par at this time no surgical intervention \par recommend compression, elevation, weight loss, exercise and low salt diet \par \par pt to follow up as needed

## 2021-01-21 ENCOUNTER — NON-APPOINTMENT (OUTPATIENT)
Age: 71
End: 2021-01-21

## 2021-01-21 ENCOUNTER — APPOINTMENT (OUTPATIENT)
Dept: OPHTHALMOLOGY | Facility: CLINIC | Age: 71
End: 2021-01-21
Payer: MEDICARE

## 2021-01-21 PROCEDURE — 99072 ADDL SUPL MATRL&STAF TM PHE: CPT

## 2021-01-21 PROCEDURE — 92012 INTRM OPH EXAM EST PATIENT: CPT

## 2021-01-27 ENCOUNTER — NON-APPOINTMENT (OUTPATIENT)
Age: 71
End: 2021-01-27

## 2021-01-27 ENCOUNTER — APPOINTMENT (OUTPATIENT)
Dept: GASTROENTEROLOGY | Facility: CLINIC | Age: 71
End: 2021-01-27
Payer: MEDICARE

## 2021-01-27 VITALS
HEART RATE: 62 BPM | SYSTOLIC BLOOD PRESSURE: 102 MMHG | WEIGHT: 190 LBS | BODY MASS INDEX: 35.87 KG/M2 | HEIGHT: 61 IN | TEMPERATURE: 97.6 F | DIASTOLIC BLOOD PRESSURE: 67 MMHG

## 2021-01-27 PROCEDURE — 99214 OFFICE O/P EST MOD 30 MIN: CPT

## 2021-01-27 PROCEDURE — 99072 ADDL SUPL MATRL&STAF TM PHE: CPT

## 2021-01-27 NOTE — HISTORY OF PRESENT ILLNESS
[Constipation] : denies constipation [Yellow Skin Or Eyes (Jaundice)] : denies jaundice [Abdominal Pain] : denies abdominal pain [Abdominal Swelling] : denies abdominal swelling [Rectal Pain] : denies rectal pain [_________] : Performed [unfilled] [Heartburn] : stable heartburn [Nausea] : improved nausea [Vomiting] : improved vomiting [Diarrhea] : stable diarrhea [de-identified] : Liz presents to the office today for follow up with complaints of nausea, borborygmi and diarrhea.  She was last seen in the office in November 2018.\par \par She reports that she started to develop mild nausea, at random times in the day, in November 2020.  On Christmas morning, she woke up with nausea and threw up on herself.  She felt like "WWIII" was going on in her upper abdomen with cramping and growling.  Later on, she had diarrhea and was afraid to eat.  She placed herself on a tea and toast diet and lost weight as a result.  She was trying Pepto and Imodium but did not feel it helped much.  She continued to have episodes of nausea.  She has kept records of her symptoms in a journal.  On January 6, she vomited again and on January 17 she had watery, nonbloody diarrhea.  Her symptoms slowed down after she went on a bland diet.  This morning she ate eggs and gomez and has felt fine.  Her bowel movements are now hard.  She feels the urge to go (urine and stool) but has not had a BM.  She restarted Benefiber once a day.  She denies any fever but reports that her son also had nausea one month ago.  She was so sick that she felt she had COVID but she underwent a PCR test which was negative.\par \par She was initially seen in 2018 after she developed GI symptoms after having cervical spine surgery in February 2018.  She developed diarrhea with antibiotics and also had dyspepsia with loss of appetite and weight loss.  She has been taking famotidine twice a day.  Her paternal uncle had colon cancer.  A colonoscopy in 2010 revealed a small hyperplastic polyp, diverticulosis, and internal hemorrhoids.  An endoscopy (and EUS) in 2013 revealed gastric ulcers with reactive gastropathy secondary to oral iron tablets.  A surveillance EGD in 2014 revealed that the ulcers had healed.  Two FIT tests were negative in 2020. [de-identified] : healed gastric ulcers [de-identified] : hyperplastic polyp, diverticulosis, hemorrhoids

## 2021-01-27 NOTE — REASON FOR VISIT
[Follow-Up: _____] : a [unfilled] follow-up visit [FreeTextEntry1] : Nausea, stomach gurgling, with occasional diarrhea

## 2021-01-27 NOTE — PHYSICAL EXAM
[General Appearance - Alert] : alert [General Appearance - In No Acute Distress] : in no acute distress [General Appearance - Well Nourished] : well nourished [Sclera] : the sclera and conjunctiva were normal [PERRL With Normal Accommodation] : pupils were equal in size, round, and reactive to light [Extraocular Movements] : extraocular movements were intact [Outer Ear] : the ears and nose were normal in appearance [Hearing Threshold Finger Rub Not [place]] : hearing was normal [Neck Appearance] : the appearance of the neck was normal [Neck Cervical Mass (___cm)] : no neck mass was observed [Auscultation Breath Sounds / Voice Sounds] : lungs were clear to auscultation bilaterally [Heart Rate And Rhythm] : heart rate was normal and rhythm regular [Heart Sounds] : normal S1 and S2 [Edema] : there was no peripheral edema [Bowel Sounds] : normal bowel sounds [Abdomen Soft] : soft [Abdomen Tenderness] : non-tender [] : no hepato-splenomegaly [Abdomen Mass (___ Cm)] : no abdominal mass palpated [Abdomen Hernia] : no hernia was discovered [Cervical Lymph Nodes Enlarged Anterior Bilaterally] : anterior cervical [Supraclavicular Lymph Nodes Enlarged Bilaterally] : supraclavicular [No CVA Tenderness] : no ~M costovertebral angle tenderness [Skin Color & Pigmentation] : normal skin color and pigmentation [Oriented To Time, Place, And Person] : oriented to person, place, and time [FreeTextEntry1] : weakness of right arm, lower extremities

## 2021-01-27 NOTE — ASSESSMENT
[FreeTextEntry1] : 1.  Nausea with episodes of vomiting and diarrhea, improving.  May be secondary to gastroenteritis.  Differential includes functional GI disorder, biliary colic.\par 2.  Hyperplastic polyp, diverticulosis, and hemorrhooids on colonoscopy in 2010.  Recent FIT test negative.\par 3.  History of Fecal soilage, improved on fiber.\par 4.  Dyspepsia, history of gastric ulcer with reactive gastropathy on EGD in 2013.  Surveillance EGD in 2014 with healed ulcers.\par 5.  Obesity.\par 6.  Asthma/CHANTELL.\par 7.  Cervical spine stenosis status post decompression/fusion.\par 8.  Lumbosacral spine stenosis.\par 9.  HTN.\par 10.  HLD.\par \par Recs:\par - Recent labs reviewed.\par - Continue famotidine twice daily.\par - Ondansetron PRN nausea.\par - Check abdominal US.\par - If symptoms persist, consider EGD/ colonoscopy.

## 2021-01-27 NOTE — REVIEW OF SYSTEMS
[Abdominal Pain] : abdominal pain [Vomiting] : vomiting [Constipation] : constipation [Diarrhea] : diarrhea [Joint Pain] : joint pain [Limb Swelling] : limb swelling [Anxiety] : anxiety [Depression] : depression [Negative] : Heme/Lymph

## 2021-02-19 ENCOUNTER — TRANSCRIPTION ENCOUNTER (OUTPATIENT)
Age: 71
End: 2021-02-19

## 2021-02-19 ENCOUNTER — NON-APPOINTMENT (OUTPATIENT)
Age: 71
End: 2021-02-19

## 2021-02-22 ENCOUNTER — NON-APPOINTMENT (OUTPATIENT)
Age: 71
End: 2021-02-22

## 2021-03-16 ENCOUNTER — APPOINTMENT (OUTPATIENT)
Dept: PULMONOLOGY | Facility: CLINIC | Age: 71
End: 2021-03-16
Payer: MEDICARE

## 2021-03-16 VITALS
DIASTOLIC BLOOD PRESSURE: 68 MMHG | BODY MASS INDEX: 35.3 KG/M2 | HEART RATE: 85 BPM | SYSTOLIC BLOOD PRESSURE: 104 MMHG | OXYGEN SATURATION: 97 % | TEMPERATURE: 96.7 F | RESPIRATION RATE: 16 BRPM | HEIGHT: 61 IN | WEIGHT: 187 LBS

## 2021-03-16 PROCEDURE — 99214 OFFICE O/P EST MOD 30 MIN: CPT

## 2021-03-16 PROCEDURE — 99072 ADDL SUPL MATRL&STAF TM PHE: CPT

## 2021-03-16 RX ORDER — BUDESONIDE AND FORMOTEROL FUMARATE DIHYDRATE 80; 4.5 UG/1; UG/1
80-4.5 AEROSOL RESPIRATORY (INHALATION) TWICE DAILY
Qty: 1 | Refills: 1 | Status: DISCONTINUED | COMMUNITY
Start: 2021-01-29 | End: 2021-03-16

## 2021-03-16 RX ORDER — FLUTICASONE PROPIONATE AND SALMETEROL 250; 50 UG/1; UG/1
250-50 POWDER RESPIRATORY (INHALATION)
Qty: 1 | Refills: 3 | Status: DISCONTINUED | COMMUNITY
End: 2021-03-16

## 2021-03-17 NOTE — END OF VISIT
[FreeTextEntry3] : I agree with the advanced clinical provider's history, physical examination and plan of care. I personally elicited a history and examined the patient. See above attestation.\par \par 35 minutes time spent for patient education related to comorbidities and medications, documentation.\par  [Time Spent: ___ minutes] : I have spent [unfilled] minutes of time on the encounter. [FreeTextEntry2] : \par \par \par \par

## 2021-03-17 NOTE — REVIEW OF SYSTEMS
[Fatigue] : fatigue [Postnasal Drip] : postnasal drip [Hypertension] : ~T hypertension [Heartburn] : heartburn [Diarrhea] : diarrhea [Nocturia] : nocturia [As Noted in HPI] : as noted in HPI [Back Pain] : ~T back pain [Myalgias] : myalgias [Arthralgias] : arthralgias [Depression] : depression [Anxiety] : anxiety [Negative] : Pulmonary Hypertension [Fracture] : fracture [Recent Wt Gain (___ Lbs)] : no recent weight gain [Cough] : no cough [Panic Attacks] : no panic attacks [Difficulty Maintaining Sleep] : no difficulty maintaining sleep [Snoring] : no snoring [FreeTextEntry3] : gained 10 lbs in 4-5mo [FreeTextEntry7] : heartburn controlled

## 2021-03-17 NOTE — ASSESSMENT
[FreeTextEntry1] : ATTENDING ATTESTATION\par \par 70 year old female with a history of Severe CHANTELL AHI 80 has been prescribed CPAP numerous times and due to poor compliance machine is returned. Pt is not amenable to PAP therapy at this time, does not like a variety of masks tried. Could not pay for Advair out of pocket cost too high. \par \par Asthma: Well controlled even off of Advair for 3 months. Drop in Fev1 last PFT 2017 \par - Continue Singulair\par - Start Breo 100 (given sample) \par - Schedule PFT (aware needs covid swab prior) \par \par Sleep:  Severe CHANTELL on repeat sleep study AHI of 80 with associated desaturations in oxygen. Had PAP titration was given APAP 6-16 which she returned shortly after receiving due to non compliance \par - Discussed in depth with patient the risks for potential health complications of untreated CHANTELL including but not limited to hypertension, heart attack, stroke and pulmonary htn. \par - Reviewed numerous types of masks with patient but she is currently not amendable to CPAP therapy at this time but she will think about it and let provider know if she becomes agreeable. \par - If continues to refuse CPAP therapy may need home overnight oximetry to get approval for O2.  \par \par HTN/Edema \par - f/u with cardiology given list of providers \par \par f/u in 3 months in office

## 2021-03-17 NOTE — PHYSICAL EXAM
[General Appearance - In No Acute Distress] : no acute distress [Normal Conjunctiva] : the conjunctiva exhibited no abnormalities [Nail Clubbing] : no clubbing of the fingernails [Cyanosis, Localized] : no localized cyanosis [Oriented To Time, Place, And Person] : oriented to person, place, and time [Impaired Insight] : insight and judgment were intact [Affect] : the affect was normal [Low Lying Soft Palate] : low lying soft palate [Enlarged Base of the Tongue] : enlargement of the base of the tongue [IV] : IV [Neck Appearance] : the appearance of the neck was normal [Heart Rate And Rhythm] : heart rate and rhythm were normal [Heart Sounds] : normal S1 and S2 [] : no respiratory distress [Respiration, Rhythm And Depth] : normal respiratory rhythm and effort [Auscultation Breath Sounds / Voice Sounds] : lungs were clear to auscultation bilaterally [Abnormal Walk] : normal gait [Non-Pitting] : non-pitting [FreeTextEntry1] : obese [FreeTextEntry2] : non pitting edema in bl les

## 2021-03-30 ENCOUNTER — APPOINTMENT (OUTPATIENT)
Dept: MAMMOGRAPHY | Facility: IMAGING CENTER | Age: 71
End: 2021-03-30
Payer: MEDICARE

## 2021-03-30 ENCOUNTER — APPOINTMENT (OUTPATIENT)
Dept: ULTRASOUND IMAGING | Facility: IMAGING CENTER | Age: 71
End: 2021-03-30
Payer: MEDICARE

## 2021-03-30 ENCOUNTER — RESULT REVIEW (OUTPATIENT)
Age: 71
End: 2021-03-30

## 2021-03-30 ENCOUNTER — OUTPATIENT (OUTPATIENT)
Dept: OUTPATIENT SERVICES | Facility: HOSPITAL | Age: 71
LOS: 1 days | End: 2021-03-30
Payer: COMMERCIAL

## 2021-03-30 DIAGNOSIS — Z90.89 ACQUIRED ABSENCE OF OTHER ORGANS: Chronic | ICD-10-CM

## 2021-03-30 DIAGNOSIS — M12.9 ARTHROPATHY, UNSPECIFIED: Chronic | ICD-10-CM

## 2021-03-30 DIAGNOSIS — R11.0 NAUSEA: ICD-10-CM

## 2021-03-30 DIAGNOSIS — Z12.39 ENCOUNTER FOR OTHER SCREENING FOR MALIGNANT NEOPLASM OF BREAST: ICD-10-CM

## 2021-03-30 PROCEDURE — 77067 SCR MAMMO BI INCL CAD: CPT | Mod: 26

## 2021-03-30 PROCEDURE — 77063 BREAST TOMOSYNTHESIS BI: CPT

## 2021-03-30 PROCEDURE — 76700 US EXAM ABDOM COMPLETE: CPT

## 2021-03-30 PROCEDURE — 76700 US EXAM ABDOM COMPLETE: CPT | Mod: 26

## 2021-03-30 PROCEDURE — 77067 SCR MAMMO BI INCL CAD: CPT

## 2021-03-30 PROCEDURE — 77063 BREAST TOMOSYNTHESIS BI: CPT | Mod: 26

## 2021-03-31 ENCOUNTER — APPOINTMENT (OUTPATIENT)
Dept: INTERNAL MEDICINE | Facility: CLINIC | Age: 71
End: 2021-03-31
Payer: MEDICARE

## 2021-03-31 VITALS
TEMPERATURE: 97.1 F | BODY MASS INDEX: 36.63 KG/M2 | SYSTOLIC BLOOD PRESSURE: 101 MMHG | HEART RATE: 72 BPM | OXYGEN SATURATION: 96 % | HEIGHT: 61 IN | DIASTOLIC BLOOD PRESSURE: 67 MMHG | WEIGHT: 194 LBS

## 2021-03-31 DIAGNOSIS — R92.8 OTHER ABNORMAL AND INCONCLUSIVE FINDINGS ON DIAGNOSTIC IMAGING OF BREAST: ICD-10-CM

## 2021-03-31 PROCEDURE — 99072 ADDL SUPL MATRL&STAF TM PHE: CPT

## 2021-03-31 PROCEDURE — 99213 OFFICE O/P EST LOW 20 MIN: CPT

## 2021-03-31 NOTE — HISTORY OF PRESENT ILLNESS
[de-identified] : Patient had a fall in February, she fell backwards and hit her head and arm on the dresser then went to the floor. Her neighbors helped to bring her up to her chair. She went to sleep right after and then woke up the next morning and called Call Center. EMS was called and she was brought to [facility]. There had workup with CT head, cervical spine and upper extremity. She was found to have left distal radius fracture, cast was placed and she has been following up with orthopedist since then. The cast is set to be removed on 4/8.\par She feels herself shifting to one side sometimes and losing balance, this has been happening since her spine surgery two years ago. She notes having frequent falls. She feels she has to take small steps to move around for fear of falling.

## 2021-03-31 NOTE — PHYSICAL EXAM
[No Acute Distress] : no acute distress [Well Nourished] : well nourished [Well Developed] : well developed [Normal Sclera/Conjunctiva] : normal sclera/conjunctiva [PERRL] : pupils equal round and reactive to light [EOMI] : extraocular movements intact [No Respiratory Distress] : no respiratory distress  [No Accessory Muscle Use] : no accessory muscle use [Clear to Auscultation] : lungs were clear to auscultation bilaterally [Normal Rate] : normal rate  [Regular Rhythm] : with a regular rhythm [Normal S1, S2] : normal S1 and S2 [No Edema] : there was no peripheral edema [No Focal Deficits] : no focal deficits [Alert and Oriented x3] : oriented to person, place, and time

## 2021-03-31 NOTE — PLAN
[FreeTextEntry1] : Unsteady gait, falls, balance issues\par s/p CT head in ER after fall\par Recommend neurology consult- information provided\par \par Mammogram report reviewed with patient- asymmetry requiring further imaging. right diagnostic mammogram ordered she will call to schedule

## 2021-04-05 ENCOUNTER — APPOINTMENT (OUTPATIENT)
Dept: MAMMOGRAPHY | Facility: IMAGING CENTER | Age: 71
End: 2021-04-05
Payer: MEDICARE

## 2021-04-05 ENCOUNTER — RESULT REVIEW (OUTPATIENT)
Age: 71
End: 2021-04-05

## 2021-04-05 ENCOUNTER — OUTPATIENT (OUTPATIENT)
Dept: OUTPATIENT SERVICES | Facility: HOSPITAL | Age: 71
LOS: 1 days | End: 2021-04-05
Payer: COMMERCIAL

## 2021-04-05 DIAGNOSIS — M12.9 ARTHROPATHY, UNSPECIFIED: Chronic | ICD-10-CM

## 2021-04-05 DIAGNOSIS — Z00.8 ENCOUNTER FOR OTHER GENERAL EXAMINATION: ICD-10-CM

## 2021-04-05 DIAGNOSIS — Z90.89 ACQUIRED ABSENCE OF OTHER ORGANS: Chronic | ICD-10-CM

## 2021-04-05 PROCEDURE — 77065 DX MAMMO INCL CAD UNI: CPT | Mod: 26,RT

## 2021-04-05 PROCEDURE — 77065 DX MAMMO INCL CAD UNI: CPT

## 2021-04-12 ENCOUNTER — RESULT REVIEW (OUTPATIENT)
Age: 71
End: 2021-04-12

## 2021-04-12 ENCOUNTER — OUTPATIENT (OUTPATIENT)
Dept: OUTPATIENT SERVICES | Facility: HOSPITAL | Age: 71
LOS: 1 days | End: 2021-04-12
Payer: COMMERCIAL

## 2021-04-12 ENCOUNTER — APPOINTMENT (OUTPATIENT)
Dept: MAMMOGRAPHY | Facility: IMAGING CENTER | Age: 71
End: 2021-04-12
Payer: MEDICARE

## 2021-04-12 DIAGNOSIS — M48.07 SPINAL STENOSIS, LUMBOSACRAL REGION: ICD-10-CM

## 2021-04-12 DIAGNOSIS — M12.9 ARTHROPATHY, UNSPECIFIED: Chronic | ICD-10-CM

## 2021-04-12 DIAGNOSIS — N64.9 DISORDER OF BREAST, UNSPECIFIED: ICD-10-CM

## 2021-04-12 DIAGNOSIS — Z90.89 ACQUIRED ABSENCE OF OTHER ORGANS: Chronic | ICD-10-CM

## 2021-04-12 PROCEDURE — 77065 DX MAMMO INCL CAD UNI: CPT

## 2021-04-12 PROCEDURE — 88305 TISSUE EXAM BY PATHOLOGIST: CPT | Mod: 26

## 2021-04-12 PROCEDURE — 19081 BX BREAST 1ST LESION STRTCTC: CPT

## 2021-04-12 PROCEDURE — 19081 BX BREAST 1ST LESION STRTCTC: CPT | Mod: RT

## 2021-04-12 PROCEDURE — A4648: CPT

## 2021-04-12 PROCEDURE — 88305 TISSUE EXAM BY PATHOLOGIST: CPT

## 2021-04-12 PROCEDURE — 77065 DX MAMMO INCL CAD UNI: CPT | Mod: 26,RT

## 2021-04-20 ENCOUNTER — APPOINTMENT (OUTPATIENT)
Dept: NEUROLOGY | Facility: CLINIC | Age: 71
End: 2021-04-20
Payer: MEDICARE

## 2021-04-20 ENCOUNTER — NON-APPOINTMENT (OUTPATIENT)
Age: 71
End: 2021-04-20

## 2021-04-20 VITALS
DIASTOLIC BLOOD PRESSURE: 72 MMHG | HEART RATE: 73 BPM | WEIGHT: 192 LBS | SYSTOLIC BLOOD PRESSURE: 112 MMHG | BODY MASS INDEX: 36.25 KG/M2 | HEIGHT: 61 IN

## 2021-04-20 VITALS — TEMPERATURE: 95.1 F

## 2021-04-20 DIAGNOSIS — Z91.89 OTHER SPECIFIED PERSONAL RISK FACTORS, NOT ELSEWHERE CLASSIFIED: ICD-10-CM

## 2021-04-20 PROCEDURE — 99214 OFFICE O/P EST MOD 30 MIN: CPT

## 2021-04-20 PROCEDURE — 99072 ADDL SUPL MATRL&STAF TM PHE: CPT

## 2021-04-20 NOTE — PHYSICAL EXAM
[FreeTextEntry1] : Awake, alert, oriented x 3.  Language fluent.  Comprehension intact.  Naming intact.  Repetition intact.  Affect normal.  Cranial nerves grossly intact.  Motor exam: normal bulk, normal tone, 5/5 in UEs bilaterally, 4+/5 bilateral hip flexion, 5/5 in distal LEs.  No tremors or fasciculations.  Sensory exam: intact to LT/vibration.  DTRs: 2+ throughout, flexor plantar response bilaterally, no clonus.  Coordination: no dysmetria.  Gait: can ambulate independently but uses walker as needed, slow, cautious gait, normal steady turns, normal stride length, normal base.  Romberg - negative\par

## 2021-04-20 NOTE — HISTORY OF PRESENT ILLNESS
[FreeTextEntry1] : 72 yo woman referred by her PCP Dr Caal, for gait unsteadiness and frequent falls.\par \par She feels her balance problems started back in 2018 after she required cervical spinal surgery.  While walking, she feels like she is swaying to the right side at times.  She recently had a fall in February when she was standing in her bedroom, and suddenly fell backwards hitting her had on the the dresser and broke her left wrist.  She did not lose consciousness, no disorientation.  She got help from her son, and was able to go to sleep that night, then called her PCP the next morning.  She was sent to the ER for evaluation, and had multiple xrays done.\par \par Over the past 3 years, she has had about 1 major fall per year, typically on uneven surfaces causing accidental trip and falls.  She has had bilateral knee replacements, left hip replacement, she has chronic lumbar spinal stenosis, cervical stenosis s/p decompression.  No history of stroke or MI.\par \par PMHx: HTN, HLD, CHANTELL (does not use CPAP, with excessive sleepiness during the day - pulmonary doctor is Dr Faust and she will be following up with him)

## 2021-04-20 NOTE — ASSESSMENT
[FreeTextEntry1] : 70 yo woman with gait instabilty, which is likely multfactorial (cervical and lumbar spinal stenosis, hip and knee osteoarthritis s/p multiple joint replacements).  No findings on exam suggestive of parkinsonism, stroke, or hydrocephalus.  Due to age and stroke risk factors, it would be reasonable to pursue brain MRI to evaluate for vascular disease.\par \par Plan:\par 1. MRI brain to rule out significant vascular disease and/or ventriculomegaly\par 2. Resume PT/OT\par 3. Untreated CHANTELL - follow up with sleep medicine\par 4. Patient agrees with plan.\par 5. Follow up in 6 wks\par \par Mauri Guevara MD\par [facility]\par \par Greater than 50% of the encounter was spent on counseling and coordination of care discussing differential diagnosis, diagnostic testing, and treatment options. We have talked about appropriate follow up, and I have spent 45 minutes of face to face time with the patient.\par

## 2021-04-29 ENCOUNTER — APPOINTMENT (OUTPATIENT)
Dept: OTOLARYNGOLOGY | Facility: CLINIC | Age: 71
End: 2021-04-29
Payer: MEDICARE

## 2021-04-29 VITALS
BODY MASS INDEX: 36.25 KG/M2 | HEART RATE: 73 BPM | HEIGHT: 61 IN | DIASTOLIC BLOOD PRESSURE: 68 MMHG | WEIGHT: 192 LBS | TEMPERATURE: 96.4 F | SYSTOLIC BLOOD PRESSURE: 108 MMHG

## 2021-04-29 DIAGNOSIS — H90.3 SENSORINEURAL HEARING LOSS, BILATERAL: ICD-10-CM

## 2021-04-29 PROCEDURE — 99204 OFFICE O/P NEW MOD 45 MIN: CPT | Mod: 25

## 2021-04-29 PROCEDURE — 92567 TYMPANOMETRY: CPT

## 2021-04-29 PROCEDURE — 69200 CLEAR OUTER EAR CANAL: CPT | Mod: LT

## 2021-04-29 PROCEDURE — 92557 COMPREHENSIVE HEARING TEST: CPT

## 2021-04-29 PROCEDURE — 99072 ADDL SUPL MATRL&STAF TM PHE: CPT

## 2021-04-29 NOTE — PHYSICAL EXAM
[Midline] : trachea located in midline position [Normal] : no rashes [de-identified] : b/l Ci and cotton in left

## 2021-04-29 NOTE — CONSULT LETTER
[FreeTextEntry1] : Dear Dr. JOIE KARIMI \par I had the pleasure of evaluating your patient SHABANA COLVIN, thank you for allowing us to participate in their care. please see full note detailing our visit below.\par If you have any questions, please do not hesitate to call me and I would be happy to discuss further. \par \par Royal Delgado M.D.\par Attending Physician,  \par Department of Otolaryngology - Head and Neck Surgery\par [facility] \par Office: [phone]\par Fax: [phone]\par \par

## 2021-04-29 NOTE — HISTORY OF PRESENT ILLNESS
[de-identified] : hearing loss\par loss worse in the left \par right sided tinnitus \par years \par

## 2021-04-29 NOTE — PROCEDURE
[FreeTextEntry3] : Procedure- Removal of left ear FB \par Diagnosis - Left ear FB \par Left ear found to have left ear FB, under microscopy removed under direct vision with alligator, canal appeared normal.\par \par Procedure- removal of cerumen bilaterally\par Diagnosis - cerumen impaction\par bilateral ears found to have impacted cerumen - they were cleared with suction and curette, canals appeared normal.\par

## 2021-04-29 NOTE — ASSESSMENT
[FreeTextEntry1] : Hearing loss - likely conductive resolved after disimpaction \par patient declined audiogram \par ear hygiene\par discussed preventive measures and signs of accumulation\par left fb removed \par Mild bilateral sensory neural hearing loss on audiological evaluation. At this point clinically not severe and it does not significant limitation in function, discuses what to look for in regards to signs of worsening hearing loss that may require re-evaluation. Also discussed behavioral techniques and environmental controls for improving function . They will follow up as needed or if hearing gets worse.\par

## 2021-04-30 ENCOUNTER — APPOINTMENT (OUTPATIENT)
Dept: MRI IMAGING | Facility: CLINIC | Age: 71
End: 2021-04-30

## 2021-05-05 ENCOUNTER — APPOINTMENT (OUTPATIENT)
Dept: ORTHOPEDIC SURGERY | Facility: CLINIC | Age: 71
End: 2021-05-05
Payer: MEDICARE

## 2021-05-05 VITALS
HEART RATE: 74 BPM | HEIGHT: 61 IN | DIASTOLIC BLOOD PRESSURE: 60 MMHG | WEIGHT: 192 LBS | SYSTOLIC BLOOD PRESSURE: 93 MMHG | BODY MASS INDEX: 36.25 KG/M2

## 2021-05-05 PROCEDURE — 99072 ADDL SUPL MATRL&STAF TM PHE: CPT

## 2021-05-05 PROCEDURE — 99214 OFFICE O/P EST MOD 30 MIN: CPT

## 2021-05-10 ENCOUNTER — NON-APPOINTMENT (OUTPATIENT)
Age: 71
End: 2021-05-10

## 2021-05-11 ENCOUNTER — APPOINTMENT (OUTPATIENT)
Dept: MRI IMAGING | Facility: CLINIC | Age: 71
End: 2021-05-11
Payer: MEDICARE

## 2021-05-11 ENCOUNTER — OUTPATIENT (OUTPATIENT)
Dept: OUTPATIENT SERVICES | Facility: HOSPITAL | Age: 71
LOS: 1 days | End: 2021-05-11
Payer: COMMERCIAL

## 2021-05-11 DIAGNOSIS — Z91.89 OTHER SPECIFIED PERSONAL RISK FACTORS, NOT ELSEWHERE CLASSIFIED: ICD-10-CM

## 2021-05-11 DIAGNOSIS — R26.81 UNSTEADINESS ON FEET: ICD-10-CM

## 2021-05-11 DIAGNOSIS — M12.9 ARTHROPATHY, UNSPECIFIED: Chronic | ICD-10-CM

## 2021-05-11 DIAGNOSIS — Z90.89 ACQUIRED ABSENCE OF OTHER ORGANS: Chronic | ICD-10-CM

## 2021-05-11 PROCEDURE — 70551 MRI BRAIN STEM W/O DYE: CPT | Mod: 26

## 2021-05-11 PROCEDURE — 70551 MRI BRAIN STEM W/O DYE: CPT

## 2021-05-12 ENCOUNTER — NON-APPOINTMENT (OUTPATIENT)
Age: 71
End: 2021-05-12

## 2021-05-25 ENCOUNTER — NON-APPOINTMENT (OUTPATIENT)
Age: 71
End: 2021-05-25

## 2021-05-25 ENCOUNTER — APPOINTMENT (OUTPATIENT)
Dept: OPHTHALMOLOGY | Facility: CLINIC | Age: 71
End: 2021-05-25
Payer: MEDICARE

## 2021-05-25 PROCEDURE — 92012 INTRM OPH EXAM EST PATIENT: CPT

## 2021-05-25 PROCEDURE — 92083 EXTENDED VISUAL FIELD XM: CPT

## 2021-06-07 ENCOUNTER — APPOINTMENT (OUTPATIENT)
Dept: NEUROLOGY | Facility: CLINIC | Age: 71
End: 2021-06-07
Payer: MEDICARE

## 2021-06-07 VITALS
HEART RATE: 75 BPM | BODY MASS INDEX: 36.44 KG/M2 | SYSTOLIC BLOOD PRESSURE: 110 MMHG | DIASTOLIC BLOOD PRESSURE: 69 MMHG | WEIGHT: 193 LBS | HEIGHT: 61 IN

## 2021-06-07 PROCEDURE — 99214 OFFICE O/P EST MOD 30 MIN: CPT

## 2021-06-07 NOTE — ASSESSMENT
[FreeTextEntry1] : 70 yo woman with gait instabilty, which is likely multfactorial (cervical and lumbar spinal stenosis, hip and knee osteoarthritis s/p multiple joint replacements). No findings on exam suggestive of parkinsonism, stroke, or hydrocephalus. \par \par Old lacunar infarct on brain MRI, and chronic ischemic white matter changes.\par \par Plan:\par 1. Antiplatelet therapy for secondary stroke prevention: Aspirin 81mg daily\par 2. Statin therapy: on Atorvastatin, LDL goal < 70\par 3. Continue PT/OT\par 4. Untreated CHANTELL - follow up with sleep medicine\par 5. Patient agrees with plan.\par 6. Follow up in 6 months.\par \par Mauri Guevara MD\par [facility]\par \par Greater than 50% of the encounter was spent on counseling and coordination of care discussing differential diagnosis, diagnostic testing, and treatment options. We have talked about appropriate follow up, and I have spent 25 minutes of face to face time with the patient.

## 2021-06-07 NOTE — PHYSICAL EXAM
[FreeTextEntry1] : Awake, alert, oriented x 3. Language fluent. Comprehension intact. Naming intact. Repetition intact. Affect normal. Cranial nerves grossly intact. Motor exam: normal bulk, normal tone, 5/5 in UEs bilaterally, 4+/5 bilateral hip flexion, 5/5 in distal LEs. No tremors or fasciculations. Sensory exam: intact to LT/vibration. DTRs: 2+ throughout, flexor plantar response bilaterally, no clonus. Coordination: no dysmetria. Gait: can ambulate independently but uses walker as needed, slow, cautious gait, normal steady turns, normal stride length, normal base. Romberg - negative

## 2021-06-07 NOTE — HISTORY OF PRESENT ILLNESS
[FreeTextEntry1] : 72 yo woman with gait instability, which is likely multifactorial (cervical and lumbar spinal stenosis, hip and knee osteoarthritis s/p multiple joint replacements). No findings on exam suggestive of parkinsonism, stroke, or hydrocephalus. Due to age and stroke risk factors, it would be reasonable to pursue brain MRI to evaluate for vascular disease.\par \par Since last visit, she started PT, has not had any additional falls.\par \par MRI brain shows diffuse white matter changes, and an old lacunar infarct in right corona radiata.  Prominent white matter changes in the rainer, likely ischemic in nature.

## 2021-06-08 ENCOUNTER — RX RENEWAL (OUTPATIENT)
Age: 71
End: 2021-06-08

## 2021-06-11 ENCOUNTER — APPOINTMENT (OUTPATIENT)
Dept: DISASTER EMERGENCY | Facility: CLINIC | Age: 71
End: 2021-06-11

## 2021-06-12 ENCOUNTER — APPOINTMENT (OUTPATIENT)
Dept: DISASTER EMERGENCY | Facility: CLINIC | Age: 71
End: 2021-06-12

## 2021-06-15 ENCOUNTER — APPOINTMENT (OUTPATIENT)
Dept: PULMONOLOGY | Facility: CLINIC | Age: 71
End: 2021-06-15
Payer: MEDICARE

## 2021-06-15 VITALS
WEIGHT: 195 LBS | DIASTOLIC BLOOD PRESSURE: 70 MMHG | HEIGHT: 63 IN | BODY MASS INDEX: 34.55 KG/M2 | HEART RATE: 71 BPM | OXYGEN SATURATION: 98 % | TEMPERATURE: 97.6 F | SYSTOLIC BLOOD PRESSURE: 102 MMHG

## 2021-06-15 PROCEDURE — 94729 DIFFUSING CAPACITY: CPT

## 2021-06-15 PROCEDURE — 94060 EVALUATION OF WHEEZING: CPT

## 2021-06-15 PROCEDURE — ZZZZZ: CPT

## 2021-06-15 PROCEDURE — 94726 PLETHYSMOGRAPHY LUNG VOLUMES: CPT

## 2021-06-15 PROCEDURE — 99215 OFFICE O/P EST HI 40 MIN: CPT | Mod: 25

## 2021-06-15 RX ORDER — FLUTICASONE FUROATE AND VILANTEROL TRIFENATATE 100; 25 UG/1; UG/1
100-25 POWDER RESPIRATORY (INHALATION)
Qty: 1 | Refills: 1 | Status: DISCONTINUED | COMMUNITY
Start: 2021-03-16 | End: 2021-06-15

## 2021-06-15 RX ORDER — ASPIRIN ENTERIC COATED TABLETS 81 MG 81 MG/1
81 TABLET, DELAYED RELEASE ORAL
Qty: 30 | Refills: 0 | Status: ACTIVE | COMMUNITY
Start: 2021-06-15

## 2021-06-15 NOTE — PHYSICAL EXAM
[General Appearance - In No Acute Distress] : no acute distress [Normal Conjunctiva] : the conjunctiva exhibited no abnormalities [Nail Clubbing] : no clubbing of the fingernails [Cyanosis, Localized] : no localized cyanosis [Non-Pitting] : non-pitting [Oriented To Time, Place, And Person] : oriented to person, place, and time [Impaired Insight] : insight and judgment were intact [Affect] : the affect was normal [Low Lying Soft Palate] : low lying soft palate [Enlarged Base of the Tongue] : enlargement of the base of the tongue [IV] : IV [Neck Appearance] : the appearance of the neck was normal [Heart Rate And Rhythm] : heart rate and rhythm were normal [Heart Sounds] : normal S1 and S2 [] : no respiratory distress [Respiration, Rhythm And Depth] : normal respiratory rhythm and effort [Auscultation Breath Sounds / Voice Sounds] : lungs were clear to auscultation bilaterally [Abnormal Walk] : normal gait [FreeTextEntry2] : non pitting edema in bl les [FreeTextEntry1] : non pitting edema in BL Les

## 2021-06-15 NOTE — HISTORY OF PRESENT ILLNESS
[Obstructive Sleep Apnea] : obstructive sleep apnea [Witnessed Apneas] : witnessed sleep apnea [Daytime Somnolence] : daytime somnolence [ESS: ___] : ESS score [unfilled] [Unintentional Sleep While Inactive] : unintentional sleep while inactive [Awakes Unrefreshed] : awakening unrefreshed [Recent  Weight Gain] : recent weight gain [To Bed: ___] : ~he/she~ goes to bed at [unfilled] [Arises: ___] : arises at [unfilled] [Sleep Onset Latency: ___ minutes] : sleep onset latency of [unfilled] minutes reported [Nocturnal Awakenings: ___] : ~he/she~ typically has [unfilled] nocturnal awakenings [WASO: ___] : Wake time after sleep onset is [unfilled] [TST: ___] : Total sleep time is [unfilled] [Daytime Sleep: ___] : daytime sleep: [unfilled] [Difficulty Breathing During Exertion] : denies dyspnea on exertion [Feelings Of Weakness On Exertion] : denies exercise intolerance [Cough] : denies coughing [Wheezing] : denies wheezing [Chest Pain Or Discomfort] : denies chest pain [Regional Soft Tissue Swelling Both Lower Extremities] : stable lower extremity edema [Fever] : denies fever [Wt Gain ___ Lbs] : recent [unfilled] ~Upound(s) weight gain [FreeTextEntry1] : 72 y/o F non smoker, h/o obesity HTN hypercholesterolemia, s/p spinal fusion c3-7 on 2/27/18, OA s/p left hip surgery and left shoulder problem/pain, asthma and Severe CHANTELL. Recently fell and broke her wrist in a cast decreased physical activity. Denies hx of COVID 19 infection.\par \par Asthma: diagnosed 20 yrs ago, \par Was on Advair 500 tapered down to 250 tolerated frequently stops her maintenance inhalers due to out of pocket cost. Was given BREO sample last visit and could not afford prescription. Says she notices no difference in her asthma symptoms on or off the ICS/LABA. Pt has used albuterol hfa 1x in the last 3 months prophylaxtically ebofre a walk on a humid day and it helped. Has been on Prednisone daily dosing for >1 year for PMR on 5mg daily. Denies acute respiratory symptoms. No fevers, chills, cough, sob, wheeze, chest pain or tightness. Decreased in Fev1 last PFT in 2017. Chronic intermittent LE Edema slightly improved saw vascular 1/2021 still has not made apt with cardio as recommended.\par \par CHANTELL: Severe CHANTELL (AHI of 80), dx'ed 3/29/2010\par Underwent PAP titration in lab on 6/16/19 with frequent desaturations in oxygen. Pt was started on therapy used very briefly and device was taken back due to non compliance. Was on APAP [id number]. Did not like variety of face masks. Endorses sleep talking and feeling un-refreshed. No additional naps, No caffeine. Pt refuses to use APAP therapy after numerous discussions. Saw neuro who identified old lacunar infarct and started her on ASA 81mg. Pt has apt with sleep lab to  overnight oximetry. Needs testing for O2 approval with insurance. \par \par DME: [facility]\par \par PND: on azelastine. (stopped Flonase)\par \par GERD: PPI and Ranitidine 150 mg po bid and carafate [Snoring] : no snoring [Frequent Nocturnal Awakening] : no nocturnal awakening [Unintentional Sleep while Active] : no unintentional sleep while active [Awakes with Headache] : no headache upon awakening [Awakening With Dry Mouth] : no dry mouth upon awakening [DIS] : no DIS [DMS] : no DMS [Unusual Sleep Behavior] : no unusual sleep behavior [Hypersomnolence] : no hypersomnolence [Cataplexy] : no cataplexy [Sleep Paralysis] : no sleep paralysis [Hypnagogic Hallucinations] : no hallucinations when falling asleep [Hypnopompic Hallucinations] : no hallucinations when awakening [Lower Extremity Discomfort] : no lower extremity discomfort in evening or at bedtime [Oxygen] : the patient uses no supplemental oxygen

## 2021-06-15 NOTE — END OF VISIT
[Time Spent: ___ minutes] : I have spent [unfilled] minutes of time on the encounter. [FreeTextEntry2] : \par \par \par \par  [FreeTextEntry3] : I agree with the advanced clinical provider's history, physical examination and plan of care. I personally elicited a history and examined the patient. See above attestation. Extensively discussed risks of not treating severe CHANTELL however pt reports severe intolerance to pap mask and headgear, discomfort with straps around head, cannot sleep well wearing it. She will ask her dentist if oral appliance is an option, which is not ideal for severe CHANTELL but will provide some treatment. Still needs to complete nocturnal pulse ox. Pt is not interest in INSPIRE, also BMI is over the limit. Gave weight management contact as well. \par 45 minutes time spent for patient education related to comorbidities and medications, documentation.\par

## 2021-06-15 NOTE — REVIEW OF SYSTEMS
[Fatigue] : fatigue [Postnasal Drip] : postnasal drip [Hypertension] : ~T hypertension [Heartburn] : heartburn [Nocturia] : nocturia [As Noted in HPI] : as noted in HPI [Fracture] : fracture [Back Pain] : ~T back pain [Myalgias] : myalgias [Arthralgias] : arthralgias [Depression] : depression [Anxiety] : anxiety [Negative] : Pulmonary Hypertension [Recent Wt Gain (___ Lbs)] : recent [unfilled] ~Ulb weight gain [Edema] : ~T edema was present [PND] : PND [Cough] : no cough [Sputum] : not coughing up ~M sputum [Panic Attacks] : no panic attacks [Snoring] : no snoring [Difficulty Maintaining Sleep] : no difficulty maintaining sleep

## 2021-06-15 NOTE — ASSESSMENT
[FreeTextEntry1] : ATTENDING ATTESTATION\par \par 71 year old female with a history of Severe CHANTELL AHI 80 has been prescribed CPAP numerous times and due to poor compliance machine is returned. Pt is not amenable to PAP therapy at this time, does not like a variety of masks tried. Could not pay for Advair/Breo out of pocket cost too high. No acute respiratory complaints. \par \par Asthma: Well controlled off of ICS/Laba therapies. \par - PFT today improved even off of BREO from 2017, Drop in Fev1 last PFT 2017 \par - Continue Singulair\par - Can stay off ICS/Laba if asthma symptoms increased contact provider for Rx \par - Cont Albuterol Q4-6H PRN \par \par Sleep:  Severe CHANTELL on repeat sleep study AHI of 80 with associated desaturations in oxygen. Had PAP titration was given APAP 6-16 which she returned shortly after receiving due to non compliance \par - Discussed in depth with patient the risks for potential health complications of untreated CHANTELL including but not limited to hypertension, heart attack, stroke and pulmonary htn. \par - Reviewed numerous types of masks with patient but she is currently not amendable to CPAP therapy at this time. Reinforced today with patient the importance of treating her CHANTELL \par - O2 denied by insurance needs further testing \par - Do Overnight oximetry and f/u with results. \par - If qualifies will need repeat testing to assess efficacy of 2L O2 QHS \par - Gave referral sheet for Oralmandibular device (pt having dental work currently advised to discuss with DDS) \par - Pts BMI disqualifies her for Inspire (pt also firmly declined \par - Discussed importance of weight loss and management for pts CHANTELL and overall health given referral sheet to Weight management/nutritionist \par HTN/Edema \par - f/u with cardiology given list of providers AGAIN \par \par f/u after testing

## 2021-06-15 NOTE — COUNSELING
[Potential consequences of obesity discussed] : Potential consequences of obesity discussed [Benefits of weight loss discussed] : Benefits of weight loss discussed [Structured Weight Management Program suggested:] : Structured weight management program suggested

## 2021-06-17 ENCOUNTER — APPOINTMENT (OUTPATIENT)
Dept: SLEEP CENTER | Facility: CLINIC | Age: 71
End: 2021-06-17

## 2021-06-17 ENCOUNTER — FORM ENCOUNTER (OUTPATIENT)
Age: 71
End: 2021-06-17

## 2021-07-26 ENCOUNTER — APPOINTMENT (OUTPATIENT)
Dept: INTERNAL MEDICINE | Facility: CLINIC | Age: 71
End: 2021-07-26
Payer: MEDICARE

## 2021-07-26 VITALS
OXYGEN SATURATION: 96 % | DIASTOLIC BLOOD PRESSURE: 70 MMHG | TEMPERATURE: 97.9 F | HEIGHT: 63 IN | SYSTOLIC BLOOD PRESSURE: 112 MMHG | WEIGHT: 194 LBS | BODY MASS INDEX: 34.38 KG/M2 | HEART RATE: 71 BPM

## 2021-07-26 PROCEDURE — 99214 OFFICE O/P EST MOD 30 MIN: CPT

## 2021-07-26 NOTE — PLAN
[FreeTextEntry1] : Continue PT twice weekly\par Neurology follow up\par Taking prednisone 5mg daily for PMR\par Recent kenalog shoulder injection\par Rheumatology follow up\par Requesting letter for [id number] to accommodate walker\par CPE Sep 2021

## 2021-07-26 NOTE — HISTORY OF PRESENT ILLNESS
[de-identified] : Patient here for follow up.\par She continues to use a walker for ambulation. For her frequent falls and gait instability she saw Dr Guevara. She has started physical therapy twice per week and will f/u with him in 6 months. She continues to see orthopedist for spinal stenosis.\par She continues to see rheumatologist for PMR, due to this she has weak shoulder strength. She last got kenalog injection in her right shoulder a few weeks ago with Dr Champagne.\par

## 2021-07-26 NOTE — REVIEW OF SYSTEMS
[Joint Pain] : joint pain [Muscle Pain] : muscle pain [Unsteady Walking] : ataxia [Negative] : Neurological

## 2021-07-30 ENCOUNTER — APPOINTMENT (OUTPATIENT)
Dept: ORTHOPEDIC SURGERY | Facility: CLINIC | Age: 71
End: 2021-07-30
Payer: MEDICARE

## 2021-07-30 DIAGNOSIS — M60.9 MYOSITIS, UNSPECIFIED: ICD-10-CM

## 2021-07-30 DIAGNOSIS — M54.5 LOW BACK PAIN: ICD-10-CM

## 2021-07-30 DIAGNOSIS — M79.10 MYALGIA, UNSPECIFIED SITE: ICD-10-CM

## 2021-07-30 PROCEDURE — 99214 OFFICE O/P EST MOD 30 MIN: CPT | Mod: 25

## 2021-07-30 PROCEDURE — 20552 NJX 1/MLT TRIGGER POINT 1/2: CPT

## 2021-08-02 ENCOUNTER — RX RENEWAL (OUTPATIENT)
Age: 71
End: 2021-08-02

## 2021-08-13 ENCOUNTER — LABORATORY RESULT (OUTPATIENT)
Age: 71
End: 2021-08-13

## 2021-09-16 ENCOUNTER — RX RENEWAL (OUTPATIENT)
Age: 71
End: 2021-09-16

## 2021-09-18 ENCOUNTER — LABORATORY RESULT (OUTPATIENT)
Age: 71
End: 2021-09-18

## 2021-09-20 LAB
25(OH)D3 SERPL-MCNC: 46.6 NG/ML
ALBUMIN SERPL ELPH-MCNC: 3.9 G/DL
ALP BLD-CCNC: 96 U/L
ALT SERPL-CCNC: 16 U/L
ANION GAP SERPL CALC-SCNC: 15 MMOL/L
APPEARANCE: CLEAR
AST SERPL-CCNC: 21 U/L
BASOPHILS # BLD AUTO: 0.02 K/UL
BASOPHILS NFR BLD AUTO: 0.2 %
BILIRUB SERPL-MCNC: 0.5 MG/DL
BILIRUBIN URINE: NEGATIVE
BLOOD URINE: NEGATIVE
BUN SERPL-MCNC: 17 MG/DL
CALCIUM SERPL-MCNC: 9.3 MG/DL
CHLORIDE SERPL-SCNC: 98 MMOL/L
CHOLEST SERPL-MCNC: 193 MG/DL
CK SERPL-CCNC: 120 U/L
CO2 SERPL-SCNC: 30 MMOL/L
COLOR: YELLOW
CREAT SERPL-MCNC: 1.07 MG/DL
EOSINOPHIL # BLD AUTO: 0.28 K/UL
EOSINOPHIL NFR BLD AUTO: 3.3 %
ESTIMATED AVERAGE GLUCOSE: 131 MG/DL
GLUCOSE QUALITATIVE U: NEGATIVE
GLUCOSE SERPL-MCNC: 119 MG/DL
HBA1C MFR BLD HPLC: 6.2 %
HCT VFR BLD CALC: 39.2 %
HDLC SERPL-MCNC: 75 MG/DL
HGB BLD-MCNC: 12.1 G/DL
IMM GRANULOCYTES NFR BLD AUTO: 0.2 %
KETONES URINE: NEGATIVE
LDLC SERPL CALC-MCNC: 95 MG/DL
LEUKOCYTE ESTERASE URINE: ABNORMAL
LYMPHOCYTES # BLD AUTO: 2.88 K/UL
LYMPHOCYTES NFR BLD AUTO: 33.9 %
MAN DIFF?: NORMAL
MCHC RBC-ENTMCNC: 30 PG
MCHC RBC-ENTMCNC: 30.9 GM/DL
MCV RBC AUTO: 97.3 FL
MONOCYTES # BLD AUTO: 0.66 K/UL
MONOCYTES NFR BLD AUTO: 7.8 %
NEUTROPHILS # BLD AUTO: 4.63 K/UL
NEUTROPHILS NFR BLD AUTO: 54.6 %
NITRITE URINE: NEGATIVE
NONHDLC SERPL-MCNC: 118 MG/DL
PH URINE: 6.5
PLATELET # BLD AUTO: 250 K/UL
POTASSIUM SERPL-SCNC: 3.7 MMOL/L
PROT SERPL-MCNC: 6.9 G/DL
PROTEIN URINE: NEGATIVE
RBC # BLD: 4.03 M/UL
RBC # FLD: 14.9 %
SODIUM SERPL-SCNC: 143 MMOL/L
SPECIFIC GRAVITY URINE: 1.02
T4 FREE SERPL-MCNC: 1.2 NG/DL
TRIGL SERPL-MCNC: 113 MG/DL
TSH SERPL-ACNC: 1.23 UIU/ML
UROBILINOGEN URINE: NORMAL
WBC # FLD AUTO: 8.49 K/UL

## 2021-09-23 ENCOUNTER — NON-APPOINTMENT (OUTPATIENT)
Age: 71
End: 2021-09-23

## 2021-09-23 ENCOUNTER — APPOINTMENT (OUTPATIENT)
Dept: OPHTHALMOLOGY | Facility: CLINIC | Age: 71
End: 2021-09-23
Payer: MEDICARE

## 2021-09-23 PROCEDURE — 99214 OFFICE O/P EST MOD 30 MIN: CPT

## 2021-09-24 ENCOUNTER — APPOINTMENT (OUTPATIENT)
Dept: INTERNAL MEDICINE | Facility: CLINIC | Age: 71
End: 2021-09-24
Payer: MEDICARE

## 2021-09-24 VITALS
TEMPERATURE: 98.1 F | SYSTOLIC BLOOD PRESSURE: 118 MMHG | BODY MASS INDEX: 35.26 KG/M2 | OXYGEN SATURATION: 96 % | HEIGHT: 63 IN | DIASTOLIC BLOOD PRESSURE: 74 MMHG | HEART RATE: 71 BPM | WEIGHT: 199 LBS

## 2021-09-24 DIAGNOSIS — Z01.818 ENCOUNTER FOR OTHER PREPROCEDURAL EXAMINATION: ICD-10-CM

## 2021-09-24 DIAGNOSIS — S13.9XXA SPRAIN OF JOINTS AND LIGAMENTS OF UNSPECIFIED PARTS OF NECK, INITIAL ENCOUNTER: ICD-10-CM

## 2021-09-24 DIAGNOSIS — Z84.89 FAMILY HISTORY OF OTHER SPECIFIED CONDITIONS: ICD-10-CM

## 2021-09-24 DIAGNOSIS — M50.90 CERVICAL DISC DISORDER, UNSPECIFIED, UNSPECIFIED CERVICAL REGION: ICD-10-CM

## 2021-09-24 DIAGNOSIS — Z87.898 PERSONAL HISTORY OF OTHER SPECIFIED CONDITIONS: ICD-10-CM

## 2021-09-24 DIAGNOSIS — F41.8 OTHER SPECIFIED ANXIETY DISORDERS: ICD-10-CM

## 2021-09-24 DIAGNOSIS — R06.2 WHEEZING: ICD-10-CM

## 2021-09-24 DIAGNOSIS — M48.02 SPINAL STENOSIS, CERVICAL REGION: ICD-10-CM

## 2021-09-24 DIAGNOSIS — D72.10 EOSINOPHILIA, UNSPECIFIED: ICD-10-CM

## 2021-09-24 DIAGNOSIS — Z86.79 PERSONAL HISTORY OF OTHER DISEASES OF THE CIRCULATORY SYSTEM: ICD-10-CM

## 2021-09-24 DIAGNOSIS — R15.9 FULL INCONTINENCE OF FECES: ICD-10-CM

## 2021-09-24 DIAGNOSIS — L73.2 HIDRADENITIS SUPPURATIVA: ICD-10-CM

## 2021-09-24 DIAGNOSIS — Z81.8 FAMILY HISTORY OF OTHER MENTAL AND BEHAVIORAL DISORDERS: ICD-10-CM

## 2021-09-24 DIAGNOSIS — Z86.19 PERSONAL HISTORY OF OTHER INFECTIOUS AND PARASITIC DISEASES: ICD-10-CM

## 2021-09-24 DIAGNOSIS — Z86.69 PERSONAL HISTORY OF OTHER DISEASES OF THE NERVOUS SYSTEM AND SENSE ORGANS: ICD-10-CM

## 2021-09-24 DIAGNOSIS — K31.9 DISEASE OF STOMACH AND DUODENUM, UNSPECIFIED: ICD-10-CM

## 2021-09-24 DIAGNOSIS — S80.01XA CONTUSION OF RIGHT KNEE, INITIAL ENCOUNTER: ICD-10-CM

## 2021-09-24 DIAGNOSIS — Z87.39 PERSONAL HISTORY OF OTHER DISEASES OF THE MUSCULOSKELETAL SYSTEM AND CONNECTIVE TISSUE: ICD-10-CM

## 2021-09-24 DIAGNOSIS — Z92.29 PERSONAL HISTORY OF OTHER DRUG THERAPY: ICD-10-CM

## 2021-09-24 DIAGNOSIS — M47.812 SPONDYLOSIS W/OUT MYELOPATHY OR RADICULOPATHY, CERVICAL REGION: ICD-10-CM

## 2021-09-24 DIAGNOSIS — Z86.59 PERSONAL HISTORY OF OTHER MENTAL AND BEHAVIORAL DISORDERS: ICD-10-CM

## 2021-09-24 DIAGNOSIS — R60.0 LOCALIZED EDEMA: ICD-10-CM

## 2021-09-24 DIAGNOSIS — R26.89 OTHER ABNORMALITIES OF GAIT AND MOBILITY: ICD-10-CM

## 2021-09-24 DIAGNOSIS — M75.82 OTHER SHOULDER LESIONS, LEFT SHOULDER: ICD-10-CM

## 2021-09-24 DIAGNOSIS — M17.10 UNILATERAL PRIMARY OSTEOARTHRITIS, UNSPECIFIED KNEE: ICD-10-CM

## 2021-09-24 DIAGNOSIS — Z87.2 PERSONAL HISTORY OF DISEASES OF THE SKIN AND SUBCUTANEOUS TISSUE: ICD-10-CM

## 2021-09-24 DIAGNOSIS — H26.9 UNSPECIFIED CATARACT: ICD-10-CM

## 2021-09-24 DIAGNOSIS — M43.10 SPONDYLOLISTHESIS, SITE UNSPECIFIED: ICD-10-CM

## 2021-09-24 DIAGNOSIS — M48.07 SPINAL STENOSIS, LUMBOSACRAL REGION: ICD-10-CM

## 2021-09-24 DIAGNOSIS — N61.1 ABSCESS OF THE BREAST AND NIPPLE: ICD-10-CM

## 2021-09-24 DIAGNOSIS — R05 COUGH: ICD-10-CM

## 2021-09-24 DIAGNOSIS — R29.6 REPEATED FALLS: ICD-10-CM

## 2021-09-24 DIAGNOSIS — F41.9 ANXIETY DISORDER, UNSPECIFIED: ICD-10-CM

## 2021-09-24 PROCEDURE — G0008: CPT

## 2021-09-24 PROCEDURE — 82270 OCCULT BLOOD FECES: CPT

## 2021-09-24 PROCEDURE — 90662 IIV NO PRSV INCREASED AG IM: CPT

## 2021-09-24 PROCEDURE — G0444 DEPRESSION SCREEN ANNUAL: CPT | Mod: 59

## 2021-09-24 PROCEDURE — G0439: CPT

## 2021-09-24 RX ORDER — ALBUTEROL SULFATE 90 UG/1
108 (90 BASE) AEROSOL, METERED RESPIRATORY (INHALATION)
Qty: 1 | Refills: 3 | Status: COMPLETED | COMMUNITY
Start: 2017-05-26 | End: 2021-09-24

## 2021-09-24 RX ORDER — ONDANSETRON 4 MG/1
4 TABLET ORAL
Qty: 21 | Refills: 0 | Status: COMPLETED | COMMUNITY
Start: 2021-01-27 | End: 2021-09-24

## 2021-09-24 NOTE — HISTORY OF PRESENT ILLNESS
[FreeTextEntry1] : Pt presented for PE.  Last PE was 1 year ago with another PCP, but her PCP is no longer available. [de-identified] : Pt had ankle swelling occasionally and it was swollen again.  She has compression stocking and will start wearing them.\par \par Pt was well and did not get sick throughout the COVID pandemic.  She had COVID vaccine and tolerated it well.\par \par She would like to have flu vaccine today.\par \par She has CHANTELL and is not using CPAP.\par \par She sees rheumatologist for PMR and R shoulder bursitis, and treated with injection occ, and is also taking Prednisone.

## 2021-09-24 NOTE — PHYSICAL EXAM
[No Acute Distress] : no acute distress [Well Nourished] : well nourished [Well Developed] : well developed [Normal Sclera/Conjunctiva] : normal sclera/conjunctiva [PERRL] : pupils equal round and reactive to light [EOMI] : extraocular movements intact [Normal Outer Ear/Nose] : the outer ears and nose were normal in appearance [Normal Oropharynx] : the oropharynx was normal [Normal TMs] : both tympanic membranes were normal [Normal Nasal Mucosa] : the nasal mucosa was normal [No JVD] : no jugular venous distention [No Lymphadenopathy] : no lymphadenopathy [Supple] : supple [No Respiratory Distress] : no respiratory distress  [Clear to Auscultation] : lungs were clear to auscultation bilaterally [Normal Rate] : normal rate  [Regular Rhythm] : with a regular rhythm [Normal S1, S2] : normal S1 and S2 [No Carotid Bruits] : no carotid bruits [Pedal Pulses Present] : the pedal pulses are present [No Extremity Clubbing/Cyanosis] : no extremity clubbing/cyanosis [Normal Appearance] : normal in appearance [No Masses] : no palpable masses [No Axillary Lymphadenopathy] : no axillary lymphadenopathy [No Nipple Discharge] : no nipple discharge [Soft] : abdomen soft [Non Tender] : non-tender [Non-distended] : non-distended [Normal Bowel Sounds] : normal bowel sounds [Normal Sphincter Tone] : normal sphincter tone [No Mass] : no mass [Normal Supraclavicular Nodes] : no supraclavicular lymphadenopathy [Normal Axillary Nodes] : no axillary lymphadenopathy [Normal Posterior Cervical Nodes] : no posterior cervical lymphadenopathy [Normal Anterior Cervical Nodes] : no anterior cervical lymphadenopathy [No CVA Tenderness] : no CVA  tenderness [No Spinal Tenderness] : no spinal tenderness [No Joint Swelling] : no joint swelling [Grossly Normal Strength/Tone] : grossly normal strength/tone [No Rash] : no rash [Coordination Grossly Intact] : coordination grossly intact [No Focal Deficits] : no focal deficits [Speech Grossly Normal] : speech grossly normal [Normal Affect] : the affect was normal [Alert and Oriented x3] : oriented to person, place, and time [Normal Mood] : the mood was normal [Stool Occult Blood] : stool negative for occult blood [de-identified] : Obese female in stated age,  [de-identified] : B/l 1-2 + pitting edema, no calf tenderness, [de-identified] : presented to office today with rollator,

## 2021-09-24 NOTE — HEALTH RISK ASSESSMENT
[Fair] : ~his/her~ current health as fair  [Good] : ~his/her~  mood as  good [Yes] : Yes [No] : In the past 12 months have you used drugs other than those required for medical reasons? No [Two or more falls in past year] : Patient reported two or more falls in the past year [0] : 2) Feeling down, depressed, or hopeless: Not at all (0) [Patient declined PAP Smear] : Patient declined PAP Smear [Patient declined colonoscopy] : Patient declined colonoscopy [None] : None [With Family] : lives with family [# of Members in Household ___] :  household currently consist of [unfilled] member(s) [Retired] : retired [College] : College [Single] : single [# Of Children ___] : has [unfilled] children [Feels Safe at Home] : Feels safe at home [Fully functional (bathing, dressing, toileting, transferring, walking, feeding)] : Fully functional (bathing, dressing, toileting, transferring, walking, feeding) [Smoke Detector] : smoke detector [Seat Belt] :  uses seat belt [With Patient/Caregiver] : , with patient/caregiver [Relationship: ___] : Relationship: [unfilled] [] : No [[id number]] : 0 [de-identified] : No formal exercise, walks around, [EyeExamDate] : 09/21 [Change in mental status noted] : No change in mental status noted [Reports changes in hearing] : Reports no changes in hearing [Reports changes in vision] : Reports no changes in vision [Reports changes in dental health] : Reports no changes in dental health [Carbon Monoxide Detector] : no carbon monoxide detector [MammogramDate] : 03/21 [PapSmearDate] : >20 years [BoneDensityDate] : 09/19 [ColonoscopyDate] : 2013 [ColonoscopyComments] : [facility] - 10/13,  [de-identified] : Pt doesn't cook less, pt uses [employer] [de-identified] : dentist - 11/20 [AdvancecareDate] : 09/21

## 2021-09-24 NOTE — REVIEW OF SYSTEMS
[Recent Change In Weight] : ~T recent weight change [Lower Ext Edema] : lower extremity edema [Dyspnea on Exertion] : dyspnea on exertion [Constipation] : constipation [Nocturia] : nocturia [Frequency] : frequency [Joint Pain] : joint pain [Joint Stiffness] : joint stiffness [Unsteady Walking] : ataxia [Negative] : Heme/Lymph [Fever] : no fever [Chills] : no chills [Fatigue] : no fatigue [Chest Pain] : no chest pain [Palpitations] : no palpitations [Shortness Of Breath] : no shortness of breath [Wheezing] : no wheezing [Cough] : no cough [Abdominal Pain] : no abdominal pain [Nausea] : no nausea [Diarrhea] : diarrhea [Vomiting] : no vomiting [Heartburn] : no heartburn [Melena] : no melena [Dysuria] : no dysuria [Incontinence] : no incontinence [Hematuria] : no hematuria [Itching] : no itching [Mole Changes] : no mole changes [Skin Rash] : no skin rash [Headache] : no headache [Fainting] : no fainting [Dizziness] : no dizziness [Insomnia] : no insomnia [Anxiety] : no anxiety [Depression] : no depression [FreeTextEntry3] : wears corrective lens, [FreeTextEntry5] : as in HPI, [FreeTextEntry6] : Has chronic TRENT, no change [FreeTextEntry7] : Occ  [FreeTextEntry8] : Nocturia of 1 X per night,  [FreeTextEntry9] : pt is on Tramadol and Prednisone, [de-identified] : Uses cane at home, and walker when she goes out, fell a couple of time, and is doing PT, has carpal tunnel syndrome, [de-identified] : Has CHANTELL, but not using CPAP,

## 2021-09-24 NOTE — ASSESSMENT
[FreeTextEntry1] : Patient was up-to-date with mammogram and bone density test.  She declined Pap smear and colonoscopy.  Patient was reminded to have routine eye exam and dental care.

## 2021-09-24 NOTE — DATA REVIEWED
[FreeTextEntry1] : EKG - deferred, pt stated that she will be seeing cardiologist soon, and will get tested there.

## 2021-09-24 NOTE — COUNSELING
[Fall prevention counseling provided] : Fall prevention counseling provided [Adequate lighting] : Adequate lighting [No throw rugs] : No throw rugs [Use proper foot wear] : Use proper foot wear [Use recommended devices] : Use recommended devices [Potential consequences of obesity discussed] : Potential consequences of obesity discussed [Benefits of weight loss discussed] : Benefits of weight loss discussed [Encouraged to increase physical activity] : Encouraged to increase physical activity [Target Wt Loss Goal ___] : Weight Loss Goals: Target weight loss goal [unfilled] lbs [Decrease Portions] : decrease portions [Needs reinforcement, provided] : Patient needs reinforcement on understanding of disease, goals and obesity follow-up plan; reinforcement was provided

## 2021-09-28 ENCOUNTER — APPOINTMENT (OUTPATIENT)
Dept: INTERNAL MEDICINE | Facility: CLINIC | Age: 71
End: 2021-09-28

## 2021-10-06 ENCOUNTER — RX RENEWAL (OUTPATIENT)
Age: 71
End: 2021-10-06

## 2021-10-25 ENCOUNTER — APPOINTMENT (OUTPATIENT)
Dept: ORTHOPEDIC SURGERY | Facility: CLINIC | Age: 71
End: 2021-10-25
Payer: MEDICARE

## 2021-10-25 VITALS
DIASTOLIC BLOOD PRESSURE: 67 MMHG | HEART RATE: 73 BPM | SYSTOLIC BLOOD PRESSURE: 108 MMHG | BODY MASS INDEX: 36.8 KG/M2 | WEIGHT: 200 LBS | HEIGHT: 62 IN

## 2021-10-25 DIAGNOSIS — M50.30 OTHER CERVICAL DISC DEGENERATION, UNSPECIFIED CERVICAL REGION: ICD-10-CM

## 2021-10-25 PROCEDURE — 99214 OFFICE O/P EST MOD 30 MIN: CPT

## 2021-10-28 ENCOUNTER — RX RENEWAL (OUTPATIENT)
Age: 71
End: 2021-10-28

## 2021-11-08 ENCOUNTER — NON-APPOINTMENT (OUTPATIENT)
Age: 71
End: 2021-11-08

## 2021-12-07 ENCOUNTER — APPOINTMENT (OUTPATIENT)
Dept: NEUROLOGY | Facility: CLINIC | Age: 71
End: 2021-12-07

## 2021-12-15 ENCOUNTER — NON-APPOINTMENT (OUTPATIENT)
Age: 71
End: 2021-12-15

## 2021-12-15 ENCOUNTER — EMERGENCY (EMERGENCY)
Facility: HOSPITAL | Age: 71
LOS: 1 days | Discharge: HOME | End: 2021-12-15
Attending: HOSPITALIST | Admitting: HOSPITALIST
Payer: MEDICARE

## 2021-12-15 VITALS
TEMPERATURE: 98 F | HEART RATE: 72 BPM | DIASTOLIC BLOOD PRESSURE: 60 MMHG | SYSTOLIC BLOOD PRESSURE: 106 MMHG | RESPIRATION RATE: 16 BRPM | OXYGEN SATURATION: 98 %

## 2021-12-15 VITALS
HEART RATE: 68 BPM | RESPIRATION RATE: 16 BRPM | HEIGHT: 62 IN | OXYGEN SATURATION: 99 % | TEMPERATURE: 97 F | SYSTOLIC BLOOD PRESSURE: 112 MMHG | DIASTOLIC BLOOD PRESSURE: 65 MMHG

## 2021-12-15 DIAGNOSIS — M12.9 ARTHROPATHY, UNSPECIFIED: Chronic | ICD-10-CM

## 2021-12-15 DIAGNOSIS — Z90.89 ACQUIRED ABSENCE OF OTHER ORGANS: Chronic | ICD-10-CM

## 2021-12-15 PROCEDURE — 99285 EMERGENCY DEPT VISIT HI MDM: CPT

## 2021-12-15 PROCEDURE — 72125 CT NECK SPINE W/O DYE: CPT | Mod: 26,MA

## 2021-12-15 PROCEDURE — 70450 CT HEAD/BRAIN W/O DYE: CPT | Mod: 26,MA

## 2021-12-15 RX ORDER — ACETAMINOPHEN 500 MG
975 TABLET ORAL ONCE
Refills: 0 | Status: COMPLETED | OUTPATIENT
Start: 2021-12-15 | End: 2021-12-15

## 2021-12-15 RX ADMIN — Medication 975 MILLIGRAM(S): at 15:47

## 2021-12-15 NOTE — ED PROVIDER NOTE - NSFOLLOWUPINSTRUCTIONS_ED_ALL_ED_FT
You were admitted for further evaluation after having a fall. While in the ED, a CT scan of your head was done that shows no acute intracranial bleed. If you experience dizziness, severe headache, or feel more confused, please seek medical care. Please follow up with your primary care physician in the next week for further evaluation.

## 2021-12-15 NOTE — ED PROVIDER NOTE - PHYSICAL EXAMINATION
Gen - NAD; well-appearing; A+Ox3   HEENT - +soft 5x5cm circular hematoma without overlying skin breakdown to posterior scalp, EOMI, moist mucous membranes  Neck - supple, no appreciable c-spine tenderness, FROM  Resp - CTAB, no increased WOB  CV -  RRR, no m/r/g  Abd - soft, NT, ND; no guarding or rebound  Back - no midline tenderness  MSK - 5/5 strength and FROM b/l UE and LE, some baseline weakness to b/l hip with flexion  Extrem - no LE edema/erythema/tenderness  Neuro - no focal motor or sensation deficits

## 2021-12-15 NOTE — ED ADULT TRIAGE NOTE - CHIEF COMPLAINT QUOTE
Pt c/o loss of balance after trying to get into car without her walker and fell hitting head c/o right arm and shoulder pain.  No LOC.  Hx cervical spine fusion 2 years ago

## 2021-12-15 NOTE — ED PROVIDER NOTE - CLINICAL SUMMARY MEDICAL DECISION MAKING FREE TEXT BOX
71 year old female with history of asthma, C3-C4 spinal stenosis s/sp posterior spinal fusion in 2018, presenting s/p fall with posterior scalp pain. Appears well here, VS wnl, neurologically intact on exam without any midline tenderness. Will r/o ICH and vertebral fx with CT. Tylenol for pain. Anticipate dc.

## 2021-12-15 NOTE — ED PROVIDER NOTE - OBJECTIVE STATEMENT
71 year old female with history of asthma, C3-C4 spinal stenosis s/sp posterior spinal fusion in 2018, presenting s/p fall with posterior scalp pain. States she was getting into a car, attempted to swing her L leg into vehicle, "lost balance" and fell backwards, +headstrike to posterior scalp with beanie hat on, denies LOC/AC use. Reports having pain to back of head as well as mild b/l shoulder pain but denies neck pain, numbness, weakness, chest pain, abdominal pain, N/V/D.

## 2021-12-15 NOTE — ED ADULT NURSE NOTE - OBJECTIVE STATEMENT
Pt received AOx4, ambulatory w. walker at bedside w. pmhx of HTN, HLD, asthma, spinal fusion in 2018  presents to the ED s/p mechanical fall earlier today. Pt states she was trying to get into her car, fell backwards and hit the back of her head. Lump noted to the back of the head but no bleeding. Pt complaining of pain in the b/l shoulders and right buttocks radiating to the back of the thigh. Denies blood thinner usage. Denies LOC. Denies CP, SOB, NVD, abd pain, chills, fever, cough at this moment. Medicated per Md order.

## 2021-12-15 NOTE — ED PROVIDER NOTE - ATTENDING CONTRIBUTION TO CARE
71F with hx of Asthma, cervical spinal stenosis s/p fusion presents s/p mechanical fall. patient states she went to get into an SUV and missed her step and fell backwards onto the curb. no loc. ambulatory after fall. + posterior head swelling. no loc or neck pain. no weakness/numbness/tingling of arms/hands. takes ASA.    ***GEN - NAD; well appearing; A+O x3 ***HEAD - + soft tissue swelling to posterior right scalp. ***EYES/NOSE - PERRL, EOMI, mucous membranes moist, no discharge ***THROAT: Oral cavity and pharynx normal. No inflammation, swelling, exudate, or lesions.  ***NECK: Neck supple, non-tender without lymphadenopathy, no masses, no thyromegaly.   ***PULMONARY - CTA b/l, symmetric breath sounds. ***CARDIAC -s1s2, RRR, no M,G,R  ***ABDOMEN - +BS, ND, NT, soft, no guarding, no rebound, no masses   ***BACK - no CVA tenderness, Normal  spine ***EXTREMITIES - symmetric pulses, 2+ dp, capillary refill < 2 seconds, no clubbing, no cyanosis, no edema ***SKIN - no rash or bruising   ***NEUROLOGIC - alert, CN 2-12 intact. normal neuro exam.     MDM: 71F with head injury s/p mechanical fall. obtain CTH & c-spine. tylenol for pain.

## 2021-12-15 NOTE — ED PROVIDER NOTE - NSICDXPASTMEDICALHX_GEN_ALL_CORE_FT
PAST MEDICAL HISTORY:  Asthma     Carpal tunnel syndrome on left     Cataracts, bilateral     Cervical disc disease December 2017    Depression never hospitalized    Gastric ulcer     Glaucoma     Hypercholesterolemia     Hypertension     Lumbar stenosis     Obesity     Prediabetes diagnosed in early 2017, on diet control    Seasonal allergies     Sleep apnea supposed to use device at night but doesn't    Spinal stenosis in cervical region

## 2021-12-15 NOTE — ED PROVIDER NOTE - PATIENT PORTAL LINK FT
You can access the FollowMyHealth Patient Portal offered by [facility] by registering at the following website: [email or web address]. By joining [facility]’s FollowMyHealth portal, you will also be able to view your health information using other applications (apps) compatible with our system.

## 2021-12-15 NOTE — ED PROVIDER NOTE - NSICDXPASTSURGICALHX_GEN_ALL_CORE_FT
PAST SURGICAL HISTORY:  Arthropathy 2005, left hip replacement    Arthropathy 2006, left knee replacement    Arthropathy 2013, right knee replacement    S/P tonsillectomy

## 2021-12-15 NOTE — ED ADULT NURSE NOTE - INTERVENTIONS DEFINITIONS
[place] to call system/Call bell, personal items and telephone within reach/Instruct patient to call for assistance/Room bathroom lighting operational/Non-slip footwear when patient is off stretcher/Physically safe environment: no spills, clutter or unnecessary equipment/Stretcher in lowest position, wheels locked, appropriate side rails in place/Provide visual cue, wrist band, yellow gown, etc./Monitor gait and stability/Monitor for mental status changes and reorient to person, place, and time

## 2021-12-15 NOTE — ED ADULT TRIAGE NOTE - NSTRIAGECARE_GEN_A_ER
On arrival, pt found sitting in stretcher.  Per nurse who witnessed event, pt was being stood up when he wanted to sit back down.  When he sat back down, he slumped into the stretcher with his eyes rolled back.  However, he came to with vigorous sternal rub and is now cracking jokes with the RRT team.  On monitor, pt's BP initially read as 173/91 but was in the 130s/80s on repeated measurements.  FS >100.  SpO2 98% RA.  RR ~16, breathing comfortably.  Pt able to say there was a cloudiness that came over him prior to his episode, but denies feeling foggy afterwards.  It was noticed that he had lost control of his bladder and soiled his stretcher bedding.  Lungs were clear.  Neuro exam was non-focal, although pt was not able to fully narrate the reason why he's being admitted.  No tongue trauma.  Rhythm was NS on tele.
Face Mask/EKG
Postpartum state

## 2021-12-16 ENCOUNTER — APPOINTMENT (OUTPATIENT)
Dept: CARDIOLOGY | Facility: CLINIC | Age: 71
End: 2021-12-16
Payer: MEDICARE

## 2021-12-16 ENCOUNTER — NON-APPOINTMENT (OUTPATIENT)
Age: 71
End: 2021-12-16

## 2021-12-16 VITALS
SYSTOLIC BLOOD PRESSURE: 109 MMHG | OXYGEN SATURATION: 96 % | WEIGHT: 199 LBS | DIASTOLIC BLOOD PRESSURE: 70 MMHG | BODY MASS INDEX: 36.62 KG/M2 | HEART RATE: 77 BPM | HEIGHT: 62 IN

## 2021-12-16 PROCEDURE — 99203 OFFICE O/P NEW LOW 30 MIN: CPT

## 2021-12-16 PROCEDURE — 99213 OFFICE O/P EST LOW 20 MIN: CPT

## 2021-12-16 PROCEDURE — 93000 ELECTROCARDIOGRAM COMPLETE: CPT

## 2021-12-16 NOTE — HISTORY OF PRESENT ILLNESS
[FreeTextEntry1] : 71 year old woman with history of HTN HLD who was previously seeing Dr. Mirza for general cardiology but was transferred for further care.\par \par #HTN- on HCTZ 25 mg daily, Metoprolol 25 mg BID\par BP really controlled, continue present meds\par #HLD- On Atorvastatin 20 mg daily, continue present meds\par

## 2021-12-16 NOTE — DISCUSSION/SUMMARY
[FreeTextEntry1] : 71 year old woman with history of HTN HLD here to establish care. \par \par #HTN- on HCTZ 25 mg daily, Metoprolol 25 mg BID\par BP really controlled, continue present meds\par #HLD- On Atorvastatin 20 mg daily, continue present meds\par Lipids 9/20/21:\par  /HDL 75/LDL 95\par #FU in 6 months\par

## 2021-12-27 ENCOUNTER — APPOINTMENT (OUTPATIENT)
Dept: INTERNAL MEDICINE | Facility: CLINIC | Age: 71
End: 2021-12-27
Payer: MEDICARE

## 2021-12-27 VITALS
OXYGEN SATURATION: 95 % | SYSTOLIC BLOOD PRESSURE: 111 MMHG | HEIGHT: 62 IN | WEIGHT: 199 LBS | DIASTOLIC BLOOD PRESSURE: 73 MMHG | TEMPERATURE: 97.8 F | BODY MASS INDEX: 36.62 KG/M2 | HEART RATE: 75 BPM

## 2021-12-27 DIAGNOSIS — Z87.898 PERSONAL HISTORY OF OTHER SPECIFIED CONDITIONS: ICD-10-CM

## 2021-12-27 DIAGNOSIS — R29.6 REPEATED FALLS: ICD-10-CM

## 2021-12-27 PROCEDURE — 99214 OFFICE O/P EST MOD 30 MIN: CPT

## 2021-12-27 RX ORDER — MULTIVIT-MIN/FOLIC/VIT K/LYCOP 400-300MCG
50 MCG TABLET ORAL
Qty: 90 | Refills: 3 | Status: ACTIVE | COMMUNITY
Start: 2021-12-27

## 2021-12-27 NOTE — PHYSICAL EXAM
[No Acute Distress] : no acute distress [Well Nourished] : well nourished [Well Developed] : well developed [Supple] : supple [No Respiratory Distress] : no respiratory distress  [Clear to Auscultation] : lungs were clear to auscultation bilaterally [Normal Rate] : normal rate  [Regular Rhythm] : with a regular rhythm [Normal S1, S2] : normal S1 and S2 [No Edema] : there was no peripheral edema [Soft] : abdomen soft [Non Tender] : non-tender [Normal Bowel Sounds] : normal bowel sounds [No CVA Tenderness] : no CVA  tenderness [No Spinal Tenderness] : no spinal tenderness [No Joint Swelling] : no joint swelling [Grossly Normal Strength/Tone] : grossly normal strength/tone [Coordination Grossly Intact] : coordination grossly intact [No Focal Deficits] : no focal deficits [Speech Grossly Normal] : speech grossly normal [Normal Affect] : the affect was normal [Alert and Oriented x3] : oriented to person, place, and time [Normal Mood] : the mood was normal [de-identified] : Obese female in stated age,  [de-identified] : ambulate with rollator,

## 2021-12-27 NOTE — ASSESSMENT
[FreeTextEntry1] : Pt will return in 3/2022 for routine 6 month f/u and reminded to have labs done prior to OV.

## 2021-12-27 NOTE — END OF VISIT
[Time Spent: ___ minutes] : I have spent [unfilled] minutes of time on the encounter.
subjective fever three days ago

## 2021-12-27 NOTE — HISTORY OF PRESENT ILLNESS
[FreeTextEntry1] : Pt presented for follow up after a fall and ED visit in 12/15/2021. [de-identified] : She went to PT and when she leaving to go home, she tried to climb in a car and fell backward hitting her head.  She went to ED and had CT of head, which was negative.  She was then sent home.  She is better now and the pain from her fall has resolved.\par \par She had COVID vaccine and is trying to get the booster dose.\par \par She saw a new cardiologist recently, and will be going for an echocardiogram.

## 2021-12-28 ENCOUNTER — APPOINTMENT (OUTPATIENT)
Dept: OPHTHALMOLOGY | Facility: CLINIC | Age: 71
End: 2021-12-28
Payer: MEDICARE

## 2021-12-28 ENCOUNTER — NON-APPOINTMENT (OUTPATIENT)
Age: 71
End: 2021-12-28

## 2021-12-28 PROCEDURE — 92012 INTRM OPH EXAM EST PATIENT: CPT

## 2022-01-05 ENCOUNTER — APPOINTMENT (OUTPATIENT)
Dept: NEUROLOGY | Facility: CLINIC | Age: 72
End: 2022-01-05

## 2022-01-05 ENCOUNTER — APPOINTMENT (OUTPATIENT)
Dept: CV DIAGNOSITCS | Facility: HOSPITAL | Age: 72
End: 2022-01-05
Payer: MEDICARE

## 2022-01-05 ENCOUNTER — OUTPATIENT (OUTPATIENT)
Dept: OUTPATIENT SERVICES | Facility: HOSPITAL | Age: 72
LOS: 1 days | End: 2022-01-05

## 2022-01-05 DIAGNOSIS — I10 ESSENTIAL (PRIMARY) HYPERTENSION: ICD-10-CM

## 2022-01-05 DIAGNOSIS — Z90.89 ACQUIRED ABSENCE OF OTHER ORGANS: Chronic | ICD-10-CM

## 2022-01-05 DIAGNOSIS — M12.9 ARTHROPATHY, UNSPECIFIED: Chronic | ICD-10-CM

## 2022-01-05 PROCEDURE — 93306 TTE W/DOPPLER COMPLETE: CPT | Mod: 26

## 2022-03-28 ENCOUNTER — APPOINTMENT (OUTPATIENT)
Dept: INTERNAL MEDICINE | Facility: CLINIC | Age: 72
End: 2022-03-28
Payer: MEDICARE

## 2022-03-28 VITALS
WEIGHT: 199 LBS | BODY MASS INDEX: 36.62 KG/M2 | OXYGEN SATURATION: 97 % | SYSTOLIC BLOOD PRESSURE: 112 MMHG | TEMPERATURE: 97.7 F | HEART RATE: 76 BPM | HEIGHT: 62 IN | DIASTOLIC BLOOD PRESSURE: 71 MMHG

## 2022-03-28 VITALS — DIASTOLIC BLOOD PRESSURE: 74 MMHG | SYSTOLIC BLOOD PRESSURE: 134 MMHG

## 2022-03-28 DIAGNOSIS — Z87.898 PERSONAL HISTORY OF OTHER SPECIFIED CONDITIONS: ICD-10-CM

## 2022-03-28 DIAGNOSIS — R09.02 HYPOXEMIA: ICD-10-CM

## 2022-03-28 DIAGNOSIS — Z87.11 PERSONAL HISTORY OF PEPTIC ULCER DISEASE: ICD-10-CM

## 2022-03-28 DIAGNOSIS — Z99.81 HYPOXEMIA: ICD-10-CM

## 2022-03-28 DIAGNOSIS — Z12.39 ENCOUNTER FOR OTHER SCREENING FOR MALIGNANT NEOPLASM OF BREAST: ICD-10-CM

## 2022-03-28 DIAGNOSIS — M54.9 DORSALGIA, UNSPECIFIED: ICD-10-CM

## 2022-03-28 DIAGNOSIS — T16.2XXA FOREIGN BODY IN LEFT EAR, INITIAL ENCOUNTER: ICD-10-CM

## 2022-03-28 PROCEDURE — 99214 OFFICE O/P EST MOD 30 MIN: CPT

## 2022-03-28 RX ORDER — DORZOLAMIDE HYDROCHLORIDE 20 MG/ML
2 SOLUTION OPHTHALMIC TWICE DAILY
Qty: 3 | Refills: 0 | Status: COMPLETED | COMMUNITY
Start: 2019-04-11 | End: 2022-03-28

## 2022-03-28 NOTE — HISTORY OF PRESENT ILLNESS
[FreeTextEntry1] : Pt presented for 3  month f/u of HTN, HLD and glucose intolerance.  She has not have labs done yet. [de-identified] : She ran out of Sucralfate because the copay for it has gone up.  She will f/u with GI regarding medication.\par \par Pt will be seeing rheumatologist soon, and is still on Prednisone and Cymbalta, but has to take Tramadol as needed.\par \par She got a notice that she needs to repeat mammogram.\par \par She is seeing dentist.\par \par Pt was well and did not get sick throughout the COVID pandemic.  She had 3 doses of COVID vaccine.  She was UTD with flu anc pneumonia vaccine.\par \par She did not fall again since last OV.

## 2022-03-28 NOTE — PHYSICAL EXAM
[No Acute Distress] : no acute distress [Well Nourished] : well nourished [Well Developed] : well developed [Supple] : supple [No Respiratory Distress] : no respiratory distress  [Clear to Auscultation] : lungs were clear to auscultation bilaterally [Normal Rate] : normal rate  [Regular Rhythm] : with a regular rhythm [Normal S1, S2] : normal S1 and S2 [No Edema] : there was no peripheral edema [Soft] : abdomen soft [Non Tender] : non-tender [Normal Bowel Sounds] : normal bowel sounds [Normal Supraclavicular Nodes] : no supraclavicular lymphadenopathy [Normal Posterior Cervical Nodes] : no posterior cervical lymphadenopathy [Normal Anterior Cervical Nodes] : no anterior cervical lymphadenopathy [No CVA Tenderness] : no CVA  tenderness [No Spinal Tenderness] : no spinal tenderness [No Joint Swelling] : no joint swelling [Grossly Normal Strength/Tone] : grossly normal strength/tone [Speech Grossly Normal] : speech grossly normal [Normal Affect] : the affect was normal [Alert and Oriented x3] : oriented to person, place, and time [Normal Mood] : the mood was normal [de-identified] : Obese female in stated age,  [de-identified] : Ambulate with rollator,

## 2022-04-01 ENCOUNTER — APPOINTMENT (OUTPATIENT)
Dept: NEUROLOGY | Facility: CLINIC | Age: 72
End: 2022-04-01

## 2022-04-15 ENCOUNTER — APPOINTMENT (OUTPATIENT)
Dept: ULTRASOUND IMAGING | Facility: IMAGING CENTER | Age: 72
End: 2022-04-15
Payer: MEDICARE

## 2022-04-15 ENCOUNTER — OUTPATIENT (OUTPATIENT)
Dept: OUTPATIENT SERVICES | Facility: HOSPITAL | Age: 72
LOS: 1 days | End: 2022-04-15
Payer: MEDICARE

## 2022-04-15 ENCOUNTER — RESULT REVIEW (OUTPATIENT)
Age: 72
End: 2022-04-15

## 2022-04-15 ENCOUNTER — APPOINTMENT (OUTPATIENT)
Dept: MAMMOGRAPHY | Facility: IMAGING CENTER | Age: 72
End: 2022-04-15
Payer: MEDICARE

## 2022-04-15 DIAGNOSIS — Z12.39 ENCOUNTER FOR OTHER SCREENING FOR MALIGNANT NEOPLASM OF BREAST: ICD-10-CM

## 2022-04-15 DIAGNOSIS — M12.9 ARTHROPATHY, UNSPECIFIED: Chronic | ICD-10-CM

## 2022-04-15 DIAGNOSIS — Z90.89 ACQUIRED ABSENCE OF OTHER ORGANS: Chronic | ICD-10-CM

## 2022-04-15 PROCEDURE — 77067 SCR MAMMO BI INCL CAD: CPT

## 2022-04-15 PROCEDURE — 77063 BREAST TOMOSYNTHESIS BI: CPT | Mod: 26

## 2022-04-15 PROCEDURE — 77067 SCR MAMMO BI INCL CAD: CPT | Mod: 26

## 2022-04-15 PROCEDURE — 77063 BREAST TOMOSYNTHESIS BI: CPT

## 2022-04-27 NOTE — H&P PST ADULT - NEGATIVE GENERAL GENITOURINARY SYMPTOMS
Infant has roomed in with mother this shift except for circumcision. Benefits of rooming in discussed. no renal colic/no dysuria/no hematuria/no bladder infections/normal urinary frequency

## 2022-04-28 ENCOUNTER — NON-APPOINTMENT (OUTPATIENT)
Age: 72
End: 2022-04-28

## 2022-04-28 ENCOUNTER — APPOINTMENT (OUTPATIENT)
Dept: OPHTHALMOLOGY | Facility: CLINIC | Age: 72
End: 2022-04-28
Payer: MEDICARE

## 2022-04-28 PROCEDURE — 92012 INTRM OPH EXAM EST PATIENT: CPT

## 2022-04-28 PROCEDURE — 92133 CPTRZD OPH DX IMG PST SGM ON: CPT

## 2022-05-13 ENCOUNTER — NON-APPOINTMENT (OUTPATIENT)
Age: 72
End: 2022-05-13

## 2022-06-14 ENCOUNTER — RESULT CHARGE (OUTPATIENT)
Age: 72
End: 2022-06-14

## 2022-06-17 ENCOUNTER — NON-APPOINTMENT (OUTPATIENT)
Age: 72
End: 2022-06-17

## 2022-06-17 ENCOUNTER — APPOINTMENT (OUTPATIENT)
Dept: CARDIOLOGY | Facility: CLINIC | Age: 72
End: 2022-06-17
Payer: MEDICARE

## 2022-06-17 VITALS
OXYGEN SATURATION: 96 % | SYSTOLIC BLOOD PRESSURE: 115 MMHG | HEART RATE: 70 BPM | BODY MASS INDEX: 37.54 KG/M2 | WEIGHT: 204 LBS | HEIGHT: 62 IN | DIASTOLIC BLOOD PRESSURE: 72 MMHG

## 2022-06-17 PROCEDURE — 99213 OFFICE O/P EST LOW 20 MIN: CPT | Mod: 25

## 2022-06-17 PROCEDURE — 93000 ELECTROCARDIOGRAM COMPLETE: CPT

## 2022-06-17 NOTE — HISTORY OF PRESENT ILLNESS
[FreeTextEntry1] : 71 year old woman with history of HTN HLD \par \par #HTN- on HCTZ 25 mg daily, Metoprolol 25 mg BID\par BP really controlled, continue present meds\par #HLD- On Atorvastatin 20 mg daily, continue present meds\par

## 2022-06-17 NOTE — DISCUSSION/SUMMARY
[FreeTextEntry1] : 71 year old woman with history of HTN HLD here for followup \par \par #HTN- on HCTZ 25 mg daily, Metoprolol 25 mg BID\par BP really controlled, continue present meds\par #HLD- On Atorvastatin 20 mg daily, continue present meds\par Lipids 9/20/21:\par  /HDL 75/LDL 95\par #FU in 6 months\par

## 2022-06-23 LAB
ALBUMIN SERPL ELPH-MCNC: 4 G/DL
ALP BLD-CCNC: 109 U/L
ALT SERPL-CCNC: 14 U/L
ANION GAP SERPL CALC-SCNC: 9 MMOL/L
AST SERPL-CCNC: 20 U/L
BASOPHILS # BLD AUTO: 0.02 K/UL
BASOPHILS NFR BLD AUTO: 0.2 %
BILIRUB SERPL-MCNC: 0.4 MG/DL
BUN SERPL-MCNC: 18 MG/DL
CALCIUM SERPL-MCNC: 9.3 MG/DL
CHLORIDE SERPL-SCNC: 98 MMOL/L
CHOLEST SERPL-MCNC: 208 MG/DL
CK SERPL-CCNC: 103 U/L
CO2 SERPL-SCNC: 35 MMOL/L
CREAT SERPL-MCNC: 0.95 MG/DL
EGFR: 64 ML/MIN/1.73M2
EOSINOPHIL # BLD AUTO: 0.37 K/UL
EOSINOPHIL NFR BLD AUTO: 4.2 %
GLUCOSE SERPL-MCNC: 100 MG/DL
HCT VFR BLD CALC: 39 %
HDLC SERPL-MCNC: 76 MG/DL
HGB BLD-MCNC: 12.2 G/DL
IMM GRANULOCYTES NFR BLD AUTO: 0.2 %
LDLC SERPL CALC-MCNC: 105 MG/DL
LYMPHOCYTES # BLD AUTO: 1.77 K/UL
LYMPHOCYTES NFR BLD AUTO: 20.3 %
MAN DIFF?: NORMAL
MCHC RBC-ENTMCNC: 29.7 PG
MCHC RBC-ENTMCNC: 31.3 GM/DL
MCV RBC AUTO: 94.9 FL
MONOCYTES # BLD AUTO: 0.62 K/UL
MONOCYTES NFR BLD AUTO: 7.1 %
NEUTROPHILS # BLD AUTO: 5.94 K/UL
NEUTROPHILS NFR BLD AUTO: 68 %
NONHDLC SERPL-MCNC: 132 MG/DL
PLATELET # BLD AUTO: 256 K/UL
POTASSIUM SERPL-SCNC: 4.2 MMOL/L
PROT SERPL-MCNC: 7.4 G/DL
RBC # BLD: 4.11 M/UL
RBC # FLD: 14.4 %
SODIUM SERPL-SCNC: 143 MMOL/L
TRIGL SERPL-MCNC: 136 MG/DL
WBC # FLD AUTO: 8.74 K/UL

## 2022-06-24 LAB
ESTIMATED AVERAGE GLUCOSE: 134 MG/DL
HBA1C MFR BLD HPLC: 6.3 %

## 2022-06-27 ENCOUNTER — APPOINTMENT (OUTPATIENT)
Dept: NEUROLOGY | Facility: CLINIC | Age: 72
End: 2022-06-27

## 2022-06-27 VITALS
HEART RATE: 82 BPM | HEIGHT: 62 IN | BODY MASS INDEX: 37.91 KG/M2 | DIASTOLIC BLOOD PRESSURE: 74 MMHG | SYSTOLIC BLOOD PRESSURE: 125 MMHG | WEIGHT: 206 LBS

## 2022-06-27 DIAGNOSIS — R51.9 HEADACHE, UNSPECIFIED: ICD-10-CM

## 2022-06-27 DIAGNOSIS — G93.2 BENIGN INTRACRANIAL HYPERTENSION: ICD-10-CM

## 2022-06-27 PROCEDURE — 99214 OFFICE O/P EST MOD 30 MIN: CPT

## 2022-06-28 ENCOUNTER — RX RENEWAL (OUTPATIENT)
Age: 72
End: 2022-06-28

## 2022-06-28 ENCOUNTER — APPOINTMENT (OUTPATIENT)
Dept: INTERNAL MEDICINE | Facility: CLINIC | Age: 72
End: 2022-06-28
Payer: MEDICARE

## 2022-06-28 VITALS
SYSTOLIC BLOOD PRESSURE: 113 MMHG | TEMPERATURE: 97 F | HEART RATE: 71 BPM | WEIGHT: 206 LBS | DIASTOLIC BLOOD PRESSURE: 72 MMHG | HEIGHT: 62 IN | BODY MASS INDEX: 37.91 KG/M2 | OXYGEN SATURATION: 96 %

## 2022-06-28 VITALS — DIASTOLIC BLOOD PRESSURE: 74 MMHG | SYSTOLIC BLOOD PRESSURE: 122 MMHG

## 2022-06-28 PROCEDURE — 99214 OFFICE O/P EST MOD 30 MIN: CPT

## 2022-06-28 RX ORDER — PREDNISONE 5 MG/1
5 TABLET ORAL DAILY
Refills: 0 | Status: COMPLETED | COMMUNITY
End: 2022-06-28

## 2022-06-28 NOTE — HISTORY OF PRESENT ILLNESS
[FreeTextEntry1] : Pt presented for 3 month f/u of HTN, HLD & glucose intolerance. [de-identified] : She saw the neurologist yesterday, for gait instability, and also c/o severe HA last week that lasted a couple of seconds.  She is being sent for labs to check ESR/CRP and MRI of brain.  She was introduced to the  and will help her get a HHA.\par \par Pt also saw cardiologist last week, and checked out ok, so no change of meds.\par \par She continues to have some ankle swelling, and they usually gets better in the morning.\par \par She was on Prednisone, and dose reduced by rheumatologist from 5 mg 1 tab to 1/2 tab daily.  She is still on Tramadol 3x per day.

## 2022-07-01 NOTE — HISTORY OF PRESENT ILLNESS
[FreeTextEntry1] : 71 yo woman with gait instability, which is likely multifactorial (cervical and lumbar spinal stenosis, hip and knee osteoarthritis s/p multiple joint replacements). No findings on exam suggestive of parkinsonism, stroke, or hydrocephalus. \par \par Old lacunar infarct on brain MRI, and chronic ischemic white matter changes.\par \par Last week, she had a sharp left temporal headache which lasted about 5 minutes, but was severe.  She has been having intermittent frontal headaches as well over the past 4 months.  No visual disturbances, but +left jaw pain.

## 2022-07-01 NOTE — ASSESSMENT
[FreeTextEntry1] : 71 yo woman with new onset chronic intermittent temporal headaches.\par \par Plan:\par 1. MRI brain\par 2. ESR, CRP\par 3. OTC medication prn headache onset\par 4. Patient agrees with plan.\par 5. Follow up after testing completed.\par \par Mauri Guevara MD\par [facility]\par \par Greater than 50% of the encounter was spent on counseling and coordination of care discussing differential diagnosis, diagnostic testing, and treatment options. We have talked about appropriate follow up, and I have spent 25 minutes of face to face time with the patient.\par

## 2022-07-01 NOTE — PHYSICAL EXAM
[FreeTextEntry1] : \par No temporal artery tenderness or induration.\par Awake, alert, oriented x 3. Language fluent. Comprehension intact. Naming intact. Repetition intact. Affect normal. Cranial nerves grossly intact. Motor exam: normal bulk, normal tone, 5/5 in UEs bilaterally, 4+/5 bilateral hip flexion, 5/5 in distal LEs. No tremors or fasciculations. Sensory exam: intact to LT/vibration. DTRs: 2+ throughout, flexor plantar response bilaterally, no clonus. Coordination: no dysmetria. Gait: can ambulate independently but uses walker as needed, slow, cautious gait, normal steady turns, normal stride length, normal base. Romberg - negative.

## 2022-07-06 RX ORDER — FLUTICASONE FUROATE AND VILANTEROL TRIFENATATE 100; 25 UG/1; UG/1
100-25 POWDER RESPIRATORY (INHALATION)
Qty: 1 | Refills: 2 | Status: DISCONTINUED | COMMUNITY
Start: 2021-09-24 | End: 2022-07-06

## 2022-07-15 ENCOUNTER — NON-APPOINTMENT (OUTPATIENT)
Age: 72
End: 2022-07-15

## 2022-07-15 ENCOUNTER — APPOINTMENT (OUTPATIENT)
Dept: MRI IMAGING | Facility: CLINIC | Age: 72
End: 2022-07-15

## 2022-07-15 LAB
CRP SERPL-MCNC: 14 MG/L
ERYTHROCYTE [SEDIMENTATION RATE] IN BLOOD BY WESTERGREN METHOD: 50 MM/HR

## 2022-07-18 ENCOUNTER — APPOINTMENT (OUTPATIENT)
Dept: ORTHOPEDIC SURGERY | Facility: CLINIC | Age: 72
End: 2022-07-18

## 2022-07-18 VITALS — BODY MASS INDEX: 37.91 KG/M2 | HEIGHT: 62 IN | WEIGHT: 206 LBS

## 2022-07-18 DIAGNOSIS — M43.16 SPONDYLOLISTHESIS, LUMBAR REGION: ICD-10-CM

## 2022-07-18 PROCEDURE — 72040 X-RAY EXAM NECK SPINE 2-3 VW: CPT

## 2022-07-18 PROCEDURE — 99214 OFFICE O/P EST MOD 30 MIN: CPT

## 2022-07-22 ENCOUNTER — APPOINTMENT (OUTPATIENT)
Dept: MRI IMAGING | Facility: IMAGING CENTER | Age: 72
End: 2022-07-22

## 2022-07-22 ENCOUNTER — OUTPATIENT (OUTPATIENT)
Dept: OUTPATIENT SERVICES | Facility: HOSPITAL | Age: 72
LOS: 1 days | End: 2022-07-22
Payer: MEDICARE

## 2022-07-22 DIAGNOSIS — Z90.89 ACQUIRED ABSENCE OF OTHER ORGANS: Chronic | ICD-10-CM

## 2022-07-22 DIAGNOSIS — M12.9 ARTHROPATHY, UNSPECIFIED: Chronic | ICD-10-CM

## 2022-07-22 DIAGNOSIS — Z00.8 ENCOUNTER FOR OTHER GENERAL EXAMINATION: ICD-10-CM

## 2022-07-22 DIAGNOSIS — R51.9 HEADACHE, UNSPECIFIED: ICD-10-CM

## 2022-07-22 PROCEDURE — 70551 MRI BRAIN STEM W/O DYE: CPT

## 2022-07-22 PROCEDURE — 70551 MRI BRAIN STEM W/O DYE: CPT | Mod: 26

## 2022-07-29 ENCOUNTER — APPOINTMENT (OUTPATIENT)
Dept: PULMONOLOGY | Facility: CLINIC | Age: 72
End: 2022-07-29

## 2022-07-29 VITALS
HEART RATE: 82 BPM | HEIGHT: 62 IN | TEMPERATURE: 97.2 F | BODY MASS INDEX: 37.36 KG/M2 | SYSTOLIC BLOOD PRESSURE: 118 MMHG | DIASTOLIC BLOOD PRESSURE: 71 MMHG | WEIGHT: 203 LBS | OXYGEN SATURATION: 94 % | RESPIRATION RATE: 17 BRPM

## 2022-07-29 DIAGNOSIS — G47.34 IDIOPATHIC SLEEP RELATED NONOBSTRUCTIVE ALVEOLAR HYPOVENTILATION: ICD-10-CM

## 2022-07-29 PROCEDURE — 99214 OFFICE O/P EST MOD 30 MIN: CPT

## 2022-07-29 NOTE — END OF VISIT
[FreeTextEntry3] : I, Dr. Stephanie Faust, personally performed the evaluation and management (E/M) services for this established patient who presents today with (a) new problem(s)/exacerbation of (an) existing condition(s).  That E/M includes conducting the examination, assessing all new/exacerbated conditions, and establishing a new plan of care.  Today, Sammi Moseley ACP, was here to observe my evaluation and management services for this new problem/exacerbated condition to be followed going forward.\par \par Nocturnal hypoxemia is significant. Reinforced with pt need for nocturnal pulse oximetry. asthma controlled on current regimen. f/u after nocturnal pulse ox testing. \par \par 40 minutes time spent for patient education related to comorbidities and medications, medical records/labs/radiology reviews, preventative care, documentation, coordination of care.\par

## 2022-07-29 NOTE — REVIEW OF SYSTEMS
[Fatigue] : fatigue [Poor Appetite] : poor appetite [Postnasal Drip] : postnasal drip [Sinus Problems] : sinus problems [GERD] : gerd [Nocturia] : nocturia [Arthralgias] : arthralgias [Obesity] : obesity [Negative] : Psychiatric [Epistaxis] : no epistaxis [Sore Throat] : no sore throat [Nasal Congestion] : no nasal congestion [Dry Mouth] : no dry mouth [Cough] : no cough [Chest Tightness] : no chest tightness [Sputum] : no sputum [Dyspnea] : no dyspnea [Wheezing] : no wheezing [SOB on Exertion] : no sob on exertion [TextBox_30] : snoring [TextBox_94] : h [TextBox_122] : gait instability, uses rollator for ambulation

## 2022-07-29 NOTE — PHYSICAL EXAM
[No Acute Distress] : no acute distress [IV] : Mallampati Class: IV [Normal Appearance] : normal appearance [No Neck Mass] : no neck mass [Normal Rate/Rhythm] : normal rate/rhythm [Normal S1, S2] : normal s1, s2 [No Murmurs] : no murmurs [No Resp Distress] : no resp distress [Clear to Auscultation Bilaterally] : clear to auscultation bilaterally [No Abnormalities] : no abnormalities [Benign] : benign [No Clubbing] : no clubbing [No Cyanosis] : no cyanosis [No Edema] : no edema [Normal Color/ Pigmentation] : normal color/ pigmentation [No Focal Deficits] : no focal deficits [Oriented x3] : oriented x3 [Normal Affect] : normal affect [TextBox_99] : ambulates with rollator

## 2022-07-29 NOTE — HISTORY OF PRESENT ILLNESS
[TextBox_4] : 71 y/o F non smoker, history of obesity, HTN, hypercholesteremia, s/p spinal fusion C3-C7 (2/2018), OA s/p left hip surgery and left shoulder problem/pain, PMR, asthma and severe CHANTELL. Denies hx of COVID infection. \par \par Asthma: Diagnosed 20+ years ago\par Was on Advair 500, tapered down to 250. Tolerated frequent stops of maintenance inhaler d/t high OOP costs. Was given BREO samples, but could not afford rx. In the past pt did not noticed difference in symptoms on/off ICS/LABA. \par On daily prednisone 2.5 mg for PMR managed by her Rheumatologist.  \par \par She presents today after recently restarting Symbicort BID. She is tolerating the inhaler well and has not required Albuterol in over a week. Endorses improvement in nighttime cough and mucus production at night. Denies chronic cough, wheeze, chest tightness, SOB, chest pain, palpitations, fever, chills, LE swelling. \par \par CHANTELL: Severe (AHI 80) dx'ed 3/29/2010\par Underwent PAP titration in lab on 6/16/19 with frequent desaturations in oxygen. Pt was started on therapy, used very briefly and device was taken back d/t non-compliance. Was on APAP 6/16 cmH2O. Did not like variety of face masks. Pt refused APAP after numerous discussions. Patient has dentures and would not qualify for dental device. Saw neuro who identified old lacunar infarct and started her on ASA 81 mg. Has not completed overnight oximetry. \par Endorses sleep talking, snoring, and feeling unrefreshed upon awakening. ESS today 8.\par Sleep Schedule: Goes to bed around 12am, wakes up 1-2 times to void, up at 430 am to feed her cats. Goes back to sleep from 6am-10am. No daytime naps/sleep. TST ~8 hours. \par \par DME: [facility]\par PND: an Azelastine (Flonase) \par GERD: Well controlled on Famotidine

## 2022-08-01 ENCOUNTER — NON-APPOINTMENT (OUTPATIENT)
Age: 72
End: 2022-08-01

## 2022-08-14 ENCOUNTER — NON-APPOINTMENT (OUTPATIENT)
Age: 72
End: 2022-08-14

## 2022-08-14 ENCOUNTER — TRANSCRIPTION ENCOUNTER (OUTPATIENT)
Age: 72
End: 2022-08-14

## 2022-08-17 ENCOUNTER — APPOINTMENT (OUTPATIENT)
Dept: SLEEP CENTER | Facility: CLINIC | Age: 72
End: 2022-08-17

## 2022-08-24 ENCOUNTER — LABORATORY RESULT (OUTPATIENT)
Age: 72
End: 2022-08-24

## 2022-08-24 LAB
25(OH)D3 SERPL-MCNC: 48.9 NG/ML
ALBUMIN SERPL ELPH-MCNC: 3.9 G/DL
ALP BLD-CCNC: 111 U/L
ALT SERPL-CCNC: 17 U/L
ANION GAP SERPL CALC-SCNC: 13 MMOL/L
AST SERPL-CCNC: 19 U/L
BASOPHILS # BLD AUTO: 0.02 K/UL
BASOPHILS NFR BLD AUTO: 0.3 %
BILIRUB SERPL-MCNC: 0.4 MG/DL
BUN SERPL-MCNC: 19 MG/DL
CALCIUM SERPL-MCNC: 9.3 MG/DL
CHLORIDE SERPL-SCNC: 99 MMOL/L
CHOLEST SERPL-MCNC: 201 MG/DL
CK SERPL-CCNC: 109 U/L
CO2 SERPL-SCNC: 29 MMOL/L
CREAT SERPL-MCNC: 1.07 MG/DL
CRP SERPL-MCNC: 9 MG/L
EGFR: 55 ML/MIN/1.73M2
EOSINOPHIL # BLD AUTO: 0.21 K/UL
EOSINOPHIL NFR BLD AUTO: 2.8 %
ERYTHROCYTE [SEDIMENTATION RATE] IN BLOOD BY WESTERGREN METHOD: 79 MM/HR
GLUCOSE SERPL-MCNC: 106 MG/DL
HCT VFR BLD CALC: 37.2 %
HDLC SERPL-MCNC: 75 MG/DL
HGB BLD-MCNC: 11.6 G/DL
IMM GRANULOCYTES NFR BLD AUTO: 0.1 %
LDLC SERPL CALC-MCNC: 104 MG/DL
LYMPHOCYTES # BLD AUTO: 1.78 K/UL
LYMPHOCYTES NFR BLD AUTO: 23.5 %
MAN DIFF?: NORMAL
MCHC RBC-ENTMCNC: 29.4 PG
MCHC RBC-ENTMCNC: 31.2 GM/DL
MCV RBC AUTO: 94.2 FL
MONOCYTES # BLD AUTO: 0.65 K/UL
MONOCYTES NFR BLD AUTO: 8.6 %
NEUTROPHILS # BLD AUTO: 4.92 K/UL
NEUTROPHILS NFR BLD AUTO: 64.7 %
NONHDLC SERPL-MCNC: 126 MG/DL
PLATELET # BLD AUTO: 243 K/UL
POTASSIUM SERPL-SCNC: 3.8 MMOL/L
PROT SERPL-MCNC: 7.3 G/DL
RBC # BLD: 3.95 M/UL
RBC # FLD: 14.5 %
SODIUM SERPL-SCNC: 141 MMOL/L
T3RU NFR SERPL: 1 TBI
T4 FREE SERPL-MCNC: 1.1 NG/DL
TRIGL SERPL-MCNC: 110 MG/DL
TSH SERPL-ACNC: 1.4 UIU/ML
WBC # FLD AUTO: 7.59 K/UL

## 2022-08-25 LAB
ANA SER IF-ACNC: NEGATIVE
ESTIMATED AVERAGE GLUCOSE: 140 MG/DL
HBA1C MFR BLD HPLC: 6.5 %

## 2022-08-26 ENCOUNTER — RX RENEWAL (OUTPATIENT)
Age: 72
End: 2022-08-26

## 2022-09-12 ENCOUNTER — RESULT REVIEW (OUTPATIENT)
Age: 72
End: 2022-09-12

## 2022-09-12 ENCOUNTER — APPOINTMENT (OUTPATIENT)
Dept: INTERNAL MEDICINE | Facility: CLINIC | Age: 72
End: 2022-09-12

## 2022-09-12 VITALS — DIASTOLIC BLOOD PRESSURE: 62 MMHG | SYSTOLIC BLOOD PRESSURE: 108 MMHG

## 2022-09-12 VITALS
TEMPERATURE: 97.4 F | OXYGEN SATURATION: 97 % | HEART RATE: 84 BPM | BODY MASS INDEX: 37.17 KG/M2 | DIASTOLIC BLOOD PRESSURE: 70 MMHG | HEIGHT: 62 IN | SYSTOLIC BLOOD PRESSURE: 110 MMHG | WEIGHT: 202 LBS

## 2022-09-12 DIAGNOSIS — D72.10 EOSINOPHILIA, UNSPECIFIED: ICD-10-CM

## 2022-09-12 DIAGNOSIS — Z87.828 PERSONAL HISTORY OF OTHER (HEALED) PHYSICAL INJURY AND TRAUMA: ICD-10-CM

## 2022-09-12 PROCEDURE — G0008: CPT

## 2022-09-12 PROCEDURE — G0444 DEPRESSION SCREEN ANNUAL: CPT | Mod: 59

## 2022-09-12 PROCEDURE — G0439: CPT

## 2022-09-12 PROCEDURE — 90662 IIV NO PRSV INCREASED AG IM: CPT

## 2022-09-12 NOTE — REVIEW OF SYSTEMS
[Recent Change In Weight] : ~T recent weight change [Lower Ext Edema] : lower extremity edema [Dyspnea on Exertion] : dyspnea on exertion [Constipation] : constipation [Nocturia] : nocturia [Frequency] : frequency [Joint Pain] : joint pain [Joint Stiffness] : joint stiffness [Unsteady Walking] : ataxia [Negative] : Heme/Lymph [Fever] : no fever [Chills] : no chills [Fatigue] : no fatigue [Chest Pain] : no chest pain [Palpitations] : no palpitations [Shortness Of Breath] : no shortness of breath [Wheezing] : no wheezing [Cough] : no cough [Abdominal Pain] : no abdominal pain [Nausea] : no nausea [Diarrhea] : diarrhea [Vomiting] : no vomiting [Heartburn] : no heartburn [Melena] : no melena [Dysuria] : no dysuria [Incontinence] : no incontinence [Hematuria] : no hematuria [Itching] : no itching [Mole Changes] : no mole changes [Skin Rash] : no skin rash [Headache] : no headache [Dizziness] : no dizziness [Fainting] : no fainting [Insomnia] : no insomnia [Anxiety] : no anxiety [Depression] : no depression [FreeTextEntry2] : She  [FreeTextEntry3] : Wears reading glasses, [FreeTextEntry5] : occ ankle swelling if she stays on her feet more,  [FreeTextEntry6] : Has chronic TRENT, no change [FreeTextEntry7] : Constipation is better with Benefiber,  [FreeTextEntry8] : Nocturia of 1 X per night,  [FreeTextEntry9] : pt is on Tramadol and Prednisone, as in HPI,  [de-identified] : Uses cane at home, and walker when she goes out, fell a couple of time, and is doing PT, has carpal tunnel syndrome, [de-identified] : Has CHANTELL, but not using CPAP, and was advised to consider wearing oxygen at night, pt has to make apt,

## 2022-09-12 NOTE — PHYSICAL EXAM
[No Acute Distress] : no acute distress [Well Nourished] : well nourished [Well Developed] : well developed [Normal Sclera/Conjunctiva] : normal sclera/conjunctiva [PERRL] : pupils equal round and reactive to light [EOMI] : extraocular movements intact [Normal Outer Ear/Nose] : the outer ears and nose were normal in appearance [Normal Oropharynx] : the oropharynx was normal [Normal TMs] : both tympanic membranes were normal [Normal Nasal Mucosa] : the nasal mucosa was normal [No JVD] : no jugular venous distention [No Lymphadenopathy] : no lymphadenopathy [Supple] : supple [No Respiratory Distress] : no respiratory distress  [Clear to Auscultation] : lungs were clear to auscultation bilaterally [Normal Rate] : normal rate  [Regular Rhythm] : with a regular rhythm [Normal S1, S2] : normal S1 and S2 [No Carotid Bruits] : no carotid bruits [Pedal Pulses Present] : the pedal pulses are present [No Extremity Clubbing/Cyanosis] : no extremity clubbing/cyanosis [No Masses] : no palpable masses [No Nipple Discharge] : no nipple discharge [Soft] : abdomen soft [Non Tender] : non-tender [Non-distended] : non-distended [Normal Bowel Sounds] : normal bowel sounds [Normal Supraclavicular Nodes] : no supraclavicular lymphadenopathy [Normal Axillary Nodes] : no axillary lymphadenopathy [Normal Posterior Cervical Nodes] : no posterior cervical lymphadenopathy [Normal Anterior Cervical Nodes] : no anterior cervical lymphadenopathy [No CVA Tenderness] : no CVA  tenderness [No Spinal Tenderness] : no spinal tenderness [No Joint Swelling] : no joint swelling [Grossly Normal Strength/Tone] : grossly normal strength/tone [No Rash] : no rash [Coordination Grossly Intact] : coordination grossly intact [No Focal Deficits] : no focal deficits [Speech Grossly Normal] : speech grossly normal [Normal Affect] : the affect was normal [Alert and Oriented x3] : oriented to person, place, and time [Normal Mood] : the mood was normal [Normal Appearance] : normal in appearance [No Axillary Lymphadenopathy] : no axillary lymphadenopathy [Declined Rectal Exam] : declined rectal exam [de-identified] : Obese female in stated age,  [de-identified] : B/l 1-2 + pitting edema, no calf tenderness, [FreeTextEntry1] : deferred, pt declined as she  had stool incontinence today after drinking cold milk, [de-identified] : presented to office today with rollator,

## 2022-09-12 NOTE — HISTORY OF PRESENT ILLNESS
[FreeTextEntry1] : Pt presented for PE.  Last PE was 1 year ago. [de-identified] : Pt noted that she has itch in 8/2022, and went to urgent care center and it was felt that it may be due to change of soap, and she was given Triamcinolone lotion, it's improving after 2 weeks.\par \par Pt also ran out of Tramadol, and she was not able to get refills because it was a Friday, and so she took OTC Tylenol and such, and so she eventually developed withdrawal symptoms.  She has f/u with rheumatologist and finally was able to get back on Tramadol, and is better now.  Prednisone is being reduced to 2.5 mg daily.\par \par Pt was well and did not get sick throughout the COVID pandemic.  She's had 4 doses of COVID vaccine already.\par \par Pt saw podiatrist for foot pain, she had some Cortisone shot, she was given Etodolac.  She took it twice and it hurt her stomach, and so she stopped it.  the Cortisone injection helped with pain.\par \par Pt would like to have flu vaccine today.

## 2022-09-12 NOTE — ASSESSMENT
[FreeTextEntry1] : Patient is overdue for all HCM tests, she was given prescription to repeat mammogram.  She declined Pap smear and may consider colonoscopy and will follow-up with GI.  Patient was reminded to have routine eye exam and dental care.

## 2022-09-12 NOTE — HEALTH RISK ASSESSMENT
[Yes] : Yes [No] : In the past 12 months have you used drugs other than those required for medical reasons? No [Two or more falls in past year] : Patient reported two or more falls in the past year [0] : 2) Feeling down, depressed, or hopeless: Not at all (0) [Patient declined PAP Smear] : Patient declined PAP Smear [Patient declined colonoscopy] : Patient declined colonoscopy [None] : None [With Family] : lives with family [# of Members in Household ___] :  household currently consist of [unfilled] member(s) [Retired] : retired [College] : College [Single] : single [# Of Children ___] : has [unfilled] children [Feels Safe at Home] : Feels safe at home [Fully functional (bathing, dressing, toileting, transferring, walking, feeding)] : Fully functional (bathing, dressing, toileting, transferring, walking, feeding) [Smoke Detector] : smoke detector [Seat Belt] :  uses seat belt [With Patient/Caregiver] : , with patient/caregiver [Relationship: ___] : Relationship: [unfilled] [Good] : ~his/her~ current health as good [Fair] :  ~his/her~ mood as fair [Never] : Never [Monthly or less (1 pt)] : Monthly or less (1 point) [1 or 2 (0 pts)] : 1 or 2 (0 points) [Never (0 pts)] : Never (0 points) [PHQ-2 Negative - No further assessment needed] : PHQ-2 Negative - No further assessment needed [Carbon Monoxide Detector] : carbon monoxide detector [Audit-CScore] : 1 [de-identified] : No formal exercise, walks around, [[id number]] : 0 [EyeExamDate] : 03/22 [Change in mental status noted] : No change in mental status noted [Reports changes in hearing] : Reports no changes in hearing [Reports changes in vision] : Reports no changes in vision [Reports changes in dental health] : Reports no changes in dental health [MammogramDate] : 04/21 [PapSmearDate] : >20 years [BoneDensityDate] : 09/19 [ColonoscopyDate] : 2013 [ColonoscopyComments] : EUS - 10/13,  [de-identified] : Pt doesn't cook much, pt uses [employer] [de-identified] : dentist - 11/2 [AdvancecareDate] : 09/21

## 2022-09-12 NOTE — COUNSELING
[Fall prevention counseling provided] : Fall prevention counseling provided [Adequate lighting] : Adequate lighting [No throw rugs] : No throw rugs [Use proper foot wear] : Use proper foot wear [Use recommended devices] : Use recommended devices [Potential consequences of obesity discussed] : Potential consequences of obesity discussed [Benefits of weight loss discussed] : Benefits of weight loss discussed [Encouraged to increase physical activity] : Encouraged to increase physical activity [Target Wt Loss Goal ___] : Weight Loss Goals: Target weight loss goal [unfilled] lbs [Decrease Portions] : decrease portions [Needs reinforcement, provided] : Patient needs reinforcement on understanding of disease, goals and obesity follow-up plan; reinforcement was provided [____ min/wk Activity] : [unfilled] min/wk activity

## 2022-09-29 ENCOUNTER — APPOINTMENT (OUTPATIENT)
Dept: OPHTHALMOLOGY | Facility: CLINIC | Age: 72
End: 2022-09-29

## 2022-09-29 ENCOUNTER — NON-APPOINTMENT (OUTPATIENT)
Age: 72
End: 2022-09-29

## 2022-09-29 PROCEDURE — 92083 EXTENDED VISUAL FIELD XM: CPT

## 2022-09-29 PROCEDURE — 92014 COMPRE OPH EXAM EST PT 1/>: CPT

## 2022-10-06 ENCOUNTER — RX RENEWAL (OUTPATIENT)
Age: 72
End: 2022-10-06

## 2022-10-06 ENCOUNTER — OUTPATIENT (OUTPATIENT)
Dept: OUTPATIENT SERVICES | Facility: HOSPITAL | Age: 72
LOS: 1 days | End: 2022-10-06
Payer: MEDICARE

## 2022-10-06 ENCOUNTER — APPOINTMENT (OUTPATIENT)
Dept: ORTHOPEDIC SURGERY | Facility: CLINIC | Age: 72
End: 2022-10-06

## 2022-10-06 ENCOUNTER — APPOINTMENT (OUTPATIENT)
Dept: MAMMOGRAPHY | Facility: IMAGING CENTER | Age: 72
End: 2022-10-06

## 2022-10-06 ENCOUNTER — RESULT REVIEW (OUTPATIENT)
Age: 72
End: 2022-10-06

## 2022-10-06 VITALS — HEIGHT: 61 IN | WEIGHT: 205 LBS | BODY MASS INDEX: 38.71 KG/M2

## 2022-10-06 DIAGNOSIS — M54.50 LOW BACK PAIN, UNSPECIFIED: ICD-10-CM

## 2022-10-06 DIAGNOSIS — M51.34 OTHER INTERVERTEBRAL DISC DEGENERATION, THORACIC REGION: ICD-10-CM

## 2022-10-06 DIAGNOSIS — M12.9 ARTHROPATHY, UNSPECIFIED: Chronic | ICD-10-CM

## 2022-10-06 DIAGNOSIS — G89.29 LOW BACK PAIN, UNSPECIFIED: ICD-10-CM

## 2022-10-06 DIAGNOSIS — Z00.8 ENCOUNTER FOR OTHER GENERAL EXAMINATION: ICD-10-CM

## 2022-10-06 DIAGNOSIS — Z90.89 ACQUIRED ABSENCE OF OTHER ORGANS: Chronic | ICD-10-CM

## 2022-10-06 PROCEDURE — 77065 DX MAMMO INCL CAD UNI: CPT

## 2022-10-06 PROCEDURE — G0279: CPT

## 2022-10-06 PROCEDURE — 77065 DX MAMMO INCL CAD UNI: CPT | Mod: 26,RT

## 2022-10-06 PROCEDURE — 72070 X-RAY EXAM THORAC SPINE 2VWS: CPT

## 2022-10-06 PROCEDURE — G0279: CPT | Mod: 26

## 2022-10-06 PROCEDURE — 72040 X-RAY EXAM NECK SPINE 2-3 VW: CPT

## 2022-10-06 PROCEDURE — 99214 OFFICE O/P EST MOD 30 MIN: CPT

## 2022-10-06 NOTE — HISTORY OF PRESENT ILLNESS
[Stable] : stable [de-identified] : 72 year female presents for follow up evaluation neck and mid back pain.\par S/P C3-7 decompression and fusion on 2/27/2018. \par Last seen in July and was referred to PT Cervical and Lumbar. \par Pt ate a cheese burger Sept 29/2022; 45 minutes after experienced severe gas, nausea, weakness and severe abdominal pain; Pt started experiencing severe pain to L shoulder pain and L arm pain.\par Believes food poisoning triggered these symptoms \par Took Tramadol; pain subsided to L arm \par No fever chills sweats nausea vomiting no bowel or bladder dysfunction, no recent weight loss or gain no night pain. This history is in addition to the intake form that I personally reviewed.

## 2022-10-06 NOTE — PHYSICAL EXAM
[Normal] : Gait: normal [Walker] : ambulates with walker [Roach's Sign] : negative Roach's sign [Pronator Drift] : negative pronator drift [SLR] : negative straight leg raise [de-identified] : 5 out of 5 motor strength, sensation is intact and symmetrical full range of motion flexion extension and rotation, no palpatory tenderness full range of motion of hips knees shoulders and elbows (all four extremities), no atrophy, negative straight leg raise, no pathological reflexes, no swelling, normal ambulation, no apparent distress skin is intact, no palpable lymph nodes, no upper or lower extremity instability, alert and oriented x3 and normal mood. Normal finger-to nose test. \par \par  [de-identified] : AP lateral cervical shows adequate C3-7 fusion posterior-reviewed with the patient.\par \par X  ray of thoracic spine done 10/06/2022-Thoracic degenerative disc disease-Results reviewed with the patient \par

## 2022-10-06 NOTE — DISCUSSION/SUMMARY
[de-identified] : S/P C3-7 decompression and fusion on 2/27/2018. \par Thoracic degenerative disc disease. \par Sending to ID for GI issues.\par F/U after.\par All options discussed including rest, medicine, home exercise, acupuncture, Chiropractic care, Physical Therapy, Pain management, and last resort surgery. All questions were answered, all alternatives discussed and the patient is in complete agreement with that plan. Follow-up appointment as instructed. Any issues and the patient will call or come in sooner.

## 2022-10-06 NOTE — REASON FOR VISIT
[Follow-Up Visit] : a follow-up visit for [Neck Pain] : neck pain [FreeTextEntry2] : neck pain radiating down to back pain.

## 2022-11-21 ENCOUNTER — APPOINTMENT (OUTPATIENT)
Dept: GASTROENTEROLOGY | Facility: CLINIC | Age: 72
End: 2022-11-21

## 2022-11-23 ENCOUNTER — NON-APPOINTMENT (OUTPATIENT)
Age: 72
End: 2022-11-23

## 2022-12-08 NOTE — PROGRESS NOTE ADULT - NSHPATTENDINGPLANDISCUSS_GEN_ALL_CORE
Patient sent for imaging, Voiced no concern. Will continue to monitor
report given to OR VIRGINIA Holley.
orthopedics team
orthopedics team

## 2022-12-19 ENCOUNTER — APPOINTMENT (OUTPATIENT)
Dept: CARDIOLOGY | Facility: CLINIC | Age: 72
End: 2022-12-19

## 2022-12-19 ENCOUNTER — NON-APPOINTMENT (OUTPATIENT)
Age: 72
End: 2022-12-19

## 2022-12-19 VITALS
HEIGHT: 61 IN | DIASTOLIC BLOOD PRESSURE: 77 MMHG | WEIGHT: 206 LBS | SYSTOLIC BLOOD PRESSURE: 144 MMHG | HEART RATE: 71 BPM | BODY MASS INDEX: 38.89 KG/M2 | RESPIRATION RATE: 16 BRPM | OXYGEN SATURATION: 98 % | TEMPERATURE: 98.6 F

## 2022-12-19 PROCEDURE — 99214 OFFICE O/P EST MOD 30 MIN: CPT | Mod: 25

## 2022-12-19 PROCEDURE — 93000 ELECTROCARDIOGRAM COMPLETE: CPT

## 2022-12-19 NOTE — HISTORY OF PRESENT ILLNESS
[FreeTextEntry1] : 72 year old woman with history of HTN HLD \par \par TTE 1/5/22:\par Normal LV and RV function; no valvular abnormalities\par \par #HTN- on HCTZ 25 mg daily, Metoprolol 25 mg BID\par BP really controlled, continue present meds\par #HLD- On Atorvastatin 20 mg daily, continue present meds\par

## 2022-12-19 NOTE — DISCUSSION/SUMMARY
[EKG obtained to assist in diagnosis and management of assessed problem(s)] : EKG obtained to assist in diagnosis and management of assessed problem(s) [FreeTextEntry1] : 72 year old woman with history of HTN HLD here for followup \par \par #HTN- on HCTZ 25 mg daily, Metoprolol 25 mg BID\par BP really controlled, continue present meds\par #HLD- On Atorvastatin 20 mg daily, continue present meds\par Lipids 9/20/21:\par  /HDL 75/LDL 95\par #FU in 6 months\par

## 2022-12-20 ENCOUNTER — APPOINTMENT (OUTPATIENT)
Dept: CARE COORDINATION | Facility: HOME HEALTH | Age: 72
End: 2022-12-20

## 2022-12-22 ENCOUNTER — APPOINTMENT (OUTPATIENT)
Dept: GASTROENTEROLOGY | Facility: CLINIC | Age: 72
End: 2022-12-22

## 2022-12-22 ENCOUNTER — NON-APPOINTMENT (OUTPATIENT)
Age: 72
End: 2022-12-22

## 2022-12-22 VITALS
WEIGHT: 206 LBS | HEIGHT: 61 IN | HEART RATE: 81 BPM | DIASTOLIC BLOOD PRESSURE: 75 MMHG | SYSTOLIC BLOOD PRESSURE: 126 MMHG | OXYGEN SATURATION: 98 % | BODY MASS INDEX: 38.89 KG/M2 | TEMPERATURE: 97.3 F

## 2022-12-22 PROCEDURE — 99204 OFFICE O/P NEW MOD 45 MIN: CPT

## 2022-12-22 NOTE — ASSESSMENT
[FreeTextEntry1] : Impression:\par # Abnormal MRI: Unclear etiology; needs further evaluation to rule out pancreatic cancer\par \par Plan\par - Plan for Upper Endoscopy/EUS\par - PST\par - Alternatives for procedure and risks of infection, perforation, bleeding, abdominal pain & adverse reaction to anesthesia discussed.\par - Preparations for the procedure discussed\par - COVID PCR ordered \par \par

## 2022-12-22 NOTE — HISTORY OF PRESENT ILLNESS
[FreeTextEntry1] : 72 year old woman with hx f HTN, HLD, asthma, CHANTELL, Anxiety, Polymyalgia rheumatica presents for initial evaluation of abnormal MRI findings\par \par Patient was seen by Dr. Minor in 10/14/2022 and reported having severe abdominal pain associated with sweating and chills after eating a cheeseburger. US was performed which showed a mildly dilated CBD to 8 mm. MRI was performed showing mild pancreatic ductal dilation to 5 mm with a transition point at the level of the head of the pancreas. No clear mass was reported. Patient now presents for evaluation of MRI findings. Pt denies any abdominal complaints at this time. Patient denies fevers, chills, chest pain, SOB, nausea, vomiting, diarrhea, melena, hematochezia, hematemesis, dysphagia, odynophagia, headache, dizziness, abdominal pain and recent travel.

## 2022-12-22 NOTE — REVIEW OF SYSTEMS
[Fever] : no fever [Chills] : no chills [Feeling Poorly] : not feeling poorly [Feeling Tired] : not feeling tired [Red Eyes] : eyes not red [Scleral Icterus (Yellow Eyes)] : no scleral icterus [Sore Throat] : no sore throat [Hoarseness] : no hoarseness [Chest Pain] : no chest pain [Palpitations] : no palpitations [Lower Ext Edema (lower leg swelling)] : no lower extremity edema [Shortness Of Breath] : no shortness of breath [Cough] : no cough [As Noted in HPI] : as noted in HPI [Joint Stiffness] : no joint stiffness [Limb Swelling] : no limb swelling [Skin Wound] : no skin wound [Jaundice (yellowing of skin)] : no jaundice [Dizziness] : no dizziness [Fainting] : no fainting [Difficulty Walking] : no difficulty walking [Anxiety] : no anxiety [Depression] : no depression [Hot Flashes] : no hot flashes [Muscle Weakness] : no muscle weakness [Easy Bleeding] : no tendency for easy bleeding [Easy Bruising] : no tendency for easy bruising

## 2022-12-22 NOTE — PHYSICAL EXAM
[Alert] : alert [Normal Voice/Communication] : normal voice/communication [Healthy Appearing] : healthy appearing [No Acute Distress] : no acute distress [Sclera] : the sclera and conjunctiva were normal [Hearing Threshold Finger Rub Not [place]] : hearing was normal [Normal Lips/Gums] : the lips and gums were normal [Normal Appearance] : the appearance of the neck was normal [No Neck Mass] : no neck mass was observed [No Respiratory Distress] : no respiratory distress [Respiration, Rhythm And Depth] : normal respiratory rhythm and effort [Auscultation Breath Sounds / Voice Sounds] : lungs were clear to auscultation bilaterally [Heart Rate And Rhythm] : heart rate was normal and rhythm regular [Normal S1, S2] : normal S1 and S2 [Murmurs] : no murmurs [Bowel Sounds] : normal bowel sounds [Abdomen Tenderness] : non-tender [No Masses] : no abdominal mass palpated [Abdomen Soft] : soft [Cervical Lymph Nodes Enlarged Posterior Bilaterally] : no posterior cervical lymphadenopathy [Cervical Lymph Nodes Enlarged Anterior Bilaterally] : no anterior cervical lymphadenopathy [No CVA Tenderness] : no CVA  tenderness [No Spinal Tenderness] : no spinal tenderness [Involuntary Movements] : no involuntary movements were seen [Normal Color / Pigmentation] : normal skin color and pigmentation [] : no rash [Normal Turgor] : normal skin turgor [No Focal Deficits] : no focal deficits [Motor Exam] : the motor exam was normal [Oriented To Time, Place, And Person] : oriented to person, place, and time [Normal Affect] : the affect was normal [de-identified] : Walks with walker. Difficulty getting from seated to standing position

## 2022-12-30 ENCOUNTER — APPOINTMENT (OUTPATIENT)
Dept: INTERNAL MEDICINE | Facility: CLINIC | Age: 72
End: 2022-12-30
Payer: MEDICARE

## 2022-12-30 VITALS
HEART RATE: 84 BPM | TEMPERATURE: 97.5 F | DIASTOLIC BLOOD PRESSURE: 70 MMHG | OXYGEN SATURATION: 97 % | HEIGHT: 61 IN | SYSTOLIC BLOOD PRESSURE: 110 MMHG | BODY MASS INDEX: 38.89 KG/M2 | WEIGHT: 206 LBS

## 2022-12-30 DIAGNOSIS — M25.473 EFFUSION, UNSPECIFIED ANKLE: ICD-10-CM

## 2022-12-30 DIAGNOSIS — N64.9 DISORDER OF BREAST, UNSPECIFIED: ICD-10-CM

## 2022-12-30 PROCEDURE — 99214 OFFICE O/P EST MOD 30 MIN: CPT

## 2022-12-30 RX ORDER — TRAMADOL HYDROCHLORIDE 50 MG/1
50 TABLET, COATED ORAL 3 TIMES DAILY
Qty: 60 | Refills: 0 | Status: COMPLETED | COMMUNITY
Start: 2018-01-31 | End: 2022-12-30

## 2022-12-30 RX ORDER — PREDNISONE 2.5 MG/1
2.5 TABLET ORAL
Qty: 90 | Refills: 0 | Status: COMPLETED | COMMUNITY
Start: 2022-06-16 | End: 2022-12-30

## 2022-12-30 NOTE — HISTORY OF PRESENT ILLNESS
[FreeTextEntry8] : Pt initially arranged for this encounter for a sensitivity on R breast due to the marker that was placed last year, but that has resolved.\par \par Pt also has ankle edema for many months, she is on HCTZ, the ankle swelling actually resolved a few days ago.\par \par She has been prescribed Tramadol, Gabapentin and Naproxen over the past couple of months.  She had SE and is off Tramadol and Gabapentin.  She is currently on Naproxen, and has some stomach upset today.\par \par Pt saw GI and recommended that she has an EUS.  She is trying to find someone who can go with her to go for the procedure because she will be under anesthesia.  She had an abd US and the pancreas was not well visualized.  She is also due for a repeat colonoscopy.

## 2022-12-30 NOTE — PHYSICAL EXAM
[No Acute Distress] : no acute distress [Well Nourished] : well nourished [Well Developed] : well developed [Supple] : supple [No Respiratory Distress] : no respiratory distress  [Clear to Auscultation] : lungs were clear to auscultation bilaterally [Normal Rate] : normal rate  [Regular Rhythm] : with a regular rhythm [Normal S1, S2] : normal S1 and S2 [Normal Appearance] : normal in appearance [No Masses] : no palpable masses [No Nipple Discharge] : no nipple discharge [No Axillary Lymphadenopathy] : no axillary lymphadenopathy [Soft] : abdomen soft [Non Tender] : non-tender [Normal Bowel Sounds] : normal bowel sounds [Normal Supraclavicular Nodes] : no supraclavicular lymphadenopathy [Normal Axillary Nodes] : no axillary lymphadenopathy [Normal Posterior Cervical Nodes] : no posterior cervical lymphadenopathy [Normal Anterior Cervical Nodes] : no anterior cervical lymphadenopathy [No CVA Tenderness] : no CVA  tenderness [No Spinal Tenderness] : no spinal tenderness [No Joint Swelling] : no joint swelling [No Rash] : no rash [Speech Grossly Normal] : speech grossly normal [Normal Affect] : the affect was normal [Alert and Oriented x3] : oriented to person, place, and time [Normal Mood] : the mood was normal [de-identified] : Obese female in stated age,  [de-identified] : Trace edema, [de-identified] : Ambulate with rollator,

## 2023-01-06 ENCOUNTER — RX RENEWAL (OUTPATIENT)
Age: 73
End: 2023-01-06

## 2023-01-09 ENCOUNTER — APPOINTMENT (OUTPATIENT)
Dept: ORTHOPEDIC SURGERY | Facility: CLINIC | Age: 73
End: 2023-01-09
Payer: MEDICARE

## 2023-01-09 VITALS
TEMPERATURE: 97.4 F | HEIGHT: 61 IN | BODY MASS INDEX: 38.89 KG/M2 | HEART RATE: 85 BPM | OXYGEN SATURATION: 98 % | WEIGHT: 206 LBS

## 2023-01-09 PROCEDURE — 73130 X-RAY EXAM OF HAND: CPT | Mod: RT,LT

## 2023-01-09 PROCEDURE — 99214 OFFICE O/P EST MOD 30 MIN: CPT | Mod: 25

## 2023-01-09 PROCEDURE — 20526 THER INJECTION CARP TUNNEL: CPT | Mod: LT,RT

## 2023-01-12 ENCOUNTER — APPOINTMENT (OUTPATIENT)
Dept: NEUROLOGY | Facility: CLINIC | Age: 73
End: 2023-01-12

## 2023-01-12 ENCOUNTER — APPOINTMENT (OUTPATIENT)
Dept: ORTHOPEDIC SURGERY | Facility: CLINIC | Age: 73
End: 2023-01-12
Payer: MEDICARE

## 2023-01-12 VITALS
DIASTOLIC BLOOD PRESSURE: 60 MMHG | TEMPERATURE: 97.4 F | HEART RATE: 71 BPM | OXYGEN SATURATION: 98 % | SYSTOLIC BLOOD PRESSURE: 98 MMHG | HEIGHT: 61 IN | BODY MASS INDEX: 38.89 KG/M2 | WEIGHT: 206 LBS

## 2023-01-12 PROCEDURE — 99213 OFFICE O/P EST LOW 20 MIN: CPT

## 2023-01-12 NOTE — PHYSICAL EXAM
[Normal] : Gait: normal [Walker] : ambulates with walker [Roach's Sign] : negative Roach's sign [Pronator Drift] : negative pronator drift [SLR] : negative straight leg raise [de-identified] : 5 out of 5 motor strength, sensation is intact and symmetrical full range of motion flexion extension and rotation, no palpatory tenderness full range of motion of hips knees shoulders and elbows (all four extremities), no atrophy, negative straight leg raise, no pathological reflexes, no swelling, normal ambulation, no apparent distress skin is intact, no palpable lymph nodes, no upper or lower extremity instability, alert and oriented x3 and normal mood. Normal finger-to nose test. \par +Lotus's. \par Pain in bilateral trapezius. [de-identified] : AP lateral cervical shows adequate C3-7 fusion posterior-reviewed with the patient.\par \par X  ray of thoracic spine done 10/06/2022-Thoracic degenerative disc disease-Results reviewed with the patient \par

## 2023-01-12 NOTE — HISTORY OF PRESENT ILLNESS
[Stable] : stable [de-identified] : 72 year female presents for follow up evaluation neck and mid back pain.\par S/P C3-7 decompression and fusion on 2/27/2018. \par Pt ate a cheese burger Sept 29/2022; 45 minutes after experienced severe gas, nausea, weakness and severe abdominal pain; Pt started experiencing severe pain to L Bilateral Bilateral shoulder and arm pain \par No longer taking Tramadol;  started experiencing withdrawal symptoms when she missed doses; \par No fever chills sweats nausea vomiting no bowel or bladder dysfunction, no recent weight loss or gain no night pain. This history is in addition to the intake form that I personally reviewed. \par Visited Digestive  disease care; Has a endoscopic sonogram scheduled to address digestive issues\par Stopped taking Extra strength Tylenol; caused digestive issues. \par Taking Naproxen; offers very little pain relief \par Goal is to have pain managed \par Participated in PT;  Insurance stopped paying for it; PT was helping with balance and gait \par Bilateral knee pain; Worsens when transitioning from the sitting to the standing position \par \par \par \par

## 2023-01-12 NOTE — ADDENDUM
[FreeTextEntry1] : This note was written by Terence Paniagua on 01/12/2023 acting as scribe for Dr. Skip Weber M.D.\par \par I, Skip Weber MD, have read and attest that all the information, medical decision making and discharge instructions within are true and accurate.

## 2023-01-12 NOTE — DISCUSSION/SUMMARY
[de-identified] : S/P C3-7 decompression and fusion on 2/27/2018. \par Thoracic degenerative disc disease. \par Bilateral trapezial trigger points.\par Discussed all options. \par Referred to Dr. Kimberlyn Garcia for pain management, trigger point injections with ultrasound guidance. \par All options discussed including rest, medicine, home exercise, acupuncture, Chiropractic care, Physical Therapy, Pain management, and last resort surgery. All questions were answered, all alternatives discussed and the patient is in complete agreement with that plan. Follow-up appointment as instructed. Any issues and the patient will call or come in sooner.

## 2023-01-18 ENCOUNTER — APPOINTMENT (OUTPATIENT)
Dept: ORTHOPEDIC SURGERY | Facility: CLINIC | Age: 73
End: 2023-01-18
Payer: MEDICARE

## 2023-01-18 VITALS
SYSTOLIC BLOOD PRESSURE: 120 MMHG | HEIGHT: 61 IN | WEIGHT: 206 LBS | DIASTOLIC BLOOD PRESSURE: 75 MMHG | BODY MASS INDEX: 38.89 KG/M2

## 2023-01-18 PROCEDURE — 99204 OFFICE O/P NEW MOD 45 MIN: CPT | Mod: 25

## 2023-01-18 PROCEDURE — 20611 DRAIN/INJ JOINT/BURSA W/US: CPT | Mod: RT

## 2023-01-18 PROCEDURE — 73030 X-RAY EXAM OF SHOULDER: CPT | Mod: LT

## 2023-01-23 ENCOUNTER — APPOINTMENT (OUTPATIENT)
Dept: ORTHOPEDIC SURGERY | Facility: CLINIC | Age: 73
End: 2023-01-23
Payer: MEDICARE

## 2023-01-23 VITALS
SYSTOLIC BLOOD PRESSURE: 127 MMHG | BODY MASS INDEX: 38.89 KG/M2 | DIASTOLIC BLOOD PRESSURE: 72 MMHG | OXYGEN SATURATION: 96 % | HEART RATE: 86 BPM | WEIGHT: 206 LBS | HEIGHT: 61 IN

## 2023-01-23 DIAGNOSIS — M19.019 PRIMARY OSTEOARTHRITIS, UNSPECIFIED SHOULDER: ICD-10-CM

## 2023-01-23 PROCEDURE — 20611 DRAIN/INJ JOINT/BURSA W/US: CPT | Mod: RT

## 2023-01-23 PROCEDURE — 99214 OFFICE O/P EST MOD 30 MIN: CPT | Mod: 25

## 2023-01-30 ENCOUNTER — OUTPATIENT (OUTPATIENT)
Dept: OUTPATIENT SERVICES | Facility: HOSPITAL | Age: 73
LOS: 1 days | End: 2023-01-30

## 2023-01-30 VITALS
WEIGHT: 194.01 LBS | HEART RATE: 80 BPM | OXYGEN SATURATION: 99 % | TEMPERATURE: 97 F | RESPIRATION RATE: 16 BRPM | SYSTOLIC BLOOD PRESSURE: 105 MMHG | DIASTOLIC BLOOD PRESSURE: 70 MMHG | HEIGHT: 61 IN

## 2023-01-30 DIAGNOSIS — K21.9 GASTRO-ESOPHAGEAL REFLUX DISEASE WITHOUT ESOPHAGITIS: ICD-10-CM

## 2023-01-30 DIAGNOSIS — J45.909 UNSPECIFIED ASTHMA, UNCOMPLICATED: ICD-10-CM

## 2023-01-30 DIAGNOSIS — M12.9 ARTHROPATHY, UNSPECIFIED: Chronic | ICD-10-CM

## 2023-01-30 DIAGNOSIS — K86.89 OTHER SPECIFIED DISEASES OF PANCREAS: ICD-10-CM

## 2023-01-30 DIAGNOSIS — G47.33 OBSTRUCTIVE SLEEP APNEA (ADULT) (PEDIATRIC): ICD-10-CM

## 2023-01-30 DIAGNOSIS — Z98.1 ARTHRODESIS STATUS: Chronic | ICD-10-CM

## 2023-01-30 DIAGNOSIS — Z96.652 PRESENCE OF LEFT ARTIFICIAL KNEE JOINT: Chronic | ICD-10-CM

## 2023-01-30 DIAGNOSIS — I10 ESSENTIAL (PRIMARY) HYPERTENSION: ICD-10-CM

## 2023-01-30 DIAGNOSIS — F41.9 ANXIETY DISORDER, UNSPECIFIED: ICD-10-CM

## 2023-01-30 DIAGNOSIS — Z96.642 PRESENCE OF LEFT ARTIFICIAL HIP JOINT: Chronic | ICD-10-CM

## 2023-01-30 DIAGNOSIS — Z98.49 CATARACT EXTRACTION STATUS, UNSPECIFIED EYE: Chronic | ICD-10-CM

## 2023-01-30 DIAGNOSIS — Z90.89 ACQUIRED ABSENCE OF OTHER ORGANS: Chronic | ICD-10-CM

## 2023-01-30 DIAGNOSIS — Z96.651 PRESENCE OF RIGHT ARTIFICIAL KNEE JOINT: Chronic | ICD-10-CM

## 2023-01-30 LAB
A1C WITH ESTIMATED AVERAGE GLUCOSE RESULT: 6.1 % — HIGH (ref 4–5.6)
ALBUMIN SERPL ELPH-MCNC: 3.8 G/DL — SIGNIFICANT CHANGE UP (ref 3.3–5)
ALP SERPL-CCNC: 114 U/L — SIGNIFICANT CHANGE UP (ref 40–120)
ALT FLD-CCNC: 9 U/L — SIGNIFICANT CHANGE UP (ref 4–33)
ANION GAP SERPL CALC-SCNC: 11 MMOL/L — SIGNIFICANT CHANGE UP (ref 7–14)
AST SERPL-CCNC: 14 U/L — SIGNIFICANT CHANGE UP (ref 4–32)
BILIRUB SERPL-MCNC: 0.4 MG/DL — SIGNIFICANT CHANGE UP (ref 0.2–1.2)
BUN SERPL-MCNC: 28 MG/DL — HIGH (ref 7–23)
CALCIUM SERPL-MCNC: 9.4 MG/DL — SIGNIFICANT CHANGE UP (ref 8.4–10.5)
CHLORIDE SERPL-SCNC: 97 MMOL/L — LOW (ref 98–107)
CO2 SERPL-SCNC: 32 MMOL/L — HIGH (ref 22–31)
CREAT SERPL-MCNC: 1.12 MG/DL — SIGNIFICANT CHANGE UP (ref 0.5–1.3)
EGFR: 52 ML/MIN/1.73M2 — LOW
ESTIMATED AVERAGE GLUCOSE: 128 — SIGNIFICANT CHANGE UP
GLUCOSE SERPL-MCNC: 100 MG/DL — HIGH (ref 70–99)
HCT VFR BLD CALC: 38.6 % — SIGNIFICANT CHANGE UP (ref 34.5–45)
HGB BLD-MCNC: 11.8 G/DL — SIGNIFICANT CHANGE UP (ref 11.5–15.5)
MCHC RBC-ENTMCNC: 28 PG — SIGNIFICANT CHANGE UP (ref 27–34)
MCHC RBC-ENTMCNC: 30.6 GM/DL — LOW (ref 32–36)
MCV RBC AUTO: 91.7 FL — SIGNIFICANT CHANGE UP (ref 80–100)
NRBC # BLD: 0 /100 WBCS — SIGNIFICANT CHANGE UP (ref 0–0)
NRBC # FLD: 0 K/UL — SIGNIFICANT CHANGE UP (ref 0–0)
PLATELET # BLD AUTO: 266 K/UL — SIGNIFICANT CHANGE UP (ref 150–400)
POTASSIUM SERPL-MCNC: 3.3 MMOL/L — LOW (ref 3.5–5.3)
POTASSIUM SERPL-SCNC: 3.3 MMOL/L — LOW (ref 3.5–5.3)
PROT SERPL-MCNC: 8.1 G/DL — SIGNIFICANT CHANGE UP (ref 6–8.3)
RBC # BLD: 4.21 M/UL — SIGNIFICANT CHANGE UP (ref 3.8–5.2)
RBC # FLD: 14.2 % — SIGNIFICANT CHANGE UP (ref 10.3–14.5)
SODIUM SERPL-SCNC: 140 MMOL/L — SIGNIFICANT CHANGE UP (ref 135–145)
WBC # BLD: 10.44 K/UL — SIGNIFICANT CHANGE UP (ref 3.8–10.5)
WBC # FLD AUTO: 10.44 K/UL — SIGNIFICANT CHANGE UP (ref 3.8–10.5)

## 2023-01-30 RX ORDER — DULOXETINE HYDROCHLORIDE 30 MG/1
1 CAPSULE, DELAYED RELEASE ORAL
Qty: 0 | Refills: 0 | DISCHARGE

## 2023-01-30 RX ORDER — FLUTICASONE PROPIONATE AND SALMETEROL 50; 250 UG/1; UG/1
1 POWDER ORAL; RESPIRATORY (INHALATION)
Qty: 0 | Refills: 0 | DISCHARGE

## 2023-01-30 RX ORDER — CETIRIZINE HYDROCHLORIDE 10 MG/1
1 TABLET ORAL
Qty: 0 | Refills: 0 | DISCHARGE

## 2023-01-30 RX ORDER — ASCORBIC ACID 60 MG
1 TABLET,CHEWABLE ORAL
Qty: 0 | Refills: 0 | DISCHARGE

## 2023-01-30 RX ORDER — OMEPRAZOLE 10 MG/1
1 CAPSULE, DELAYED RELEASE ORAL
Qty: 0 | Refills: 0 | DISCHARGE

## 2023-01-30 RX ORDER — METOPROLOL TARTRATE 50 MG
1 TABLET ORAL
Qty: 0 | Refills: 0 | DISCHARGE

## 2023-01-30 RX ORDER — FLUTICASONE PROPIONATE 220 MCG
1 AEROSOL WITH ADAPTER (GRAM) INHALATION
Qty: 0 | Refills: 0 | DISCHARGE

## 2023-01-30 RX ORDER — DORZOLAMIDE HYDROCHLORIDE 20 MG/ML
1 SOLUTION/ DROPS OPHTHALMIC
Qty: 0 | Refills: 0 | DISCHARGE

## 2023-01-30 RX ORDER — L.ACIDOPH/B.ANIMALIS/B.LONGUM 15B CELL
1 CAPSULE ORAL
Qty: 0 | Refills: 0 | DISCHARGE

## 2023-01-30 RX ORDER — TRAVOPROST 0.04 MG/ML
1 SOLUTION/ DROPS OPHTHALMIC
Qty: 0 | Refills: 0 | DISCHARGE

## 2023-01-30 RX ORDER — LOPERAMIDE HCL 2 MG
1 TABLET ORAL
Qty: 0 | Refills: 0 | DISCHARGE

## 2023-01-30 RX ORDER — SODIUM CHLORIDE 9 MG/ML
1000 INJECTION, SOLUTION INTRAVENOUS
Refills: 0 | Status: DISCONTINUED | OUTPATIENT
Start: 2023-02-10 | End: 2023-02-24

## 2023-01-30 NOTE — H&P PST ADULT - PROBLEM SELECTOR PLAN 1
Patient tentatively scheduled for Endoscopic Ultrasound on 2/10/23.  Pre-op instructions provided. Pt given verbal and written instructions with teach back . Pt verbalized understanding with return demonstration.   Preop Covid PCR test ordered .Instructions regarding covid PCR test to be obtained 3- 5 days prior to surgery and locations for covid testing site provided. Pt verbalized understanding

## 2023-01-30 NOTE — H&P PST ADULT - NSICDXPASTMEDICALHX_GEN_ALL_CORE_FT
PAST MEDICAL HISTORY:  Asthma     Carpal tunnel syndrome on left     Cervical disc disease December 2017    Depression never hospitalized    Gastric ulcer     GERD (gastroesophageal reflux disease)     Glaucoma     Hypercholesterolemia     Hypertension     Lumbar stenosis     Obesity     Prediabetes diagnosed in early 2017, on diet control    Seasonal allergies     Sleep apnea supposed to use device at night but doesn't    Spinal stenosis in cervical region

## 2023-01-30 NOTE — H&P PST ADULT - PROBLEM SELECTOR PLAN 3
Patient instructed to take duloxetine with a sip of water on the morning of procedure. Patient verbalized understanding.

## 2023-01-30 NOTE — H&P PST ADULT - NSICDXPASTSURGICALHX_GEN_ALL_CORE_FT
PAST SURGICAL HISTORY:  H/O cataract extraction     H/O total knee replacement, right     History of fusion of cervical spine     History of left hip replacement     History of left knee replacement     S/P tonsillectomy

## 2023-01-30 NOTE — H&P PST ADULT - HISTORY OF PRESENT ILLNESS
72 year old female with PMH of HTN, HLD,  Asthma (no exacerbation) , prediabetic, polymyalgia rheumatica, GERD, anxiety depression,   presents to Presurgical testing with diagnosis of other specified disease of pancreas  scheduled for endoscopic ultrasound.

## 2023-01-30 NOTE — H&P PST ADULT - OTHER CARE PROVIDERS
Dr Leesa Adames (cardiologist) [phone].                                                  Dr Govind Brown (pulmonary) [phone]

## 2023-01-30 NOTE — H&P PST ADULT - PROBLEM SELECTOR PLAN 6
Patient instructed to take famotidine and sucralfate with a sip of water on the morning of procedure.

## 2023-02-06 ENCOUNTER — NON-APPOINTMENT (OUTPATIENT)
Age: 73
End: 2023-02-06

## 2023-02-08 LAB — SARS-COV-2 N GENE NPH QL NAA+PROBE: NOT DETECTED

## 2023-02-09 NOTE — ASU PATIENT PROFILE, ADULT - MEDICATION ADMINISTRATION INFO, PROFILE
Mike Shah  [address] [place] [id number]  Phone: [phone]  Fax: [phone]  Follow Up Time:    no concerns

## 2023-02-10 ENCOUNTER — RESULT REVIEW (OUTPATIENT)
Age: 73
End: 2023-02-10

## 2023-02-10 ENCOUNTER — OUTPATIENT (OUTPATIENT)
Dept: OUTPATIENT SERVICES | Facility: HOSPITAL | Age: 73
LOS: 1 days | Discharge: HOME | End: 2023-02-10
Payer: MEDICARE

## 2023-02-10 ENCOUNTER — APPOINTMENT (OUTPATIENT)
Dept: GASTROENTEROLOGY | Facility: HOSPITAL | Age: 73
End: 2023-02-10

## 2023-02-10 VITALS
HEIGHT: 61 IN | HEART RATE: 77 BPM | OXYGEN SATURATION: 98 % | DIASTOLIC BLOOD PRESSURE: 57 MMHG | TEMPERATURE: 99 F | SYSTOLIC BLOOD PRESSURE: 99 MMHG | RESPIRATION RATE: 20 BRPM | WEIGHT: 195.11 LBS

## 2023-02-10 VITALS
OXYGEN SATURATION: 95 % | SYSTOLIC BLOOD PRESSURE: 96 MMHG | HEART RATE: 80 BPM | RESPIRATION RATE: 17 BRPM | DIASTOLIC BLOOD PRESSURE: 60 MMHG

## 2023-02-10 DIAGNOSIS — Z96.652 PRESENCE OF LEFT ARTIFICIAL KNEE JOINT: Chronic | ICD-10-CM

## 2023-02-10 DIAGNOSIS — Z90.89 ACQUIRED ABSENCE OF OTHER ORGANS: Chronic | ICD-10-CM

## 2023-02-10 DIAGNOSIS — Z96.642 PRESENCE OF LEFT ARTIFICIAL HIP JOINT: Chronic | ICD-10-CM

## 2023-02-10 DIAGNOSIS — Z98.1 ARTHRODESIS STATUS: Chronic | ICD-10-CM

## 2023-02-10 DIAGNOSIS — Z96.651 PRESENCE OF RIGHT ARTIFICIAL KNEE JOINT: Chronic | ICD-10-CM

## 2023-02-10 DIAGNOSIS — Z98.49 CATARACT EXTRACTION STATUS, UNSPECIFIED EYE: Chronic | ICD-10-CM

## 2023-02-10 DIAGNOSIS — K86.89 OTHER SPECIFIED DISEASES OF PANCREAS: ICD-10-CM

## 2023-02-10 PROCEDURE — 43259 EGD US EXAM DUODENUM/JEJUNUM: CPT | Mod: GC

## 2023-02-10 PROCEDURE — 88305 TISSUE EXAM BY PATHOLOGIST: CPT | Mod: 26

## 2023-02-10 PROCEDURE — 43239 EGD BIOPSY SINGLE/MULTIPLE: CPT | Mod: GC

## 2023-02-10 NOTE — PRE PROCEDURE NOTE - PRE PROCEDURE EVALUATION
Attending Physician:  Dr. Rangel    Procedure: EUS    Indication for Procedure: Abnormal MRI  ________________________________________________________  PAST MEDICAL & SURGICAL HISTORY:  Prediabetes  diagnosed in early 2017, on diet control      Hypertension      Hypercholesterolemia      Seasonal allergies      Asthma      Depression  never hospitalized      Glaucoma      Obesity      Sleep apnea  supposed to use device at night but doesn&#x27;t      Gastric ulcer      Cervical disc disease  December 2017      Spinal stenosis in cervical region      Lumbar stenosis      Carpal tunnel syndrome on left      GERD (gastroesophageal reflux disease)      S/P tonsillectomy      H/O cataract extraction      History of left hip replacement      History of left knee replacement      H/O total knee replacement, right      History of fusion of cervical spine        ALLERGIES:  clindamycin (Other)  clindamycin (Unknown)  strawberry (Rash)    HOME MEDICATIONS:  Albuterol (Eqv-ProAir HFA) 90 mcg/inh inhalation aerosol: 2 puff(s) inhaled every 6 hours, As Needed  aspirin 81 mg oral tablet: 1 tab(s) orally once a day LD 2/2/23  azelastine 137 mcg/inh (0.1%) nasal spray: 2 spray(s) nasal 2 times a day  DULoxetine 30 mg oral delayed release capsule: 1 cap(s) orally once a day  DULoxetine 60 mg oral delayed release capsule: 1 cap(s) orally once a day  famotidine 20 mg oral tablet: 1 tab(s) orally 2 times a day  hydroCHLOROthiazide 25 mg oral tablet: 1 tab(s) orally once a day  Lipitor 20 mg oral tablet: 1 tab(s) orally once a day (at bedtime)  loperamide 2 mg oral capsule: 2 cap(s) orally , As Needed  metoprolol tartrate 25 mg oral tablet: 1 tab(s) orally 2 times a day  montelukast 10 mg oral tablet: 1 tab(s) orally once a day (at bedtime)  Multiple Vitamins oral capsule: 1 cap(s) orally once a day last dose 01/30/23  sucralfate 1 g oral tablet: 1 tab(s) orally 2 times a day  Symbicort 80 mcg-4.5 mcg/inh inhalation aerosol: 2 puff(s) inhaled 2 times a day  Ventolin HFA 90 mcg/inh inhalation aerosol: 2 puff(s) inhaled 4 times a day, As Needed  Vitamin B12: 1 tab(s) orally once a day  Vitamin D3 2000 intl units oral tablet: 1 tab(s) orally once a day    AICD/PPM: [ ] yes   [ ] no    PERTINENT LAB DATA:                      PHYSICAL EXAMINATION:    Height (cm): 154.9  Weight (kg): 88.5  BMI (kg/m2): 36.9  BSA (m2): 1.87T(C): 36.2  HR: 80  BP: 96/60  RR: 17  SpO2: 95%    Constitutional: NAD  HEENT: PERRLA, EOMI,    Neck:  No JVD  Respiratory: CTAB/L  Cardiovascular: S1 and S2  Gastrointestinal: BS+, soft, NT/ND  Extremities: No peripheral edema  Neurological: A/O x 3, no focal deficits  Psychiatric: Normal mood, normal affect  Skin: No rashes    ASA Class: I [ ]  II [ ]  III [ x ]  IV [ ]    COMMENTS:    The patient is a suitable candidate for the planned procedure unless box checked [ ]  No, explain:

## 2023-02-13 ENCOUNTER — NON-APPOINTMENT (OUTPATIENT)
Age: 73
End: 2023-02-13

## 2023-02-13 LAB — SURGICAL PATHOLOGY STUDY: SIGNIFICANT CHANGE UP

## 2023-02-17 ENCOUNTER — NON-APPOINTMENT (OUTPATIENT)
Age: 73
End: 2023-02-17

## 2023-02-21 ENCOUNTER — APPOINTMENT (OUTPATIENT)
Dept: INTERNAL MEDICINE | Facility: CLINIC | Age: 73
End: 2023-02-21
Payer: MEDICARE

## 2023-02-21 VITALS — TEMPERATURE: 98.5 F

## 2023-02-21 PROBLEM — K21.9 GASTRO-ESOPHAGEAL REFLUX DISEASE WITHOUT ESOPHAGITIS: Chronic | Status: ACTIVE | Noted: 2023-01-30

## 2023-02-21 PROCEDURE — 99213 OFFICE O/P EST LOW 20 MIN: CPT | Mod: 95

## 2023-02-21 NOTE — PHYSICAL EXAM
[No Acute Distress] : no acute distress [Well Nourished] : well nourished [Well Developed] : well developed [No Respiratory Distress] : no respiratory distress  [Speech Grossly Normal] : speech grossly normal [Normal Affect] : the affect was normal [Alert and Oriented x3] : oriented to person, place, and time [Normal Mood] : the mood was normal [de-identified] : female in stated age, exam liimited due to telehealth encounter, VS provided by pt,

## 2023-02-21 NOTE — HISTORY OF PRESENT ILLNESS
[Home] : at home, [unfilled] , at the time of the visit. [Medical Office: ([facility])___] : at the medical office located in  [Verbal consent obtained from patient] : the patient, [unfilled] [Cold Symptoms] : cold symptoms [Moderate] : moderate [___ Weeks ago] :  [unfilled] weeks ago [Congestion] : congestion [Cough] : cough [Sore Throat] : sore throat [Headache] : headache [Activity] : with activity [Stable] : stable [Wheezing] : no wheezing [Chills] : no chills [Shortness Of Breath] : no shortness of breath [Earache] : no earache [Fatigue] : not fatigue [Fever] : no fever [FreeTextEntry2] : diarrhea for 2-3 days with or soft & mushy stool, she took Imodium w/o improvement, no abdominal pain, N/V, coughing up phlegm, especially at night, HA and sore throat has improved,  [FreeTextEntry5] : Tylenol, Gargling with salt water, hot tea, Robitussin, Azelastin NS,  [FreeTextEntry8] : She is eating nondairy yogurt, and taking Benefiber.  She denied recent ABx use.

## 2023-02-23 ENCOUNTER — NON-APPOINTMENT (OUTPATIENT)
Age: 73
End: 2023-02-23

## 2023-03-13 ENCOUNTER — APPOINTMENT (OUTPATIENT)
Dept: INTERNAL MEDICINE | Facility: CLINIC | Age: 73
End: 2023-03-13
Payer: MEDICARE

## 2023-03-13 VITALS — SYSTOLIC BLOOD PRESSURE: 112 MMHG | DIASTOLIC BLOOD PRESSURE: 74 MMHG

## 2023-03-13 VITALS
TEMPERATURE: 97 F | HEIGHT: 61 IN | SYSTOLIC BLOOD PRESSURE: 107 MMHG | HEART RATE: 80 BPM | OXYGEN SATURATION: 96 % | BODY MASS INDEX: 38.89 KG/M2 | DIASTOLIC BLOOD PRESSURE: 70 MMHG | WEIGHT: 206 LBS

## 2023-03-13 DIAGNOSIS — K86.89 OTHER SPECIFIED DISEASES OF PANCREAS: ICD-10-CM

## 2023-03-13 DIAGNOSIS — Z13.820 ENCOUNTER FOR SCREENING FOR OSTEOPOROSIS: ICD-10-CM

## 2023-03-13 DIAGNOSIS — J45.901 UNSPECIFIED ASTHMA WITH (ACUTE) EXACERBATION: ICD-10-CM

## 2023-03-13 DIAGNOSIS — B36.9 SUPERFICIAL MYCOSIS, UNSPECIFIED: ICD-10-CM

## 2023-03-13 PROCEDURE — 99214 OFFICE O/P EST MOD 30 MIN: CPT

## 2023-03-13 RX ORDER — CETIRIZINE HYDROCHLORIDE 10 MG/1
10 TABLET, FILM COATED ORAL
Qty: 90 | Refills: 1 | Status: COMPLETED | COMMUNITY
End: 2023-03-13

## 2023-03-13 NOTE — HISTORY OF PRESENT ILLNESS
[FreeTextEntry1] : Pt presented for 6 month f/u of HTN, HLD and glucose intolerance. [de-identified] : She had viral illness in 2/2023 and it took her almost a month to get better.  She is now completely recovered. but still has some postnasal occ.\par \par She has L knee problem, she was not able to lift this up to step into her bathtub.  She will go to see ortho and hoping to get PT.\par \par She is no longer taking Tramadol and is taking Tylenol XS with adequate response.  She is eating kale and vegetable more now.\par \par She saw GI and had EGD and was told to repeat  MRI of abdomen around 5/2023.

## 2023-03-13 NOTE — PHYSICAL EXAM
[No Acute Distress] : no acute distress [Well Nourished] : well nourished [Well Developed] : well developed [Supple] : supple [No Respiratory Distress] : no respiratory distress  [Clear to Auscultation] : lungs were clear to auscultation bilaterally [Normal Rate] : normal rate  [Regular Rhythm] : with a regular rhythm [Normal S1, S2] : normal S1 and S2 [Soft] : abdomen soft [Non Tender] : non-tender [Normal Bowel Sounds] : normal bowel sounds [No CVA Tenderness] : no CVA  tenderness [No Spinal Tenderness] : no spinal tenderness [Grossly Normal Strength/Tone] : grossly normal strength/tone [Speech Grossly Normal] : speech grossly normal [Normal Affect] : the affect was normal [Alert and Oriented x3] : oriented to person, place, and time [Normal Mood] : the mood was normal [de-identified] : Obese female in stated age,  [de-identified] : Trace ankle edema,  [de-identified] : OA changes of both knees, [de-identified] : Ambulate with rollators,

## 2023-03-16 ENCOUNTER — APPOINTMENT (OUTPATIENT)
Dept: NEUROLOGY | Facility: CLINIC | Age: 73
End: 2023-03-16

## 2023-03-20 ENCOUNTER — APPOINTMENT (OUTPATIENT)
Dept: ORTHOPEDIC SURGERY | Facility: CLINIC | Age: 73
End: 2023-03-20
Payer: MEDICARE

## 2023-03-20 VITALS
BODY MASS INDEX: 37 KG/M2 | SYSTOLIC BLOOD PRESSURE: 112 MMHG | OXYGEN SATURATION: 96 % | HEIGHT: 61 IN | DIASTOLIC BLOOD PRESSURE: 72 MMHG | HEART RATE: 77 BPM | WEIGHT: 196 LBS | TEMPERATURE: 96.9 F

## 2023-03-20 DIAGNOSIS — Z96.652 PRESENCE OF LEFT ARTIFICIAL KNEE JOINT: ICD-10-CM

## 2023-03-20 DIAGNOSIS — Z96.651 PRESENCE OF RIGHT ARTIFICIAL KNEE JOINT: ICD-10-CM

## 2023-03-20 PROCEDURE — 99214 OFFICE O/P EST MOD 30 MIN: CPT

## 2023-03-20 PROCEDURE — 73562 X-RAY EXAM OF KNEE 3: CPT | Mod: LT,RT

## 2023-03-20 NOTE — HISTORY OF PRESENT ILLNESS
[de-identified] : 72 year old female s/p left THR in 2005, left TKR in 2006 with Dr. Villalpando and right TKR in 2013 with Dr. Carver presents for initial evaluation of bilateral knee pain and leg weakness. She had a fall last year while getting in the car after physical therapy and has been having difficulties with the knees since then. She has been experiencing left knee locking with even mild flexion, and right knee buckling if standing or walking for a prolonged period. She ambulates with a walker at baseline. She also complains of muscle weakness in both legs, L>R, and inability to lift her legs up due to this. She takes Tylenol at night for pain. She had a cervical fusion with Dr. Weber 2/27/18.

## 2023-03-20 NOTE — PHYSICAL EXAM
[Slightly Antalgic] : slightly antalgic [Walker] : ambulates with walker [DP] : dorsalis pedis 2+ and symmetric bilaterally [PT] : posterior tibial 2+ and symmetric bilaterally [Normal] : Alert and in no acute distress [Poor Appearance] : well-appearing [Obese] : obese [de-identified] : The patient has no respiratory distress. Mood and affect are normal. The patient is alert and oriented to person, place and time.\par There is no pain with active or passive motion of the hips.  There is no tenderness of either hip.  Examination of the knees demonstrates healed scars bilaterally.  Quadriceps and hamstring function are intact.  She has quadriceps and hamstring weakness bilaterally.  There is no gross instability of either knee arthroplasty.  There is minimal tenderness to palpation of the right patella.  There is no proximal tibial tenderness.  The calves are soft and nontender.There is mild swelling of both ankles which is chronic. [de-identified] : AP, lateral and sunrise x-rays of both knees taken today demonstrate bilateral total knee replacement in good position.  There are lucencies at the right tibial component which are unchanged from x-rays taken in April 15, 2019.

## 2023-03-21 ENCOUNTER — APPOINTMENT (OUTPATIENT)
Dept: NEUROLOGY | Facility: CLINIC | Age: 73
End: 2023-03-21
Payer: MEDICARE

## 2023-03-21 VITALS
SYSTOLIC BLOOD PRESSURE: 102 MMHG | HEIGHT: 61 IN | HEART RATE: 80 BPM | BODY MASS INDEX: 37 KG/M2 | WEIGHT: 196 LBS | DIASTOLIC BLOOD PRESSURE: 67 MMHG

## 2023-03-21 PROCEDURE — 99215 OFFICE O/P EST HI 40 MIN: CPT

## 2023-03-21 RX ORDER — NAPROXEN 500 MG/1
500 TABLET ORAL
Qty: 60 | Refills: 0 | Status: DISCONTINUED | COMMUNITY
Start: 2022-12-09 | End: 2023-03-21

## 2023-03-21 NOTE — PHYSICAL EXAM
[FreeTextEntry1] : PHYSICAL EXAM\par Constitutional: Alert, no acute distress \par Psychiatric: appropriate affect and mood\par Pulmonary: No respiratory distress, stable on room air\par \par NEUROLOGICAL EXAM\par Mental status: The patient is alert, attentive and conversational memory intact.\par Speech/language: No dysarthria\par Cranial nerves:\par CN II: Visual fields are full to confrontation. Pupil size equal and briskly reactive to light. \par CN III, IV, VI: EOMI, no nystagmus, no ptosis\par CN V: Facial sensation is intact to pinprick in all 3 divisions bilaterally.\par CN VII: Face is symmetric with normal eye closure and smile.\par CN VII: Hearing is normal to rubbing fingers\par CN IX, X: Palate elevates symmetrically. Phonation is normal.\par CN XI: Head turning and shoulder shrug are intact\par CN XII: Tongue is midline with normal movements and no atrophy.\par Motor: B/l Deltoids 4/5 (pain limited), Distal UE 5/5.\par            Hip flexors 4+/5, Distal LE 5/5. \par Reflexes:                 R        L\par                  Biceps    2+       2+\par                  Patellar   3+       3+\par                  Achilles  2+       2+\par                  Plantar responses- R down\par                                                  L down\par Sensory: LT/PP intact UE and LE\par Coordination: There is no dysmetria on finger-to-nose and heel to shin. \par Gait/Stance: Walks with walker, short steps, steady and cautious gait. Antalgic gait, drags/limps with left leg. \par \par \par \par

## 2023-03-21 NOTE — DATA REVIEWED
[de-identified] : Scans reviewed- \par \par MRI brain 7/2022 (for headaches, ordered by Dr. Gueavra)- age related volume loss and (mild-mod) chronic microvascular ischemic changes.  small chronic right corona radiata lacunar infarct. \par \par MRI C spine 2017- multilevel degen changes. severe canal stenosis and cord edema vs myelopathy at C3-C4, C4. \par MRI L spine 2017- multilevel spondylosis, mod spinal canal stenosis L2-L3, severe canal and neural foraminal stenosis at L4-L5, moderate disc bulge at L5-S1 that contacts b/l L5 nerve roots (L>R) [de-identified] : EMG/NCS 7/2020, UE- moderate right and mild left CTS.

## 2023-03-21 NOTE — ASSESSMENT
[FreeTextEntry1] : Assessment/Plan:\par  72 year old female w/ long standing hx of gait instability- likely multifactorial and 2/2 to known cervical degenerative disc disease with stenosis and myelopathy (s/p C3-C7 decompression and fusion), Lumbar spine stenosis and b/l knee and hip OA (s/p b/l knee replacement and L hip replacement). \par \par Her symptoms improved with balance training and strengthening exercises in PT. \par \par There has been no significant progression of symptoms. Encouraged physical activity and weight loss. \par \par I will refer her to PT at [facility].\par Fall precautions discussed. She walks with a walker. \par \par \par Return to clinic 3 months\par \par The above plan was discussed with SHABANA COLVIN in great detail.  SHABANA COLVIN verbalized understanding and agrees with plan as detailed above. Patient was provided education and counselling on current diagnosis/symptoms. She was advised to call our clinic at [phone] for any new or worsening symptoms, or with any questions or concerns. In case of acute onset of neurological symptoms or worsening presentation, patient was advised to present to nearest emergency room for further evaluation. SHABANA MATHEWSSON expressed understanding and all her questions/concerns were addressed.\par \par Clara Goncalves M.D\par

## 2023-03-21 NOTE — HISTORY OF PRESENT ILLNESS
[FreeTextEntry1] : HPI (initial visit Mar 21, 2023)- SHABANA COLVIN is a 72 year old right handed woman w/ hx of Asthma, HTN, HLD, b/l CTS, Polymyalgia rheumatica, knee/ hip OA s/p b/l knee replacements and L hip replacement, CHANTELL (not on Cpap), Cervical and lumbar spine spondylosis s/p C3-C7 fusion 2018 here for evaluation of gait imbalance. \par \par She has been followed by Dr. Skip Weber (spine Ortho), hx of multilevel spondylosis in cervical and lumbar spine with severe canal stenosis at L4-L5 and C3-C4, with myelopathy? at C3-4 level, s/p C3-C7 decomp/fusion 2018. Recently referred to pain management (Dr. Kimberlyn Garcia) for neck/shoulder pain. \par \par Of note, she has been followed by Dr. Guevara since 2021 intermittent frontal/temporal headaches and gait unsteadiness/frequent falls. MRI brain did not show any acute pathology. GAit instability was though to be multifactorial and 2/2 to hx of cervical and lumbar spine disease and knee/hip OA. She was referred to PT for balance exercises.\par \par She has had gait instability ever since spine surgery. Falls have improved. Last fall was 12/2021, fell outside [employer]- was swinging leg up to getting into SUV, fell backwards. \par \par She reports she has noted improvement with PT. PT ended June 2022, insurance denied further sessions.  She is requesting new PT referral. She saw her Ortho and got a referral to PT for knee. Feels like she looses balance when turning. No significant progression. \par \par \par

## 2023-03-23 ENCOUNTER — APPOINTMENT (OUTPATIENT)
Dept: NEUROLOGY | Facility: CLINIC | Age: 73
End: 2023-03-23

## 2023-03-30 ENCOUNTER — NON-APPOINTMENT (OUTPATIENT)
Age: 73
End: 2023-03-30

## 2023-03-30 ENCOUNTER — APPOINTMENT (OUTPATIENT)
Dept: OPHTHALMOLOGY | Facility: CLINIC | Age: 73
End: 2023-03-30
Payer: MEDICARE

## 2023-03-30 PROCEDURE — 92012 INTRM OPH EXAM EST PATIENT: CPT

## 2023-04-03 LAB
ALBUMIN SERPL ELPH-MCNC: 4 G/DL
ALP BLD-CCNC: 112 U/L
ALT SERPL-CCNC: 50 U/L
ANION GAP SERPL CALC-SCNC: 10 MMOL/L
AST SERPL-CCNC: 52 U/L
BASOPHILS # BLD AUTO: 0.02 K/UL
BASOPHILS NFR BLD AUTO: 0.3 %
BILIRUB SERPL-MCNC: 0.2 MG/DL
BUN SERPL-MCNC: 21 MG/DL
CALCIUM SERPL-MCNC: 9.2 MG/DL
CHLORIDE SERPL-SCNC: 101 MMOL/L
CHOLEST SERPL-MCNC: 195 MG/DL
CK SERPL-CCNC: 103 U/L
CO2 SERPL-SCNC: 33 MMOL/L
CREAT SERPL-MCNC: 1.11 MG/DL
CRP SERPL-MCNC: 8 MG/L
EGFR: 52 ML/MIN/1.73M2
EOSINOPHIL # BLD AUTO: 0.53 K/UL
EOSINOPHIL NFR BLD AUTO: 8.5 %
ERYTHROCYTE [SEDIMENTATION RATE] IN BLOOD BY WESTERGREN METHOD: 120 MM/HR
ESTIMATED AVERAGE GLUCOSE: 128 MG/DL
GLUCOSE SERPL-MCNC: 108 MG/DL
HBA1C MFR BLD HPLC: 6.1 %
HCT VFR BLD CALC: 37.9 %
HDLC SERPL-MCNC: 71 MG/DL
HGB BLD-MCNC: 11.7 G/DL
IMM GRANULOCYTES NFR BLD AUTO: 0.2 %
LDLC SERPL CALC-MCNC: 100 MG/DL
LYMPHOCYTES # BLD AUTO: 2.29 K/UL
LYMPHOCYTES NFR BLD AUTO: 36.6 %
MAN DIFF?: NORMAL
MCHC RBC-ENTMCNC: 28.7 PG
MCHC RBC-ENTMCNC: 30.9 GM/DL
MCV RBC AUTO: 92.9 FL
MONOCYTES # BLD AUTO: 0.61 K/UL
MONOCYTES NFR BLD AUTO: 9.8 %
NEUTROPHILS # BLD AUTO: 2.79 K/UL
NEUTROPHILS NFR BLD AUTO: 44.6 %
NONHDLC SERPL-MCNC: 124 MG/DL
PLATELET # BLD AUTO: 244 K/UL
POTASSIUM SERPL-SCNC: 3.6 MMOL/L
PROT SERPL-MCNC: 7.2 G/DL
RBC # BLD: 4.08 M/UL
RBC # FLD: 15.1 %
SODIUM SERPL-SCNC: 144 MMOL/L
TRIGL SERPL-MCNC: 119 MG/DL
WBC # FLD AUTO: 6.25 K/UL

## 2023-04-05 ENCOUNTER — NON-APPOINTMENT (OUTPATIENT)
Age: 73
End: 2023-04-05

## 2023-04-13 ENCOUNTER — NON-APPOINTMENT (OUTPATIENT)
Age: 73
End: 2023-04-13

## 2023-04-13 ENCOUNTER — APPOINTMENT (OUTPATIENT)
Dept: ORTHOPEDIC SURGERY | Facility: CLINIC | Age: 73
End: 2023-04-13

## 2023-04-14 ENCOUNTER — NON-APPOINTMENT (OUTPATIENT)
Age: 73
End: 2023-04-14

## 2023-04-17 ENCOUNTER — APPOINTMENT (OUTPATIENT)
Dept: ORTHOPEDIC SURGERY | Facility: CLINIC | Age: 73
End: 2023-04-17
Payer: MEDICARE

## 2023-04-17 VITALS
BODY MASS INDEX: 37 KG/M2 | DIASTOLIC BLOOD PRESSURE: 49 MMHG | HEART RATE: 78 BPM | HEIGHT: 61 IN | TEMPERATURE: 97.3 F | WEIGHT: 196 LBS | SYSTOLIC BLOOD PRESSURE: 98 MMHG | OXYGEN SATURATION: 95 %

## 2023-04-17 PROCEDURE — 99214 OFFICE O/P EST MOD 30 MIN: CPT

## 2023-05-02 ENCOUNTER — NON-APPOINTMENT (OUTPATIENT)
Age: 73
End: 2023-05-02

## 2023-05-02 ENCOUNTER — APPOINTMENT (OUTPATIENT)
Dept: RHEUMATOLOGY | Facility: CLINIC | Age: 73
End: 2023-05-02
Payer: MEDICARE

## 2023-05-02 ENCOUNTER — LABORATORY RESULT (OUTPATIENT)
Age: 73
End: 2023-05-02

## 2023-05-02 VITALS
WEIGHT: 202 LBS | HEIGHT: 61 IN | BODY MASS INDEX: 38.14 KG/M2 | TEMPERATURE: 97.8 F | HEART RATE: 83 BPM | DIASTOLIC BLOOD PRESSURE: 72 MMHG | OXYGEN SATURATION: 95 % | SYSTOLIC BLOOD PRESSURE: 121 MMHG

## 2023-05-02 PROCEDURE — 99204 OFFICE O/P NEW MOD 45 MIN: CPT

## 2023-05-05 LAB
ANA PAT FLD IF-IMP: ABNORMAL
ANA SER IF-ACNC: ABNORMAL
CCP AB SER IA-ACNC: <8 UNITS
CK SERPL-CCNC: 144 U/L
CRP SERPL-MCNC: 7 MG/L
ENA SS-A AB SER IA-ACNC: <0.2 AL
ENA SS-B AB SER IA-ACNC: <0.2 AL
ERYTHROCYTE [SEDIMENTATION RATE] IN BLOOD BY WESTERGREN METHOD: 82 MM/HR
FERRITIN SERPL-MCNC: 124 NG/ML
HBV CORE IGG+IGM SER QL: NONREACTIVE
HBV SURFACE AB SER QL: REACTIVE
HBV SURFACE AG SER QL: NONREACTIVE
HCV AB SER QL: NONREACTIVE
HCV S/CO RATIO: 0.1 S/CO
IL6 SERPL-MCNC: 8.9 PG/ML
M TB IFN-G BLD-IMP: NEGATIVE
QUANTIFERON TB PLUS MITOGEN MINUS NIL: >10 IU/ML
QUANTIFERON TB PLUS NIL: 0.03 IU/ML
QUANTIFERON TB PLUS TB1 MINUS NIL: 0.01 IU/ML
QUANTIFERON TB PLUS TB2 MINUS NIL: 0.02 IU/ML
RF+CCP IGG SER-IMP: NEGATIVE
RHEUMATOID FACT SER QL: <10 IU/ML
TRANSFERRIN SERPL-MCNC: 255 MG/DL
URATE SERPL-MCNC: 4.9 MG/DL

## 2023-05-05 NOTE — PHYSICAL EXAM
[General Appearance - Alert] : alert [General Appearance - In No Acute Distress] : in no acute distress [General Appearance - Well Nourished] : well nourished [Sclera] : the sclera and conjunctiva were normal [Nasal Cavity] : the nasal mucosa and septum were normal [FreeTextEntry1] : limited rom of bilateral shoulders but no TTP

## 2023-05-05 NOTE — ASSESSMENT
[FreeTextEntry1] : 73F with reported history of PMR based on shoulder pain and elevated inflammatory markers with inadequate response to prednisone at the time. Patient's main complaints and radiating cervicalgia and lower back pain and pt does have significant spinal stenosis on imaging.. Less likely to be secondary to PMR or inflammatory arthritis such as RA.\par \par \par \par Plan\par -check ESR CRP IL6 paraproteinemia labs\par -obtain records from Dr. Champagne to see what dose of prednisone she was started on\par -recommend to get MRI bilateral shoulders to check for inflammatory arthritis\par -would hold off on prednisone\par -recommend vascular eval for leg edema\par -follow up after above

## 2023-05-05 NOTE — HISTORY OF PRESENT ILLNESS
[FreeTextEntry1] : Referred by PCP for elevated ESR CRP and history of PMR\par \par History of PMR:\par = 3-4 years ago dg with PMR by rheumatologist Dr. Champagne\par = was on prednisone which helped a little bit but not entirely\par = she has had persistent elevation of inflammatory markers ESR . ESR 7-14. Since 2019 seeing hematologist for elevated ESR\par \par occasional occipital headaches\par no vision changes\par no scalp tenderness\par \par History of cervical spine stenosis\par = s/p C3-C7 fusion 2018\par = has been on cymbalta 90mg for several years, does not think it is helping her\par = used to be on tramadol but it made her drowsy\par \par History of severe lumbar spinal canal stenosis\par = follows with spine ortho\par = used to be gabapentin\par \par History of knee OA\par =s/p bilateral TKR\par \par History of L hip OA\par =s/p L THR\par \par History of CTS\par EMG 7/2020 b/l median neuropathy\par \par Pancreatic duct dilatation\par PUD on carafat\par seeing Isabella Rangel\par \par severe CHANTELL\par asthma\par follows with pulmonary\par \par \par Today:\par =Persistent neck and mid back pain, has had trigger point injections\par =persistent bilateral shoulder pain, has had shoulder injections\par =takes meloxicam as needed\par \par SH: nc\par FH: No personal or family history of autoimmune disease.\par \par Labs\par 4/2023\par Reviewed labs - elevated inflammatory markers\par \par CRP 8\par \par Imaging reviewed:\par MRI C spine 2017- multilevel degen changes. severe canal stenosis and cord edema vs myelopathy at C3-C4, C4. \par MRI L spine 2017- multilevel spondylosis, mod spinal canal stenosis L2-L3, severe canal and neural foraminal stenosis at L4-L5, moderate disc bulge at L5-S1 that contacts b/l L5 nerve roots (L>R) \par EMG/NCS 7/2020, UE- moderate right and mild left CTS.

## 2023-05-08 LAB
ALBUMIN MFR SERPL ELPH: 49.6 %
ALBUMIN SERPL-MCNC: 3.7 G/DL
ALBUMIN/GLOB SERPL: 1 RATIO
ALPHA1 GLOB MFR SERPL ELPH: 4.9 %
ALPHA1 GLOB SERPL ELPH-MCNC: 0.4 G/DL
ALPHA2 GLOB MFR SERPL ELPH: 13.1 %
ALPHA2 GLOB SERPL ELPH-MCNC: 1 G/DL
B-GLOBULIN MFR SERPL ELPH: 12.6 %
B-GLOBULIN SERPL ELPH-MCNC: 0.9 G/DL
DEPRECATED KAPPA LC FREE/LAMBDA SER: 2 RATIO
GAMMA GLOB FLD ELPH-MCNC: 1.5 G/DL
GAMMA GLOB MFR SERPL ELPH: 19.8 %
IGA SER QL IEP: 280 MG/DL
IGG SER QL IEP: 1632 MG/DL
IGM SER QL IEP: 58 MG/DL
INTERPRETATION SERPL IEP-IMP: NORMAL
KAPPA LC CSF-MCNC: 1.75 MG/DL
KAPPA LC SERPL-MCNC: 3.5 MG/DL
M PROTEIN SPEC IFE-MCNC: NORMAL
PROT SERPL-MCNC: 7.5 G/DL
PROT SERPL-MCNC: 7.5 G/DL

## 2023-05-09 ENCOUNTER — NON-APPOINTMENT (OUTPATIENT)
Age: 73
End: 2023-05-09

## 2023-05-09 LAB
ALBUPE: 13.8 %
ALPHA1UPE: 38.8 %
ALPHA2UPE: 20.3 %
BETAUPE: 17.5 %
GAMMAUPE: 9.6 %
HLA-B27 QL NAA+PROBE: NORMAL
IGA 24H UR QL IFE: NORMAL
KAPPA LC 24H UR QL: NORMAL
PROT PATTERN 24H UR ELPH-IMP: NORMAL
PROT UR-MCNC: 12 MG/DL
PROT UR-MCNC: 12 MG/DL

## 2023-05-24 ENCOUNTER — APPOINTMENT (OUTPATIENT)
Dept: VASCULAR SURGERY | Facility: CLINIC | Age: 73
End: 2023-05-24
Payer: MEDICARE

## 2023-05-24 VITALS
TEMPERATURE: 98.3 F | HEART RATE: 70 BPM | HEIGHT: 61 IN | WEIGHT: 202 LBS | SYSTOLIC BLOOD PRESSURE: 113 MMHG | DIASTOLIC BLOOD PRESSURE: 72 MMHG | BODY MASS INDEX: 38.14 KG/M2

## 2023-05-24 PROCEDURE — 93970 EXTREMITY STUDY: CPT

## 2023-05-24 PROCEDURE — 99213 OFFICE O/P EST LOW 20 MIN: CPT

## 2023-05-24 NOTE — PHYSICAL EXAM
[Ankle Swelling (On Exam)] : present [Ankle Swelling Bilaterally] : bilaterally  [No Rash or Lesion] : No rash or lesion [Alert] : alert [Oriented to Person] : oriented to person [Oriented to Place] : oriented to place [Oriented to Time] : oriented to time [Calm] : calm [Varicose Veins Of Lower Extremities] : not present [] : not present [de-identified] : NAD

## 2023-05-24 NOTE — HISTORY OF PRESENT ILLNESS
[FreeTextEntry1] : 73F with many years of bilateral leg swelling. Patient reported that this had previously happened on amlodipine which then resolved after discontinuing amlodipine. Did not have an echo recently. Has hx of left hip replacement and bilateral knee replacements. Non smoker. No significant family hx of venous insufficiency.

## 2023-05-24 NOTE — ASSESSMENT
[FreeTextEntry1] : 73F with bilateral leg edema.\par \par VI study no venous reflux bilaterally, no DVTs, no SVTs\par palpable pedal pulses \par no arterial or venous insufficiency found to explain pt's symptoms\par \par \par Plan:\par - compression stockings (15-20mmHg)\par - leg elevation when sitting \par f/up with cardiology for echo\par - follow up as needed

## 2023-05-25 ENCOUNTER — APPOINTMENT (OUTPATIENT)
Dept: ORTHOPEDIC SURGERY | Facility: CLINIC | Age: 73
End: 2023-05-25
Payer: MEDICARE

## 2023-05-25 VITALS — HEART RATE: 85 BPM | DIASTOLIC BLOOD PRESSURE: 73 MMHG | SYSTOLIC BLOOD PRESSURE: 112 MMHG | OXYGEN SATURATION: 96 %

## 2023-05-25 VITALS — WEIGHT: 202 LBS | BODY MASS INDEX: 38.14 KG/M2 | HEIGHT: 61 IN

## 2023-05-25 PROCEDURE — 99214 OFFICE O/P EST MOD 30 MIN: CPT

## 2023-06-01 ENCOUNTER — RX RENEWAL (OUTPATIENT)
Age: 73
End: 2023-06-01

## 2023-06-03 ENCOUNTER — APPOINTMENT (OUTPATIENT)
Dept: MRI IMAGING | Facility: IMAGING CENTER | Age: 73
End: 2023-06-03

## 2023-06-07 ENCOUNTER — APPOINTMENT (OUTPATIENT)
Dept: PHYSICAL MEDICINE AND REHAB | Facility: CLINIC | Age: 73
End: 2023-06-07
Payer: MEDICARE

## 2023-06-07 VITALS — OXYGEN SATURATION: 97 % | DIASTOLIC BLOOD PRESSURE: 69 MMHG | HEART RATE: 65 BPM | SYSTOLIC BLOOD PRESSURE: 104 MMHG

## 2023-06-07 PROCEDURE — 99214 OFFICE O/P EST MOD 30 MIN: CPT

## 2023-06-07 NOTE — ASSESSMENT
[FreeTextEntry1] : 73 year old woman with b/l shoulder pain, difficulty with sit to stand transfers, h/o PMR\par previous notes reviewed\par continue PT, start HEP with daily stretching, strengthening exercises \par discussed pain medications, tylenol, muscle relaxants, patient will get [place] medical marijuana card \par follow up as needed

## 2023-06-07 NOTE — PHYSICAL EXAM
[Normal] : Oriented to person, place, and time, insight and judgement were intact and the affect was normal [de-identified] : no respiratory distress  [de-identified] : warm, well perfused  [de-identified] : proximal shoulder abduction/hip flexion 4+/5, uses armrests to stand from seated position, decreased ROM b/l shoulders  [de-identified] : b/l LE edema [de-identified] : sensation intact to light touch

## 2023-06-07 NOTE — HISTORY OF PRESENT ILLNESS
[FreeTextEntry1] : Patient is a 73 year old woman who presents for evaluation. She complains of shoulder pain, bilateral, felt with activity, at night, radiates to biceps. Saw Dr. Garcia in January, xrays of shoulders with severe endstage OA, she had no relief with cortisone injections. Also has difficulty standing from seated position, worse with standing from toilet, had a fall last year and feels off balance. She has knee pain, s/p b/l TKR, xrays of b/l knees in March 2023 show replacements in good position. Patient has history of PMR, followed by rheumatology, was previously on prednisone. She is here for medical marijuana. Taking meloxicam daily with little relief, no relief with tylenol, felt too sleepy with muscle relaxants. Patient is currently in PT, has a few sessions remaining, appeal in process for more visits. She complains of LE edema, trying to get BESSIE stockings, was less active over past year due to edema, putting legs up. Patient notes history of carpal tunnel, wrist/hand pain, now with left forearm numbness, has appointment with Ortho/hand next week.

## 2023-06-09 NOTE — H&P PST ADULT - VENOUS THROMBOEMBOLISM SCORE
Detail Level: Zone
Photo Preface (Leave Blank If You Do Not Want): Photographs were obtained today
9

## 2023-06-12 ENCOUNTER — APPOINTMENT (OUTPATIENT)
Dept: ORTHOPEDIC SURGERY | Facility: CLINIC | Age: 73
End: 2023-06-12
Payer: MEDICARE

## 2023-06-12 VITALS — HEIGHT: 61 IN | BODY MASS INDEX: 37.76 KG/M2 | WEIGHT: 200 LBS

## 2023-06-12 PROCEDURE — 99214 OFFICE O/P EST MOD 30 MIN: CPT | Mod: 25

## 2023-06-13 RX ORDER — ALBUTEROL SULFATE 90 UG/1
108 (90 BASE) INHALANT RESPIRATORY (INHALATION)
Qty: 1 | Refills: 1 | Status: ACTIVE | COMMUNITY
Start: 2021-06-08 | End: 1900-01-01

## 2023-06-21 ENCOUNTER — APPOINTMENT (OUTPATIENT)
Dept: ORTHOPEDIC SURGERY | Facility: CLINIC | Age: 73
End: 2023-06-21
Payer: MEDICARE

## 2023-06-21 DIAGNOSIS — M25.519 PAIN IN UNSPECIFIED SHOULDER: ICD-10-CM

## 2023-06-21 DIAGNOSIS — G89.29 PAIN IN UNSPECIFIED SHOULDER: ICD-10-CM

## 2023-06-21 PROCEDURE — 99214 OFFICE O/P EST MOD 30 MIN: CPT

## 2023-06-23 ENCOUNTER — APPOINTMENT (OUTPATIENT)
Dept: MRI IMAGING | Facility: CLINIC | Age: 73
End: 2023-06-23
Payer: MEDICARE

## 2023-06-23 ENCOUNTER — OUTPATIENT (OUTPATIENT)
Dept: OUTPATIENT SERVICES | Facility: HOSPITAL | Age: 73
LOS: 1 days | End: 2023-06-23
Payer: MEDICARE

## 2023-06-23 DIAGNOSIS — Z90.89 ACQUIRED ABSENCE OF OTHER ORGANS: Chronic | ICD-10-CM

## 2023-06-23 DIAGNOSIS — Z96.642 PRESENCE OF LEFT ARTIFICIAL HIP JOINT: Chronic | ICD-10-CM

## 2023-06-23 DIAGNOSIS — Z96.652 PRESENCE OF LEFT ARTIFICIAL KNEE JOINT: Chronic | ICD-10-CM

## 2023-06-23 DIAGNOSIS — K86.89 OTHER SPECIFIED DISEASES OF PANCREAS: ICD-10-CM

## 2023-06-23 DIAGNOSIS — Z96.651 PRESENCE OF RIGHT ARTIFICIAL KNEE JOINT: Chronic | ICD-10-CM

## 2023-06-23 DIAGNOSIS — Z98.1 ARTHRODESIS STATUS: Chronic | ICD-10-CM

## 2023-06-23 DIAGNOSIS — Z98.49 CATARACT EXTRACTION STATUS, UNSPECIFIED EYE: Chronic | ICD-10-CM

## 2023-06-23 PROBLEM — M25.519 CHRONIC SHOULDER PAIN: Status: ACTIVE | Noted: 2023-06-23

## 2023-06-23 PROCEDURE — 74183 MRI ABD W/O CNTR FLWD CNTR: CPT

## 2023-06-23 PROCEDURE — A9585: CPT

## 2023-06-23 PROCEDURE — 74183 MRI ABD W/O CNTR FLWD CNTR: CPT | Mod: 26

## 2023-06-26 ENCOUNTER — APPOINTMENT (OUTPATIENT)
Dept: CARDIOLOGY | Facility: CLINIC | Age: 73
End: 2023-06-26
Payer: MEDICARE

## 2023-06-26 ENCOUNTER — NON-APPOINTMENT (OUTPATIENT)
Age: 73
End: 2023-06-26

## 2023-06-26 VITALS
HEIGHT: 61 IN | HEART RATE: 84 BPM | WEIGHT: 205 LBS | TEMPERATURE: 98.3 F | BODY MASS INDEX: 38.71 KG/M2 | OXYGEN SATURATION: 96 % | DIASTOLIC BLOOD PRESSURE: 75 MMHG | SYSTOLIC BLOOD PRESSURE: 132 MMHG

## 2023-06-26 PROCEDURE — 99214 OFFICE O/P EST MOD 30 MIN: CPT | Mod: 25

## 2023-06-26 PROCEDURE — 93000 ELECTROCARDIOGRAM COMPLETE: CPT

## 2023-06-26 NOTE — HISTORY OF PRESENT ILLNESS
[FreeTextEntry1] : 73 year old woman with history of HTN HLD \par \par #HTN- on HCTZ 25 mg daily, Metoprolol 25 mg BID\par BP really controlled, continue present meds\par #HLD- On Atorvastatin 20 mg daily, continue present meds\par

## 2023-06-26 NOTE — DISCUSSION/SUMMARY
[FreeTextEntry1] : 73 year old woman with history of HTN HLD here for followup \par \par #HTN- on HCTZ 25 mg daily, Metoprolol 25 mg BID\par BP really controlled, continue present meds\par #HLD- On Atorvastatin 20 mg daily, continue present meds\par Lipids 9/20/21:\par  /HDL 71/EPX204\par #FU in 6 months\par  [EKG obtained to assist in diagnosis and management of assessed problem(s)] : EKG obtained to assist in diagnosis and management of assessed problem(s)

## 2023-06-29 ENCOUNTER — NON-APPOINTMENT (OUTPATIENT)
Age: 73
End: 2023-06-29

## 2023-07-11 ENCOUNTER — RX RENEWAL (OUTPATIENT)
Age: 73
End: 2023-07-11

## 2023-07-11 ENCOUNTER — APPOINTMENT (OUTPATIENT)
Dept: NEUROLOGY | Facility: CLINIC | Age: 73
End: 2023-07-11
Payer: MEDICARE

## 2023-07-11 ENCOUNTER — APPOINTMENT (OUTPATIENT)
Dept: RHEUMATOLOGY | Facility: CLINIC | Age: 73
End: 2023-07-11
Payer: MEDICARE

## 2023-07-11 VITALS
HEART RATE: 77 BPM | DIASTOLIC BLOOD PRESSURE: 71 MMHG | TEMPERATURE: 97.5 F | HEIGHT: 60 IN | SYSTOLIC BLOOD PRESSURE: 112 MMHG | BODY MASS INDEX: 39.85 KG/M2 | WEIGHT: 203 LBS | OXYGEN SATURATION: 96 %

## 2023-07-11 DIAGNOSIS — R70.0 ELEVATED ERYTHROCYTE SEDIMENTATION RATE: ICD-10-CM

## 2023-07-11 DIAGNOSIS — M48.02 SPINAL STENOSIS, CERVICAL REGION: ICD-10-CM

## 2023-07-11 DIAGNOSIS — M48.061 SPINAL STENOSIS, LUMBAR REGION WITHOUT NEUROGENIC CLAUDICATION: ICD-10-CM

## 2023-07-11 DIAGNOSIS — G56.22 LESION OF ULNAR NERVE, LEFT UPPER LIMB: ICD-10-CM

## 2023-07-11 DIAGNOSIS — G56.02 CARPAL TUNNEL SYNDROME, LEFT UPPER LIMB: ICD-10-CM

## 2023-07-11 DIAGNOSIS — G56.01 CARPAL TUNNEL SYNDROME, RIGHT UPPER LIMB: ICD-10-CM

## 2023-07-11 PROCEDURE — 99214 OFFICE O/P EST MOD 30 MIN: CPT

## 2023-07-11 NOTE — DATA REVIEWED
[de-identified] : Scans reviewed- \par \par MRI brain 7/2022 (for headaches, ordered by Dr. Guevara)- age related volume loss and (mild-mod) chronic microvascular ischemic changes.  small chronic right corona radiata lacunar infarct. \par \par MRI C spine 2017- multilevel degen changes. severe canal stenosis and cord edema vs myelopathy at C3-C4, C4. \par MRI L spine 2017- multilevel spondylosis, mod spinal canal stenosis L2-L3, severe canal and neural foraminal stenosis at L4-L5, moderate disc bulge at L5-S1 that contacts b/l L5 nerve roots (L>R) [de-identified] : EMG/NCS 7/2020, UE- moderate right and mild left CTS.

## 2023-07-11 NOTE — HISTORY OF PRESENT ILLNESS
[FreeTextEntry1] : INTERIM HX 07/11/2023: Completed PT for balance, helped. No falls. Continues to report b/l CTS, s/p steroid shots. New L 4th and 5th digit tingling/numbness x 1.5 months, sharp pains from elbow to fingers when bending elbow, wakes her at night. Seeing Ortho hand, Dr Madai Ahumada, scheduled for EMG/NCS. \par \par ----------------------------------------------------------------------------------------------------------------------------------------\par HPI (initial visit Mar 21, 2023)- SHABANA COLVIN is a 72 year old right handed woman w/ hx of Asthma, HTN, HLD, b/l CTS, Polymyalgia rheumatica, knee/ hip OA s/p b/l knee replacements and L hip replacement, CHANTELL (not on Cpap), Cervical and lumbar spine spondylosis s/p C3-C7 ACDF 2018 here for evaluation of gait imbalance. \par \par She has been followed by Dr. Skip Weber (spine Ortho), hx of multilevel spondylosis in cervical and lumbar spine with severe canal stenosis at L4-L5 and C3-C4, with myelopathy? at C3-4 level, s/p C3-C7 decomp/fusion 2018. Recently referred to pain management (Dr. Kimberlyn Garcia) for neck/shoulder pain. \par \par Of note, she has been followed by Dr. Guevara since 2021 intermittent frontal/temporal headaches and gait unsteadiness/frequent falls. MRI brain did not show any acute pathology. GAit instability was though to be multifactorial and 2/2 to hx of cervical and lumbar spine disease and knee/hip OA. She was referred to PT for balance exercises.\par \par She has had gait instability ever since spine surgery. Falls have improved. Last fall was 12/2021, fell outside [employer]- was swinging leg up to getting into SUV, fell backwards. \par \par She reports she has noted improvement with PT. PT ended June 2022, insurance denied further sessions.  She is requesting new PT referral. She saw her Ortho and got a referral to PT for knee. Feels like she looses balance when turning. No significant progression. \par \par \par

## 2023-07-11 NOTE — PHYSICAL EXAM
[FreeTextEntry1] : PHYSICAL EXAM\par Constitutional: Alert, no acute distress \par Neck: some limitations in neck movement, corby to the left\par Psychiatric: appropriate affect and mood\par Pulmonary: No respiratory distress, stable on room air\par \par NEUROLOGICAL EXAM\par Mental status: The patient is alert, attentive and conversational memory intact.\par Speech/language: No dysarthria\par Cranial nerves:\par CN II: Pupil size equal and briskly reactive to light. \par CN III, IV, VI: EOMI, no nystagmus, no ptosis\par CN V: Facial sensation is intact to pinprick in all 3 divisions bilaterally.\par CN VII: Face is symmetric with normal eye closure and smile.\par CN VII: Hearing is normal to rubbing fingers\par CN IX, X: Palate elevates symmetrically. Phonation is normal.\par CN XI: Head turning and shoulder shrug are intact\par CN XII: Tongue is midline with normal movements and no atrophy.\par Motor: B/l Deltoids 4+/5 (pain limited), Distal UE 5/5.\par            Hip flexors 4+/5, Distal LE 5/5. \par Reflexes:                 R        L\par                  Biceps    2+       2+\par                  Patellar   3+       3+\par                  Achilles  2+       2+\par                  Plantar responses- R down\par                                                  L down\par Sensory: LT/PP intact UE and LE\par Coordination: There is no dysmetria on finger-to-nose.\par Gait/Stance: Walks with walker, short steps, steady and cautious gait. Antalgic gait, drags/limps with left leg. Romberg negative. \par \par Negative Tinel sign at wrist and elbow\par Negative Phalen and Spurling signs, tingling in L pinky when bending elbow for phalen signs\par \par \par \par \par

## 2023-07-11 NOTE — ASSESSMENT
[FreeTextEntry1] : Assessment/Plan:\par  # Gait instability- likely multifactorial and 2/2 to known cervical degenerative disc disease with stenosis and myelopathy (s/p C3-C7 decompression and fusion), Lumbar spine stenosis and b/l knee and hip OA (s/p b/l knee replacement and L hip replacement). \par Her symptoms are stable and have improved with balance training and strengthening exercises in PT. \par There has been no significant progression of symptoms. Encouraged physical activity and weight loss. \par Fall precautions discussed. She walks with a walker. \par \par # b/l Carpal tunnel syndrome- continue to follow with Ortho hand. Rec wrist splinting at bedtime\par \par # L 4th and 5th digit numbness/paresthesias- most c/w cubital tunnel syndrome. EMG/NCS scheduled, need to rule out superimposed cervical radiculopathy. Elbow splinting at bedtime. Continue to follow with Ortho hand. \par \par \par Return to clinic as needed\par \par The above plan was discussed with SHABANA COLVIN in great detail. SHABANA COLVIN verbalized understanding and agrees with plan as detailed above. Patient was provided education and counselling on current diagnosis/symptoms. She was advised to call our clinic at [phone] for any new or worsening symptoms, or with any questions or concerns. In case of acute onset of neurological symptoms or worsening presentation, patient was advised to present to nearest emergency room for further evaluation. SHABANA MATHEWSSON expressed understanding and all her questions/concerns were addressed.\par \par Clara Goncalves M.D

## 2023-07-12 DIAGNOSIS — M25.512 PAIN IN RIGHT SHOULDER: ICD-10-CM

## 2023-07-12 DIAGNOSIS — M25.511 PAIN IN RIGHT SHOULDER: ICD-10-CM

## 2023-07-18 LAB
CRP SERPL-MCNC: 7 MG/L
ERYTHROCYTE [SEDIMENTATION RATE] IN BLOOD BY WESTERGREN METHOD: 71 MM/HR

## 2023-07-24 ENCOUNTER — RX RENEWAL (OUTPATIENT)
Age: 73
End: 2023-07-24

## 2023-07-24 RX ORDER — METOPROLOL TARTRATE 25 MG/1
25 TABLET, FILM COATED ORAL
Qty: 180 | Refills: 1 | Status: ACTIVE | COMMUNITY
Start: 2018-02-16 | End: 1900-01-01

## 2023-07-24 RX ORDER — HYDROCHLOROTHIAZIDE 25 MG/1
25 TABLET ORAL
Qty: 90 | Refills: 1 | Status: ACTIVE | COMMUNITY
Start: 2017-04-28 | End: 1900-01-01

## 2023-07-25 ENCOUNTER — NON-APPOINTMENT (OUTPATIENT)
Age: 73
End: 2023-07-25

## 2023-07-25 ENCOUNTER — RESULT REVIEW (OUTPATIENT)
Age: 73
End: 2023-07-25

## 2023-07-26 ENCOUNTER — OUTPATIENT (OUTPATIENT)
Dept: OUTPATIENT SERVICES | Facility: HOSPITAL | Age: 73
LOS: 1 days | End: 2023-07-26
Payer: MEDICARE

## 2023-07-26 ENCOUNTER — APPOINTMENT (OUTPATIENT)
Dept: RADIOLOGY | Facility: IMAGING CENTER | Age: 73
End: 2023-07-26
Payer: MEDICARE

## 2023-07-26 DIAGNOSIS — Z96.642 PRESENCE OF LEFT ARTIFICIAL HIP JOINT: Chronic | ICD-10-CM

## 2023-07-26 DIAGNOSIS — Z98.49 CATARACT EXTRACTION STATUS, UNSPECIFIED EYE: Chronic | ICD-10-CM

## 2023-07-26 DIAGNOSIS — M25.511 PAIN IN RIGHT SHOULDER: ICD-10-CM

## 2023-07-26 DIAGNOSIS — Z90.89 ACQUIRED ABSENCE OF OTHER ORGANS: Chronic | ICD-10-CM

## 2023-07-26 DIAGNOSIS — Z96.652 PRESENCE OF LEFT ARTIFICIAL KNEE JOINT: Chronic | ICD-10-CM

## 2023-07-26 DIAGNOSIS — Z96.651 PRESENCE OF RIGHT ARTIFICIAL KNEE JOINT: Chronic | ICD-10-CM

## 2023-07-26 DIAGNOSIS — Z98.1 ARTHRODESIS STATUS: Chronic | ICD-10-CM

## 2023-07-26 PROCEDURE — 73030 X-RAY EXAM OF SHOULDER: CPT | Mod: 26,LT,RT

## 2023-07-26 PROCEDURE — 73030 X-RAY EXAM OF SHOULDER: CPT

## 2023-08-01 NOTE — HISTORY OF PRESENT ILLNESS
[FreeTextEntry1] : Referred by PCP for elevated ESR CRP and history of PMR  History of PMR: = 3-4 years ago dg with PMR by rheumatologist Dr. Champagne = was on prednisone which helped a little bit but not entirely = she has had persistent elevation of inflammatory markers ESR . ESR 7-14. Since 2019 seeing hematologist for elevated ESR  occasional occipital headaches no vision changes no scalp tenderness  History of cervical spine stenosis = s/p C3-C7 fusion 2018 = has been on cymbalta 90mg for several years, does not think it is helping her = used to be on tramadol but it made her drowsy  History of severe lumbar spinal canal stenosis = follows with spine ortho = used to be gabapentin  History of knee OA =s/p bilateral TKR  History of L hip OA =s/p L THR  History of CTS EMG 7/2020 b/l median neuropathy  Pancreatic duct dilatation PUD on carafat seeing Isabella Rangel  severe CHANTELL asthma follows with pulmonary   Today: =Persistent neck and mid back pain, has had trigger point injections =persistent bilateral shoulder pain, has had shoulder injections =takes meloxicam as needed  SH: nc FH: No personal or family history of autoimmune disease.  Labs 4/2023 Reviewed labs - elevated inflammatory markers  CRP 8  Imaging reviewed: MRI C spine 2017- multilevel degen changes. severe canal stenosis and cord edema vs myelopathy at C3-C4, C4.  MRI L spine 2017- multilevel spondylosis, mod spinal canal stenosis L2-L3, severe canal and neural foraminal stenosis at L4-L5, moderate disc bulge at L5-S1 that contacts b/l L5 nerve roots (L>R)  EMG/NCS 7/2020, UE- moderate right and mild left CTS.

## 2023-08-01 NOTE — ASSESSMENT
[FreeTextEntry1] : 73F with reported history of PMR based on shoulder pain and elevated inflammatory markers with inadequate response to prednisone at the time. Patient's main complaints and radiating cervicalgia and lower back pain and pt does have significant spinal stenosis on imaging.. Less likely to be secondary to PMR or inflammatory arthritis such as RA.  Plan -check ESR CRP -recommend to get MRI bilateral shoulders to check for inflammatory arthritis -recommend PET/CT to evaluate for source of high ESR CRP -would hold off on prednisone -recommend vascular eval for leg edema -follow up after above

## 2023-08-04 ENCOUNTER — APPOINTMENT (OUTPATIENT)
Dept: NUCLEAR MEDICINE | Facility: IMAGING CENTER | Age: 73
End: 2023-08-04
Payer: MEDICARE

## 2023-08-04 ENCOUNTER — OUTPATIENT (OUTPATIENT)
Dept: OUTPATIENT SERVICES | Facility: HOSPITAL | Age: 73
LOS: 1 days | End: 2023-08-04
Payer: MEDICARE

## 2023-08-04 DIAGNOSIS — Z98.49 CATARACT EXTRACTION STATUS, UNSPECIFIED EYE: Chronic | ICD-10-CM

## 2023-08-04 DIAGNOSIS — Z98.1 ARTHRODESIS STATUS: Chronic | ICD-10-CM

## 2023-08-04 DIAGNOSIS — Z00.8 ENCOUNTER FOR OTHER GENERAL EXAMINATION: ICD-10-CM

## 2023-08-04 DIAGNOSIS — Z96.651 PRESENCE OF RIGHT ARTIFICIAL KNEE JOINT: Chronic | ICD-10-CM

## 2023-08-04 DIAGNOSIS — Z90.89 ACQUIRED ABSENCE OF OTHER ORGANS: Chronic | ICD-10-CM

## 2023-08-04 DIAGNOSIS — Z96.652 PRESENCE OF LEFT ARTIFICIAL KNEE JOINT: Chronic | ICD-10-CM

## 2023-08-04 DIAGNOSIS — R70.0 ELEVATED ERYTHROCYTE SEDIMENTATION RATE: ICD-10-CM

## 2023-08-04 DIAGNOSIS — Z96.642 PRESENCE OF LEFT ARTIFICIAL HIP JOINT: Chronic | ICD-10-CM

## 2023-08-04 PROCEDURE — 78816 PET IMAGE W/CT FULL BODY: CPT | Mod: 26

## 2023-08-04 PROCEDURE — 78816 PET IMAGE W/CT FULL BODY: CPT

## 2023-08-04 PROCEDURE — A9552: CPT

## 2023-08-08 ENCOUNTER — APPOINTMENT (OUTPATIENT)
Dept: MRI IMAGING | Facility: CLINIC | Age: 73
End: 2023-08-08

## 2023-08-19 ENCOUNTER — NON-APPOINTMENT (OUTPATIENT)
Age: 73
End: 2023-08-19

## 2023-08-28 ENCOUNTER — OUTPATIENT (OUTPATIENT)
Dept: OUTPATIENT SERVICES | Facility: HOSPITAL | Age: 73
LOS: 1 days | End: 2023-08-28
Payer: MEDICARE

## 2023-08-28 ENCOUNTER — APPOINTMENT (OUTPATIENT)
Dept: MRI IMAGING | Facility: CLINIC | Age: 73
End: 2023-08-28
Payer: MEDICARE

## 2023-08-28 ENCOUNTER — RESULT REVIEW (OUTPATIENT)
Age: 73
End: 2023-08-28

## 2023-08-28 DIAGNOSIS — Z96.652 PRESENCE OF LEFT ARTIFICIAL KNEE JOINT: Chronic | ICD-10-CM

## 2023-08-28 DIAGNOSIS — Z00.8 ENCOUNTER FOR OTHER GENERAL EXAMINATION: ICD-10-CM

## 2023-08-28 DIAGNOSIS — Z96.642 PRESENCE OF LEFT ARTIFICIAL HIP JOINT: Chronic | ICD-10-CM

## 2023-08-28 DIAGNOSIS — Z90.89 ACQUIRED ABSENCE OF OTHER ORGANS: Chronic | ICD-10-CM

## 2023-08-28 DIAGNOSIS — Z96.651 PRESENCE OF RIGHT ARTIFICIAL KNEE JOINT: Chronic | ICD-10-CM

## 2023-08-28 DIAGNOSIS — Z98.49 CATARACT EXTRACTION STATUS, UNSPECIFIED EYE: Chronic | ICD-10-CM

## 2023-08-28 DIAGNOSIS — Z98.1 ARTHRODESIS STATUS: Chronic | ICD-10-CM

## 2023-08-28 DIAGNOSIS — M25.511 PAIN IN RIGHT SHOULDER: ICD-10-CM

## 2023-08-28 PROCEDURE — 73221 MRI JOINT UPR EXTREM W/O DYE: CPT

## 2023-08-28 PROCEDURE — 73221 MRI JOINT UPR EXTREM W/O DYE: CPT | Mod: 26,RT,76

## 2023-09-01 ENCOUNTER — APPOINTMENT (OUTPATIENT)
Dept: NEUROLOGY | Facility: CLINIC | Age: 73
End: 2023-09-01
Payer: MEDICARE

## 2023-09-01 PROCEDURE — 95886 MUSC TEST DONE W/N TEST COMP: CPT

## 2023-09-01 PROCEDURE — 95910 NRV CNDJ TEST 7-8 STUDIES: CPT

## 2023-09-04 ENCOUNTER — RX RENEWAL (OUTPATIENT)
Age: 73
End: 2023-09-04

## 2023-09-05 ENCOUNTER — RX RENEWAL (OUTPATIENT)
Age: 73
End: 2023-09-05

## 2023-09-05 DIAGNOSIS — M79.601 PAIN IN RIGHT ARM: ICD-10-CM

## 2023-09-05 DIAGNOSIS — M79.602 PAIN IN RIGHT ARM: ICD-10-CM

## 2023-09-07 ENCOUNTER — APPOINTMENT (OUTPATIENT)
Dept: ORTHOPEDIC SURGERY | Facility: CLINIC | Age: 73
End: 2023-09-07
Payer: MEDICARE

## 2023-09-07 VITALS
OXYGEN SATURATION: 95 % | HEIGHT: 60 IN | DIASTOLIC BLOOD PRESSURE: 63 MMHG | BODY MASS INDEX: 39.85 KG/M2 | HEART RATE: 96 BPM | SYSTOLIC BLOOD PRESSURE: 99 MMHG | WEIGHT: 203 LBS

## 2023-09-07 PROCEDURE — 99214 OFFICE O/P EST MOD 30 MIN: CPT

## 2023-09-07 NOTE — ADDENDUM
[FreeTextEntry1] : This note was written by Terence Paniagua on 09/07/2023 acting as scribe for Dr. Skip Weber M.D.  I, Skip Weber MD, have read and attest that all the information, medical decision making and discharge instructions within are true and accurate.

## 2023-09-07 NOTE — HISTORY OF PRESENT ILLNESS
[Stable] : stable [de-identified] : 73 year female presents for follow up evaluation neck and mid back pain. S/P C3-7 decompression and fusion on 2/27/2018.  She had eaten a cheeseburger in Sept 2022 which resulted in severe abdominal pain and N/V, and she then developed neck and thoracic pain.  Referred to Dr. Kimberlyn Garcia for pain management, trigger point injections with ultrasound guidance. Underwent injections with Dr. Garcia in January.  She underwent an EMG on 9/1/23 which revealed possible C8 sensory root/ganglion involvement, and no evidence of cubital tunnel syndrome. She was recommended by Dr. Ahumada to follow up to see if she would benefit for a possible epidural injection. She currently has pain at the left elbow that radiates to the 5th digit.  No fever chills sweats nausea vomiting no bowel or bladder dysfunction, no recent weight loss or gain no night pain. This history is in addition to the intake form that I personally reviewed.

## 2023-09-07 NOTE — DISCUSSION/SUMMARY
[de-identified] : S/P C3-7 decompression and fusion on 2/27/2018.  Thoracic degenerative disc disease.  EMG C8 ganglion sensory issue. Bilateral trapezial trigger points. Bilateral shoulder arthritis. Discussed all options. Continue PT. See ID before using MDP F/U with rheumatologist. All options discussed including rest, medicine, home exercise, acupuncture, Chiropractic care, Physical Therapy, Pain management, and last resort surgery. All questions were answered, all alternatives discussed and the patient is in complete agreement with the treatment plan which the patient contributed to and discussed with me through the shared decision making process. Follow-up appointment as instructed. Any issues and the patient will call or come in sooner.

## 2023-09-07 NOTE — PHYSICAL EXAM
[Walker] : ambulates with walker [Roach's Sign] : negative Roach's sign [Pronator Drift] : negative pronator drift [SLR] : negative straight leg raise [de-identified] : 5 out of 5 motor strength, sensation is intact and symmetrical full range of motion flexion extension and rotation, no palpatory tenderness full range of motion of hips knees, limited bilateral shoulders and elbows (all four extremities), no atrophy, negative straight leg raise, no pathological reflexes, no swelling, normal ambulation, no apparent distress skin is intact, no palpable lymph nodes, no upper or lower extremity instability, alert and oriented x3 and normal mood. Normal finger-to nose test.  [de-identified] : AP lateral cervical shows adequate C3-7 fusion posterior-reviewed with the patient.  X ray of thoracic spine done 10/06/2022-Thoracic degenerative disc disease-Results reviewed with the patient   XR AP Lat shoulder 04/17/2023 -bilateral arthritis- reviewed with the patient.   I reviewed, interpreted and clinically correlated the following outside imaging studies,  EMG 9/1/23 Right median neuropathy at wrist consistent with mild carpal tunnel Ulnar innervated muscles in the left including extensor indices proprius muscles.

## 2023-09-11 ENCOUNTER — LABORATORY RESULT (OUTPATIENT)
Age: 73
End: 2023-09-11

## 2023-09-11 LAB
ALBUMIN SERPL ELPH-MCNC: 4 G/DL
ALP BLD-CCNC: 120 U/L
ALT SERPL-CCNC: 16 U/L
ANION GAP SERPL CALC-SCNC: 9 MMOL/L
AST SERPL-CCNC: 23 U/L
BASOPHILS # BLD AUTO: 0.02 K/UL
BASOPHILS NFR BLD AUTO: 0.3 %
BILIRUB SERPL-MCNC: 0.2 MG/DL
BUN SERPL-MCNC: 29 MG/DL
CALCIUM SERPL-MCNC: 9.1 MG/DL
CHLORIDE SERPL-SCNC: 100 MMOL/L
CK SERPL-CCNC: 124 U/L
CO2 SERPL-SCNC: 33 MMOL/L
CREAT SERPL-MCNC: 1.23 MG/DL
CRP SERPL-MCNC: 11 MG/L
EGFR: 46 ML/MIN/1.73M2
EOSINOPHIL # BLD AUTO: 0.53 K/UL
EOSINOPHIL NFR BLD AUTO: 7.8 %
ERYTHROCYTE [SEDIMENTATION RATE] IN BLOOD BY WESTERGREN METHOD: 120 MM/HR
ESTIMATED AVERAGE GLUCOSE: 134 MG/DL
GLUCOSE SERPL-MCNC: 86 MG/DL
HBA1C MFR BLD HPLC: 6.3 %
HCT VFR BLD CALC: 33.5 %
HGB BLD-MCNC: 10.6 G/DL
IMM GRANULOCYTES NFR BLD AUTO: 0.1 %
LYMPHOCYTES # BLD AUTO: 2.54 K/UL
LYMPHOCYTES NFR BLD AUTO: 37.5 %
MAN DIFF?: NORMAL
MCHC RBC-ENTMCNC: 29.5 PG
MCHC RBC-ENTMCNC: 31.6 GM/DL
MCV RBC AUTO: 93.3 FL
MONOCYTES # BLD AUTO: 0.57 K/UL
MONOCYTES NFR BLD AUTO: 8.4 %
NEUTROPHILS # BLD AUTO: 3.11 K/UL
NEUTROPHILS NFR BLD AUTO: 45.9 %
PLATELET # BLD AUTO: 221 K/UL
POTASSIUM SERPL-SCNC: 3.4 MMOL/L
PROT SERPL-MCNC: 7.3 G/DL
RBC # BLD: 3.59 M/UL
RBC # FLD: 14.8 %
SODIUM SERPL-SCNC: 142 MMOL/L
T3RU NFR SERPL: 1.1 TBI
T4 FREE SERPL-MCNC: 1.1 NG/DL
TSH SERPL-ACNC: 1.25 UIU/ML
WBC # FLD AUTO: 6.78 K/UL

## 2023-09-12 ENCOUNTER — APPOINTMENT (OUTPATIENT)
Dept: RHEUMATOLOGY | Facility: CLINIC | Age: 73
End: 2023-09-12
Payer: MEDICARE

## 2023-09-12 ENCOUNTER — LABORATORY RESULT (OUTPATIENT)
Age: 73
End: 2023-09-12

## 2023-09-12 VITALS
HEART RATE: 73 BPM | BODY MASS INDEX: 40.44 KG/M2 | DIASTOLIC BLOOD PRESSURE: 56 MMHG | HEIGHT: 60 IN | SYSTOLIC BLOOD PRESSURE: 92 MMHG | OXYGEN SATURATION: 100 % | WEIGHT: 206 LBS

## 2023-09-12 PROCEDURE — 99214 OFFICE O/P EST MOD 30 MIN: CPT

## 2023-09-13 ENCOUNTER — TRANSCRIPTION ENCOUNTER (OUTPATIENT)
Age: 73
End: 2023-09-13

## 2023-09-13 ENCOUNTER — APPOINTMENT (OUTPATIENT)
Dept: INTERNAL MEDICINE | Facility: CLINIC | Age: 73
End: 2023-09-13
Payer: MEDICARE

## 2023-09-13 VITALS
TEMPERATURE: 97 F | WEIGHT: 204 LBS | HEIGHT: 60 IN | HEART RATE: 77 BPM | BODY MASS INDEX: 40.05 KG/M2 | SYSTOLIC BLOOD PRESSURE: 103 MMHG | DIASTOLIC BLOOD PRESSURE: 65 MMHG | OXYGEN SATURATION: 98 %

## 2023-09-13 DIAGNOSIS — H40.009 PREGLAUCOMA, UNSPECIFIED, UNSPECIFIED EYE: ICD-10-CM

## 2023-09-13 DIAGNOSIS — N64.4 MASTODYNIA: ICD-10-CM

## 2023-09-13 DIAGNOSIS — Z01.810 ENCOUNTER FOR PREPROCEDURAL CARDIOVASCULAR EXAMINATION: ICD-10-CM

## 2023-09-13 DIAGNOSIS — Z23 ENCOUNTER FOR IMMUNIZATION: ICD-10-CM

## 2023-09-13 DIAGNOSIS — Z01.811 ENCOUNTER FOR PREPROCEDURAL RESPIRATORY EXAMINATION: ICD-10-CM

## 2023-09-13 DIAGNOSIS — Z00.00 ENCOUNTER FOR GENERAL ADULT MEDICAL EXAMINATION W/OUT ABNORMAL FINDINGS: ICD-10-CM

## 2023-09-13 DIAGNOSIS — M54.12 RADICULOPATHY, CERVICAL REGION: ICD-10-CM

## 2023-09-13 PROCEDURE — G0439: CPT

## 2023-09-13 PROCEDURE — 82270 OCCULT BLOOD FECES: CPT

## 2023-09-13 PROCEDURE — 90662 IIV NO PRSV INCREASED AG IM: CPT

## 2023-09-13 PROCEDURE — G0008: CPT

## 2023-09-13 RX ORDER — MELOXICAM 15 MG/1
15 TABLET ORAL
Qty: 90 | Refills: 0 | Status: COMPLETED | COMMUNITY
Start: 2023-04-17 | End: 2023-09-13

## 2023-09-13 RX ORDER — MELOXICAM 7.5 MG/1
7.5 TABLET ORAL
Qty: 30 | Refills: 1 | Status: COMPLETED | COMMUNITY
Start: 2023-04-10 | End: 2023-09-13

## 2023-09-13 RX ORDER — DICLOFENAC SODIUM 75 MG/1
75 TABLET, DELAYED RELEASE ORAL
Qty: 14 | Refills: 0 | Status: COMPLETED | COMMUNITY
Start: 2023-08-29 | End: 2023-09-13

## 2023-09-15 PROBLEM — Z01.811 PREOPERATIVE RESPIRATORY EXAMINATION: Status: RESOLVED | Noted: 2019-07-05 | Resolved: 2023-09-15

## 2023-09-15 PROBLEM — N64.4 MASTODYNIA OF RIGHT BREAST: Status: RESOLVED | Noted: 2022-12-30 | Resolved: 2023-09-15

## 2023-09-15 PROBLEM — Z23 ENCOUNTER FOR IMMUNIZATION: Status: ACTIVE | Noted: 2020-06-23

## 2023-09-15 PROBLEM — M54.12 CERVICAL RADICULOPATHY: Status: ACTIVE | Noted: 2017-11-13

## 2023-09-15 PROBLEM — Z01.810 PREOP CARDIOVASCULAR EXAM: Status: RESOLVED | Noted: 2018-01-26 | Resolved: 2023-09-15

## 2023-09-21 ENCOUNTER — APPOINTMENT (OUTPATIENT)
Dept: OPHTHALMOLOGY | Facility: CLINIC | Age: 73
End: 2023-09-21
Payer: MEDICARE

## 2023-09-21 ENCOUNTER — NON-APPOINTMENT (OUTPATIENT)
Age: 73
End: 2023-09-21

## 2023-09-21 PROCEDURE — 92014 COMPRE OPH EXAM EST PT 1/>: CPT

## 2023-09-24 ENCOUNTER — RX RENEWAL (OUTPATIENT)
Age: 73
End: 2023-09-24

## 2023-09-24 RX ORDER — FAMOTIDINE 20 MG/1
20 TABLET, FILM COATED ORAL
Qty: 180 | Refills: 3 | Status: ACTIVE | COMMUNITY
Start: 2020-04-21 | End: 1900-01-01

## 2023-09-30 LAB
APPEARANCE: CLEAR
BILIRUBIN URINE: NEGATIVE
BLOOD URINE: NEGATIVE
COLOR: YELLOW
GLUCOSE QUALITATIVE U: NEGATIVE MG/DL
KETONES URINE: NEGATIVE MG/DL
LEUKOCYTE ESTERASE URINE: ABNORMAL
NITRITE URINE: NEGATIVE
PH URINE: 6
PROTEIN URINE: NEGATIVE MG/DL
SPECIFIC GRAVITY URINE: 1.02
UROBILINOGEN URINE: 0.2 MG/DL

## 2023-10-11 ENCOUNTER — APPOINTMENT (OUTPATIENT)
Dept: GASTROENTEROLOGY | Facility: HOSPITAL | Age: 73
End: 2023-10-11

## 2023-10-24 ENCOUNTER — APPOINTMENT (OUTPATIENT)
Dept: RHEUMATOLOGY | Facility: CLINIC | Age: 73
End: 2023-10-24

## 2023-10-26 ENCOUNTER — APPOINTMENT (OUTPATIENT)
Dept: GASTROENTEROLOGY | Facility: CLINIC | Age: 73
End: 2023-10-26

## 2023-10-26 ENCOUNTER — LABORATORY RESULT (OUTPATIENT)
Age: 73
End: 2023-10-26

## 2023-11-07 ENCOUNTER — APPOINTMENT (OUTPATIENT)
Dept: ORTHOPEDIC SURGERY | Facility: CLINIC | Age: 73
End: 2023-11-07
Payer: MEDICARE

## 2023-11-07 PROCEDURE — 99214 OFFICE O/P EST MOD 30 MIN: CPT

## 2023-11-08 ENCOUNTER — RX RENEWAL (OUTPATIENT)
Age: 73
End: 2023-11-08

## 2023-11-14 ENCOUNTER — APPOINTMENT (OUTPATIENT)
Dept: ORTHOPEDIC SURGERY | Facility: CLINIC | Age: 73
End: 2023-11-14
Payer: MEDICARE

## 2023-11-14 VITALS
DIASTOLIC BLOOD PRESSURE: 65 MMHG | OXYGEN SATURATION: 96 % | BODY MASS INDEX: 36.8 KG/M2 | HEIGHT: 62 IN | TEMPERATURE: 97.8 F | SYSTOLIC BLOOD PRESSURE: 135 MMHG | HEART RATE: 75 BPM | WEIGHT: 200 LBS | RESPIRATION RATE: 72 BRPM

## 2023-11-14 DIAGNOSIS — M21.42 FLAT FOOT [PES PLANUS] (ACQUIRED), LEFT FOOT: ICD-10-CM

## 2023-11-14 DIAGNOSIS — M79.672 PAIN IN LEFT FOOT: ICD-10-CM

## 2023-11-14 DIAGNOSIS — M77.42 METATARSALGIA, LEFT FOOT: ICD-10-CM

## 2023-11-14 DIAGNOSIS — M94.279 CHONDROMALACIA, UNSPECIFIED ANKLE AND JOINTS OF FOOT: ICD-10-CM

## 2023-11-14 DIAGNOSIS — L84 CORNS AND CALLOSITIES: ICD-10-CM

## 2023-11-14 DIAGNOSIS — M62.462 CONTRACTURE OF MUSCLE, LEFT LOWER LEG: ICD-10-CM

## 2023-11-14 PROCEDURE — 99214 OFFICE O/P EST MOD 30 MIN: CPT | Mod: 25

## 2023-11-14 PROCEDURE — 73630 X-RAY EXAM OF FOOT: CPT | Mod: LT

## 2023-11-21 RX ORDER — AZELASTINE HYDROCHLORIDE 137 UG/1
137 SPRAY, METERED NASAL
Qty: 90 | Refills: 1 | Status: ACTIVE | COMMUNITY
Start: 2023-09-04 | End: 1900-01-01

## 2023-11-21 RX ORDER — BUDESONIDE AND FORMOTEROL FUMARATE DIHYDRATE 80; 4.5 UG/1; UG/1
80-4.5 AEROSOL RESPIRATORY (INHALATION)
Qty: 3 | Refills: 1 | Status: ACTIVE | COMMUNITY
Start: 2022-07-06 | End: 1900-01-01

## 2023-11-29 ENCOUNTER — RX RENEWAL (OUTPATIENT)
Age: 73
End: 2023-11-29

## 2023-12-07 DIAGNOSIS — R26.81 UNSTEADINESS ON FEET: ICD-10-CM

## 2023-12-07 DIAGNOSIS — M51.36 OTHER INTERVERTEBRAL DISC DEGENERATION, LUMBAR REGION: ICD-10-CM

## 2023-12-10 ENCOUNTER — RX RENEWAL (OUTPATIENT)
Age: 73
End: 2023-12-10

## 2023-12-12 ENCOUNTER — TRANSCRIPTION ENCOUNTER (OUTPATIENT)
Age: 73
End: 2023-12-12

## 2023-12-13 ENCOUNTER — APPOINTMENT (OUTPATIENT)
Dept: INTERNAL MEDICINE | Facility: CLINIC | Age: 73
End: 2023-12-13
Payer: MEDICARE

## 2023-12-13 VITALS
WEIGHT: 206 LBS | HEIGHT: 62 IN | HEART RATE: 75 BPM | DIASTOLIC BLOOD PRESSURE: 65 MMHG | BODY MASS INDEX: 37.91 KG/M2 | SYSTOLIC BLOOD PRESSURE: 105 MMHG | TEMPERATURE: 97 F | OXYGEN SATURATION: 95 %

## 2023-12-13 DIAGNOSIS — E78.5 HYPERLIPIDEMIA, UNSPECIFIED: ICD-10-CM

## 2023-12-13 DIAGNOSIS — R73.02 IMPAIRED GLUCOSE TOLERANCE (ORAL): ICD-10-CM

## 2023-12-13 DIAGNOSIS — F43.0 ACUTE STRESS REACTION: ICD-10-CM

## 2023-12-13 DIAGNOSIS — Z11.59 ENCOUNTER FOR SCREENING FOR OTHER VIRAL DISEASES: ICD-10-CM

## 2023-12-13 PROCEDURE — 99214 OFFICE O/P EST MOD 30 MIN: CPT | Mod: 25

## 2023-12-13 RX ORDER — DICLOFENAC SODIUM 75 MG/1
75 TABLET, DELAYED RELEASE ORAL
Qty: 1 | Refills: 0 | Status: COMPLETED | COMMUNITY
Start: 2023-07-21 | End: 2023-12-13

## 2023-12-13 RX ORDER — DICLOFENAC SODIUM 75 MG/1
75 TABLET, DELAYED RELEASE ORAL
Qty: 60 | Refills: 0 | Status: COMPLETED | COMMUNITY
Start: 2023-09-05 | End: 2023-12-13

## 2023-12-14 PROBLEM — R73.02 IMPAIRED GLUCOSE TOLERANCE: Status: ACTIVE | Noted: 2020-07-06

## 2023-12-14 PROBLEM — F43.0 STRESS, REACTION GROSS: Status: ACTIVE | Noted: 2023-12-14

## 2023-12-14 PROBLEM — Z11.59 SCREENING FOR VIRAL DISEASE: Status: RESOLVED | Noted: 2020-09-23 | Resolved: 2023-12-14

## 2023-12-14 NOTE — HISTORY OF PRESENT ILLNESS
[FreeTextEntry1] : Pt presented for 3 month f/u anemia and HLD. [de-identified] : She feels well w/o any new complaint.  She had scheduled a colonoscopy but had to reschedule it twice.  She took the FIT test that is negative.  She is now rescheduled to have colonoscopy in 1/2024.  She saw rheum and was started on MTX, but has not started it yet.  She is still taking Diclofenac as that helped with her pain.  Pt is a bit down as she lives with son who has schizophrenia and is not able to help her, she does not have other family close by.  She feels a bit alone and frustrated that she does not have anyone to turn to in her hours of need.  Pt is UTD with all vaccines.

## 2023-12-14 NOTE — PHYSICAL EXAM
[No Acute Distress] : no acute distress [Well Nourished] : well nourished [Well Developed] : well developed [Supple] : supple [No Respiratory Distress] : no respiratory distress  [Clear to Auscultation] : lungs were clear to auscultation bilaterally [Normal Rate] : normal rate  [Regular Rhythm] : with a regular rhythm [Normal S1, S2] : normal S1 and S2 [No Edema] : there was no peripheral edema [Soft] : abdomen soft [Non Tender] : non-tender [Normal Bowel Sounds] : normal bowel sounds [No CVA Tenderness] : no CVA  tenderness [No Spinal Tenderness] : no spinal tenderness [No Joint Swelling] : no joint swelling [Speech Grossly Normal] : speech grossly normal [Normal Affect] : the affect was normal [Alert and Oriented x3] : oriented to person, place, and time [de-identified] : Obese female in stated age,  [de-identified] : Ambulate with rollator,

## 2023-12-18 ENCOUNTER — TRANSCRIPTION ENCOUNTER (OUTPATIENT)
Age: 73
End: 2023-12-18

## 2023-12-20 ENCOUNTER — TRANSCRIPTION ENCOUNTER (OUTPATIENT)
Age: 73
End: 2023-12-20

## 2023-12-20 LAB
ALBUMIN SERPL ELPH-MCNC: 4.2 G/DL
ALP BLD-CCNC: 129 U/L
ALT SERPL-CCNC: 19 U/L
ANION GAP SERPL CALC-SCNC: 12 MMOL/L
AST SERPL-CCNC: 17 U/L
BASOPHILS # BLD AUTO: 0.01 K/UL
BASOPHILS NFR BLD AUTO: 0.1 %
BILIRUB SERPL-MCNC: 0.4 MG/DL
BUN SERPL-MCNC: 22 MG/DL
CALCIUM SERPL-MCNC: 9.3 MG/DL
CHLORIDE SERPL-SCNC: 98 MMOL/L
CHOLEST SERPL-MCNC: 196 MG/DL
CK SERPL-CCNC: 76 U/L
CO2 SERPL-SCNC: 32 MMOL/L
CREAT SERPL-MCNC: 1.06 MG/DL
CRP SERPL-MCNC: 7 MG/L
EGFR: 55 ML/MIN/1.73M2
EOSINOPHIL # BLD AUTO: 0.43 K/UL
EOSINOPHIL NFR BLD AUTO: 4.7 %
ERYTHROCYTE [SEDIMENTATION RATE] IN BLOOD BY WESTERGREN METHOD: 107 MM/HR
ESTIMATED AVERAGE GLUCOSE: 128 MG/DL
FERRITIN SERPL-MCNC: 124 NG/ML
FOLATE SERPL-MCNC: >20 NG/ML
GLUCOSE SERPL-MCNC: 117 MG/DL
HBA1C MFR BLD HPLC: 6.1 %
HCT VFR BLD CALC: 34.9 %
HDLC SERPL-MCNC: 67 MG/DL
HGB BLD-MCNC: 10.9 G/DL
IMM GRANULOCYTES NFR BLD AUTO: 0.1 %
IRON SATN MFR SERPL: 15 %
IRON SERPL-MCNC: 44 UG/DL
LDLC SERPL CALC-MCNC: 107 MG/DL
LYMPHOCYTES # BLD AUTO: 2.55 K/UL
LYMPHOCYTES NFR BLD AUTO: 28.1 %
MAN DIFF?: NORMAL
MCHC RBC-ENTMCNC: 28.9 PG
MCHC RBC-ENTMCNC: 31.2 GM/DL
MCV RBC AUTO: 92.6 FL
MONOCYTES # BLD AUTO: 0.42 K/UL
MONOCYTES NFR BLD AUTO: 4.6 %
NEUTROPHILS # BLD AUTO: 5.66 K/UL
NEUTROPHILS NFR BLD AUTO: 62.4 %
NONHDLC SERPL-MCNC: 129 MG/DL
PLATELET # BLD AUTO: 249 K/UL
POTASSIUM SERPL-SCNC: 3.4 MMOL/L
PROT SERPL-MCNC: 7.7 G/DL
RBC # BLD: 3.77 M/UL
RBC # FLD: 14.6 %
SODIUM SERPL-SCNC: 143 MMOL/L
T4 FREE SERPL-MCNC: 1.5 NG/DL
TIBC SERPL-MCNC: 299 UG/DL
TRIGL SERPL-MCNC: 128 MG/DL
UIBC SERPL-MCNC: 254 UG/DL
VIT B12 SERPL-MCNC: >2000 PG/ML
WBC # FLD AUTO: 9.08 K/UL

## 2023-12-26 RX ORDER — MONTELUKAST 10 MG/1
10 TABLET, FILM COATED ORAL
Qty: 90 | Refills: 1 | Status: ACTIVE | COMMUNITY
Start: 2019-07-05 | End: 1900-01-01

## 2023-12-30 ENCOUNTER — APPOINTMENT (OUTPATIENT)
Dept: MAMMOGRAPHY | Facility: IMAGING CENTER | Age: 73
End: 2023-12-30
Payer: MEDICARE

## 2023-12-30 ENCOUNTER — OUTPATIENT (OUTPATIENT)
Dept: OUTPATIENT SERVICES | Facility: HOSPITAL | Age: 73
LOS: 1 days | End: 2023-12-30
Payer: MEDICARE

## 2023-12-30 ENCOUNTER — RESULT REVIEW (OUTPATIENT)
Age: 73
End: 2023-12-30

## 2023-12-30 DIAGNOSIS — Z96.651 PRESENCE OF RIGHT ARTIFICIAL KNEE JOINT: Chronic | ICD-10-CM

## 2023-12-30 DIAGNOSIS — Z00.8 ENCOUNTER FOR OTHER GENERAL EXAMINATION: ICD-10-CM

## 2023-12-30 DIAGNOSIS — Z98.1 ARTHRODESIS STATUS: Chronic | ICD-10-CM

## 2023-12-30 DIAGNOSIS — Z98.49 CATARACT EXTRACTION STATUS, UNSPECIFIED EYE: Chronic | ICD-10-CM

## 2023-12-30 DIAGNOSIS — Z96.642 PRESENCE OF LEFT ARTIFICIAL HIP JOINT: Chronic | ICD-10-CM

## 2023-12-30 DIAGNOSIS — Z96.652 PRESENCE OF LEFT ARTIFICIAL KNEE JOINT: Chronic | ICD-10-CM

## 2023-12-30 DIAGNOSIS — Z90.89 ACQUIRED ABSENCE OF OTHER ORGANS: Chronic | ICD-10-CM

## 2023-12-30 PROCEDURE — 77063 BREAST TOMOSYNTHESIS BI: CPT | Mod: 26

## 2023-12-30 PROCEDURE — 77067 SCR MAMMO BI INCL CAD: CPT | Mod: 26

## 2023-12-30 PROCEDURE — 77067 SCR MAMMO BI INCL CAD: CPT

## 2023-12-30 PROCEDURE — 77063 BREAST TOMOSYNTHESIS BI: CPT

## 2024-01-02 ENCOUNTER — APPOINTMENT (OUTPATIENT)
Dept: RHEUMATOLOGY | Facility: CLINIC | Age: 74
End: 2024-01-02
Payer: MEDICARE

## 2024-01-02 VITALS — HEART RATE: 79 BPM | DIASTOLIC BLOOD PRESSURE: 74 MMHG | SYSTOLIC BLOOD PRESSURE: 119 MMHG

## 2024-01-02 DIAGNOSIS — Z79.899 OTHER LONG TERM (CURRENT) DRUG THERAPY: ICD-10-CM

## 2024-01-02 PROCEDURE — 99214 OFFICE O/P EST MOD 30 MIN: CPT

## 2024-01-02 NOTE — HISTORY OF PRESENT ILLNESS
[FreeTextEntry1] : Interval History: 1/2/2024 Last seen in 9/2023. At last visit started on MTX but pt lost to follow up and could not be refilled. Now pt has been on MTX for 1month. On MTX 5 tabs weekly. she is tolerating it well, notices no difference in her joint pain.  9/12/2023 =Persistent neck and mid back pain, has had trigger point injections =persistent bilateral shoulder pain, has had shoulder injections =takes meloxicam as needed had PET/CT suggesting inflammatory arthritis

## 2024-01-02 NOTE — PHYSICAL EXAM
[General Appearance - Alert] : alert [General Appearance - In No Acute Distress] : in no acute distress [General Appearance - Well Nourished] : well nourished [Sclera] : the sclera and conjunctiva were normal [Nasal Cavity] : the nasal mucosa and septum were normal [] : no rash [Sensation] : the sensory exam was normal to light touch and pinprick [Motor Exam] : the motor exam was normal [Oriented To Time, Place, And Person] : oriented to person, place, and time [FreeTextEntry1] : limited rom of bilateral shoulders but no TTP

## 2024-01-02 NOTE — ASSESSMENT
[FreeTextEntry1] : 73F with reported history of PMR based on shoulder pain and elevated inflammatory markers with inadequate response to prednisone at the time. Patient's main complaints and radiating cervicalgia and lower back pain and pt does have significant spinal stenosis on imaging.. PET/CT consistent with mild inflammatory arthritis / PMR  Plan -cw MTX 5 tabs weekly, folic acid 1mg daily -cw Tylenol 1g bid -drug toxicity labs today -follow up in 1 month.

## 2024-01-03 LAB
ALBUMIN SERPL ELPH-MCNC: 4.2 G/DL
ALP BLD-CCNC: 129 U/L
ALT SERPL-CCNC: 15 U/L
ANION GAP SERPL CALC-SCNC: 11 MMOL/L
AST SERPL-CCNC: 19 U/L
BASOPHILS # BLD AUTO: 0.02 K/UL
BASOPHILS NFR BLD AUTO: 0.3 %
BILIRUB SERPL-MCNC: 0.4 MG/DL
BUN SERPL-MCNC: 24 MG/DL
CALCIUM SERPL-MCNC: 9.1 MG/DL
CHLORIDE SERPL-SCNC: 100 MMOL/L
CO2 SERPL-SCNC: 31 MMOL/L
CREAT SERPL-MCNC: 1.14 MG/DL
CRP SERPL-MCNC: 8 MG/L
EGFR: 51 ML/MIN/1.73M2
EOSINOPHIL # BLD AUTO: 0.44 K/UL
EOSINOPHIL NFR BLD AUTO: 6.4 %
ERYTHROCYTE [SEDIMENTATION RATE] IN BLOOD BY WESTERGREN METHOD: 93 MM/HR
GLUCOSE SERPL-MCNC: 107 MG/DL
HCT VFR BLD CALC: 35.3 %
HGB BLD-MCNC: 11.1 G/DL
IMM GRANULOCYTES NFR BLD AUTO: 0.1 %
LYMPHOCYTES # BLD AUTO: 2.71 K/UL
LYMPHOCYTES NFR BLD AUTO: 39.3 %
MAN DIFF?: NORMAL
MCHC RBC-ENTMCNC: 30.5 PG
MCHC RBC-ENTMCNC: 31.4 GM/DL
MCV RBC AUTO: 97 FL
MONOCYTES # BLD AUTO: 0.3 K/UL
MONOCYTES NFR BLD AUTO: 4.4 %
NEUTROPHILS # BLD AUTO: 3.41 K/UL
NEUTROPHILS NFR BLD AUTO: 49.5 %
PLATELET # BLD AUTO: 276 K/UL
POTASSIUM SERPL-SCNC: 3.5 MMOL/L
PROT SERPL-MCNC: 7.6 G/DL
RBC # BLD: 3.64 M/UL
RBC # FLD: 14.8 %
SODIUM SERPL-SCNC: 142 MMOL/L
WBC # FLD AUTO: 6.89 K/UL

## 2024-01-08 ENCOUNTER — RX RENEWAL (OUTPATIENT)
Age: 74
End: 2024-01-08

## 2024-01-11 ENCOUNTER — NON-APPOINTMENT (OUTPATIENT)
Age: 74
End: 2024-01-11

## 2024-01-11 ENCOUNTER — APPOINTMENT (OUTPATIENT)
Dept: CARDIOLOGY | Facility: CLINIC | Age: 74
End: 2024-01-11
Payer: MEDICARE

## 2024-01-11 VITALS
BODY MASS INDEX: 37.91 KG/M2 | OXYGEN SATURATION: 98 % | SYSTOLIC BLOOD PRESSURE: 109 MMHG | HEART RATE: 89 BPM | WEIGHT: 206 LBS | DIASTOLIC BLOOD PRESSURE: 68 MMHG | HEIGHT: 62 IN

## 2024-01-11 PROCEDURE — 99214 OFFICE O/P EST MOD 30 MIN: CPT | Mod: 25

## 2024-01-11 PROCEDURE — 93000 ELECTROCARDIOGRAM COMPLETE: CPT

## 2024-01-11 NOTE — HISTORY OF PRESENT ILLNESS
[FreeTextEntry1] : 73 year old woman with history of HTN HLD   #HTN- on HCTZ 25 mg daily, Metoprolol 25 mg BID BP really controlled, continue present meds #HLD- On Atorvastatin 20 mg daily, continue present meds

## 2024-01-11 NOTE — DISCUSSION/SUMMARY
[FreeTextEntry1] : 73 year old woman with history of HTN HLD here for followup   #HTN- on HCTZ 25 mg daily, Metoprolol 25 mg BID BP really controlled, continue present meds #HLD- On Atorvastatin 20 mg daily, continue present meds #FU in 6 months  [EKG obtained to assist in diagnosis and management of assessed problem(s)] : EKG obtained to assist in diagnosis and management of assessed problem(s)

## 2024-01-19 NOTE — ASU PATIENT PROFILE, ADULT - FALL HARM RISK - HARM RISK INTERVENTIONS

## 2024-01-22 ENCOUNTER — TRANSCRIPTION ENCOUNTER (OUTPATIENT)
Age: 74
End: 2024-01-22

## 2024-01-22 ENCOUNTER — OUTPATIENT (OUTPATIENT)
Dept: OUTPATIENT SERVICES | Facility: HOSPITAL | Age: 74
LOS: 1 days | Discharge: HOME | End: 2024-01-22
Payer: MEDICARE

## 2024-01-22 ENCOUNTER — RESULT REVIEW (OUTPATIENT)
Age: 74
End: 2024-01-22

## 2024-01-22 ENCOUNTER — APPOINTMENT (OUTPATIENT)
Dept: GASTROENTEROLOGY | Facility: HOSPITAL | Age: 74
End: 2024-01-22

## 2024-01-22 VITALS
TEMPERATURE: 97 F | RESPIRATION RATE: 23 BRPM | DIASTOLIC BLOOD PRESSURE: 73 MMHG | OXYGEN SATURATION: 95 % | WEIGHT: 199.96 LBS | HEART RATE: 92 BPM | SYSTOLIC BLOOD PRESSURE: 124 MMHG | HEIGHT: 61 IN

## 2024-01-22 VITALS
HEART RATE: 73 BPM | RESPIRATION RATE: 14 BRPM | DIASTOLIC BLOOD PRESSURE: 63 MMHG | OXYGEN SATURATION: 98 % | SYSTOLIC BLOOD PRESSURE: 110 MMHG

## 2024-01-22 DIAGNOSIS — Z98.1 ARTHRODESIS STATUS: Chronic | ICD-10-CM

## 2024-01-22 DIAGNOSIS — Z96.651 PRESENCE OF RIGHT ARTIFICIAL KNEE JOINT: Chronic | ICD-10-CM

## 2024-01-22 DIAGNOSIS — Z90.89 ACQUIRED ABSENCE OF OTHER ORGANS: Chronic | ICD-10-CM

## 2024-01-22 DIAGNOSIS — Z98.49 CATARACT EXTRACTION STATUS, UNSPECIFIED EYE: Chronic | ICD-10-CM

## 2024-01-22 DIAGNOSIS — Z96.642 PRESENCE OF LEFT ARTIFICIAL HIP JOINT: Chronic | ICD-10-CM

## 2024-01-22 DIAGNOSIS — D64.9 ANEMIA, UNSPECIFIED: ICD-10-CM

## 2024-01-22 DIAGNOSIS — Z96.652 PRESENCE OF LEFT ARTIFICIAL KNEE JOINT: Chronic | ICD-10-CM

## 2024-01-22 PROCEDURE — 45385 COLONOSCOPY W/LESION REMOVAL: CPT | Mod: 59

## 2024-01-22 PROCEDURE — 88342 IMHCHEM/IMCYTCHM 1ST ANTB: CPT | Mod: 26

## 2024-01-22 PROCEDURE — 88305 TISSUE EXAM BY PATHOLOGIST: CPT | Mod: 26

## 2024-01-22 PROCEDURE — 43239 EGD BIOPSY SINGLE/MULTIPLE: CPT

## 2024-01-22 RX ORDER — SODIUM CHLORIDE 9 MG/ML
1000 INJECTION INTRAMUSCULAR; INTRAVENOUS; SUBCUTANEOUS ONCE
Refills: 0 | Status: COMPLETED | OUTPATIENT
Start: 2024-01-22 | End: 2024-01-22

## 2024-01-22 RX ORDER — SODIUM CHLORIDE 9 MG/ML
500 INJECTION INTRAMUSCULAR; INTRAVENOUS; SUBCUTANEOUS
Refills: 0 | Status: DISCONTINUED | OUTPATIENT
Start: 2024-01-22 | End: 2024-01-22

## 2024-01-22 RX ORDER — ALBUTEROL 90 UG/1
2 AEROSOL, METERED ORAL
Qty: 0 | Refills: 0 | DISCHARGE

## 2024-01-22 RX ADMIN — SODIUM CHLORIDE 1000 MILLILITER(S): 9 INJECTION INTRAMUSCULAR; INTRAVENOUS; SUBCUTANEOUS at 08:52

## 2024-01-22 NOTE — PRE PROCEDURE NOTE - PRE PROCEDURE EVALUATION
Attending Physician:   Dr. Rangel    Procedure: EGD/Colonoscopy    Indication for Procedure: Anemia    ________________________________________________________  PAST MEDICAL & SURGICAL HISTORY:  Prediabetes  diagnosed in early 2017, on diet control      Hypertension      Hypercholesterolemia      Seasonal allergies      Asthma      Depression  never hospitalized      Glaucoma      Obesity      Sleep apnea  supposed to use device at night but doesn't      Gastric ulcer      Cervical disc disease  December 2017      Spinal stenosis in cervical region      Lumbar stenosis      Carpal tunnel syndrome on left      GERD (gastroesophageal reflux disease)      S/P tonsillectomy      H/O cataract extraction      History of left hip replacement      History of left knee replacement      H/O total knee replacement, right      History of fusion of cervical spine        ALLERGIES:  strawberry (Rash)  clindamycin (Unknown)  clindamycin (Other)    HOME MEDICATIONS:  Albuterol (Eqv-ProAir HFA) 90 mcg/inh inhalation aerosol: 2 puff(s) inhaled every 6 hours, As Needed  aspirin 81 mg oral tablet: 1 tab(s) orally once a day LD 2/2/23  azelastine 137 mcg/inh (0.1%) nasal spray: 2 spray(s) nasal 2 times a day  DULoxetine 30 mg oral delayed release capsule: 1 cap(s) orally once a day  DULoxetine 60 mg oral delayed release capsule: 1 cap(s) orally once a day  famotidine 20 mg oral tablet: 1 tab(s) orally 2 times a day  hydroCHLOROthiazide 25 mg oral tablet: 1 tab(s) orally once a day  Lipitor 20 mg oral tablet: 1 tab(s) orally once a day (at bedtime)  loperamide 2 mg oral capsule: 2 cap(s) orally , As Needed  metoprolol tartrate 25 mg oral tablet: 1 tab(s) orally 2 times a day  montelukast 10 mg oral tablet: 1 tab(s) orally once a day (at bedtime)  Multiple Vitamins oral capsule: 1 cap(s) orally once a day last dose 01/30/23  sucralfate 1 g oral tablet: 1 tab(s) orally 2 times a day  Symbicort 80 mcg-4.5 mcg/inh inhalation aerosol: 2 puff(s) inhaled 2 times a day  Vitamin B12: 1 tab(s) orally once a day  Vitamin D3 2000 intl units oral tablet: 1 tab(s) orally once a day    AICD/PPM: [ ] yes   [x ] no    PERTINENT LAB DATA:                      PHYSICAL EXAMINATION:    Height (cm): 154.9  Weight (kg): 90.7  BMI (kg/m2): 37.8  BSA (m2): 1.89  AVSS    Constitutional: NAD  HEENT: PERRLA, EOMI,    Neck:  No JVD  Respiratory: CTAB/L  Cardiovascular: S1 and S2  Gastrointestinal: BS+, soft, NT/ND  Extremities: No peripheral edema  Neurological: A/O x 3, no focal deficits  Psychiatric: Normal mood, normal affect  Skin: No rashes    ASA Class: I [ ]  II [ ]  III [ x]  IV [ ]    COMMENTS:    The patient is a suitable candidate for the planned procedure unless box checked [ ]  No, explain:    I explained the risks (bleeding, infection, perforation, CV risk, anesthesia risk)/b/a with the patient. The patient agrees to proceed. Patient had opportunity to ask questions in preprocedure bay 8. All questions answered.

## 2024-01-22 NOTE — ASU PREOP CHECKLIST - STERILIZATION AFFIRMATION
n/a Eye Protection Verbiage: Before proceeding with the stage, a plastic scleral shield was inserted. The globe was anesthetized with a few drops of 1% lidocaine with 1:100,000 epinephrine. Then, an appropriate sized scleral shield was chosen and coated with lacrilube ointment. The shield was gently inserted and left in place for the duration of each stage. After the stage was completed, the shield was gently removed.

## 2024-01-22 NOTE — ASU DISCHARGE PLAN (ADULT/PEDIATRIC) - NS MD DC FALL RISK RISK
For information on Fall & Injury Prevention, visit: [email or web address] OR  [email or web address] OR  https://www.cdc.gov/steadi/patient.html

## 2024-01-24 ENCOUNTER — TRANSCRIPTION ENCOUNTER (OUTPATIENT)
Age: 74
End: 2024-01-24

## 2024-01-26 LAB — SURGICAL PATHOLOGY STUDY: SIGNIFICANT CHANGE UP

## 2024-01-29 ENCOUNTER — NON-APPOINTMENT (OUTPATIENT)
Age: 74
End: 2024-01-29

## 2024-02-05 ENCOUNTER — TRANSCRIPTION ENCOUNTER (OUTPATIENT)
Age: 74
End: 2024-02-05

## 2024-02-06 ENCOUNTER — TRANSCRIPTION ENCOUNTER (OUTPATIENT)
Age: 74
End: 2024-02-06

## 2024-02-07 ENCOUNTER — TRANSCRIPTION ENCOUNTER (OUTPATIENT)
Age: 74
End: 2024-02-07

## 2024-02-11 ENCOUNTER — NON-APPOINTMENT (OUTPATIENT)
Age: 74
End: 2024-02-11

## 2024-02-20 ENCOUNTER — APPOINTMENT (OUTPATIENT)
Dept: RHEUMATOLOGY | Facility: CLINIC | Age: 74
End: 2024-02-20
Payer: MEDICARE

## 2024-02-20 PROCEDURE — 99214 OFFICE O/P EST MOD 30 MIN: CPT

## 2024-02-20 RX ORDER — METHOTREXATE 2.5 MG/1
2.5 TABLET ORAL
Qty: 25 | Refills: 3 | Status: ACTIVE | COMMUNITY
Start: 2023-09-12 | End: 1900-01-01

## 2024-02-20 NOTE — HISTORY OF PRESENT ILLNESS
[FreeTextEntry1] : Interval History: 2/20/2024 Pt has been on MTX 5 tabs for one month. No change in joint pain. 1/2/2024 Last seen in 9/2023. At last visit started on MTX but pt lost to follow up and could not be refilled. Now pt has been on MTX for 1month. On MTX 5 tabs weekly. she is tolerating it well, notices no difference in her joint pain.  9/12/2023 =Persistent neck and mid back pain, has had trigger point injections =persistent bilateral shoulder pain, has had shoulder injections =takes meloxicam as needed had PET/CT suggesting inflammatory arthritis

## 2024-02-20 NOTE — ASSESSMENT
[FreeTextEntry1] : 73F with reported history of PMR based on shoulder pain and elevated inflammatory markers with inadequate response to prednisone at the time. Patient's main complaints and radiating cervicalgia and lower back pain and pt does have significant spinal stenosis on imaging.. PET/CT consistent with mild inflammatory arthritis / PMR  Plan -cw MTX 5 tabs weekly (renally adjusted), folic acid 1mg daily -cw Tylenol 1g bid -drug toxicity labs today -follow up in 1 month.

## 2024-02-21 LAB
ALBUMIN SERPL ELPH-MCNC: 3.9 G/DL
ALP BLD-CCNC: 103 U/L
ALT SERPL-CCNC: 29 U/L
ANION GAP SERPL CALC-SCNC: 13 MMOL/L
AST SERPL-CCNC: 27 U/L
BASOPHILS # BLD AUTO: 0.02 K/UL
BASOPHILS NFR BLD AUTO: 0.3 %
BILIRUB SERPL-MCNC: 0.3 MG/DL
BUN SERPL-MCNC: 18 MG/DL
CALCIUM SERPL-MCNC: 9 MG/DL
CHLORIDE SERPL-SCNC: 97 MMOL/L
CO2 SERPL-SCNC: 30 MMOL/L
CREAT SERPL-MCNC: 1.01 MG/DL
CRP SERPL-MCNC: 11 MG/L
EGFR: 59 ML/MIN/1.73M2
EOSINOPHIL # BLD AUTO: 0.37 K/UL
EOSINOPHIL NFR BLD AUTO: 5.2 %
ERYTHROCYTE [SEDIMENTATION RATE] IN BLOOD BY WESTERGREN METHOD: 107 MM/HR
GLUCOSE SERPL-MCNC: 123 MG/DL
HCT VFR BLD CALC: 33.2 %
HGB BLD-MCNC: 10.3 G/DL
IMM GRANULOCYTES NFR BLD AUTO: 0.3 %
LYMPHOCYTES # BLD AUTO: 2.43 K/UL
LYMPHOCYTES NFR BLD AUTO: 34.2 %
MAN DIFF?: NORMAL
MCHC RBC-ENTMCNC: 30.5 PG
MCHC RBC-ENTMCNC: 31 GM/DL
MCV RBC AUTO: 98.2 FL
MONOCYTES # BLD AUTO: 0.38 K/UL
MONOCYTES NFR BLD AUTO: 5.3 %
NEUTROPHILS # BLD AUTO: 3.89 K/UL
NEUTROPHILS NFR BLD AUTO: 54.7 %
PLATELET # BLD AUTO: 289 K/UL
POTASSIUM SERPL-SCNC: 3.4 MMOL/L
PROT SERPL-MCNC: 7.2 G/DL
RBC # BLD: 3.38 M/UL
RBC # FLD: 17.2 %
SODIUM SERPL-SCNC: 140 MMOL/L
WBC # FLD AUTO: 7.11 K/UL

## 2024-03-05 ENCOUNTER — TRANSCRIPTION ENCOUNTER (OUTPATIENT)
Age: 74
End: 2024-03-05

## 2024-03-05 RX ORDER — FOLIC ACID 1 MG/1
1 TABLET ORAL
Qty: 30 | Refills: 3 | Status: ACTIVE | COMMUNITY
Start: 2023-09-12 | End: 1900-01-01

## 2024-03-14 ENCOUNTER — APPOINTMENT (OUTPATIENT)
Dept: INTERNAL MEDICINE | Facility: CLINIC | Age: 74
End: 2024-03-14
Payer: MEDICARE

## 2024-03-14 VITALS
OXYGEN SATURATION: 97 % | SYSTOLIC BLOOD PRESSURE: 107 MMHG | TEMPERATURE: 98.5 F | HEIGHT: 62 IN | BODY MASS INDEX: 38.09 KG/M2 | WEIGHT: 207 LBS | HEART RATE: 85 BPM | DIASTOLIC BLOOD PRESSURE: 70 MMHG

## 2024-03-14 DIAGNOSIS — M54.16 RADICULOPATHY, LUMBAR REGION: ICD-10-CM

## 2024-03-14 DIAGNOSIS — B34.9 VIRAL INFECTION, UNSPECIFIED: ICD-10-CM

## 2024-03-14 DIAGNOSIS — D64.9 ANEMIA, UNSPECIFIED: ICD-10-CM

## 2024-03-14 DIAGNOSIS — N18.30 CHRONIC KIDNEY DISEASE, STAGE 3 UNSPECIFIED: ICD-10-CM

## 2024-03-14 DIAGNOSIS — Z87.39 PERSONAL HISTORY OF OTHER DISEASES OF THE MUSCULOSKELETAL SYSTEM AND CONNECTIVE TISSUE: ICD-10-CM

## 2024-03-14 DIAGNOSIS — E66.9 OBESITY, UNSPECIFIED: ICD-10-CM

## 2024-03-14 PROCEDURE — G2211 COMPLEX E/M VISIT ADD ON: CPT

## 2024-03-14 PROCEDURE — 99214 OFFICE O/P EST MOD 30 MIN: CPT

## 2024-03-14 RX ORDER — SODIUM SULFATE, POTASSIUM SULFATE AND MAGNESIUM SULFATE 1.6; 3.13; 17.5 G/177ML; G/177ML; G/177ML
17.5-3.13-1.6 SOLUTION ORAL
Qty: 1 | Refills: 0 | Status: COMPLETED | COMMUNITY
Start: 2023-09-18 | End: 2024-03-14

## 2024-03-14 RX ORDER — POLYETHYLENE GLYCOL 3350 17 G/17G
17 POWDER, FOR SOLUTION ORAL
Qty: 119 | Refills: 0 | Status: COMPLETED | COMMUNITY
Start: 2023-09-18 | End: 2024-03-14

## 2024-03-14 RX ORDER — DICLOFENAC SODIUM 75 MG/1
75 TABLET, DELAYED RELEASE ORAL
Qty: 60 | Refills: 0 | Status: COMPLETED | COMMUNITY
Start: 2023-10-23 | End: 2024-03-14

## 2024-03-14 RX ORDER — CHLORHEXIDINE GLUCONATE 4 %
1000 LIQUID (ML) TOPICAL
Qty: 90 | Refills: 2 | Status: ACTIVE | COMMUNITY
Start: 2024-03-14

## 2024-03-14 RX ORDER — VITAMIN B COMPLEX
CAPSULE ORAL
Refills: 0 | Status: COMPLETED | COMMUNITY
End: 2024-03-14

## 2024-03-14 RX ORDER — LOPERAMIDE HYDROCHLORIDE 2 MG/1
2 CAPSULE ORAL
Qty: 120 | Refills: 5 | Status: COMPLETED | COMMUNITY
Start: 2018-11-28 | End: 2024-03-14

## 2024-03-14 NOTE — HISTORY OF PRESENT ILLNESS
[FreeTextEntry1] : Pt presented for 3 month follow up of HTN & PMR. [de-identified] : Pt is on MTX for now and is off NSAID.  She is on MTX for a couple of months and has not noticed much improvement of her pain.  She has b/l shoulder pain, worse on L.  This is interfering with her household chores.  She was told she may need shoulder replacement but she is reluctant to do that.  She also has worsening back pain.  She gained a few lbs and is less active and did less exercise.    She had labs done with rheum a few weeks ago.  Last blood tests done for me was in 12/2023, and did not have repeat labs for me for this visit.  She has not been sick since last OV with me.

## 2024-03-14 NOTE — PHYSICAL EXAM
[No Acute Distress] : no acute distress [Well Nourished] : well nourished [Well Developed] : well developed [Supple] : supple [No Respiratory Distress] : no respiratory distress  [Clear to Auscultation] : lungs were clear to auscultation bilaterally [Normal Rate] : normal rate  [Regular Rhythm] : with a regular rhythm [Normal S1, S2] : normal S1 and S2 [No Edema] : there was no peripheral edema [Soft] : abdomen soft [Non Tender] : non-tender [Normal Bowel Sounds] : normal bowel sounds [No CVA Tenderness] : no CVA  tenderness [No Spinal Tenderness] : no spinal tenderness [Speech Grossly Normal] : speech grossly normal [Normal Affect] : the affect was normal [Alert and Oriented x3] : oriented to person, place, and time [de-identified] : Limited ROM of both shoulders, [de-identified] : Obese female in stated age,  [de-identified] : Ambulate with rollator,

## 2024-03-26 ENCOUNTER — APPOINTMENT (OUTPATIENT)
Dept: ORTHOPEDIC SURGERY | Facility: CLINIC | Age: 74
End: 2024-03-26
Payer: MEDICARE

## 2024-03-26 VITALS — HEIGHT: 62 IN | WEIGHT: 207 LBS | BODY MASS INDEX: 38.09 KG/M2

## 2024-03-26 PROCEDURE — 99213 OFFICE O/P EST LOW 20 MIN: CPT | Mod: 25

## 2024-03-26 PROCEDURE — 73502 X-RAY EXAM HIP UNI 2-3 VIEWS: CPT

## 2024-03-26 NOTE — H&P PST ADULT - PMH
Asthma    Cataracts, bilateral    Cervical disc disease  December 2017  Depression  never hospitalized  Gastric ulcer    Glaucoma    Hypercholesterolemia    Hypertension    Obesity    Prediabetes  diagnosed in early 2017  Seasonal allergies    Sleep apnea  supposed to use device at night but doesn't  used

## 2024-03-27 NOTE — PHYSICAL EXAM
[de-identified] : Constitutional:  73 year old female, alert and oriented, cooperative, in no acute distress.  HEENT  NC/AT.  Appearance: symmetric  Neck/Back Straight without deformity or instability.  Good ROM.  Chest/Respiratory  Respiratory effort: no intercostal retractions or use of accessory muscles. Nonlabored Breathing  Mental Status:  Judgment, insight: intact Orientation: oriented to time, place, and person  Neurological: Sensory and Motor are grossly intact throughout  Left Hip Exam: Inspection/Appearance:      Incision well healed, no erythema or drainage  Tenderness:  	Sacroiliac: Negative  	Greater trochanter: Positive (also tenderness over the anterior hip/thigh)                 SHEILA Test: Negative                  FADIR Test: Negative   Range of Motion:                 Extension - 0  	Flexion - 100  	IR - 30 	ER - 40 	Abd - 40  	Add - 30   Stability: Normal without instability  Neurologic Exam     Motor intact including 5/5 Extensor Hallucis Longus, 5/5 Flexor Hallucis Longus, 5/5 Tibialis Anterior and 5/5 Gastrocnemius     Sensation Intact to Light Touch including Saphenous, Sural, Superficial Peroneal, Deep Peroneal, Tibial nerve distributions  Vascular Exam     Foot is warm and well perfused with 2+ Dorsalis Pedis Pulse  No pain with range of motion of the right hip or bilateral knees. No lumbar paraspinal muscle tenderness. [de-identified] : XRay:  XRays of the Pelvis (1 View) and Left Hip (2 Views) taken in the office today and reviewed with the patient. XRays demonstrate a Left Total Hip Arthroplasty in good position and alignment. There is no obvious evidence of fracture, dislocation, osteolysis or loosening. (my personal interpretation) Components: Stitch Labs S-ROM

## 2024-03-27 NOTE — HISTORY OF PRESENT ILLNESS
[de-identified] : Liz Alvarez is a 73-year-old female who presents the office for evaluation of her left total hip arthroplasty.  Patient underwent left MICHAEL in 2005 by Dr. Villalpando.  She did well overall.  About 5 to 6 years ago, patient started to have lateral hip pain and tenderness.  Patient is also planned for lumbar spine surgery by Dr. Weber.  She received a prior spine injection, which did not help.  She is also considering left total shoulder arthroplasty.  She had a fall in 2020 1/2020, with head strike.  Patient has balance issues and is currently in physical therapy.  She can have leg paresthesias.  No fevers.  History: HTN, HLD, Asthma, CHANTELL

## 2024-03-27 NOTE — DISCUSSION/SUMMARY
[de-identified] : Liz Alvarez is a 73-year-old female who presents the office for evaluation of her left total hip arthroplasty.  Patient underwent left MICHAEL in 2005 by Dr. Villalpando.  X-rays showed left total hip arthroplasty in good position and alignment.  Examination showed tenderness over the greater trochanter.  Discussed with the patient the examination and imaging findings.  Discussed with patient that her pain is possibly secondary to greater trochanteric bursitis.  Discussed with patient management of her pain at this time, including physical therapy.  Patient was given a referral to physical therapy.  Discussed following patient's metal ion levels.  Cobalt and chromium levels were ordered.  Patient will follow-up in 1 month for reevaluation and management, pending results of metal ion levels.  Patient understanding and in agreement with the plan.  All questions answered.   Plan: -Physical therapy -Follow-up cobalt and chromium levels -Follow-up in 1 month for reevaluation and management

## 2024-04-08 ENCOUNTER — RX RENEWAL (OUTPATIENT)
Age: 74
End: 2024-04-08

## 2024-04-08 RX ORDER — SUCRALFATE 1 G/1
1 TABLET ORAL
Qty: 180 | Refills: 0 | Status: ACTIVE | COMMUNITY
Start: 2018-11-28 | End: 1900-01-01

## 2024-04-16 ENCOUNTER — APPOINTMENT (OUTPATIENT)
Dept: RHEUMATOLOGY | Facility: CLINIC | Age: 74
End: 2024-04-16
Payer: MEDICARE

## 2024-04-16 ENCOUNTER — TRANSCRIPTION ENCOUNTER (OUTPATIENT)
Age: 74
End: 2024-04-16

## 2024-04-16 VITALS
SYSTOLIC BLOOD PRESSURE: 98 MMHG | HEIGHT: 62 IN | TEMPERATURE: 97.2 F | HEART RATE: 65 BPM | OXYGEN SATURATION: 92 % | BODY MASS INDEX: 38.09 KG/M2 | WEIGHT: 207 LBS | DIASTOLIC BLOOD PRESSURE: 62 MMHG

## 2024-04-16 VITALS — SYSTOLIC BLOOD PRESSURE: 101 MMHG | DIASTOLIC BLOOD PRESSURE: 64 MMHG

## 2024-04-16 PROCEDURE — 99214 OFFICE O/P EST MOD 30 MIN: CPT

## 2024-04-17 ENCOUNTER — NON-APPOINTMENT (OUTPATIENT)
Age: 74
End: 2024-04-17

## 2024-04-17 LAB
COBALT: <1 UG/L
CR BLD-MCNC: 0.5 UG/L

## 2024-04-19 ENCOUNTER — APPOINTMENT (OUTPATIENT)
Dept: NEUROLOGY | Facility: CLINIC | Age: 74
End: 2024-04-19

## 2024-04-30 ENCOUNTER — APPOINTMENT (OUTPATIENT)
Dept: ORTHOPEDIC SURGERY | Facility: CLINIC | Age: 74
End: 2024-04-30
Payer: MEDICARE

## 2024-04-30 DIAGNOSIS — M19.011 PRIMARY OSTEOARTHRITIS, RIGHT SHOULDER: ICD-10-CM

## 2024-04-30 DIAGNOSIS — M19.012 PRIMARY OSTEOARTHRITIS, LEFT SHOULDER: ICD-10-CM

## 2024-04-30 PROCEDURE — 99214 OFFICE O/P EST MOD 30 MIN: CPT

## 2024-05-02 ENCOUNTER — APPOINTMENT (OUTPATIENT)
Dept: ORTHOPEDIC SURGERY | Facility: CLINIC | Age: 74
End: 2024-05-02
Payer: MEDICARE

## 2024-05-02 VITALS — WEIGHT: 206 LBS | HEIGHT: 62 IN | BODY MASS INDEX: 37.91 KG/M2

## 2024-05-02 DIAGNOSIS — Z96.649 PRESENCE OF UNSPECIFIED ARTIFICIAL HIP JOINT: ICD-10-CM

## 2024-05-02 PROBLEM — M19.012 PRIMARY OSTEOARTHRITIS OF LEFT SHOULDER: Status: ACTIVE | Noted: 2023-11-07

## 2024-05-02 PROBLEM — M19.011 PRIMARY OSTEOARTHRITIS OF RIGHT SHOULDER: Status: ACTIVE | Noted: 2023-11-07

## 2024-05-02 PROCEDURE — 99213 OFFICE O/P EST LOW 20 MIN: CPT

## 2024-05-02 NOTE — PHYSICAL EXAM
[de-identified] : Constitutional:  73 year old female, alert and oriented, cooperative, in no acute distress.  HEENT  NC/AT.  Appearance: symmetric  Neck/Back Straight without deformity or instability.  Good ROM.  Chest/Respiratory  Respiratory effort: no intercostal retractions or use of accessory muscles. Nonlabored Breathing  Mental Status:  Judgment, insight: intact Orientation: oriented to time, place, and person  Neurological: Sensory and Motor are grossly intact throughout  Left Hip Exam: Inspection/Appearance:      Incision well healed, no erythema or drainage  Tenderness:  	Sacroiliac: Negative  	Greater trochanter: Positive (also tenderness over the anterior hip/thigh)                 SHEILA Test: Negative                  FADIR Test: Negative   Range of Motion:                 Extension - 0  	Flexion - 100  	IR - 30 	ER - 40 	Abd - 40  	Add - 30   Stability: Normal without instability  Neurologic Exam     Motor intact including 5/5 Extensor Hallucis Longus, 5/5 Flexor Hallucis Longus, 5/5 Tibialis Anterior and 5/5 Gastrocnemius     Sensation Intact to Light Touch including Saphenous, Sural, Superficial Peroneal, Deep Peroneal, Tibial nerve distributions  Vascular Exam     Foot is warm and well perfused with 2+ Dorsalis Pedis Pulse  No pain with range of motion of the right hip or bilateral knees. No lumbar paraspinal muscle tenderness. [de-identified] : XRay:  XRays of the Pelvis (1 View) and Left Hip (2 Views) taken on 3/26/2024. XRays demonstrate a Left Total Hip Arthroplasty in good position and alignment. There is no obvious evidence of fracture, dislocation, osteolysis or loosening. (my personal interpretation) Components: Perfect S-ROM

## 2024-05-02 NOTE — HISTORY OF PRESENT ILLNESS
[de-identified] : 74 year old female seen previously for bilateral shoulder OA, she is here to discuss left shoulder arthroplasty. She received Steroid injection in both shoulders in Juen 2023 without relief. Completed PT with no improvement. The pain is constant pain and taking Tylenol with some relief. Patient is still using a walker for Balance issues.

## 2024-05-02 NOTE — DISCUSSION/SUMMARY
[de-identified] : 74-year-old female with bilateral shoulder osteoarthritis   Patient presents for consideration of left shoulder arthroplasty.  Long discussion was had with the patient regarding surgical nature and treatment of her left shoulder.  MRI suggestive of tendinopathy of the rotator cuff without high-grade tear, and patient does have adequate rotator cuff function, but given her age and degree of tendinopathy she may be a candidate for reverse arthroplasty.  Prior to consideration of further treatment would recommend CT imaging of the left shoulder to determine preoperative plan.  Patient is agreeable.  All risks, benefits and alternatives to the proposed surgical procedure, left shoulder arthroplasty including reverse prosthesis, as well as the need for formal post-operative rehabilitation were discussed in great detail with the patient. Risks include but are not limited to pain, bleeding, infection, neurovascular injury, stiffness, implant malfunction, medical complications (including DVT, PE, MI), and risks of anesthesia.   The patient expressed understanding and all questions were answered. The patient is electing to proceed, and will have the patient scheduled accordingly following CT imaging.

## 2024-05-02 NOTE — DISCUSSION/SUMMARY
[de-identified] : Liz Alvarez is a 73-year-old female who presents the office for follow up of her left total hip arthroplasty.  Patient underwent left MICHAEL in 2005 by Dr. Villalpando.  Prior X-rays showed left total hip arthroplasty in good position and alignment.  Examination showed tenderness over the greater trochanter.  Discussed with the patient the examination and imaging findings.  Discussed with patient that her pain is possibly secondary to greater trochanteric bursitis.   Patient states that her hip pain is manageable at this time.  She is planned for left shoulder arthroscopy by Dr. De La O.  Patient was previously given a referral to physical therapy for her left hip pain.  Patient will follow-up in 6 months for reevaluation and management.  Patient understanding and in agreement with the plan.  All questions answered.  Plan: -Physical therapy -Follow-up in 6 months for reevaluation and management

## 2024-05-02 NOTE — PHYSICAL EXAM
[de-identified] : Right shoulder exam:  Inspection: No malalignment, No defects, No atrophy Skin: No masses, No lesions Neck: Negative Spurling, full ROM, no pain with ROM AROM: FF to 140, abduction to 80, ER to 50, IR to lower lumbar Painful arc ROM: + Tenderness: No bicipital tenderness, +tenderness to greater tuberosity/RTC insertion, + anterior shoulder/lesser tuberosity tenderness Strength: 4+/5 ER, 5/5 IR in adduction, 5/5 supraspinatus testing, negative [place]'s test AC joint: No TTP/pain with cross arm testing Biceps: Speed Negative, Yergason Negative Impingement test: + Servin, + Neer Vasc: 2+ radial pulse Stability: Stable Neuro: AIN, PIN, Ulnar nerve intact to motor Sensation: Intact to light touch throughout Other findings: None.  Left shoulder exam:  Inspection: No malalignment, No defects, No atrophy Skin: No masses, No lesions Neck: Negative Spurling, full ROM, no pain with ROM AROM: FF to 160, abduction to 80, ER to 45, IR to lower lumbar Painful arc ROM: + Tenderness: No bicipital tenderness, +tenderness to greater tuberosity/RTC insertion, +anterior shoulder/lesser tuberosity tenderness Strength: 5/5 ER, 5/5 IR in adduction, 4/5 supraspinatus testing, negative [place]'s test AC joint: No TTP/pain with cross arm testing Biceps: Speed Negative, Yergason Negative Impingement test: + Servin, + Neer Vasc: 2+ radial pulse Stability: Stable Neuro: AIN, PIN, Ulnar nerve intact to motor Sensation: Intact to light touch throughout Other findings: None. [de-identified] : Images were reviewed dated 1/18/2023  3 views of bilateral shoulder that show no acute fracture or dislocation. There is severe glenohumeral and mild AC joint degenerative change seen. Type II acromion. There is no significant malalignment. No significant other obvious osseous abnormality, otherwise unremarkable.  MRI left shoulder dated 08/28/2023 shows severe glenohumeral arthritis with RTC tendinosis.  MRI right shoulder date 08/28/2023 shows severe glenohumeral arthritis with RTC tendinosis.  We independently reviewed and discussed in detail the images and the radiologic reports with the patient.

## 2024-05-02 NOTE — HISTORY OF PRESENT ILLNESS
[de-identified] : 5/2/2024  Liz Alvarez presents to the office for follow-up of her left total hip arthroplasty.  Patient's left hip pain is manageable at this time.  Her metal ion levels showed cobalt: <1, chromium: 0.5.  No falls.  No fevers or chills.  3/26/2024 Liz Alvarez is a 73-year-old female who presents the office for evaluation of her left total hip arthroplasty.  Patient underwent left MICHAEL in 2005 by Dr. Villalpando.  She did well overall.  About 5 to 6 years ago, patient started to have lateral hip pain and tenderness.  Patient is also planned for lumbar spine surgery by Dr. Weber.  She received a prior spine injection, which did not help.  She is also considering left total shoulder arthroplasty.  She had a fall in 2020 1/2020, with head strike.  Patient has balance issues and is currently in physical therapy.  She can have leg paresthesias.  No fevers.  History: HTN, HLD, Asthma, CHANTELL

## 2024-05-04 LAB
ALBUMIN SERPL ELPH-MCNC: 4.1 G/DL
ALP BLD-CCNC: 101 U/L
ALT SERPL-CCNC: 23 U/L
ANION GAP SERPL CALC-SCNC: 14 MMOL/L
AST SERPL-CCNC: 22 U/L
BASOPHILS # BLD AUTO: 0.02 K/UL
BASOPHILS NFR BLD AUTO: 0.2 %
BILIRUB SERPL-MCNC: 0.4 MG/DL
BUN SERPL-MCNC: 25 MG/DL
CALCIUM SERPL-MCNC: 9.1 MG/DL
CHLORIDE SERPL-SCNC: 98 MMOL/L
CO2 SERPL-SCNC: 29 MMOL/L
CREAT SERPL-MCNC: 1.14 MG/DL
CRP SERPL-MCNC: 7 MG/L
EGFR: 51 ML/MIN/1.73M2
EOSINOPHIL # BLD AUTO: 0.37 K/UL
EOSINOPHIL NFR BLD AUTO: 4.4 %
ERYTHROCYTE [SEDIMENTATION RATE] IN BLOOD BY WESTERGREN METHOD: 118 MM/HR
GLUCOSE SERPL-MCNC: 143 MG/DL
HCT VFR BLD CALC: 32.9 %
HGB BLD-MCNC: 10.4 G/DL
IMM GRANULOCYTES NFR BLD AUTO: 0.4 %
LYMPHOCYTES # BLD AUTO: 2.84 K/UL
LYMPHOCYTES NFR BLD AUTO: 33.6 %
MAN DIFF?: NORMAL
MCHC RBC-ENTMCNC: 31.4 PG
MCHC RBC-ENTMCNC: 31.6 GM/DL
MCV RBC AUTO: 99.4 FL
MONOCYTES # BLD AUTO: 0.43 K/UL
MONOCYTES NFR BLD AUTO: 5.1 %
NEUTROPHILS # BLD AUTO: 4.75 K/UL
NEUTROPHILS NFR BLD AUTO: 56.3 %
PLATELET # BLD AUTO: 298 K/UL
POTASSIUM SERPL-SCNC: 3.7 MMOL/L
PROT SERPL-MCNC: 7.4 G/DL
RBC # BLD: 3.31 M/UL
RBC # FLD: 16 %
SODIUM SERPL-SCNC: 141 MMOL/L
WBC # FLD AUTO: 8.44 K/UL

## 2024-05-04 NOTE — HISTORY OF PRESENT ILLNESS
[FreeTextEntry1] : Interval History: 4/16/2024 on mtx 5 tabs, shoulder pain is improved, but still with unchanged back pain.  2/20/2024 Pt has been on MTX 5 tabs for one month. No change in joint pain. 1/2/2024 Last seen in 9/2023. At last visit started on MTX but pt lost to follow up and could not be refilled. Now pt has been on MTX for 1month. On MTX 5 tabs weekly. she is tolerating it well, notices no difference in her joint pain.  9/12/2023 =Persistent neck and mid back pain, has had trigger point injections =persistent bilateral shoulder pain, has had shoulder injections =takes meloxicam as needed had PET/CT suggesting inflammatory arthritis

## 2024-05-04 NOTE — ASSESSMENT
[FreeTextEntry1] : 73F with reported history of PMR based on shoulder pain and elevated inflammatory markers with inadequate response to prednisone at the time. Patient's main complaints and radiating cervicalgia and lower back pain and pt does have significant spinal stenosis on imaging.. PET/CT consistent with mild inflammatory arthritis / PMR  Plan -cw MTX 5 tabs weekly (renally adjusted), folic acid 1mg daily -cw Tylenol 1g bid -drug toxicity labs today -follow up in 3 months

## 2024-05-10 ENCOUNTER — TRANSCRIPTION ENCOUNTER (OUTPATIENT)
Age: 74
End: 2024-05-10

## 2024-05-14 ENCOUNTER — OUTPATIENT (OUTPATIENT)
Dept: OUTPATIENT SERVICES | Facility: HOSPITAL | Age: 74
LOS: 1 days | End: 2024-05-14
Payer: COMMERCIAL

## 2024-05-14 ENCOUNTER — APPOINTMENT (OUTPATIENT)
Dept: CT IMAGING | Facility: IMAGING CENTER | Age: 74
End: 2024-05-14
Payer: MEDICARE

## 2024-05-14 DIAGNOSIS — Z00.8 ENCOUNTER FOR OTHER GENERAL EXAMINATION: ICD-10-CM

## 2024-05-14 DIAGNOSIS — Z96.651 PRESENCE OF RIGHT ARTIFICIAL KNEE JOINT: Chronic | ICD-10-CM

## 2024-05-14 DIAGNOSIS — Z98.49 CATARACT EXTRACTION STATUS, UNSPECIFIED EYE: Chronic | ICD-10-CM

## 2024-05-14 DIAGNOSIS — M19.012 PRIMARY OSTEOARTHRITIS, LEFT SHOULDER: ICD-10-CM

## 2024-05-14 DIAGNOSIS — Z96.652 PRESENCE OF LEFT ARTIFICIAL KNEE JOINT: Chronic | ICD-10-CM

## 2024-05-14 DIAGNOSIS — Z98.1 ARTHRODESIS STATUS: Chronic | ICD-10-CM

## 2024-05-14 DIAGNOSIS — Z96.642 PRESENCE OF LEFT ARTIFICIAL HIP JOINT: Chronic | ICD-10-CM

## 2024-05-14 PROCEDURE — 73200 CT UPPER EXTREMITY W/O DYE: CPT | Mod: 26,LT

## 2024-05-14 PROCEDURE — 73200 CT UPPER EXTREMITY W/O DYE: CPT

## 2024-05-17 ENCOUNTER — NON-APPOINTMENT (OUTPATIENT)
Age: 74
End: 2024-05-17

## 2024-05-20 ENCOUNTER — TRANSCRIPTION ENCOUNTER (OUTPATIENT)
Age: 74
End: 2024-05-20

## 2024-05-21 ENCOUNTER — TRANSCRIPTION ENCOUNTER (OUTPATIENT)
Age: 74
End: 2024-05-21

## 2024-05-23 ENCOUNTER — TRANSCRIPTION ENCOUNTER (OUTPATIENT)
Age: 74
End: 2024-05-23

## 2024-05-24 ENCOUNTER — TRANSCRIPTION ENCOUNTER (OUTPATIENT)
Age: 74
End: 2024-05-24

## 2024-05-28 NOTE — PHYSICAL THERAPY INITIAL EVALUATION ADULT - ASSISTIVE DEVICE FOR TRANSFER: STAND/SIT, REHAB EVAL
There were no fractures on your x-ray.  Return to the emergency department if you develop new or worsening symptoms.  Rest ice ibuprofen and elevation and compression with the Ace wrap should help.  It was a pleasure to see you today.  Good luck healing up from this   rolling walker

## 2024-05-30 RX ORDER — LIDOCAINE 5% 700 MG/1
5 PATCH TOPICAL
Qty: 1 | Refills: 3 | Status: ACTIVE | COMMUNITY
Start: 2024-05-30 | End: 1900-01-01

## 2024-05-30 RX ORDER — LIDOCAINE 5% 700 MG/1
5 PATCH TOPICAL
Qty: 1 | Refills: 0 | Status: DISCONTINUED | COMMUNITY
Start: 2024-02-05 | End: 2024-05-30

## 2024-06-02 DIAGNOSIS — I51.89 OTHER ILL-DEFINED HEART DISEASES: ICD-10-CM

## 2024-06-03 ENCOUNTER — NON-APPOINTMENT (OUTPATIENT)
Age: 74
End: 2024-06-03

## 2024-06-03 ENCOUNTER — APPOINTMENT (OUTPATIENT)
Dept: INTERNAL MEDICINE | Facility: CLINIC | Age: 74
End: 2024-06-03
Payer: MEDICARE

## 2024-06-03 VITALS
OXYGEN SATURATION: 92 % | SYSTOLIC BLOOD PRESSURE: 112 MMHG | RESPIRATION RATE: 15 BRPM | WEIGHT: 206 LBS | TEMPERATURE: 98.1 F | BODY MASS INDEX: 37.91 KG/M2 | HEART RATE: 73 BPM | DIASTOLIC BLOOD PRESSURE: 73 MMHG | HEIGHT: 62 IN

## 2024-06-03 DIAGNOSIS — R19.5 OTHER FECAL ABNORMALITIES: ICD-10-CM

## 2024-06-03 DIAGNOSIS — I10 ESSENTIAL (PRIMARY) HYPERTENSION: ICD-10-CM

## 2024-06-03 DIAGNOSIS — G47.33 OBSTRUCTIVE SLEEP APNEA (ADULT) (PEDIATRIC): ICD-10-CM

## 2024-06-03 DIAGNOSIS — M35.3 POLYMYALGIA RHEUMATICA: ICD-10-CM

## 2024-06-03 DIAGNOSIS — Z01.818 ENCOUNTER FOR OTHER PREPROCEDURAL EXAMINATION: ICD-10-CM

## 2024-06-03 DIAGNOSIS — K21.9 GASTRO-ESOPHAGEAL REFLUX DISEASE W/OUT ESOPHAGITIS: ICD-10-CM

## 2024-06-03 DIAGNOSIS — J45.909 UNSPECIFIED ASTHMA, UNCOMPLICATED: ICD-10-CM

## 2024-06-03 PROCEDURE — G2211 COMPLEX E/M VISIT ADD ON: CPT

## 2024-06-03 PROCEDURE — 99214 OFFICE O/P EST MOD 30 MIN: CPT

## 2024-06-03 RX ORDER — CELECOXIB 200 MG/1
200 CAPSULE ORAL TWICE DAILY
Refills: 0 | Status: ACTIVE | COMMUNITY
Start: 2024-06-03

## 2024-06-03 RX ORDER — POLYETHYLENE GLYCOL 3350 17 G/17G
17 POWDER, FOR SOLUTION ORAL
Qty: 357 | Refills: 0 | Status: COMPLETED | COMMUNITY
Start: 2023-11-16 | End: 2024-06-03

## 2024-06-04 PROBLEM — M35.3 POLYMYALGIA RHEUMATICA: Status: ACTIVE | Noted: 2019-11-20

## 2024-06-04 PROBLEM — K21.9 GERD (GASTROESOPHAGEAL REFLUX DISEASE): Status: ACTIVE | Noted: 2022-03-28

## 2024-06-04 PROBLEM — R19.5 GUAIAC POSITIVE STOOLS: Status: RESOLVED | Noted: 2023-09-15 | Resolved: 2024-06-04

## 2024-06-04 PROBLEM — Z01.818 PRE-OP EXAMINATION: Status: ACTIVE | Noted: 2019-07-10

## 2024-06-04 RX ORDER — AZELASTINE HYDROCHLORIDE 137 UG/1
0.1 SPRAY, METERED NASAL
Qty: 3 | Refills: 1 | Status: COMPLETED | COMMUNITY
Start: 2018-12-14 | End: 2024-06-04

## 2024-06-06 ENCOUNTER — OUTPATIENT (OUTPATIENT)
Dept: OUTPATIENT SERVICES | Facility: HOSPITAL | Age: 74
LOS: 1 days | End: 2024-06-06

## 2024-06-06 VITALS
DIASTOLIC BLOOD PRESSURE: 68 MMHG | SYSTOLIC BLOOD PRESSURE: 103 MMHG | OXYGEN SATURATION: 98 % | TEMPERATURE: 99 F | HEART RATE: 81 BPM | RESPIRATION RATE: 16 BRPM | WEIGHT: 209 LBS | HEIGHT: 62 IN

## 2024-06-06 DIAGNOSIS — G47.33 OBSTRUCTIVE SLEEP APNEA (ADULT) (PEDIATRIC): ICD-10-CM

## 2024-06-06 DIAGNOSIS — M19.012 PRIMARY OSTEOARTHRITIS, LEFT SHOULDER: ICD-10-CM

## 2024-06-06 DIAGNOSIS — M06.9 RHEUMATOID ARTHRITIS, UNSPECIFIED: ICD-10-CM

## 2024-06-06 DIAGNOSIS — J45.909 UNSPECIFIED ASTHMA, UNCOMPLICATED: ICD-10-CM

## 2024-06-06 DIAGNOSIS — Z90.89 ACQUIRED ABSENCE OF OTHER ORGANS: Chronic | ICD-10-CM

## 2024-06-06 DIAGNOSIS — Z98.49 CATARACT EXTRACTION STATUS, UNSPECIFIED EYE: Chronic | ICD-10-CM

## 2024-06-06 DIAGNOSIS — Z96.651 PRESENCE OF RIGHT ARTIFICIAL KNEE JOINT: Chronic | ICD-10-CM

## 2024-06-06 DIAGNOSIS — Z98.1 ARTHRODESIS STATUS: Chronic | ICD-10-CM

## 2024-06-06 DIAGNOSIS — E87.6 HYPOKALEMIA: ICD-10-CM

## 2024-06-06 DIAGNOSIS — I10 ESSENTIAL (PRIMARY) HYPERTENSION: ICD-10-CM

## 2024-06-06 DIAGNOSIS — Z96.642 PRESENCE OF LEFT ARTIFICIAL HIP JOINT: Chronic | ICD-10-CM

## 2024-06-06 DIAGNOSIS — Z96.652 PRESENCE OF LEFT ARTIFICIAL KNEE JOINT: Chronic | ICD-10-CM

## 2024-06-06 LAB
ANION GAP SERPL CALC-SCNC: 14 MMOL/L — SIGNIFICANT CHANGE UP (ref 7–14)
APPEARANCE UR: CLEAR — SIGNIFICANT CHANGE UP
BILIRUB UR-MCNC: NEGATIVE — SIGNIFICANT CHANGE UP
BLD GP AB SCN SERPL QL: NEGATIVE — SIGNIFICANT CHANGE UP
BUN SERPL-MCNC: 22 MG/DL — SIGNIFICANT CHANGE UP (ref 7–23)
CALCIUM SERPL-MCNC: 9 MG/DL — SIGNIFICANT CHANGE UP (ref 8.4–10.5)
CHLORIDE SERPL-SCNC: 98 MMOL/L — SIGNIFICANT CHANGE UP (ref 98–107)
CO2 SERPL-SCNC: 29 MMOL/L — SIGNIFICANT CHANGE UP (ref 22–31)
COLOR SPEC: YELLOW — SIGNIFICANT CHANGE UP
CREAT SERPL-MCNC: 1.1 MG/DL — SIGNIFICANT CHANGE UP (ref 0.5–1.3)
DIFF PNL FLD: NEGATIVE — SIGNIFICANT CHANGE UP
EGFR: 53 ML/MIN/1.73M2 — LOW
GLUCOSE SERPL-MCNC: 126 MG/DL — HIGH (ref 70–99)
GLUCOSE UR QL: NEGATIVE MG/DL — SIGNIFICANT CHANGE UP
HCT VFR BLD CALC: 31.9 % — LOW (ref 34.5–45)
HGB BLD-MCNC: 10.3 G/DL — LOW (ref 11.5–15.5)
KETONES UR-MCNC: NEGATIVE MG/DL — SIGNIFICANT CHANGE UP
LEUKOCYTE ESTERASE UR-ACNC: ABNORMAL
MCHC RBC-ENTMCNC: 31.9 PG — SIGNIFICANT CHANGE UP (ref 27–34)
MCHC RBC-ENTMCNC: 32.3 GM/DL — SIGNIFICANT CHANGE UP (ref 32–36)
MCV RBC AUTO: 98.8 FL — SIGNIFICANT CHANGE UP (ref 80–100)
NITRITE UR-MCNC: NEGATIVE — SIGNIFICANT CHANGE UP
NRBC # BLD: 0 /100 WBCS — SIGNIFICANT CHANGE UP (ref 0–0)
NRBC # FLD: 0 K/UL — SIGNIFICANT CHANGE UP (ref 0–0)
PH UR: 6 — SIGNIFICANT CHANGE UP (ref 5–8)
PLATELET # BLD AUTO: 246 K/UL — SIGNIFICANT CHANGE UP (ref 150–400)
POTASSIUM SERPL-MCNC: 2.9 MMOL/L — CRITICAL LOW (ref 3.5–5.3)
POTASSIUM SERPL-SCNC: 2.9 MMOL/L — CRITICAL LOW (ref 3.5–5.3)
PROT UR-MCNC: SIGNIFICANT CHANGE UP MG/DL
RBC # BLD: 3.23 M/UL — LOW (ref 3.8–5.2)
RBC # FLD: 13.8 % — SIGNIFICANT CHANGE UP (ref 10.3–14.5)
RH IG SCN BLD-IMP: POSITIVE — SIGNIFICANT CHANGE UP
RH IG SCN BLD-IMP: POSITIVE — SIGNIFICANT CHANGE UP
SODIUM SERPL-SCNC: 141 MMOL/L — SIGNIFICANT CHANGE UP (ref 135–145)
SP GR SPEC: 1.03 — SIGNIFICANT CHANGE UP (ref 1–1.03)
UROBILINOGEN FLD QL: 1 MG/DL — SIGNIFICANT CHANGE UP (ref 0.2–1)
WBC # BLD: 6.93 K/UL — SIGNIFICANT CHANGE UP (ref 3.8–10.5)
WBC # FLD AUTO: 6.93 K/UL — SIGNIFICANT CHANGE UP (ref 3.8–10.5)

## 2024-06-06 RX ORDER — LOPERAMIDE HCL 2 MG
2 TABLET ORAL
Qty: 0 | Refills: 0 | DISCHARGE

## 2024-06-06 RX ORDER — POTASSIUM CHLORIDE 1500 MG/1
20 TABLET, EXTENDED RELEASE ORAL
Qty: 90 | Refills: 0 | Status: ACTIVE | COMMUNITY
Start: 2024-06-06 | End: 1900-01-01

## 2024-06-06 NOTE — H&P PST ADULT - PROBLEM SELECTOR PLAN 1
Written & verbal preop instructions, gi prophylaxis & surgical soap given  Pt verbalized good understanding.  Teach back done on surgical soap instructions. Scheduled for total shoulder arthroplasty possible reverse.  Written & verbal preop instructions, gi prophylaxis - pt to take own & surgical soap given  Pt verbalized good understanding.  Teach back done on surgical soap instructions. Scheduled for total shoulder arthroplasty possible reverse.  Written & verbal preop instructions, gi prophylaxis - pt to take own & surgical soap given  Pt verbalized good understanding.  Teach back done on surgical soap instructions.  Medical eval - surgeon request.  Pending copy of report

## 2024-06-06 NOTE — H&P PST ADULT - CARDIOVASCULAR
regular rate and rhythm/S1 S2 present/no murmur details… b/l ankles trace edema/regular rate and rhythm/S1 S2 present/no murmur

## 2024-06-06 NOTE — H&P PST ADULT - HISTORY OF PRESENT ILLNESS
74 year old female seen previously for bilateral shoulder OA, she is here to discuss left shoulder arthroplasty. She received Steroid injection in both shoulders in Juen 2023 without relief. Completed PT with no improvement. The pain is constant pain and taking Tylenol with some relief. Patient is still using a walker for Balance issues.  ? 74 year old female presents for preop eval for scheduled total shoulder arthroplasty possible reverse.  Pt state bilateral shoulder OA and had steroid injection in both shoulders in June 2023 without relief and PT with no improvement.   Pt with c/o constant pain in b/l shoulders - PRN Tylenol with minimal relief.

## 2024-06-06 NOTE — PROVIDER CONTACT NOTE (CRITICAL VALUE NOTIFICATION) - BACKGROUND
Pt was seen at presurgical testing today. Pt is scheduled for Total Shoulder Arthroplasty on 6/14/24.

## 2024-06-06 NOTE — H&P PST ADULT - ANESTHESIA, PREVIOUS REACTION, PROFILE
Mallampati = II,  partial upper & lower dentures/none Mallampati = IV,  partial upper & lower dentures/none

## 2024-06-06 NOTE — H&P PST ADULT - TEMPERATURE IN FAHRENHEIT (DEGREES F)
98.9 O-Z Plasty Text: The defect edges were debeveled with a #15 scalpel blade.  Given the location of the defect, shape of the defect and the proximity to free margins an O-Z plasty (double transposition flap) was deemed most appropriate.  Using a sterile surgical marker, the appropriate transposition flaps were drawn incorporating the defect and placing the expected incisions within the relaxed skin tension lines where possible.    The area thus outlined was incised deep to adipose tissue with a #15 scalpel blade.  The skin margins were undermined to an appropriate distance in all directions utilizing iris scissors.  Hemostasis was achieved with electrocautery.  The flaps were then transposed into place, one clockwise and the other counterclockwise, and anchored with interrupted buried subcutaneous sutures.

## 2024-06-06 NOTE — H&P PST ADULT - MUSCULOSKELETAL COMMENTS
preop dx Primary osteoarthritis of left shoulder preop dx Primary osteoarthritis of left shoulder, ambulating with rollator

## 2024-06-06 NOTE — H&P PST ADULT - PROBLEM SELECTOR PLAN 3
Pt instructed to take  on morning of surgery. Pt instructed to take metoprolol on morning of surgery.

## 2024-06-06 NOTE — H&P PST ADULT - NSICDXPASTMEDICALHX_GEN_ALL_CORE_FT
PAST MEDICAL HISTORY:  Asthma     Carpal tunnel syndrome on left     Cervical disc disease December 2017    Depression never hospitalized    Gastric ulcer     GERD (gastroesophageal reflux disease)     Glaucoma     Hypercholesterolemia     Hypertension     Lumbar stenosis     Obesity     Prediabetes diagnosed in early 2017, on diet control    Seasonal allergies     Sleep apnea supposed to use device at night but doesn't    Spinal stenosis in cervical region      PAST MEDICAL HISTORY:  Asthma     Carpal tunnel syndrome on left     Cervical disc disease December 2017    Depression never hospitalized    Gastric ulcer     GERD (gastroesophageal reflux disease)     Glaucoma     HLD (hyperlipidemia)     Hypercholesterolemia     Hypertension     Lumbar stenosis     Obesity     Obstructive sleep apnea     Osteoarthritis     Prediabetes diagnosed in early 2017, on diet control    Primary osteoarthritis of left shoulder     Rheumatoid arthritis     Seasonal allergies     Sleep apnea supposed to use device at night but doesn't    Spinal stenosis in cervical region

## 2024-06-07 ENCOUNTER — TRANSCRIPTION ENCOUNTER (OUTPATIENT)
Age: 74
End: 2024-06-07

## 2024-06-10 LAB
-  AMIKACIN: SIGNIFICANT CHANGE UP
-  AZTREONAM: SIGNIFICANT CHANGE UP
-  CEFEPIME: SIGNIFICANT CHANGE UP
-  CEFTAZIDIME: SIGNIFICANT CHANGE UP
-  CIPROFLOXACIN: SIGNIFICANT CHANGE UP
-  IMIPENEM: SIGNIFICANT CHANGE UP
-  LEVOFLOXACIN: SIGNIFICANT CHANGE UP
-  MEROPENEM: SIGNIFICANT CHANGE UP
-  PIPERACILLIN/TAZOBACTAM: SIGNIFICANT CHANGE UP
CULTURE RESULTS: ABNORMAL
METHOD TYPE: SIGNIFICANT CHANGE UP
ORGANISM # SPEC MICROSCOPIC CNT: ABNORMAL
ORGANISM # SPEC MICROSCOPIC CNT: ABNORMAL
SPECIMEN SOURCE: SIGNIFICANT CHANGE UP

## 2024-06-10 RX ORDER — CIPROFLOXACIN HYDROCHLORIDE 250 MG/1
250 TABLET, FILM COATED ORAL
Qty: 6 | Refills: 0 | Status: ACTIVE | COMMUNITY
Start: 2024-06-10 | End: 1900-01-01

## 2024-06-11 LAB
ALBUMIN SERPL ELPH-MCNC: 4 G/DL
ALP BLD-CCNC: 99 U/L
ALT SERPL-CCNC: 12 U/L
ANION GAP SERPL CALC-SCNC: 10 MMOL/L
ANION GAP SERPL CALC-SCNC: 12 MMOL/L
AST SERPL-CCNC: 19 U/L
BASOPHILS # BLD AUTO: 0.02 K/UL
BASOPHILS NFR BLD AUTO: 0.3 %
BILIRUB SERPL-MCNC: 0.2 MG/DL
BUN SERPL-MCNC: 16 MG/DL
BUN SERPL-MCNC: 17 MG/DL
CALCIUM SERPL-MCNC: 9.1 MG/DL
CALCIUM SERPL-MCNC: 9.3 MG/DL
CHLORIDE SERPL-SCNC: 100 MMOL/L
CHLORIDE SERPL-SCNC: 101 MMOL/L
CHOLEST SERPL-MCNC: 186 MG/DL
CK SERPL-CCNC: 118 U/L
CO2 SERPL-SCNC: 30 MMOL/L
CO2 SERPL-SCNC: 31 MMOL/L
CREAT SERPL-MCNC: 1.06 MG/DL
CREAT SERPL-MCNC: 1.08 MG/DL
CRP SERPL-MCNC: 12 MG/L
EGFR: 54 ML/MIN/1.73M2
EGFR: 55 ML/MIN/1.73M2
EOSINOPHIL # BLD AUTO: 0.35 K/UL
EOSINOPHIL NFR BLD AUTO: 5.1 %
ERYTHROCYTE [SEDIMENTATION RATE] IN BLOOD BY WESTERGREN METHOD: 55 MM/HR
ESTIMATED AVERAGE GLUCOSE: 123 MG/DL
FOLATE SERPL-MCNC: 17.1 NG/ML
GLUCOSE SERPL-MCNC: 97 MG/DL
HBA1C MFR BLD HPLC: 5.9 %
HCT VFR BLD CALC: 34.8 %
HDLC SERPL-MCNC: 71 MG/DL
HGB BLD-MCNC: 10.4 G/DL
IMM GRANULOCYTES NFR BLD AUTO: 0.1 %
LDLC SERPL CALC-MCNC: 99 MG/DL
LYMPHOCYTES # BLD AUTO: 2.11 K/UL
LYMPHOCYTES NFR BLD AUTO: 31 %
MAN DIFF?: NORMAL
MCHC RBC-ENTMCNC: 29.9 GM/DL
MCHC RBC-ENTMCNC: 31.2 PG
MCV RBC AUTO: 104.5 FL
MONOCYTES # BLD AUTO: 0.66 K/UL
MONOCYTES NFR BLD AUTO: 9.7 %
NEUTROPHILS # BLD AUTO: 3.65 K/UL
NEUTROPHILS NFR BLD AUTO: 53.8 %
NONHDLC SERPL-MCNC: 115 MG/DL
PLATELET # BLD AUTO: 276 K/UL
POTASSIUM SERPL-SCNC: 3.5 MMOL/L
POTASSIUM SERPL-SCNC: 3.6 MMOL/L
PROT SERPL-MCNC: 7.5 G/DL
RBC # BLD: 3.33 M/UL
RBC # FLD: 14.2 %
SODIUM SERPL-SCNC: 142 MMOL/L
SODIUM SERPL-SCNC: 143 MMOL/L
TRIGL SERPL-MCNC: 93 MG/DL
VIT B12 SERPL-MCNC: >2000 PG/ML
WBC # FLD AUTO: 6.8 K/UL

## 2024-06-11 NOTE — HISTORY OF PRESENT ILLNESS
[No Pertinent Cardiac History] : no history of aortic stenosis, atrial fibrillation, coronary artery disease, recent myocardial infarction, or implantable device/pacemaker [Asthma] : asthma [Sleep Apnea] : sleep apnea [No Adverse Anesthesia Reaction] : no adverse anesthesia reaction in self or family member [(Patient denies any chest pain, claudication, dyspnea on exertion, orthopnea, palpitations or syncope)] : Patient denies any chest pain, claudication, dyspnea on exertion, orthopnea, palpitations or syncope [Poor (<4 METs)] : Poor (<4 METs) [Anti-Platelet Agents: _____] : Anti-Platelet Agents: [unfilled] [COPD] : no COPD [Smoker] : not a smoker [Chronic Anticoagulation] : no chronic anticoagulation [Chronic Kidney Disease] : no chronic kidney disease [Diabetes] : no diabetes [FreeTextEntry1] : Left total shoulder arthroplasty, possible reverse [FreeTextEntry2] : 6/14/2024, Friday [FreeTextEntry3] : Dr. Reggie De La O [FreeTextEntry4] : Pt has OA in L shoulder and has pain for at least a year.  She is not able to lie on her shoulder and it affects her sleep.  She is not able to use L arm to do housework, unable to reach above, unable to lift anything heavier than 5 lbs.  She took anti-inflammatory medications, Cortisone injection, PT and this pain continues to worsen.  This year she met with orthopedic surgeon, Xray showed severe OA, he recommended surgery which is scheduled to be done in about 10 days.  She is otherwise well and denied recent illness.

## 2024-06-11 NOTE — ADDENDUM
[FreeTextEntry1] : 6/11/2024 -  I received phone call on 6/6/2024 from PST staffs that pt had labs done and K low at 2.9.  Prop labs done on 6/6/2024 - * BMP - K - 2.9(L), glucose - 126(H, but nonfasting), the rest of BMP was unremarkable, * CBC - H/H - 10.3/31.9(L, stable), the rest of CBC were normal, * UA - moderate LE, 3 RBC, 10 WBC, 4 epithelial cells,  * Blood type - O positive  I called pt on 6/6/2024 and informed her of low K level, she is on HCTZ and has occ mild low K at 3.4 in the past, but K was mostly normal, never this low.  Pt feels well.  I advised that she starts K supplement with KCL 20 MEQ for 1 dose then 1 tab daily.  Then repeat labs in a few days.  Pt had labs done yesterday and repeat K level was normal at 3.6,  Pt advised to stay on KCL 10 MEQ daily as long as she is taking HCTZ.  She is now optimized for proposed procedure, the rest of periperative management as above.

## 2024-06-11 NOTE — PHYSICAL EXAM
[No Acute Distress] : no acute distress [Well Nourished] : well nourished [Normal Sclera/Conjunctiva] : normal sclera/conjunctiva [PERRL] : pupils equal round and reactive to light [EOMI] : extraocular movements intact [Normal Outer Ear/Nose] : the outer ears and nose were normal in appearance [Normal Oropharynx] : the oropharynx was normal [Normal TMs] : both tympanic membranes were normal [Normal Nasal Mucosa] : the nasal mucosa was normal [No JVD] : no jugular venous distention [No Lymphadenopathy] : no lymphadenopathy [Supple] : supple [No Respiratory Distress] : no respiratory distress  [Clear to Auscultation] : lungs were clear to auscultation bilaterally [Normal Rate] : normal rate  [Regular Rhythm] : with a regular rhythm [Normal S1, S2] : normal S1 and S2 [No Carotid Bruits] : no carotid bruits [Pedal Pulses Present] : the pedal pulses are present [No Extremity Clubbing/Cyanosis] : no extremity clubbing/cyanosis [Soft] : abdomen soft [Non Tender] : non-tender [Non-distended] : non-distended [Normal Bowel Sounds] : normal bowel sounds [Normal Posterior Cervical Nodes] : no posterior cervical lymphadenopathy [Normal Anterior Cervical Nodes] : no anterior cervical lymphadenopathy [No CVA Tenderness] : no CVA  tenderness [No Spinal Tenderness] : no spinal tenderness [Grossly Normal Strength/Tone] : grossly normal strength/tone [No Rash] : no rash [Coordination Grossly Intact] : coordination grossly intact [No Focal Deficits] : no focal deficits [Normal Gait] : normal gait [Deep Tendon Reflexes (DTR)] : deep tendon reflexes were 2+ and symmetric [Speech Grossly Normal] : speech grossly normal [Normal Affect] : the affect was normal [Alert and Oriented x3] : oriented to person, place, and time [de-identified] : Obese female in stated age,  [de-identified] : Trace ankle edema, no calf tenderness, [de-identified] : Slight limited ROM of both shoulders, worse on L, [de-identified] : Ambulate with rollator to office today,

## 2024-06-11 NOTE — ASSESSMENT
[Patient Requires Further Testing] : Patient requires further testing [Other: _____] : [unfilled] [As per surgery] : as per surgery [Modify anti-platelet treatment prior to procedure] : Modify anti-platelet treatment prior to procedure [Continue medications as is] : Continue current medications [FreeTextEntry6] : She will hold off on low dose ASA 1 week prior to surgery, [FreeTextEntry7] : Pt will take only Metoprolol, Famotidine with sips of water and use the Symbicort inhaler on the morning of surgery,

## 2024-06-11 NOTE — REVIEW OF SYSTEMS
[Lower Ext Edema] : lower extremity edema [Dyspnea on Exertion] : dyspnea on exertion [Nocturia] : nocturia [Frequency] : frequency [Joint Pain] : joint pain [Joint Stiffness] : joint stiffness [Back Pain] : back pain [Unsteady Walking] : ataxia [Negative] : Heme/Lymph [Fever] : no fever [Chills] : no chills [Fatigue] : no fatigue [Recent Change In Weight] : ~T no recent weight change [Chest Pain] : no chest pain [Palpitations] : no palpitations [Shortness Of Breath] : no shortness of breath [Wheezing] : no wheezing [Cough] : no cough [Abdominal Pain] : no abdominal pain [Nausea] : no nausea [Constipation] : no constipation [Diarrhea] : diarrhea [Vomiting] : no vomiting [Heartburn] : no heartburn [Melena] : no melena [Dysuria] : no dysuria [Incontinence] : no incontinence [Hematuria] : no hematuria [Joint Swelling] : no joint swelling [Muscle Pain] : no muscle pain [Itching] : no itching [Mole Changes] : no mole changes [Skin Rash] : no skin rash [Headache] : no headache [Dizziness] : no dizziness [Fainting] : no fainting [Insomnia] : no insomnia [Anxiety] : no anxiety [Depression] : no depression [Easy Bleeding] : no easy bleeding [Easy Bruising] : no easy bruising [Swollen Glands] : no swollen glands [FreeTextEntry3] : Wears reading glasses, [FreeTextEntry5] : occ ankle swelling if she stays on her feet more,  [FreeTextEntry6] : Has chronic TRENT, no change [FreeTextEntry8] : Nocturia of 1 X per night,  [FreeTextEntry9] : Pt has various aches and pain from her arthritis, [de-identified] : Uses cane at home, and walker when she goes out uses a rollator,, and is doing PT, has carpal tunnel syndrome, [de-identified] : Has CHANTELL, but not using CPAP, pt declined f/u with sleep specialist, on Cymbalta for depression,

## 2024-06-13 ENCOUNTER — TRANSCRIPTION ENCOUNTER (OUTPATIENT)
Age: 74
End: 2024-06-13

## 2024-06-13 NOTE — ASU PATIENT PROFILE, ADULT - NSICDXPASTMEDICALHX_GEN_ALL_CORE_FT
PAST MEDICAL HISTORY:  Asthma     Carpal tunnel syndrome on left     Cervical disc disease December 2017    Depression never hospitalized    Gastric ulcer     GERD (gastroesophageal reflux disease)     Glaucoma     HLD (hyperlipidemia)     Hypercholesterolemia     Hypertension     Lumbar stenosis     Obesity     Obstructive sleep apnea     Osteoarthritis     Prediabetes diagnosed in early 2017, on diet control    Primary osteoarthritis of left shoulder     Rheumatoid arthritis     Seasonal allergies     Sleep apnea supposed to use device at night but doesn't    Spinal stenosis in cervical region

## 2024-06-13 NOTE — ASU PATIENT PROFILE, ADULT - FALL HARM RISK - HARM RISK INTERVENTIONS
Assistance with ambulation/Assistance OOB with selected safe patient handling equipment/Communicate Risk of Fall with Harm to all staff/Discuss with provider need for PT consult/Monitor gait and stability/Reinforce activity limits and safety measures with patient and family/Tailored Fall Risk Interventions/Visual Cue: Yellow wristband and red socks/Bed in lowest position, wheels locked, appropriate side rails in place/Call bell, personal items and telephone in reach/Instruct patient to call for assistance before getting out of bed or chair/Non-slip footwear when patient is out of bed/[place] to call system/Physically safe environment - no spills, clutter or unnecessary equipment/Purposeful Proactive Rounding/Room/bathroom lighting operational, light cord in reach

## 2024-06-13 NOTE — ASU PATIENT PROFILE, ADULT - VISION (WITH CORRECTIVE LENSES IF THE PATIENT USUALLY WEARS THEM):
DISCHARGE INSTRUCTIONS REVIEWED WITH PATIENT VERBALLY, PT VERBALIZED UNDERSTANDING OF DISCHARGE INSTRUCTIONS. PAPER COPY OF DISCHARGE INSTRUCTIONS GIVEN TO PATIENT WITH SELF QUARENTINE AND COVID 19 INFORMATION.pt provides verbal consent to receive results via text or email Normal vision: sees adequately in most situations; can see medication labels, newsprint

## 2024-06-14 ENCOUNTER — INPATIENT (INPATIENT)
Facility: HOSPITAL | Age: 74
LOS: 1 days | Discharge: HOME HEALTH | End: 2024-06-16
Attending: ORTHOPAEDIC SURGERY | Admitting: ORTHOPAEDIC SURGERY
Payer: MEDICARE

## 2024-06-14 ENCOUNTER — TRANSCRIPTION ENCOUNTER (OUTPATIENT)
Age: 74
End: 2024-06-14

## 2024-06-14 ENCOUNTER — APPOINTMENT (OUTPATIENT)
Dept: ORTHOPEDIC SURGERY | Facility: HOSPITAL | Age: 74
End: 2024-06-14

## 2024-06-14 VITALS
DIASTOLIC BLOOD PRESSURE: 74 MMHG | SYSTOLIC BLOOD PRESSURE: 112 MMHG | HEART RATE: 76 BPM | RESPIRATION RATE: 16 BRPM | OXYGEN SATURATION: 99 % | WEIGHT: 209 LBS | TEMPERATURE: 98 F | HEIGHT: 62 IN

## 2024-06-14 DIAGNOSIS — Z96.652 PRESENCE OF LEFT ARTIFICIAL KNEE JOINT: Chronic | ICD-10-CM

## 2024-06-14 DIAGNOSIS — Z96.651 PRESENCE OF RIGHT ARTIFICIAL KNEE JOINT: Chronic | ICD-10-CM

## 2024-06-14 DIAGNOSIS — Z90.89 ACQUIRED ABSENCE OF OTHER ORGANS: Chronic | ICD-10-CM

## 2024-06-14 DIAGNOSIS — Z98.1 ARTHRODESIS STATUS: Chronic | ICD-10-CM

## 2024-06-14 DIAGNOSIS — Z96.642 PRESENCE OF LEFT ARTIFICIAL HIP JOINT: Chronic | ICD-10-CM

## 2024-06-14 DIAGNOSIS — Z98.49 CATARACT EXTRACTION STATUS, UNSPECIFIED EYE: Chronic | ICD-10-CM

## 2024-06-14 DIAGNOSIS — M19.012 PRIMARY OSTEOARTHRITIS, LEFT SHOULDER: ICD-10-CM

## 2024-06-14 LAB
ANION GAP SERPL CALC-SCNC: 14 MMOL/L — SIGNIFICANT CHANGE UP (ref 7–14)
BUN SERPL-MCNC: 20 MG/DL — SIGNIFICANT CHANGE UP (ref 7–23)
CALCIUM SERPL-MCNC: 8.9 MG/DL — SIGNIFICANT CHANGE UP (ref 8.4–10.5)
CHLORIDE SERPL-SCNC: 101 MMOL/L — SIGNIFICANT CHANGE UP (ref 98–107)
CO2 SERPL-SCNC: 25 MMOL/L — SIGNIFICANT CHANGE UP (ref 22–31)
CREAT SERPL-MCNC: 1.02 MG/DL — SIGNIFICANT CHANGE UP (ref 0.5–1.3)
EGFR: 58 ML/MIN/1.73M2 — LOW
GLUCOSE SERPL-MCNC: 106 MG/DL — HIGH (ref 70–99)
HCT VFR BLD CALC: 32.9 % — LOW (ref 34.5–45)
HGB BLD-MCNC: 10.7 G/DL — LOW (ref 11.5–15.5)
MCHC RBC-ENTMCNC: 31.8 PG — SIGNIFICANT CHANGE UP (ref 27–34)
MCHC RBC-ENTMCNC: 32.5 GM/DL — SIGNIFICANT CHANGE UP (ref 32–36)
MCV RBC AUTO: 97.6 FL — SIGNIFICANT CHANGE UP (ref 80–100)
NRBC # BLD: 0 /100 WBCS — SIGNIFICANT CHANGE UP (ref 0–0)
NRBC # FLD: 0 K/UL — SIGNIFICANT CHANGE UP (ref 0–0)
PLATELET # BLD AUTO: 240 K/UL — SIGNIFICANT CHANGE UP (ref 150–400)
POTASSIUM SERPL-MCNC: 3.9 MMOL/L — SIGNIFICANT CHANGE UP (ref 3.5–5.3)
POTASSIUM SERPL-SCNC: 3.9 MMOL/L — SIGNIFICANT CHANGE UP (ref 3.5–5.3)
RBC # BLD: 3.37 M/UL — LOW (ref 3.8–5.2)
RBC # FLD: 14 % — SIGNIFICANT CHANGE UP (ref 10.3–14.5)
SODIUM SERPL-SCNC: 140 MMOL/L — SIGNIFICANT CHANGE UP (ref 135–145)
WBC # BLD: 8.16 K/UL — SIGNIFICANT CHANGE UP (ref 3.8–10.5)
WBC # FLD AUTO: 8.16 K/UL — SIGNIFICANT CHANGE UP (ref 3.8–10.5)

## 2024-06-14 PROCEDURE — 73030 X-RAY EXAM OF SHOULDER: CPT | Mod: 26,LT

## 2024-06-14 PROCEDURE — 23472 RECONSTRUCT SHOULDER JOINT: CPT | Mod: LT

## 2024-06-14 DEVICE — SCREW COMP LOCKING 3.5 HEX 4.75X15MM: Type: IMPLANTABLE DEVICE | Site: LEFT | Status: FUNCTIONAL

## 2024-06-14 DEVICE — SCREW LOKG FIXED 3.5 HEX 4.75X30MM: Type: IMPLANTABLE DEVICE | Site: LEFT | Status: FUNCTIONAL

## 2024-06-14 DEVICE — PIN REVERSE SHOULDER: Type: IMPLANTABLE DEVICE | Site: LEFT | Status: FUNCTIONAL

## 2024-06-14 DEVICE — BASEPLATE GLENOID MINI REV 25MM: Type: IMPLANTABLE DEVICE | Site: LEFT | Status: FUNCTIONAL

## 2024-06-14 DEVICE — IMPLANTABLE DEVICE: Type: IMPLANTABLE DEVICE | Site: LEFT | Status: FUNCTIONAL

## 2024-06-14 DEVICE — BEARING HUMERAL 36MM STD: Type: IMPLANTABLE DEVICE | Site: LEFT | Status: FUNCTIONAL

## 2024-06-14 DEVICE — TRAY HUMERAL PLUS 6 STD: Type: IMPLANTABLE DEVICE | Site: LEFT | Status: FUNCTIONAL

## 2024-06-14 DEVICE — GLENOSPHERE 36MM DIA OF CURVE: Type: IMPLANTABLE DEVICE | Site: LEFT | Status: FUNCTIONAL

## 2024-06-14 DEVICE — SCREW RVS CNTRL 6.5X25MM ST: Type: IMPLANTABLE DEVICE | Site: LEFT | Status: FUNCTIONAL

## 2024-06-14 RX ORDER — SENNOSIDES 8.6 MG
2 TABLET ORAL AT BEDTIME
Refills: 0 | Status: DISCONTINUED | OUTPATIENT
Start: 2024-06-14 | End: 2024-06-16

## 2024-06-14 RX ORDER — ASPIRIN 325 MG/1
81 TABLET, FILM COATED ORAL EVERY 12 HOURS
Refills: 0 | Status: DISCONTINUED | OUTPATIENT
Start: 2024-06-15 | End: 2024-06-16

## 2024-06-14 RX ORDER — OXYCODONE HYDROCHLORIDE 100 MG/5ML
10 SOLUTION ORAL EVERY 4 HOURS
Refills: 0 | Status: DISCONTINUED | OUTPATIENT
Start: 2024-06-14 | End: 2024-06-16

## 2024-06-14 RX ORDER — SODIUM CHLORIDE 0.9 % (FLUSH) 0.9 %
500 SYRINGE (ML) INJECTION ONCE
Refills: 0 | Status: COMPLETED | OUTPATIENT
Start: 2024-06-14 | End: 2024-06-14

## 2024-06-14 RX ORDER — HYDROMORPHONE HCL 0.2 MG/ML
0.5 INJECTION, SOLUTION INTRAVENOUS
Refills: 0 | Status: DISCONTINUED | OUTPATIENT
Start: 2024-06-14 | End: 2024-06-14

## 2024-06-14 RX ORDER — METOPROLOL TARTRATE 50 MG
25 TABLET ORAL
Refills: 0 | Status: DISCONTINUED | OUTPATIENT
Start: 2024-06-14 | End: 2024-06-16

## 2024-06-14 RX ORDER — ONDANSETRON HYDROCHLORIDE 2 MG/ML
4 INJECTION INTRAMUSCULAR; INTRAVENOUS ONCE
Refills: 0 | Status: DISCONTINUED | OUTPATIENT
Start: 2024-06-14 | End: 2024-06-14

## 2024-06-14 RX ORDER — ONDANSETRON HYDROCHLORIDE 2 MG/ML
4 INJECTION INTRAMUSCULAR; INTRAVENOUS EVERY 6 HOURS
Refills: 0 | Status: DISCONTINUED | OUTPATIENT
Start: 2024-06-14 | End: 2024-06-16

## 2024-06-14 RX ORDER — HYDROMORPHONE HCL 0.2 MG/ML
0.5 INJECTION, SOLUTION INTRAVENOUS ONCE
Refills: 0 | Status: DISCONTINUED | OUTPATIENT
Start: 2024-06-14 | End: 2024-06-14

## 2024-06-14 RX ORDER — POLYETHYLENE GLYCOL 3350 1 G/G
17 POWDER ORAL AT BEDTIME
Refills: 0 | Status: DISCONTINUED | OUTPATIENT
Start: 2024-06-14 | End: 2024-06-16

## 2024-06-14 RX ORDER — OXYCODONE HYDROCHLORIDE 100 MG/5ML
5 SOLUTION ORAL ONCE
Refills: 0 | Status: DISCONTINUED | OUTPATIENT
Start: 2024-06-14 | End: 2024-06-16

## 2024-06-14 RX ORDER — ACETAMINOPHEN 325 MG
975 TABLET ORAL EVERY 8 HOURS
Refills: 0 | Status: DISCONTINUED | OUTPATIENT
Start: 2024-06-15 | End: 2024-06-16

## 2024-06-14 RX ORDER — CEFAZOLIN 10 G/1
2000 INJECTION, POWDER, FOR SOLUTION INTRAVENOUS EVERY 8 HOURS
Refills: 0 | Status: COMPLETED | OUTPATIENT
Start: 2024-06-14 | End: 2024-06-15

## 2024-06-14 RX ORDER — OXYCODONE HYDROCHLORIDE 100 MG/5ML
5 SOLUTION ORAL EVERY 4 HOURS
Refills: 0 | Status: DISCONTINUED | OUTPATIENT
Start: 2024-06-14 | End: 2024-06-16

## 2024-06-14 RX ORDER — DULOXETINE HYDROCHLORIDE 20 MG/1
30 CAPSULE, DELAYED RELEASE ORAL DAILY
Refills: 0 | Status: DISCONTINUED | OUTPATIENT
Start: 2024-06-14 | End: 2024-06-15

## 2024-06-14 RX ORDER — FENTANYL CITRATE 50 UG/ML
25 INJECTION, SOLUTION INTRAMUSCULAR; INTRAVENOUS
Refills: 0 | Status: DISCONTINUED | OUTPATIENT
Start: 2024-06-14 | End: 2024-06-16

## 2024-06-14 RX ORDER — ACETAMINOPHEN 325 MG
1000 TABLET ORAL EVERY 8 HOURS
Refills: 0 | Status: COMPLETED | OUTPATIENT
Start: 2024-06-14 | End: 2024-06-15

## 2024-06-14 RX ORDER — ALBUTEROL 90 MCG
2 AEROSOL REFILL (GRAM) INHALATION EVERY 6 HOURS
Refills: 0 | Status: DISCONTINUED | OUTPATIENT
Start: 2024-06-14 | End: 2024-06-16

## 2024-06-14 RX ORDER — ATORVASTATIN CALCIUM 20 MG/1
20 TABLET, FILM COATED ORAL AT BEDTIME
Refills: 0 | Status: DISCONTINUED | OUTPATIENT
Start: 2024-06-14 | End: 2024-06-16

## 2024-06-14 RX ORDER — BUDESONIDE/FORMOTEROL FUMARATE 160-4.5MCG
2 HFA AEROSOL WITH ADAPTER (GRAM) INHALATION
Refills: 0 | Status: DISCONTINUED | OUTPATIENT
Start: 2024-06-14 | End: 2024-06-16

## 2024-06-14 RX ORDER — SUCRALFATE 1 G
1 TABLET ORAL
Refills: 0 | Status: DISCONTINUED | OUTPATIENT
Start: 2024-06-14 | End: 2024-06-16

## 2024-06-14 RX ORDER — SODIUM CHLORIDE 0.9 % (FLUSH) 0.9 %
500 SYRINGE (ML) INJECTION ONCE
Refills: 0 | Status: COMPLETED | OUTPATIENT
Start: 2024-06-15 | End: 2024-06-15

## 2024-06-14 RX ORDER — TRAMADOL HYDROCHLORIDE 50 MG/1
50 TABLET, FILM COATED ORAL EVERY 4 HOURS
Refills: 0 | Status: DISCONTINUED | OUTPATIENT
Start: 2024-06-14 | End: 2024-06-16

## 2024-06-14 RX ORDER — FAMOTIDINE 40 MG
20 TABLET ORAL
Refills: 0 | Status: DISCONTINUED | OUTPATIENT
Start: 2024-06-14 | End: 2024-06-16

## 2024-06-14 RX ORDER — PANTOPRAZOLE SODIUM 40 MG/10ML
40 INJECTION, POWDER, FOR SOLUTION INTRAVENOUS
Refills: 0 | Status: DISCONTINUED | OUTPATIENT
Start: 2024-06-14 | End: 2024-06-16

## 2024-06-14 RX ORDER — MONTELUKAST SODIUM 10 MG/1
10 TABLET, FILM COATED ORAL AT BEDTIME
Refills: 0 | Status: DISCONTINUED | OUTPATIENT
Start: 2024-06-14 | End: 2024-06-16

## 2024-06-14 RX ADMIN — Medication 1000 MILLIGRAM(S): at 21:39

## 2024-06-14 RX ADMIN — Medication 500 MILLILITER(S): at 14:46

## 2024-06-14 RX ADMIN — MONTELUKAST SODIUM 10 MILLIGRAM(S): 10 TABLET, FILM COATED ORAL at 21:10

## 2024-06-14 RX ADMIN — Medication 500 MILLILITER(S): at 11:52

## 2024-06-14 RX ADMIN — ATORVASTATIN CALCIUM 20 MILLIGRAM(S): 20 TABLET, FILM COATED ORAL at 21:10

## 2024-06-14 RX ADMIN — Medication 20 MILLIGRAM(S): at 17:35

## 2024-06-14 RX ADMIN — CEFAZOLIN 100 MILLIGRAM(S): 10 INJECTION, POWDER, FOR SOLUTION INTRAVENOUS at 17:35

## 2024-06-14 RX ADMIN — POLYETHYLENE GLYCOL 3350 17 GRAM(S): 1 POWDER ORAL at 21:10

## 2024-06-14 RX ADMIN — Medication 400 MILLIGRAM(S): at 13:42

## 2024-06-14 RX ADMIN — Medication 400 MILLIGRAM(S): at 21:09

## 2024-06-14 RX ADMIN — Medication 2 PUFF(S): at 21:13

## 2024-06-15 ENCOUNTER — TRANSCRIPTION ENCOUNTER (OUTPATIENT)
Age: 74
End: 2024-06-15

## 2024-06-15 LAB
ANION GAP SERPL CALC-SCNC: 11 MMOL/L — SIGNIFICANT CHANGE UP (ref 7–14)
BUN SERPL-MCNC: 27 MG/DL — HIGH (ref 7–23)
CALCIUM SERPL-MCNC: 8.3 MG/DL — LOW (ref 8.4–10.5)
CHLORIDE SERPL-SCNC: 103 MMOL/L — SIGNIFICANT CHANGE UP (ref 98–107)
CO2 SERPL-SCNC: 27 MMOL/L — SIGNIFICANT CHANGE UP (ref 22–31)
CREAT SERPL-MCNC: 1.12 MG/DL — SIGNIFICANT CHANGE UP (ref 0.5–1.3)
EGFR: 52 ML/MIN/1.73M2 — LOW
GLUCOSE SERPL-MCNC: 119 MG/DL — HIGH (ref 70–99)
HCT VFR BLD CALC: 28.5 % — LOW (ref 34.5–45)
HGB BLD-MCNC: 9 G/DL — LOW (ref 11.5–15.5)
MCHC RBC-ENTMCNC: 31.6 GM/DL — LOW (ref 32–36)
MCHC RBC-ENTMCNC: 31.6 PG — SIGNIFICANT CHANGE UP (ref 27–34)
MCV RBC AUTO: 100 FL — SIGNIFICANT CHANGE UP (ref 80–100)
NRBC # BLD: 0 /100 WBCS — SIGNIFICANT CHANGE UP (ref 0–0)
NRBC # FLD: 0 K/UL — SIGNIFICANT CHANGE UP (ref 0–0)
PLATELET # BLD AUTO: 225 K/UL — SIGNIFICANT CHANGE UP (ref 150–400)
POTASSIUM SERPL-MCNC: 3.6 MMOL/L — SIGNIFICANT CHANGE UP (ref 3.5–5.3)
POTASSIUM SERPL-SCNC: 3.6 MMOL/L — SIGNIFICANT CHANGE UP (ref 3.5–5.3)
RBC # BLD: 2.85 M/UL — LOW (ref 3.8–5.2)
RBC # FLD: 14 % — SIGNIFICANT CHANGE UP (ref 10.3–14.5)
SODIUM SERPL-SCNC: 141 MMOL/L — SIGNIFICANT CHANGE UP (ref 135–145)
WBC # BLD: 13.83 K/UL — HIGH (ref 3.8–10.5)
WBC # FLD AUTO: 13.83 K/UL — HIGH (ref 3.8–10.5)

## 2024-06-15 RX ORDER — PANTOPRAZOLE SODIUM 40 MG/10ML
1 INJECTION, POWDER, FOR SOLUTION INTRAVENOUS
Qty: 14 | Refills: 0
Start: 2024-06-15 | End: 2024-06-28

## 2024-06-15 RX ORDER — POLYETHYLENE GLYCOL 3350 1 G/G
17 POWDER ORAL
Qty: 0 | Refills: 0 | DISCHARGE
Start: 2024-06-15

## 2024-06-15 RX ORDER — DULOXETINE HYDROCHLORIDE 20 MG/1
90 CAPSULE, DELAYED RELEASE ORAL DAILY
Refills: 0 | Status: DISCONTINUED | OUTPATIENT
Start: 2024-06-15 | End: 2024-06-16

## 2024-06-15 RX ORDER — SENNOSIDES 8.6 MG
2 TABLET ORAL
Qty: 0 | Refills: 0 | DISCHARGE
Start: 2024-06-15

## 2024-06-15 RX ORDER — OXYCODONE HYDROCHLORIDE 100 MG/5ML
1 SOLUTION ORAL
Qty: 16 | Refills: 0
Start: 2024-06-15 | End: 2024-06-18

## 2024-06-15 RX ORDER — ACETAMINOPHEN 325 MG
3 TABLET ORAL
Qty: 0 | Refills: 0 | DISCHARGE
Start: 2024-06-15

## 2024-06-15 RX ORDER — DEXTROSE MONOHYDRATE AND SODIUM CHLORIDE 5; .3 G/100ML; G/100ML
500 INJECTION, SOLUTION INTRAVENOUS ONCE
Refills: 0 | Status: COMPLETED | OUTPATIENT
Start: 2024-06-15 | End: 2024-06-15

## 2024-06-15 RX ADMIN — Medication 400 MILLIGRAM(S): at 05:50

## 2024-06-15 RX ADMIN — Medication 20 MILLIGRAM(S): at 18:10

## 2024-06-15 RX ADMIN — Medication 1 GRAM(S): at 18:10

## 2024-06-15 RX ADMIN — ASPIRIN 81 MILLIGRAM(S): 325 TABLET, FILM COATED ORAL at 18:10

## 2024-06-15 RX ADMIN — Medication 975 MILLIGRAM(S): at 21:02

## 2024-06-15 RX ADMIN — CEFAZOLIN 100 MILLIGRAM(S): 10 INJECTION, POWDER, FOR SOLUTION INTRAVENOUS at 01:14

## 2024-06-15 RX ADMIN — Medication 500 MILLILITER(S): at 05:37

## 2024-06-15 RX ADMIN — OXYCODONE HYDROCHLORIDE 10 MILLIGRAM(S): 100 SOLUTION ORAL at 15:11

## 2024-06-15 RX ADMIN — DEXTROSE MONOHYDRATE AND SODIUM CHLORIDE 500 MILLILITER(S): 5; .3 INJECTION, SOLUTION INTRAVENOUS at 19:46

## 2024-06-15 RX ADMIN — OXYCODONE HYDROCHLORIDE 10 MILLIGRAM(S): 100 SOLUTION ORAL at 23:29

## 2024-06-15 RX ADMIN — Medication 975 MILLIGRAM(S): at 13:38

## 2024-06-15 RX ADMIN — OXYCODONE HYDROCHLORIDE 10 MILLIGRAM(S): 100 SOLUTION ORAL at 14:11

## 2024-06-15 RX ADMIN — Medication 25 MILLIGRAM(S): at 05:50

## 2024-06-15 RX ADMIN — Medication 20 MILLIGRAM(S): at 05:50

## 2024-06-15 RX ADMIN — PANTOPRAZOLE SODIUM 40 MILLIGRAM(S): 40 INJECTION, POWDER, FOR SOLUTION INTRAVENOUS at 05:50

## 2024-06-15 RX ADMIN — Medication 975 MILLIGRAM(S): at 12:38

## 2024-06-15 RX ADMIN — ASPIRIN 81 MILLIGRAM(S): 325 TABLET, FILM COATED ORAL at 05:50

## 2024-06-15 RX ADMIN — DULOXETINE HYDROCHLORIDE 30 MILLIGRAM(S): 20 CAPSULE, DELAYED RELEASE ORAL at 12:38

## 2024-06-15 RX ADMIN — Medication 975 MILLIGRAM(S): at 22:02

## 2024-06-16 VITALS
HEART RATE: 94 BPM | SYSTOLIC BLOOD PRESSURE: 107 MMHG | DIASTOLIC BLOOD PRESSURE: 64 MMHG | TEMPERATURE: 99 F | RESPIRATION RATE: 16 BRPM | OXYGEN SATURATION: 99 %

## 2024-06-16 RX ADMIN — Medication 2 PUFF(S): at 10:52

## 2024-06-16 RX ADMIN — PANTOPRAZOLE SODIUM 40 MILLIGRAM(S): 40 INJECTION, POWDER, FOR SOLUTION INTRAVENOUS at 05:18

## 2024-06-16 RX ADMIN — Medication 20 MILLIGRAM(S): at 05:16

## 2024-06-16 RX ADMIN — ASPIRIN 81 MILLIGRAM(S): 325 TABLET, FILM COATED ORAL at 05:16

## 2024-06-16 RX ADMIN — OXYCODONE HYDROCHLORIDE 10 MILLIGRAM(S): 100 SOLUTION ORAL at 00:29

## 2024-06-16 RX ADMIN — Medication 975 MILLIGRAM(S): at 12:30

## 2024-06-16 RX ADMIN — OXYCODONE HYDROCHLORIDE 10 MILLIGRAM(S): 100 SOLUTION ORAL at 06:16

## 2024-06-16 RX ADMIN — Medication 1 GRAM(S): at 05:17

## 2024-06-16 RX ADMIN — DULOXETINE HYDROCHLORIDE 90 MILLIGRAM(S): 20 CAPSULE, DELAYED RELEASE ORAL at 11:30

## 2024-06-16 RX ADMIN — OXYCODONE HYDROCHLORIDE 10 MILLIGRAM(S): 100 SOLUTION ORAL at 12:30

## 2024-06-16 RX ADMIN — Medication 25 MILLIGRAM(S): at 05:16

## 2024-06-16 RX ADMIN — OXYCODONE HYDROCHLORIDE 10 MILLIGRAM(S): 100 SOLUTION ORAL at 05:16

## 2024-06-16 RX ADMIN — Medication 975 MILLIGRAM(S): at 11:30

## 2024-06-16 RX ADMIN — OXYCODONE HYDROCHLORIDE 10 MILLIGRAM(S): 100 SOLUTION ORAL at 11:29

## 2024-06-17 ENCOUNTER — INPATIENT (INPATIENT)
Facility: HOSPITAL | Age: 74
LOS: 6 days | Discharge: NURSING, REHAB OR LONG-TERM CARE | End: 2024-06-24
Attending: STUDENT IN AN ORGANIZED HEALTH CARE EDUCATION/TRAINING PROGRAM | Admitting: STUDENT IN AN ORGANIZED HEALTH CARE EDUCATION/TRAINING PROGRAM
Payer: MEDICARE

## 2024-06-17 ENCOUNTER — APPOINTMENT (OUTPATIENT)
Dept: INTERNAL MEDICINE | Facility: CLINIC | Age: 74
End: 2024-06-17

## 2024-06-17 VITALS
OXYGEN SATURATION: 98 % | RESPIRATION RATE: 20 BRPM | SYSTOLIC BLOOD PRESSURE: 130 MMHG | HEIGHT: 62 IN | DIASTOLIC BLOOD PRESSURE: 84 MMHG | HEART RATE: 100 BPM | TEMPERATURE: 99 F

## 2024-06-17 DIAGNOSIS — Z96.651 PRESENCE OF RIGHT ARTIFICIAL KNEE JOINT: Chronic | ICD-10-CM

## 2024-06-17 DIAGNOSIS — Z96.652 PRESENCE OF LEFT ARTIFICIAL KNEE JOINT: Chronic | ICD-10-CM

## 2024-06-17 DIAGNOSIS — Z29.9 ENCOUNTER FOR PROPHYLACTIC MEASURES, UNSPECIFIED: ICD-10-CM

## 2024-06-17 DIAGNOSIS — G89.18 OTHER ACUTE POSTPROCEDURAL PAIN: ICD-10-CM

## 2024-06-17 DIAGNOSIS — Z90.89 ACQUIRED ABSENCE OF OTHER ORGANS: Chronic | ICD-10-CM

## 2024-06-17 DIAGNOSIS — M25.512 PAIN IN LEFT SHOULDER: ICD-10-CM

## 2024-06-17 DIAGNOSIS — Z98.49 CATARACT EXTRACTION STATUS, UNSPECIFIED EYE: Chronic | ICD-10-CM

## 2024-06-17 DIAGNOSIS — Z96.642 PRESENCE OF LEFT ARTIFICIAL HIP JOINT: Chronic | ICD-10-CM

## 2024-06-17 DIAGNOSIS — R32 UNSPECIFIED URINARY INCONTINENCE: ICD-10-CM

## 2024-06-17 DIAGNOSIS — Z91.89 OTHER SPECIFIED PERSONAL RISK FACTORS, NOT ELSEWHERE CLASSIFIED: ICD-10-CM

## 2024-06-17 DIAGNOSIS — Z98.1 ARTHRODESIS STATUS: Chronic | ICD-10-CM

## 2024-06-17 PROBLEM — M06.9 RHEUMATOID ARTHRITIS, UNSPECIFIED: Chronic | Status: ACTIVE | Noted: 2024-06-06

## 2024-06-17 PROBLEM — M19.90 UNSPECIFIED OSTEOARTHRITIS, UNSPECIFIED SITE: Chronic | Status: ACTIVE | Noted: 2024-06-06

## 2024-06-17 PROBLEM — E78.5 HYPERLIPIDEMIA, UNSPECIFIED: Chronic | Status: ACTIVE | Noted: 2024-06-06

## 2024-06-17 LAB
ALBUMIN SERPL ELPH-MCNC: 3.2 G/DL — LOW (ref 3.3–5)
ALP SERPL-CCNC: 84 U/L — SIGNIFICANT CHANGE UP (ref 40–120)
ALT FLD-CCNC: 10 U/L — SIGNIFICANT CHANGE UP (ref 4–33)
ANION GAP SERPL CALC-SCNC: 9 MMOL/L — SIGNIFICANT CHANGE UP (ref 7–14)
AST SERPL-CCNC: 40 U/L — HIGH (ref 4–32)
BASOPHILS # BLD AUTO: 0.03 K/UL — SIGNIFICANT CHANGE UP (ref 0–0.2)
BASOPHILS NFR BLD AUTO: 0.3 % — SIGNIFICANT CHANGE UP (ref 0–2)
BILIRUB SERPL-MCNC: 0.4 MG/DL — SIGNIFICANT CHANGE UP (ref 0.2–1.2)
BUN SERPL-MCNC: 13 MG/DL — SIGNIFICANT CHANGE UP (ref 7–23)
CALCIUM SERPL-MCNC: 8.8 MG/DL — SIGNIFICANT CHANGE UP (ref 8.4–10.5)
CHLORIDE SERPL-SCNC: 98 MMOL/L — SIGNIFICANT CHANGE UP (ref 98–107)
CO2 SERPL-SCNC: 30 MMOL/L — SIGNIFICANT CHANGE UP (ref 22–31)
CREAT SERPL-MCNC: 0.82 MG/DL — SIGNIFICANT CHANGE UP (ref 0.5–1.3)
EGFR: 75 ML/MIN/1.73M2 — SIGNIFICANT CHANGE UP
EOSINOPHIL # BLD AUTO: 0.18 K/UL — SIGNIFICANT CHANGE UP (ref 0–0.5)
EOSINOPHIL NFR BLD AUTO: 1.5 % — SIGNIFICANT CHANGE UP (ref 0–6)
GLUCOSE SERPL-MCNC: 101 MG/DL — HIGH (ref 70–99)
HCT VFR BLD CALC: 29.4 % — LOW (ref 34.5–45)
HGB BLD-MCNC: 9.4 G/DL — LOW (ref 11.5–15.5)
IANC: 8.36 K/UL — HIGH (ref 1.8–7.4)
IMM GRANULOCYTES NFR BLD AUTO: 0.4 % — SIGNIFICANT CHANGE UP (ref 0–0.9)
LYMPHOCYTES # BLD AUTO: 17.7 % — SIGNIFICANT CHANGE UP (ref 13–44)
LYMPHOCYTES # BLD AUTO: 2.06 K/UL — SIGNIFICANT CHANGE UP (ref 1–3.3)
MCHC RBC-ENTMCNC: 31.1 PG — SIGNIFICANT CHANGE UP (ref 27–34)
MCHC RBC-ENTMCNC: 32 GM/DL — SIGNIFICANT CHANGE UP (ref 32–36)
MCV RBC AUTO: 97.4 FL — SIGNIFICANT CHANGE UP (ref 80–100)
MONOCYTES # BLD AUTO: 0.97 K/UL — HIGH (ref 0–0.9)
MONOCYTES NFR BLD AUTO: 8.3 % — SIGNIFICANT CHANGE UP (ref 2–14)
NEUTROPHILS # BLD AUTO: 8.36 K/UL — HIGH (ref 1.8–7.4)
NEUTROPHILS NFR BLD AUTO: 71.8 % — SIGNIFICANT CHANGE UP (ref 43–77)
NRBC # BLD: 0 /100 WBCS — SIGNIFICANT CHANGE UP (ref 0–0)
NRBC # FLD: 0 K/UL — SIGNIFICANT CHANGE UP (ref 0–0)
PLATELET # BLD AUTO: 219 K/UL — SIGNIFICANT CHANGE UP (ref 150–400)
POTASSIUM SERPL-MCNC: 4.5 MMOL/L — SIGNIFICANT CHANGE UP (ref 3.5–5.3)
POTASSIUM SERPL-SCNC: 4.5 MMOL/L — SIGNIFICANT CHANGE UP (ref 3.5–5.3)
PROT SERPL-MCNC: 7.3 G/DL — SIGNIFICANT CHANGE UP (ref 6–8.3)
RBC # BLD: 3.02 M/UL — LOW (ref 3.8–5.2)
RBC # FLD: 14 % — SIGNIFICANT CHANGE UP (ref 10.3–14.5)
SODIUM SERPL-SCNC: 137 MMOL/L — SIGNIFICANT CHANGE UP (ref 135–145)
WBC # BLD: 11.65 K/UL — HIGH (ref 3.8–10.5)
WBC # FLD AUTO: 11.65 K/UL — HIGH (ref 3.8–10.5)

## 2024-06-17 PROCEDURE — 76937 US GUIDE VASCULAR ACCESS: CPT | Mod: 26

## 2024-06-17 PROCEDURE — 99222 1ST HOSP IP/OBS MODERATE 55: CPT

## 2024-06-17 PROCEDURE — 36000 PLACE NEEDLE IN VEIN: CPT

## 2024-06-17 PROCEDURE — 99285 EMERGENCY DEPT VISIT HI MDM: CPT | Mod: 25

## 2024-06-17 PROCEDURE — 73030 X-RAY EXAM OF SHOULDER: CPT | Mod: 26,LT

## 2024-06-17 RX ORDER — MAGNESIUM, ALUMINUM HYDROXIDE 400-400
30 TABLET,CHEWABLE ORAL EVERY 4 HOURS
Refills: 0 | Status: DISCONTINUED | OUTPATIENT
Start: 2024-06-17 | End: 2024-06-24

## 2024-06-17 RX ORDER — OXYCODONE HYDROCHLORIDE 100 MG/5ML
5 SOLUTION ORAL ONCE
Refills: 0 | Status: DISCONTINUED | OUTPATIENT
Start: 2024-06-17 | End: 2024-06-17

## 2024-06-17 RX ORDER — METOPROLOL TARTRATE 50 MG
25 TABLET ORAL
Refills: 0 | Status: DISCONTINUED | OUTPATIENT
Start: 2024-06-17 | End: 2024-06-24

## 2024-06-17 RX ORDER — SENNOSIDES 8.6 MG
2 TABLET ORAL AT BEDTIME
Refills: 0 | Status: DISCONTINUED | OUTPATIENT
Start: 2024-06-17 | End: 2024-06-24

## 2024-06-17 RX ORDER — ASPIRIN 325 MG/1
325 TABLET, FILM COATED ORAL DAILY
Refills: 0 | Status: DISCONTINUED | OUTPATIENT
Start: 2024-06-17 | End: 2024-06-24

## 2024-06-17 RX ORDER — SUCRALFATE 1 G
1 TABLET ORAL
Refills: 0 | Status: DISCONTINUED | OUTPATIENT
Start: 2024-06-17 | End: 2024-06-24

## 2024-06-17 RX ORDER — DULOXETINE HYDROCHLORIDE 20 MG/1
30 CAPSULE, DELAYED RELEASE ORAL DAILY
Refills: 0 | Status: DISCONTINUED | OUTPATIENT
Start: 2024-06-17 | End: 2024-06-24

## 2024-06-17 RX ORDER — ACETAMINOPHEN 325 MG
650 TABLET ORAL ONCE
Refills: 0 | Status: COMPLETED | OUTPATIENT
Start: 2024-06-17 | End: 2024-06-17

## 2024-06-17 RX ORDER — MONTELUKAST SODIUM 10 MG/1
10 TABLET, FILM COATED ORAL AT BEDTIME
Refills: 0 | Status: DISCONTINUED | OUTPATIENT
Start: 2024-06-17 | End: 2024-06-24

## 2024-06-17 RX ORDER — BUDESONIDE/FORMOTEROL FUMARATE 160-4.5MCG
2 HFA AEROSOL WITH ADAPTER (GRAM) INHALATION
Refills: 0 | Status: DISCONTINUED | OUTPATIENT
Start: 2024-06-17 | End: 2024-06-24

## 2024-06-17 RX ORDER — OXYCODONE HYDROCHLORIDE 100 MG/5ML
5 SOLUTION ORAL EVERY 6 HOURS
Refills: 0 | Status: DISCONTINUED | OUTPATIENT
Start: 2024-06-17 | End: 2024-06-24

## 2024-06-17 RX ORDER — POLYETHYLENE GLYCOL 3350 1 G/G
17 POWDER ORAL AT BEDTIME
Refills: 0 | Status: DISCONTINUED | OUTPATIENT
Start: 2024-06-17 | End: 2024-06-24

## 2024-06-17 RX ORDER — HYDROCHLOROTHIAZIDE 25 MG
1 TABLET ORAL
Qty: 0 | Refills: 0 | DISCHARGE

## 2024-06-17 RX ORDER — FAMOTIDINE 40 MG
20 TABLET ORAL
Refills: 0 | Status: DISCONTINUED | OUTPATIENT
Start: 2024-06-17 | End: 2024-06-24

## 2024-06-17 RX ORDER — ACETAMINOPHEN 325 MG
650 TABLET ORAL EVERY 6 HOURS
Refills: 0 | Status: DISCONTINUED | OUTPATIENT
Start: 2024-06-17 | End: 2024-06-17

## 2024-06-17 RX ORDER — ACETAMINOPHEN 325 MG
650 TABLET ORAL EVERY 6 HOURS
Refills: 0 | Status: DISCONTINUED | OUTPATIENT
Start: 2024-06-17 | End: 2024-06-24

## 2024-06-17 RX ORDER — AZELASTINE 137 UG/1
2 SPRAY, METERED NASAL
Qty: 0 | Refills: 0 | DISCHARGE

## 2024-06-17 RX ORDER — ALBUTEROL 90 UG/1
2 AEROSOL, METERED ORAL
Qty: 0 | Refills: 0 | DISCHARGE

## 2024-06-17 RX ORDER — ATORVASTATIN CALCIUM 20 MG/1
20 TABLET, FILM COATED ORAL AT BEDTIME
Refills: 0 | Status: DISCONTINUED | OUTPATIENT
Start: 2024-06-17 | End: 2024-06-24

## 2024-06-17 RX ORDER — DULOXETINE HYDROCHLORIDE 20 MG/1
60 CAPSULE, DELAYED RELEASE ORAL DAILY
Refills: 0 | Status: DISCONTINUED | OUTPATIENT
Start: 2024-06-17 | End: 2024-06-24

## 2024-06-17 RX ORDER — FOLIC ACID 0.8 MG
1 TABLET ORAL
Refills: 0 | DISCHARGE

## 2024-06-17 RX ORDER — MONTELUKAST 4 MG/1
1 TABLET, CHEWABLE ORAL
Refills: 0 | DISCHARGE

## 2024-06-17 RX ORDER — CHOLECALCIFEROL (VITAMIN D3) 125 MCG
1 CAPSULE ORAL
Qty: 0 | Refills: 0 | DISCHARGE

## 2024-06-17 RX ORDER — ONDANSETRON HYDROCHLORIDE 2 MG/ML
4 INJECTION INTRAMUSCULAR; INTRAVENOUS EVERY 8 HOURS
Refills: 0 | Status: DISCONTINUED | OUTPATIENT
Start: 2024-06-17 | End: 2024-06-17

## 2024-06-17 RX ORDER — ALBUTEROL 90 MCG
2 AEROSOL REFILL (GRAM) INHALATION EVERY 6 HOURS
Refills: 0 | Status: DISCONTINUED | OUTPATIENT
Start: 2024-06-17 | End: 2024-06-24

## 2024-06-17 RX ORDER — FOLIC ACID
1 POWDER (GRAM) MISCELLANEOUS DAILY
Refills: 0 | Status: DISCONTINUED | OUTPATIENT
Start: 2024-06-17 | End: 2024-06-24

## 2024-06-17 RX ORDER — BUDESONIDE AND FORMOTEROL FUMARATE DIHYDRATE 160; 4.5 UG/1; UG/1
2 AEROSOL RESPIRATORY (INHALATION)
Qty: 0 | Refills: 0 | DISCHARGE

## 2024-06-17 RX ADMIN — Medication 650 MILLIGRAM(S): at 21:23

## 2024-06-17 RX ADMIN — Medication 2 PUFF(S): at 21:43

## 2024-06-17 RX ADMIN — Medication 20 MILLIGRAM(S): at 17:37

## 2024-06-17 RX ADMIN — OXYCODONE HYDROCHLORIDE 5 MILLIGRAM(S): 100 SOLUTION ORAL at 21:29

## 2024-06-17 RX ADMIN — MONTELUKAST SODIUM 10 MILLIGRAM(S): 10 TABLET, FILM COATED ORAL at 21:18

## 2024-06-17 RX ADMIN — Medication 400 MILLIGRAM(S): at 09:20

## 2024-06-17 RX ADMIN — ATORVASTATIN CALCIUM 20 MILLIGRAM(S): 20 TABLET, FILM COATED ORAL at 21:18

## 2024-06-17 RX ADMIN — Medication 25 MILLIGRAM(S): at 17:37

## 2024-06-17 RX ADMIN — Medication 1 GRAM(S): at 17:37

## 2024-06-17 RX ADMIN — OXYCODONE HYDROCHLORIDE 5 MILLIGRAM(S): 100 SOLUTION ORAL at 19:59

## 2024-06-17 RX ADMIN — Medication 650 MILLIGRAM(S): at 21:53

## 2024-06-17 RX ADMIN — Medication 650 MILLIGRAM(S): at 09:20

## 2024-06-17 RX ADMIN — OXYCODONE HYDROCHLORIDE 5 MILLIGRAM(S): 100 SOLUTION ORAL at 09:20

## 2024-06-18 DIAGNOSIS — J45.909 UNSPECIFIED ASTHMA, UNCOMPLICATED: ICD-10-CM

## 2024-06-18 DIAGNOSIS — E78.5 HYPERLIPIDEMIA, UNSPECIFIED: ICD-10-CM

## 2024-06-18 DIAGNOSIS — M06.9 RHEUMATOID ARTHRITIS, UNSPECIFIED: ICD-10-CM

## 2024-06-18 DIAGNOSIS — I10 ESSENTIAL (PRIMARY) HYPERTENSION: ICD-10-CM

## 2024-06-18 DIAGNOSIS — F32.9 MAJOR DEPRESSIVE DISORDER, SINGLE EPISODE, UNSPECIFIED: ICD-10-CM

## 2024-06-18 PROBLEM — M19.012 PRIMARY OSTEOARTHRITIS, LEFT SHOULDER: Chronic | Status: ACTIVE | Noted: 2024-06-06

## 2024-06-18 PROBLEM — G47.33 OBSTRUCTIVE SLEEP APNEA (ADULT) (PEDIATRIC): Chronic | Status: ACTIVE | Noted: 2024-06-06

## 2024-06-18 PROCEDURE — 99232 SBSQ HOSP IP/OBS MODERATE 35: CPT

## 2024-06-18 RX ADMIN — Medication 2 PUFF(S): at 11:00

## 2024-06-18 RX ADMIN — POLYETHYLENE GLYCOL 3350 17 GRAM(S): 1 POWDER ORAL at 05:30

## 2024-06-18 RX ADMIN — OXYCODONE HYDROCHLORIDE 5 MILLIGRAM(S): 100 SOLUTION ORAL at 18:15

## 2024-06-18 RX ADMIN — Medication 1 GRAM(S): at 05:30

## 2024-06-18 RX ADMIN — Medication 1 MILLIGRAM(S): at 12:37

## 2024-06-18 RX ADMIN — OXYCODONE HYDROCHLORIDE 5 MILLIGRAM(S): 100 SOLUTION ORAL at 11:50

## 2024-06-18 RX ADMIN — OXYCODONE HYDROCHLORIDE 5 MILLIGRAM(S): 100 SOLUTION ORAL at 18:59

## 2024-06-18 RX ADMIN — Medication 25 MILLIGRAM(S): at 05:30

## 2024-06-18 RX ADMIN — OXYCODONE HYDROCHLORIDE 5 MILLIGRAM(S): 100 SOLUTION ORAL at 03:30

## 2024-06-18 RX ADMIN — Medication 20 MILLIGRAM(S): at 05:30

## 2024-06-18 RX ADMIN — Medication 1 TABLET(S): at 12:37

## 2024-06-18 RX ADMIN — Medication 650 MILLIGRAM(S): at 21:37

## 2024-06-18 RX ADMIN — Medication 2 TABLET(S): at 05:29

## 2024-06-18 RX ADMIN — OXYCODONE HYDROCHLORIDE 5 MILLIGRAM(S): 100 SOLUTION ORAL at 10:58

## 2024-06-18 RX ADMIN — Medication 650 MILLIGRAM(S): at 07:47

## 2024-06-18 RX ADMIN — ATORVASTATIN CALCIUM 20 MILLIGRAM(S): 20 TABLET, FILM COATED ORAL at 21:25

## 2024-06-18 RX ADMIN — Medication 20 MILLIGRAM(S): at 18:15

## 2024-06-18 RX ADMIN — DULOXETINE HYDROCHLORIDE 30 MILLIGRAM(S): 20 CAPSULE, DELAYED RELEASE ORAL at 12:36

## 2024-06-18 RX ADMIN — Medication 650 MILLIGRAM(S): at 20:37

## 2024-06-18 RX ADMIN — OXYCODONE HYDROCHLORIDE 5 MILLIGRAM(S): 100 SOLUTION ORAL at 02:32

## 2024-06-18 RX ADMIN — Medication 650 MILLIGRAM(S): at 05:30

## 2024-06-18 RX ADMIN — ASPIRIN 325 MILLIGRAM(S): 325 TABLET, FILM COATED ORAL at 12:36

## 2024-06-18 RX ADMIN — DULOXETINE HYDROCHLORIDE 60 MILLIGRAM(S): 20 CAPSULE, DELAYED RELEASE ORAL at 12:36

## 2024-06-18 RX ADMIN — Medication 2000 UNIT(S): at 12:37

## 2024-06-18 RX ADMIN — Medication 25 MILLIGRAM(S): at 18:15

## 2024-06-18 RX ADMIN — Medication 1 GRAM(S): at 18:15

## 2024-06-18 RX ADMIN — MONTELUKAST SODIUM 10 MILLIGRAM(S): 10 TABLET, FILM COATED ORAL at 21:24

## 2024-06-18 RX ADMIN — Medication 2 PUFF(S): at 21:24

## 2024-06-19 PROCEDURE — 99232 SBSQ HOSP IP/OBS MODERATE 35: CPT

## 2024-06-19 RX ADMIN — MONTELUKAST SODIUM 10 MILLIGRAM(S): 10 TABLET, FILM COATED ORAL at 21:15

## 2024-06-19 RX ADMIN — Medication 20 MILLIGRAM(S): at 17:17

## 2024-06-19 RX ADMIN — Medication 2000 UNIT(S): at 12:12

## 2024-06-19 RX ADMIN — DULOXETINE HYDROCHLORIDE 60 MILLIGRAM(S): 20 CAPSULE, DELAYED RELEASE ORAL at 12:12

## 2024-06-19 RX ADMIN — OXYCODONE HYDROCHLORIDE 5 MILLIGRAM(S): 100 SOLUTION ORAL at 23:11

## 2024-06-19 RX ADMIN — ASPIRIN 325 MILLIGRAM(S): 325 TABLET, FILM COATED ORAL at 12:13

## 2024-06-19 RX ADMIN — Medication 1 TABLET(S): at 12:12

## 2024-06-19 RX ADMIN — Medication 1 GRAM(S): at 05:07

## 2024-06-19 RX ADMIN — OXYCODONE HYDROCHLORIDE 5 MILLIGRAM(S): 100 SOLUTION ORAL at 17:18

## 2024-06-19 RX ADMIN — Medication 1 MILLIGRAM(S): at 12:13

## 2024-06-19 RX ADMIN — OXYCODONE HYDROCHLORIDE 5 MILLIGRAM(S): 100 SOLUTION ORAL at 05:00

## 2024-06-19 RX ADMIN — Medication 20 MILLIGRAM(S): at 05:08

## 2024-06-19 RX ADMIN — Medication 650 MILLIGRAM(S): at 07:12

## 2024-06-19 RX ADMIN — Medication 650 MILLIGRAM(S): at 18:15

## 2024-06-19 RX ADMIN — Medication 25 MILLIGRAM(S): at 05:07

## 2024-06-19 RX ADMIN — OXYCODONE HYDROCHLORIDE 5 MILLIGRAM(S): 100 SOLUTION ORAL at 12:12

## 2024-06-19 RX ADMIN — Medication 25 MILLIGRAM(S): at 17:17

## 2024-06-19 RX ADMIN — Medication 1 GRAM(S): at 17:17

## 2024-06-19 RX ADMIN — DULOXETINE HYDROCHLORIDE 30 MILLIGRAM(S): 20 CAPSULE, DELAYED RELEASE ORAL at 12:12

## 2024-06-19 RX ADMIN — Medication 2 PUFF(S): at 12:11

## 2024-06-19 RX ADMIN — OXYCODONE HYDROCHLORIDE 5 MILLIGRAM(S): 100 SOLUTION ORAL at 12:35

## 2024-06-19 RX ADMIN — OXYCODONE HYDROCHLORIDE 5 MILLIGRAM(S): 100 SOLUTION ORAL at 04:05

## 2024-06-19 RX ADMIN — ATORVASTATIN CALCIUM 20 MILLIGRAM(S): 20 TABLET, FILM COATED ORAL at 21:15

## 2024-06-20 PROCEDURE — 99232 SBSQ HOSP IP/OBS MODERATE 35: CPT

## 2024-06-20 RX ADMIN — OXYCODONE HYDROCHLORIDE 5 MILLIGRAM(S): 100 SOLUTION ORAL at 18:18

## 2024-06-20 RX ADMIN — Medication 2 PUFF(S): at 22:15

## 2024-06-20 RX ADMIN — Medication 650 MILLIGRAM(S): at 01:50

## 2024-06-20 RX ADMIN — Medication 650 MILLIGRAM(S): at 19:39

## 2024-06-20 RX ADMIN — Medication 650 MILLIGRAM(S): at 02:08

## 2024-06-20 RX ADMIN — ATORVASTATIN CALCIUM 20 MILLIGRAM(S): 20 TABLET, FILM COATED ORAL at 22:16

## 2024-06-20 RX ADMIN — Medication 2 PUFF(S): at 09:28

## 2024-06-20 RX ADMIN — DULOXETINE HYDROCHLORIDE 60 MILLIGRAM(S): 20 CAPSULE, DELAYED RELEASE ORAL at 11:36

## 2024-06-20 RX ADMIN — Medication 25 MILLIGRAM(S): at 17:48

## 2024-06-20 RX ADMIN — DULOXETINE HYDROCHLORIDE 30 MILLIGRAM(S): 20 CAPSULE, DELAYED RELEASE ORAL at 11:36

## 2024-06-20 RX ADMIN — Medication 1 TABLET(S): at 11:37

## 2024-06-20 RX ADMIN — Medication 1 GRAM(S): at 17:48

## 2024-06-20 RX ADMIN — OXYCODONE HYDROCHLORIDE 5 MILLIGRAM(S): 100 SOLUTION ORAL at 00:00

## 2024-06-20 RX ADMIN — MONTELUKAST SODIUM 10 MILLIGRAM(S): 10 TABLET, FILM COATED ORAL at 22:16

## 2024-06-20 RX ADMIN — Medication 25 MILLIGRAM(S): at 05:21

## 2024-06-20 RX ADMIN — Medication 20 MILLIGRAM(S): at 05:21

## 2024-06-20 RX ADMIN — OXYCODONE HYDROCHLORIDE 5 MILLIGRAM(S): 100 SOLUTION ORAL at 06:51

## 2024-06-20 RX ADMIN — Medication 1 MILLIGRAM(S): at 11:36

## 2024-06-20 RX ADMIN — Medication 20 MILLIGRAM(S): at 17:48

## 2024-06-20 RX ADMIN — ASPIRIN 325 MILLIGRAM(S): 325 TABLET, FILM COATED ORAL at 11:36

## 2024-06-20 RX ADMIN — OXYCODONE HYDROCHLORIDE 5 MILLIGRAM(S): 100 SOLUTION ORAL at 05:21

## 2024-06-20 RX ADMIN — OXYCODONE HYDROCHLORIDE 5 MILLIGRAM(S): 100 SOLUTION ORAL at 17:48

## 2024-06-20 RX ADMIN — Medication 650 MILLIGRAM(S): at 20:09

## 2024-06-20 RX ADMIN — Medication 2000 UNIT(S): at 11:36

## 2024-06-20 RX ADMIN — OXYCODONE HYDROCHLORIDE 5 MILLIGRAM(S): 100 SOLUTION ORAL at 12:07

## 2024-06-20 RX ADMIN — Medication 1 GRAM(S): at 05:22

## 2024-06-20 RX ADMIN — OXYCODONE HYDROCHLORIDE 5 MILLIGRAM(S): 100 SOLUTION ORAL at 11:37

## 2024-06-21 ENCOUNTER — NON-APPOINTMENT (OUTPATIENT)
Age: 74
End: 2024-06-21

## 2024-06-21 LAB
ALBUMIN SERPL ELPH-MCNC: 3.2 G/DL — LOW (ref 3.3–5)
ALP SERPL-CCNC: 83 U/L — SIGNIFICANT CHANGE UP (ref 40–120)
ALT FLD-CCNC: 13 U/L — SIGNIFICANT CHANGE UP (ref 4–33)
ANION GAP SERPL CALC-SCNC: 12 MMOL/L — SIGNIFICANT CHANGE UP (ref 7–14)
AST SERPL-CCNC: 20 U/L — SIGNIFICANT CHANGE UP (ref 4–32)
BASOPHILS # BLD AUTO: 0.02 K/UL — SIGNIFICANT CHANGE UP (ref 0–0.2)
BASOPHILS NFR BLD AUTO: 0.2 % — SIGNIFICANT CHANGE UP (ref 0–2)
BILIRUB SERPL-MCNC: 0.3 MG/DL — SIGNIFICANT CHANGE UP (ref 0.2–1.2)
BUN SERPL-MCNC: 23 MG/DL — SIGNIFICANT CHANGE UP (ref 7–23)
CALCIUM SERPL-MCNC: 8.9 MG/DL — SIGNIFICANT CHANGE UP (ref 8.4–10.5)
CHLORIDE SERPL-SCNC: 98 MMOL/L — SIGNIFICANT CHANGE UP (ref 98–107)
CO2 SERPL-SCNC: 30 MMOL/L — SIGNIFICANT CHANGE UP (ref 22–31)
CREAT SERPL-MCNC: 0.98 MG/DL — SIGNIFICANT CHANGE UP (ref 0.5–1.3)
EGFR: 61 ML/MIN/1.73M2 — SIGNIFICANT CHANGE UP
EOSINOPHIL # BLD AUTO: 0.4 K/UL — SIGNIFICANT CHANGE UP (ref 0–0.5)
EOSINOPHIL NFR BLD AUTO: 4.9 % — SIGNIFICANT CHANGE UP (ref 0–6)
GLUCOSE SERPL-MCNC: 107 MG/DL — HIGH (ref 70–99)
HCT VFR BLD CALC: 31.3 % — LOW (ref 34.5–45)
HGB BLD-MCNC: 9.7 G/DL — LOW (ref 11.5–15.5)
IANC: 5.28 K/UL — SIGNIFICANT CHANGE UP (ref 1.8–7.4)
IMM GRANULOCYTES NFR BLD AUTO: 0.2 % — SIGNIFICANT CHANGE UP (ref 0–0.9)
LYMPHOCYTES # BLD AUTO: 1.64 K/UL — SIGNIFICANT CHANGE UP (ref 1–3.3)
LYMPHOCYTES # BLD AUTO: 20.1 % — SIGNIFICANT CHANGE UP (ref 13–44)
MCHC RBC-ENTMCNC: 30.5 PG — SIGNIFICANT CHANGE UP (ref 27–34)
MCHC RBC-ENTMCNC: 31 GM/DL — LOW (ref 32–36)
MCV RBC AUTO: 98.4 FL — SIGNIFICANT CHANGE UP (ref 80–100)
MONOCYTES # BLD AUTO: 0.81 K/UL — SIGNIFICANT CHANGE UP (ref 0–0.9)
MONOCYTES NFR BLD AUTO: 9.9 % — SIGNIFICANT CHANGE UP (ref 2–14)
NEUTROPHILS # BLD AUTO: 5.28 K/UL — SIGNIFICANT CHANGE UP (ref 1.8–7.4)
NEUTROPHILS NFR BLD AUTO: 64.7 % — SIGNIFICANT CHANGE UP (ref 43–77)
NRBC # BLD: 0 /100 WBCS — SIGNIFICANT CHANGE UP (ref 0–0)
NRBC # FLD: 0 K/UL — SIGNIFICANT CHANGE UP (ref 0–0)
PLATELET # BLD AUTO: 283 K/UL — SIGNIFICANT CHANGE UP (ref 150–400)
POTASSIUM SERPL-MCNC: 3.9 MMOL/L — SIGNIFICANT CHANGE UP (ref 3.5–5.3)
POTASSIUM SERPL-SCNC: 3.9 MMOL/L — SIGNIFICANT CHANGE UP (ref 3.5–5.3)
PROT SERPL-MCNC: 6.8 G/DL — SIGNIFICANT CHANGE UP (ref 6–8.3)
RBC # BLD: 3.18 M/UL — LOW (ref 3.8–5.2)
RBC # FLD: 13.3 % — SIGNIFICANT CHANGE UP (ref 10.3–14.5)
SODIUM SERPL-SCNC: 140 MMOL/L — SIGNIFICANT CHANGE UP (ref 135–145)
WBC # BLD: 8.17 K/UL — SIGNIFICANT CHANGE UP (ref 3.8–10.5)
WBC # FLD AUTO: 8.17 K/UL — SIGNIFICANT CHANGE UP (ref 3.8–10.5)

## 2024-06-21 PROCEDURE — 99232 SBSQ HOSP IP/OBS MODERATE 35: CPT

## 2024-06-21 RX ADMIN — Medication 25 MILLIGRAM(S): at 17:30

## 2024-06-21 RX ADMIN — OXYCODONE HYDROCHLORIDE 5 MILLIGRAM(S): 100 SOLUTION ORAL at 10:00

## 2024-06-21 RX ADMIN — Medication 20 MILLIGRAM(S): at 06:04

## 2024-06-21 RX ADMIN — MONTELUKAST SODIUM 10 MILLIGRAM(S): 10 TABLET, FILM COATED ORAL at 22:36

## 2024-06-21 RX ADMIN — Medication 25 MILLIGRAM(S): at 06:04

## 2024-06-21 RX ADMIN — Medication 650 MILLIGRAM(S): at 12:02

## 2024-06-21 RX ADMIN — Medication 20 MILLIGRAM(S): at 17:30

## 2024-06-21 RX ADMIN — OXYCODONE HYDROCHLORIDE 5 MILLIGRAM(S): 100 SOLUTION ORAL at 09:30

## 2024-06-21 RX ADMIN — DULOXETINE HYDROCHLORIDE 30 MILLIGRAM(S): 20 CAPSULE, DELAYED RELEASE ORAL at 11:33

## 2024-06-21 RX ADMIN — Medication 1 GRAM(S): at 06:03

## 2024-06-21 RX ADMIN — Medication 2000 UNIT(S): at 11:32

## 2024-06-21 RX ADMIN — Medication 1 GRAM(S): at 17:30

## 2024-06-21 RX ADMIN — Medication 2 PUFF(S): at 22:35

## 2024-06-21 RX ADMIN — Medication 1 TABLET(S): at 11:34

## 2024-06-21 RX ADMIN — ASPIRIN 325 MILLIGRAM(S): 325 TABLET, FILM COATED ORAL at 11:42

## 2024-06-21 RX ADMIN — OXYCODONE HYDROCHLORIDE 5 MILLIGRAM(S): 100 SOLUTION ORAL at 22:38

## 2024-06-21 RX ADMIN — OXYCODONE HYDROCHLORIDE 5 MILLIGRAM(S): 100 SOLUTION ORAL at 23:08

## 2024-06-21 RX ADMIN — DULOXETINE HYDROCHLORIDE 60 MILLIGRAM(S): 20 CAPSULE, DELAYED RELEASE ORAL at 11:33

## 2024-06-21 RX ADMIN — Medication 1 MILLIGRAM(S): at 11:34

## 2024-06-21 RX ADMIN — Medication 650 MILLIGRAM(S): at 11:32

## 2024-06-21 RX ADMIN — Medication 2 PUFF(S): at 09:30

## 2024-06-21 RX ADMIN — ATORVASTATIN CALCIUM 20 MILLIGRAM(S): 20 TABLET, FILM COATED ORAL at 22:36

## 2024-06-22 PROCEDURE — 99232 SBSQ HOSP IP/OBS MODERATE 35: CPT

## 2024-06-22 RX ADMIN — Medication 25 MILLIGRAM(S): at 05:16

## 2024-06-22 RX ADMIN — ASPIRIN 325 MILLIGRAM(S): 325 TABLET, FILM COATED ORAL at 12:30

## 2024-06-22 RX ADMIN — DULOXETINE HYDROCHLORIDE 60 MILLIGRAM(S): 20 CAPSULE, DELAYED RELEASE ORAL at 12:31

## 2024-06-22 RX ADMIN — OXYCODONE HYDROCHLORIDE 5 MILLIGRAM(S): 100 SOLUTION ORAL at 23:09

## 2024-06-22 RX ADMIN — OXYCODONE HYDROCHLORIDE 5 MILLIGRAM(S): 100 SOLUTION ORAL at 18:00

## 2024-06-22 RX ADMIN — MONTELUKAST SODIUM 10 MILLIGRAM(S): 10 TABLET, FILM COATED ORAL at 22:15

## 2024-06-22 RX ADMIN — Medication 20 MILLIGRAM(S): at 05:16

## 2024-06-22 RX ADMIN — Medication 2 PUFF(S): at 12:30

## 2024-06-22 RX ADMIN — Medication 1 GRAM(S): at 17:01

## 2024-06-22 RX ADMIN — Medication 2000 UNIT(S): at 12:30

## 2024-06-22 RX ADMIN — OXYCODONE HYDROCHLORIDE 5 MILLIGRAM(S): 100 SOLUTION ORAL at 17:01

## 2024-06-22 RX ADMIN — Medication 1 GRAM(S): at 05:16

## 2024-06-22 RX ADMIN — Medication 20 MILLIGRAM(S): at 17:00

## 2024-06-22 RX ADMIN — OXYCODONE HYDROCHLORIDE 5 MILLIGRAM(S): 100 SOLUTION ORAL at 06:07

## 2024-06-22 RX ADMIN — OXYCODONE HYDROCHLORIDE 5 MILLIGRAM(S): 100 SOLUTION ORAL at 06:37

## 2024-06-22 RX ADMIN — Medication 25 MILLIGRAM(S): at 17:01

## 2024-06-22 RX ADMIN — Medication 1 MILLIGRAM(S): at 12:37

## 2024-06-22 RX ADMIN — DULOXETINE HYDROCHLORIDE 30 MILLIGRAM(S): 20 CAPSULE, DELAYED RELEASE ORAL at 12:31

## 2024-06-22 RX ADMIN — Medication 1 TABLET(S): at 12:31

## 2024-06-22 RX ADMIN — Medication 2 PUFF(S): at 22:14

## 2024-06-22 RX ADMIN — ATORVASTATIN CALCIUM 20 MILLIGRAM(S): 20 TABLET, FILM COATED ORAL at 22:15

## 2024-06-23 ENCOUNTER — TRANSCRIPTION ENCOUNTER (OUTPATIENT)
Age: 74
End: 2024-06-23

## 2024-06-23 PROCEDURE — 99232 SBSQ HOSP IP/OBS MODERATE 35: CPT

## 2024-06-23 RX ADMIN — Medication 2 PUFF(S): at 21:20

## 2024-06-23 RX ADMIN — OXYCODONE HYDROCHLORIDE 5 MILLIGRAM(S): 100 SOLUTION ORAL at 18:15

## 2024-06-23 RX ADMIN — OXYCODONE HYDROCHLORIDE 5 MILLIGRAM(S): 100 SOLUTION ORAL at 05:32

## 2024-06-23 RX ADMIN — Medication 1 GRAM(S): at 05:31

## 2024-06-23 RX ADMIN — Medication 20 MILLIGRAM(S): at 17:34

## 2024-06-23 RX ADMIN — Medication 25 MILLIGRAM(S): at 05:31

## 2024-06-23 RX ADMIN — Medication 650 MILLIGRAM(S): at 21:22

## 2024-06-23 RX ADMIN — MONTELUKAST SODIUM 10 MILLIGRAM(S): 10 TABLET, FILM COATED ORAL at 21:20

## 2024-06-23 RX ADMIN — Medication 2000 UNIT(S): at 11:22

## 2024-06-23 RX ADMIN — OXYCODONE HYDROCHLORIDE 5 MILLIGRAM(S): 100 SOLUTION ORAL at 17:34

## 2024-06-23 RX ADMIN — Medication 650 MILLIGRAM(S): at 22:22

## 2024-06-23 RX ADMIN — OXYCODONE HYDROCHLORIDE 5 MILLIGRAM(S): 100 SOLUTION ORAL at 06:32

## 2024-06-23 RX ADMIN — Medication 1 MILLIGRAM(S): at 11:22

## 2024-06-23 RX ADMIN — Medication 2 PUFF(S): at 11:23

## 2024-06-23 RX ADMIN — Medication 1 GRAM(S): at 17:35

## 2024-06-23 RX ADMIN — Medication 1 TABLET(S): at 11:21

## 2024-06-23 RX ADMIN — ASPIRIN 325 MILLIGRAM(S): 325 TABLET, FILM COATED ORAL at 11:22

## 2024-06-23 RX ADMIN — OXYCODONE HYDROCHLORIDE 5 MILLIGRAM(S): 100 SOLUTION ORAL at 00:09

## 2024-06-23 RX ADMIN — Medication 3 MILLIGRAM(S): at 21:20

## 2024-06-23 RX ADMIN — ATORVASTATIN CALCIUM 20 MILLIGRAM(S): 20 TABLET, FILM COATED ORAL at 21:20

## 2024-06-23 RX ADMIN — DULOXETINE HYDROCHLORIDE 60 MILLIGRAM(S): 20 CAPSULE, DELAYED RELEASE ORAL at 11:21

## 2024-06-23 RX ADMIN — Medication 20 MILLIGRAM(S): at 05:31

## 2024-06-24 ENCOUNTER — TRANSCRIPTION ENCOUNTER (OUTPATIENT)
Age: 74
End: 2024-06-24

## 2024-06-24 VITALS — WEIGHT: 205.91 LBS

## 2024-06-24 LAB
ANION GAP SERPL CALC-SCNC: 12 MMOL/L — SIGNIFICANT CHANGE UP (ref 7–14)
BUN SERPL-MCNC: 21 MG/DL — SIGNIFICANT CHANGE UP (ref 7–23)
CALCIUM SERPL-MCNC: 8.8 MG/DL — SIGNIFICANT CHANGE UP (ref 8.4–10.5)
CHLORIDE SERPL-SCNC: 97 MMOL/L — LOW (ref 98–107)
CO2 SERPL-SCNC: 30 MMOL/L — SIGNIFICANT CHANGE UP (ref 22–31)
CREAT SERPL-MCNC: 0.87 MG/DL — SIGNIFICANT CHANGE UP (ref 0.5–1.3)
EGFR: 70 ML/MIN/1.73M2 — SIGNIFICANT CHANGE UP
GLUCOSE SERPL-MCNC: 105 MG/DL — HIGH (ref 70–99)
HCT VFR BLD CALC: 29.6 % — LOW (ref 34.5–45)
HGB BLD-MCNC: 9.5 G/DL — LOW (ref 11.5–15.5)
MAGNESIUM SERPL-MCNC: 1.8 MG/DL — SIGNIFICANT CHANGE UP (ref 1.6–2.6)
MCHC RBC-ENTMCNC: 30.7 PG — SIGNIFICANT CHANGE UP (ref 27–34)
MCHC RBC-ENTMCNC: 32.1 GM/DL — SIGNIFICANT CHANGE UP (ref 32–36)
MCV RBC AUTO: 95.8 FL — SIGNIFICANT CHANGE UP (ref 80–100)
NRBC # BLD: 0 /100 WBCS — SIGNIFICANT CHANGE UP (ref 0–0)
NRBC # FLD: 0 K/UL — SIGNIFICANT CHANGE UP (ref 0–0)
PHOSPHATE SERPL-MCNC: 4.5 MG/DL — SIGNIFICANT CHANGE UP (ref 2.5–4.5)
PLATELET # BLD AUTO: 333 K/UL — SIGNIFICANT CHANGE UP (ref 150–400)
POTASSIUM SERPL-MCNC: 3.6 MMOL/L — SIGNIFICANT CHANGE UP (ref 3.5–5.3)
POTASSIUM SERPL-SCNC: 3.6 MMOL/L — SIGNIFICANT CHANGE UP (ref 3.5–5.3)
RBC # BLD: 3.09 M/UL — LOW (ref 3.8–5.2)
RBC # FLD: 13.3 % — SIGNIFICANT CHANGE UP (ref 10.3–14.5)
SODIUM SERPL-SCNC: 139 MMOL/L — SIGNIFICANT CHANGE UP (ref 135–145)
WBC # BLD: 8.77 K/UL — SIGNIFICANT CHANGE UP (ref 3.8–10.5)
WBC # FLD AUTO: 8.77 K/UL — SIGNIFICANT CHANGE UP (ref 3.8–10.5)

## 2024-06-24 PROCEDURE — 99239 HOSP IP/OBS DSCHRG MGMT >30: CPT

## 2024-06-24 RX ORDER — ASPIRIN 325 MG/1
1 TABLET, FILM COATED ORAL
Qty: 0 | Refills: 0 | DISCHARGE
Start: 2024-06-24

## 2024-06-24 RX ORDER — DULOXETINE HYDROCHLORIDE 30 MG/1
1 CAPSULE, DELAYED RELEASE ORAL
Qty: 0 | Refills: 0 | DISCHARGE

## 2024-06-24 RX ORDER — ASPIRIN/CALCIUM CARB/MAGNESIUM 324 MG
1 TABLET ORAL
Refills: 0 | DISCHARGE

## 2024-06-24 RX ORDER — DULOXETINE HYDROCHLORIDE 20 MG/1
1 CAPSULE, DELAYED RELEASE ORAL
Qty: 0 | Refills: 0 | DISCHARGE
Start: 2024-06-24

## 2024-06-24 RX ORDER — SUCRALFATE 1 G
1 TABLET ORAL
Qty: 0 | Refills: 0 | DISCHARGE
Start: 2024-06-24

## 2024-06-24 RX ORDER — ATORVASTATIN CALCIUM 20 MG/1
1 TABLET, FILM COATED ORAL
Qty: 0 | Refills: 0 | DISCHARGE
Start: 2024-06-24

## 2024-06-24 RX ORDER — FAMOTIDINE 40 MG
1 TABLET ORAL
Qty: 0 | Refills: 0 | DISCHARGE
Start: 2024-06-24

## 2024-06-24 RX ORDER — ACETAMINOPHEN 325 MG
2 TABLET ORAL
Qty: 0 | Refills: 0 | DISCHARGE
Start: 2024-06-24

## 2024-06-24 RX ORDER — OXYCODONE HYDROCHLORIDE 100 MG/5ML
1 SOLUTION ORAL
Qty: 0 | Refills: 0 | DISCHARGE
Start: 2024-06-24

## 2024-06-24 RX ORDER — METOPROLOL TARTRATE 50 MG
1 TABLET ORAL
Qty: 0 | Refills: 0 | DISCHARGE
Start: 2024-06-24

## 2024-06-24 RX ORDER — METHOTREXATE 25 MG/.4ML
5 INJECTION, SOLUTION SUBCUTANEOUS
Qty: 0 | Refills: 0 | DISCHARGE

## 2024-06-24 RX ORDER — CELECOXIB 200 MG/1
1 CAPSULE ORAL
Refills: 0 | DISCHARGE

## 2024-06-24 RX ORDER — SUCRALFATE 1 G
1 TABLET ORAL
Qty: 0 | Refills: 0 | DISCHARGE

## 2024-06-24 RX ORDER — ATORVASTATIN CALCIUM 80 MG/1
1 TABLET, FILM COATED ORAL
Qty: 0 | Refills: 0 | DISCHARGE

## 2024-06-24 RX ORDER — PREGABALIN 225 MG/1
1 CAPSULE ORAL
Qty: 0 | Refills: 0 | DISCHARGE

## 2024-06-24 RX ORDER — FAMOTIDINE 10 MG/ML
1 INJECTION INTRAVENOUS
Qty: 0 | Refills: 0 | DISCHARGE

## 2024-06-24 RX ADMIN — Medication 25 MILLIGRAM(S): at 05:50

## 2024-06-24 RX ADMIN — OXYCODONE HYDROCHLORIDE 5 MILLIGRAM(S): 100 SOLUTION ORAL at 04:06

## 2024-06-24 RX ADMIN — OXYCODONE HYDROCHLORIDE 5 MILLIGRAM(S): 100 SOLUTION ORAL at 03:06

## 2024-06-24 RX ADMIN — Medication 2 PUFF(S): at 08:47

## 2024-06-24 RX ADMIN — Medication 1 MILLIGRAM(S): at 11:53

## 2024-06-24 RX ADMIN — ASPIRIN 325 MILLIGRAM(S): 325 TABLET, FILM COATED ORAL at 11:57

## 2024-06-24 RX ADMIN — Medication 2000 UNIT(S): at 11:52

## 2024-06-24 RX ADMIN — DULOXETINE HYDROCHLORIDE 30 MILLIGRAM(S): 20 CAPSULE, DELAYED RELEASE ORAL at 11:53

## 2024-06-24 RX ADMIN — Medication 20 MILLIGRAM(S): at 05:49

## 2024-06-24 RX ADMIN — Medication 1 GRAM(S): at 05:49

## 2024-06-24 RX ADMIN — Medication 1 TABLET(S): at 11:53

## 2024-06-24 RX ADMIN — DULOXETINE HYDROCHLORIDE 60 MILLIGRAM(S): 20 CAPSULE, DELAYED RELEASE ORAL at 11:52

## 2024-06-27 ENCOUNTER — APPOINTMENT (OUTPATIENT)
Dept: ORTHOPEDIC SURGERY | Facility: CLINIC | Age: 74
End: 2024-06-27
Payer: MEDICARE

## 2024-06-27 DIAGNOSIS — M19.012 PRIMARY OSTEOARTHRITIS, LEFT SHOULDER: ICD-10-CM

## 2024-06-27 PROCEDURE — 99024 POSTOP FOLLOW-UP VISIT: CPT

## 2024-06-27 PROCEDURE — 73030 X-RAY EXAM OF SHOULDER: CPT | Mod: LT

## 2024-07-02 ENCOUNTER — TRANSCRIPTION ENCOUNTER (OUTPATIENT)
Age: 74
End: 2024-07-02

## 2024-07-10 ENCOUNTER — RX RENEWAL (OUTPATIENT)
Age: 74
End: 2024-07-10

## 2024-07-18 ENCOUNTER — APPOINTMENT (OUTPATIENT)
Dept: CARDIOLOGY | Facility: CLINIC | Age: 74
End: 2024-07-18

## 2024-07-25 ENCOUNTER — APPOINTMENT (OUTPATIENT)
Dept: ORTHOPEDIC SURGERY | Facility: CLINIC | Age: 74
End: 2024-07-25
Payer: MEDICARE

## 2024-07-25 DIAGNOSIS — M19.012 PRIMARY OSTEOARTHRITIS, LEFT SHOULDER: ICD-10-CM

## 2024-07-25 PROCEDURE — 99024 POSTOP FOLLOW-UP VISIT: CPT

## 2024-07-25 NOTE — HISTORY OF PRESENT ILLNESS
[6] : the patient reports pain that is 6/10 in severity [Clean/Dry/Intact] : clean, dry and intact [Healed] : healed [Neuro Intact] : an unremarkable neurological exam [Vascular Intact] : ~T peripheral vascular exam normal [Doing Well] : is doing well [Excellent Pain Control] : has excellent pain control [No Sign of Infection] : is showing no signs of infection [Chills] : no chills [Fever] : no fever [Nausea] : no nausea [Vomiting] : no vomiting [Erythema] : not erythematous [Discharge] : absent of discharge [Swelling] : not swollen [Dehiscence] : not dehisced [de-identified] : 74-year-old female status post left revTSA 6/14/24 [de-identified] : 74-year-old female status post left revTSA 6/14/24. Patient is currently in [facility]. She is receiving PT and OT 1 x per day.  She presents in brace. Takes Oxycodone for pain. Denies post op complications.  [de-identified] : Left shoulder exam   Inspection: No ecchymosis, no swelling Skin: Incisions C/D/I, no drainage, healed PassiveROM: 120 FF, 80 abd, 25 ER Painful arc ROM: with further motion Tenderness: Mild tenderness throughout the shoulder girdle Strength: Intact Vasc: 2+ radial pulse Neuro: AIN, PIN, Ulnar nerve intact to motor Sensation: Intact to light touch throughout [de-identified] : The following radiographs were ordered and read by me during this patients visit. I reviewed each radiograph in detail with the patient and discussed the findings as highlighted below.   3 views of the left shoulder were obtained, 06/27/2024, that show anatomic revTSA. [de-identified] : 74-year-old female status post left revTSA  Patient is doing well at this time following surgical intervention. Denies any immediate postoperative complications. Progressing well with physical therapy. Discussion was had with the patient regarding the next phase of physical therapy as instructed.   Recommendations: 1. Continue PT treatment as per protocol (Updated Rx given). HEP as instructed. 2. Brace: Discontinue brace. 3. Meds: Tylenol/NSAID's as tolerated; particularly before PT 4. Ice as needed after activity/HEP 5. Restrictions: None  Follow-up 6 weeks for repeat clinical evaluation.

## 2024-07-25 NOTE — HISTORY OF PRESENT ILLNESS
[6] : the patient reports pain that is 6/10 in severity [Clean/Dry/Intact] : clean, dry and intact [Healed] : healed [Neuro Intact] : an unremarkable neurological exam [Vascular Intact] : ~T peripheral vascular exam normal [Doing Well] : is doing well [Excellent Pain Control] : has excellent pain control [No Sign of Infection] : is showing no signs of infection [Chills] : no chills [Fever] : no fever [Nausea] : no nausea [Vomiting] : no vomiting [Erythema] : not erythematous [Discharge] : absent of discharge [Swelling] : not swollen [Dehiscence] : not dehisced [de-identified] : 74-year-old female status post left revTSA 6/14/24 [de-identified] : 74-year-old female status post left revTSA 6/14/24. Patient is currently in [facility]. She is receiving PT and OT 1 x per day.  She presents in brace. Takes Oxycodone for pain. Denies post op complications.  [de-identified] : Left shoulder exam   Inspection: No ecchymosis, no swelling Skin: Incisions C/D/I, no drainage, healed PassiveROM: 120 FF, 80 abd, 25 ER Painful arc ROM: with further motion Tenderness: Mild tenderness throughout the shoulder girdle Strength: Intact Vasc: 2+ radial pulse Neuro: AIN, PIN, Ulnar nerve intact to motor Sensation: Intact to light touch throughout [de-identified] : The following radiographs were ordered and read by me during this patients visit. I reviewed each radiograph in detail with the patient and discussed the findings as highlighted below.   3 views of the left shoulder were obtained, 06/27/2024, that show anatomic revTSA. [de-identified] : 74-year-old female status post left revTSA  Patient is doing well at this time following surgical intervention. Denies any immediate postoperative complications. Progressing well with physical therapy. Discussion was had with the patient regarding the next phase of physical therapy as instructed.   Recommendations: 1. Continue PT treatment as per protocol (Updated Rx given). HEP as instructed. 2. Brace: Discontinue brace. 3. Meds: Tylenol/NSAID's as tolerated; particularly before PT 4. Ice as needed after activity/HEP 5. Restrictions: None  Follow-up 6 weeks for repeat clinical evaluation.

## 2024-07-25 NOTE — ADDENDUM
[FreeTextEntry1] : This note was written by Charu Waddell on 07/25/2024 acting solely as a scribe for Dr. Reggie De La O.  All medical record entries made by the Scribe were at my, Dr. Reggie De La O, direction and personally dictated by me on 07/25/2024. I have personally reviewed the chart and agree that the record accurately reflects my personal performance of the history, physical exam, assessment and plan.

## 2024-07-26 ENCOUNTER — NON-APPOINTMENT (OUTPATIENT)
Age: 74
End: 2024-07-26

## 2024-07-29 ENCOUNTER — RX RENEWAL (OUTPATIENT)
Age: 74
End: 2024-07-29

## 2024-07-30 ENCOUNTER — RX RENEWAL (OUTPATIENT)
Age: 74
End: 2024-07-30

## 2024-08-09 ENCOUNTER — TRANSCRIPTION ENCOUNTER (OUTPATIENT)
Age: 74
End: 2024-08-09

## 2024-08-15 RX ORDER — OXYCODONE 5 MG/1
5 TABLET ORAL
Qty: 16 | Refills: 0 | Status: ACTIVE | COMMUNITY
Start: 2024-08-15 | End: 1900-01-01

## 2024-08-20 ENCOUNTER — APPOINTMENT (OUTPATIENT)
Dept: RHEUMATOLOGY | Facility: CLINIC | Age: 74
End: 2024-08-20

## 2024-08-21 ENCOUNTER — NON-APPOINTMENT (OUTPATIENT)
Age: 74
End: 2024-08-21

## 2024-08-22 ENCOUNTER — TRANSCRIPTION ENCOUNTER (OUTPATIENT)
Age: 74
End: 2024-08-22

## 2024-08-29 ENCOUNTER — RX RENEWAL (OUTPATIENT)
Age: 74
End: 2024-08-29

## 2024-08-31 ENCOUNTER — NON-APPOINTMENT (OUTPATIENT)
Age: 74
End: 2024-08-31

## 2024-09-05 ENCOUNTER — APPOINTMENT (OUTPATIENT)
Dept: ORTHOPEDIC SURGERY | Facility: CLINIC | Age: 74
End: 2024-09-05
Payer: MEDICARE

## 2024-09-05 VITALS — HEIGHT: 62 IN | BODY MASS INDEX: 36.8 KG/M2 | WEIGHT: 200 LBS

## 2024-09-05 DIAGNOSIS — M19.012 PRIMARY OSTEOARTHRITIS, LEFT SHOULDER: ICD-10-CM

## 2024-09-05 PROCEDURE — 73030 X-RAY EXAM OF SHOULDER: CPT | Mod: LT

## 2024-09-05 PROCEDURE — 99024 POSTOP FOLLOW-UP VISIT: CPT

## 2024-09-05 NOTE — ADDENDUM
[FreeTextEntry1] : This note was written by Charu Waddell on 09/05/2024 acting solely as a scribe for Dr. Reggie De La O.  All medical record entries made by the Scribe were at my, Dr. Reggie De La O, direction and personally dictated by me on 09/05/2024. I have personally reviewed the chart and agree that the record accurately reflects my personal performance of the history, physical exam, assessment and plan.

## 2024-09-05 NOTE — HISTORY OF PRESENT ILLNESS
[6] : the patient reports pain that is 6/10 in severity [Clean/Dry/Intact] : clean, dry and intact [Healed] : healed [Neuro Intact] : an unremarkable neurological exam [Vascular Intact] : ~T peripheral vascular exam normal [Doing Well] : is doing well [Excellent Pain Control] : has excellent pain control [No Sign of Infection] : is showing no signs of infection [Chills] : no chills [Fever] : no fever [Nausea] : no nausea [Vomiting] : no vomiting [Erythema] : not erythematous [Discharge] : absent of discharge [Swelling] : not swollen [Dehiscence] : not dehisced [de-identified] : 74-year-old female status post left revTSA 6/14/24 [de-identified] : 74-year-old female status post left revTSA 6/14/24. Patient was discharged from rehab 8/6/24. Patient contracted covid shortly after and was not able to do PT. Takes Tylenol/ Oxycodone for pain. Denies post op complications.  [de-identified] : Left shoulder exam   Inspection: No ecchymosis, no swelling Skin: Incisions C/D/I, no drainage, healed ROM: 100 FF, 80 abd, limited ER, IR to low lumbar Painful arc ROM: with further motion Tenderness: Mild tenderness throughout the shoulder girdle Strength: Intact Vasc: 2+ radial pulse Neuro: AIN, PIN, Ulnar nerve intact to motor Sensation: Intact to light touch throughout [de-identified] : The following radiographs were ordered and read by me during this patients visit. I reviewed each radiograph in detail with the patient and discussed the findings as highlighted below.   3 views of the left shoulder were obtained, 0905/2024, that show anatomic revTSA. [de-identified] : 74-year-old female status post left revTSA  Patient is doing well at this time following surgical intervention. Denies any immediate postoperative complications. Progressing well with physical therapy. Discussion was had with the patient regarding the next phase of physical therapy as instructed.   Recommendations: 1. Continue PT treatment as per protocol (Updated Rx given). HEP for terminal ROM exercises / strengthening and conditioning. 2. Meds: Tylenol/NSAID's as tolerated. 3. Ice PRN 4. Return to ADL's, light activity as instructed.  Followup 3 months.

## 2024-09-06 ENCOUNTER — TRANSCRIPTION ENCOUNTER (OUTPATIENT)
Age: 74
End: 2024-09-06

## 2024-09-12 ENCOUNTER — NON-APPOINTMENT (OUTPATIENT)
Age: 74
End: 2024-09-12

## 2024-09-14 ENCOUNTER — EMERGENCY (EMERGENCY)
Facility: HOSPITAL | Age: 74
LOS: 0 days | Discharge: HOME | End: 2024-09-14
Payer: MEDICARE

## 2024-09-14 VITALS
OXYGEN SATURATION: 100 % | RESPIRATION RATE: 19 BRPM | TEMPERATURE: 99 F | HEART RATE: 76 BPM | WEIGHT: 195.11 LBS | SYSTOLIC BLOOD PRESSURE: 95 MMHG | HEIGHT: 59 IN | DIASTOLIC BLOOD PRESSURE: 67 MMHG

## 2024-09-14 DIAGNOSIS — Z91.018 ALLERGY TO OTHER FOODS: ICD-10-CM

## 2024-09-14 DIAGNOSIS — Z98.49 CATARACT EXTRACTION STATUS, UNSPECIFIED EYE: Chronic | ICD-10-CM

## 2024-09-14 DIAGNOSIS — Z96.652 PRESENCE OF LEFT ARTIFICIAL KNEE JOINT: Chronic | ICD-10-CM

## 2024-09-14 DIAGNOSIS — M25.512 PAIN IN LEFT SHOULDER: ICD-10-CM

## 2024-09-14 DIAGNOSIS — Z88.1 ALLERGY STATUS TO OTHER ANTIBIOTIC AGENTS: ICD-10-CM

## 2024-09-14 DIAGNOSIS — I10 ESSENTIAL (PRIMARY) HYPERTENSION: ICD-10-CM

## 2024-09-14 DIAGNOSIS — G47.33 OBSTRUCTIVE SLEEP APNEA (ADULT) (PEDIATRIC): ICD-10-CM

## 2024-09-14 DIAGNOSIS — E78.5 HYPERLIPIDEMIA, UNSPECIFIED: ICD-10-CM

## 2024-09-14 DIAGNOSIS — K21.9 GASTRO-ESOPHAGEAL REFLUX DISEASE WITHOUT ESOPHAGITIS: ICD-10-CM

## 2024-09-14 DIAGNOSIS — Z90.89 ACQUIRED ABSENCE OF OTHER ORGANS: Chronic | ICD-10-CM

## 2024-09-14 DIAGNOSIS — M06.9 RHEUMATOID ARTHRITIS, UNSPECIFIED: ICD-10-CM

## 2024-09-14 DIAGNOSIS — F32.A DEPRESSION, UNSPECIFIED: ICD-10-CM

## 2024-09-14 DIAGNOSIS — Z98.1 ARTHRODESIS STATUS: Chronic | ICD-10-CM

## 2024-09-14 DIAGNOSIS — R73.03 PREDIABETES: ICD-10-CM

## 2024-09-14 DIAGNOSIS — Z96.642 PRESENCE OF LEFT ARTIFICIAL HIP JOINT: Chronic | ICD-10-CM

## 2024-09-14 DIAGNOSIS — Z79.82 LONG TERM (CURRENT) USE OF ASPIRIN: ICD-10-CM

## 2024-09-14 DIAGNOSIS — Z96.651 PRESENCE OF RIGHT ARTIFICIAL KNEE JOINT: Chronic | ICD-10-CM

## 2024-09-14 DIAGNOSIS — M19.90 UNSPECIFIED OSTEOARTHRITIS, UNSPECIFIED SITE: ICD-10-CM
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
## 2024-09-14 DIAGNOSIS — S09.90XA UNSPECIFIED INJURY OF HEAD, INITIAL ENCOUNTER: ICD-10-CM

## 2024-09-14 PROCEDURE — 73030 X-RAY EXAM OF SHOULDER: CPT | Mod: 26,LT

## 2024-09-14 PROCEDURE — 99284 EMERGENCY DEPT VISIT MOD MDM: CPT

## 2024-09-14 PROCEDURE — 70450 CT HEAD/BRAIN W/O DYE: CPT | Mod: 26,MC

## 2024-09-14 RX ORDER — ACETAMINOPHEN 500 MG/5ML
975 LIQUID (ML) ORAL ONCE
Refills: 0 | Status: COMPLETED | OUTPATIENT
Start: 2024-09-14 | End: 2024-09-14

## 2024-09-14 RX ADMIN — Medication 975 MILLIGRAM(S): at 13:53

## 2024-09-14 RX ADMIN — Medication 975 MILLIGRAM(S): at 15:00

## 2024-09-16 ENCOUNTER — TRANSCRIPTION ENCOUNTER (OUTPATIENT)
Age: 74
End: 2024-09-16

## 2024-09-18 ENCOUNTER — TRANSCRIPTION ENCOUNTER (OUTPATIENT)
Age: 74
End: 2024-09-18

## 2024-09-24 ENCOUNTER — LABORATORY RESULT (OUTPATIENT)
Age: 74
End: 2024-09-24

## 2024-09-25 ENCOUNTER — APPOINTMENT (OUTPATIENT)
Dept: PAIN MANAGEMENT | Facility: CLINIC | Age: 74
End: 2024-09-25
Payer: MEDICARE

## 2024-09-25 ENCOUNTER — APPOINTMENT (OUTPATIENT)
Dept: INTERNAL MEDICINE | Facility: CLINIC | Age: 74
End: 2024-09-25
Payer: MEDICARE

## 2024-09-25 VITALS
HEIGHT: 62 IN | DIASTOLIC BLOOD PRESSURE: 71 MMHG | SYSTOLIC BLOOD PRESSURE: 115 MMHG | BODY MASS INDEX: 36.44 KG/M2 | WEIGHT: 198 LBS | OXYGEN SATURATION: 98 % | TEMPERATURE: 98.1 F | HEART RATE: 72 BPM

## 2024-09-25 VITALS
HEIGHT: 62 IN | SYSTOLIC BLOOD PRESSURE: 101 MMHG | HEART RATE: 74 BPM | DIASTOLIC BLOOD PRESSURE: 62 MMHG | WEIGHT: 196 LBS | BODY MASS INDEX: 36.07 KG/M2

## 2024-09-25 DIAGNOSIS — E78.5 HYPERLIPIDEMIA, UNSPECIFIED: ICD-10-CM

## 2024-09-25 DIAGNOSIS — E66.9 OBESITY, UNSPECIFIED: ICD-10-CM

## 2024-09-25 DIAGNOSIS — M25.569 PAIN IN UNSPECIFIED KNEE: ICD-10-CM

## 2024-09-25 DIAGNOSIS — Z00.00 ENCOUNTER FOR GENERAL ADULT MEDICAL EXAMINATION W/OUT ABNORMAL FINDINGS: ICD-10-CM

## 2024-09-25 DIAGNOSIS — I10 ESSENTIAL (PRIMARY) HYPERTENSION: ICD-10-CM

## 2024-09-25 DIAGNOSIS — R26.81 UNSTEADINESS ON FEET: ICD-10-CM

## 2024-09-25 DIAGNOSIS — U07.1 COVID-19: ICD-10-CM

## 2024-09-25 DIAGNOSIS — F41.8 OTHER SPECIFIED ANXIETY DISORDERS: ICD-10-CM

## 2024-09-25 DIAGNOSIS — Z87.39 PERSONAL HISTORY OF OTHER DISEASES OF THE MUSCULOSKELETAL SYSTEM AND CONNECTIVE TISSUE: ICD-10-CM

## 2024-09-25 DIAGNOSIS — K21.9 GASTRO-ESOPHAGEAL REFLUX DISEASE W/OUT ESOPHAGITIS: ICD-10-CM

## 2024-09-25 DIAGNOSIS — M35.3 POLYMYALGIA RHEUMATICA: ICD-10-CM

## 2024-09-25 DIAGNOSIS — M25.552 PAIN IN LEFT HIP: ICD-10-CM

## 2024-09-25 DIAGNOSIS — R73.02 IMPAIRED GLUCOSE TOLERANCE (ORAL): ICD-10-CM

## 2024-09-25 DIAGNOSIS — M19.012 PRIMARY OSTEOARTHRITIS, LEFT SHOULDER: ICD-10-CM

## 2024-09-25 DIAGNOSIS — D64.9 ANEMIA, UNSPECIFIED: ICD-10-CM

## 2024-09-25 DIAGNOSIS — G89.4 CHRONIC PAIN SYNDROME: ICD-10-CM

## 2024-09-25 DIAGNOSIS — J45.909 UNSPECIFIED ASTHMA, UNCOMPLICATED: ICD-10-CM

## 2024-09-25 DIAGNOSIS — Z86.39 PERSONAL HISTORY OF OTHER ENDOCRINE, NUTRITIONAL AND METABOLIC DISEASE: ICD-10-CM

## 2024-09-25 DIAGNOSIS — Z23 ENCOUNTER FOR IMMUNIZATION: ICD-10-CM

## 2024-09-25 DIAGNOSIS — N18.30 CHRONIC KIDNEY DISEASE, STAGE 3 UNSPECIFIED: ICD-10-CM

## 2024-09-25 PROCEDURE — G0439: CPT

## 2024-09-25 PROCEDURE — 99205 OFFICE O/P NEW HI 60 MIN: CPT

## 2024-09-25 PROCEDURE — 99215 OFFICE O/P EST HI 40 MIN: CPT

## 2024-09-25 PROCEDURE — G0008: CPT

## 2024-09-25 PROCEDURE — 99214 OFFICE O/P EST MOD 30 MIN: CPT | Mod: 25

## 2024-09-25 PROCEDURE — 90662 IIV NO PRSV INCREASED AG IM: CPT

## 2024-09-25 RX ORDER — NALOXONE HYDROCHLORIDE 4 MG/.1ML
4 SPRAY NASAL
Qty: 1 | Refills: 1 | Status: ACTIVE | COMMUNITY
Start: 2024-09-25 | End: 1900-01-01

## 2024-09-25 RX ORDER — OXYCODONE AND ACETAMINOPHEN 2.5; 325 MG/1; MG/1
2.5-325 TABLET ORAL
Qty: 30 | Refills: 0 | Status: ACTIVE | COMMUNITY
Start: 2024-09-25 | End: 1900-01-01

## 2024-09-25 RX ORDER — DICLOFENAC SODIUM 20 MG/G
2 SOLUTION TOPICAL 3 TIMES DAILY
Qty: 2 | Refills: 5 | Status: ACTIVE | COMMUNITY
Start: 2024-09-25 | End: 1900-01-01

## 2024-09-25 NOTE — PHYSICAL EXAM
[General Appearance - Alert] : alert [General Appearance - In No Acute Distress] : in no acute distress [Oriented To Time, Place, And Person] : oriented to person, place, and time [Affect] : the affect was normal [Mood] : the mood was normal [Memory Recent] : recent memory was not impaired [Cranial Nerves Optic (II)] : visual acuity intact bilaterally,  visual fields full to confrontation, pupils equal round and reactive to light [Cranial Nerves Oculomotor (III)] : extraocular motion intact [Cranial Nerves Trigeminal (V)] : facial sensation intact symmetrically [Cranial Nerves Facial (VII)] : face symmetrical [Cranial Nerves Vestibulocochlear (VIII)] : hearing was intact bilaterally [Cranial Nerves Glossopharyngeal (IX)] : tongue and palate midline [Cranial Nerves Accessory (XI - Cranial And Spinal)] : head turning and shoulder shrug symmetric [Cranial Nerves Hypoglossal (XII)] : there was no tongue deviation with protrusion [Motor Handedness Right-Handed] : the patient is right hand dominant [Motor Strength Upper Extremities Bilaterally] : there was weakness in both upper extremities [Motor Strength Lower Extremities Bilaterally] : there was weakness in both lower extremities [Sensation Tactile Decrease] : light touch was intact [1+] : Brachioradialis left 1+ [0] : Ankle jerk left 0 [PERRL With Normal Accommodation] : pupils were equal in size, round, reactive to light, with normal accommodation [] : no respiratory distress [Neck Appearance] : the appearance of the neck was normal [Heart Rate And Rhythm] : heart rate was normal and rhythm regular [FreeTextEntry6] : 4/5 throughout  [FreeTextEntry8] : antalgic gait with rollator walker  [FreeTextEntry1] : left lumbar paraspinal muscle tenderness with spasm and trigger point

## 2024-09-25 NOTE — ASSESSMENT
[Opioids] : Patient was explained in detail about pain control by using opioids. Patient has signed and fully understands our guidelines for medication and drug screening.  Patient understands the side effects of opioids, including, but not limited to, drug tolerance, dependence, potential for addiction. This class of drugs is habit-forming and NOA regulated. The sedative effects of opioids can be potentiated by taking alcohol or any sleeping pills, along with opioids. The decision to drive is patient's responsibility, as opioids can affect his/her driving ability and ability to concentrate. The long-term place is not clear, however, patient understands that once the pain control optimizes, the goal will be to wean off the opioids. All the issues regarding opioid treatment have been addressed satisfactorily.  [FreeTextEntry1] : 73 y/o F with multiple medical comorbidities including PMR, CKD, CHANTELL, OA/DJD s/p B/L TKA, L MICHAEL, reverse Left shoulder arthroplasty in June 2024 with persistent myofascial pain exacerbated by activity/movement in addition to diffuse chronic pain.    Discussed in detail the nature of chronic pain as well as treatment options including nonopioid pain management PT, therapeutic massage, stretching, exercise program, acupuncture, CBT as well as topical medications, non-opioid pain medications, Trigger point injections, SUMI. The patient had the opportunity to ask questions and all were answered to their satisfaction.  The patient verbalized understanding of the management plan and agreed with our recommendations.  Will try topical diclofenac 2% if insurance approves.  -On Celebrex from other prescriber but explained risks of AE from prolonged use.  -Previously failed Tramadol and on Duloxetine  -Will provide Oxycodone 2.5/325 1 tab daily prn prior to therapy was provided and will monitor closely.  -continue PT- [facility].  UDS performed today Sep 25, 2024  I-Stop reviewed,  reference #: [id number] No signs of aberrant behavior and will continue to monitor for signs of toxicity. Reminded to continue to avoid alcohol. Patient denies other prescribers.  Discussed OD prevention education and prescribed naloxone kit  Safe storage of medication was reviewed.  f/u in 2-3 months or sooner if needed.

## 2024-09-25 NOTE — HISTORY OF PRESENT ILLNESS
[FreeTextEntry1] : SHABANA COLVIN is a 74 year RH female with a Pmhx of Anxiety, Depression, HTN, HLD, CKD, asthma, severe CHANTELL, GERD/PUD, OA/DJD, s/p L MICHAEL, B/L TKA, Polymyalgia Rheumatica, Cervical s/p C3-C7 fusion 2018 and Lumbr spondylosis with radiculopathy OA left shoulder s/p reverse arthroplasty 6/2024 who presents for initial evaluation of chronic pain. Patient reports chronic diffuse pain in multiple joints shoulders, knees, hips, neck and back.  Patient underwent revered Left shoulder arthroplasty but still reports persistent pain in left shoulder with motion/activity 8-9/10 decreased with rest.  Increased pain over the right shoulder which she attributes to using more while the left arm was in a sling.  She has been using apap 1gm 3x/day and Oxycodone 5 mg 2x/week prior to PT. + constipation but having BM daily.  + aspercreme but no other topicals.   S/p fall 2 weeks ago while at [employer] striking back of head to ground, no LOC. Seen at [facility].   Allergies: NKDA  Prior meds: Tramadol no help.  Current medications : Methotrexate, Duloxetine 90mg daily, Metoprolol 25 mg daily, Sucralfate, Ventolin, Symbicort, Montelukast, famotidine, HCTZ 25 mg daily, ASA 81 mg daily, Folic acid, B12, D3, MVI, Probiotic, Oxycodone 5 mg prn taking 2x/week on average prior to PT. Celebrex.   Social Hx : She lives in [place] and lives with her son who has schizophrenia.  She does not smoke, drink, denies illicits. Previously smoked marijuana in the past.

## 2024-09-25 NOTE — COUNSELING
[Fall prevention counseling provided] : Fall prevention counseling provided [Adequate lighting] : Adequate lighting [No throw rugs] : No throw rugs [Use proper foot wear] : Use proper foot wear [Use recommended devices] : Use recommended devices [Potential consequences of obesity discussed] : Potential consequences of obesity discussed [Benefits of weight loss discussed] : Benefits of weight loss discussed [Encouraged to increase physical activity] : Encouraged to increase physical activity [Target Wt Loss Goal ___] : Weight Loss Goals: Target weight loss goal [unfilled] lbs [Decrease Portions] : decrease portions [____ min/wk Activity] : [unfilled] min/wk activity [Needs reinforcement, provided] : Patient needs reinforcement on understanding of disease, goals and obesity follow-up plan; reinforcement was provided

## 2024-09-25 NOTE — REVIEW OF SYSTEMS
[Constipation] : constipation [Joint Pain] : joint pain [Neck Pain] : neck pain [Joint Stiffness] : joint stiffness [Lower Back Pain] : lower back pain [Limb Pain] : limb pain [Sleep Apnea] : sleep apnea [Anxiety] : anxiety [Depression] : depression [Snoring] : snoring [As Noted in HPI] : as noted in HPI [Negative] : Heme/Lymph

## 2024-09-26 ENCOUNTER — NON-APPOINTMENT (OUTPATIENT)
Age: 74
End: 2024-09-26

## 2024-09-26 PROBLEM — Z87.39 HISTORY OF MUSCLE PAIN: Status: RESOLVED | Noted: 2021-07-30 | Resolved: 2024-09-26

## 2024-09-26 PROBLEM — Z87.39 HISTORY OF MYOSITIS: Status: RESOLVED | Noted: 2021-07-30 | Resolved: 2024-09-26

## 2024-09-26 PROBLEM — M25.552 HIP PAIN, LEFT: Status: RESOLVED | Noted: 2019-04-15 | Resolved: 2024-09-26

## 2024-09-26 PROBLEM — Z87.39 HISTORY OF LOW BACK PAIN: Status: RESOLVED | Noted: 2021-07-30 | Resolved: 2024-09-26

## 2024-09-26 PROBLEM — Z86.39 HISTORY OF HYPOKALEMIA: Status: RESOLVED | Noted: 2018-04-18 | Resolved: 2024-09-26

## 2024-09-26 NOTE — HISTORY OF PRESENT ILLNESS
[FreeTextEntry1] : Pt presented for PE.  Last PE was 1 year ago. [de-identified] : Pt had reverse arthroplasty of L shoulder in 6/2024.  Surgery went well and is still getting PT, it's still very painful and she takes Oxycodone for pain and she sees pain management.  The ROM is much better.  She fell in the store on Saturday 9/14/2024, she lost her balance when she was reaching for something.  She went to ED at [facility], she has CT of head and Xray of L shoulder, no fracture and went home same day.  She has soreness on her forehead.  The rest of her is unchanged.  She has mild nausea at the end of the day for the past week, no vomiting.  She still takes MTX every week, she remained on Cymbalta, but has not seen rheum since the shoulder surgery.  She will make apt soon.

## 2024-09-26 NOTE — PHYSICAL EXAM
[No Acute Distress] : no acute distress [Well Nourished] : well nourished [Well Developed] : well developed [Normal Sclera/Conjunctiva] : normal sclera/conjunctiva [PERRL] : pupils equal round and reactive to light [EOMI] : extraocular movements intact [Normal Outer Ear/Nose] : the outer ears and nose were normal in appearance [Normal Oropharynx] : the oropharynx was normal [Normal TMs] : both tympanic membranes were normal [Normal Nasal Mucosa] : the nasal mucosa was normal [No JVD] : no jugular venous distention [No Lymphadenopathy] : no lymphadenopathy [Supple] : supple [No Respiratory Distress] : no respiratory distress  [Clear to Auscultation] : lungs were clear to auscultation bilaterally [Normal Rate] : normal rate  [Regular Rhythm] : with a regular rhythm [Normal S1, S2] : normal S1 and S2 [No Carotid Bruits] : no carotid bruits [Pedal Pulses Present] : the pedal pulses are present [No Extremity Clubbing/Cyanosis] : no extremity clubbing/cyanosis [Normal Appearance] : normal in appearance [No Masses] : no palpable masses [No Nipple Discharge] : no nipple discharge [No Axillary Lymphadenopathy] : no axillary lymphadenopathy [Soft] : abdomen soft [Non Tender] : non-tender [Non-distended] : non-distended [Normal Bowel Sounds] : normal bowel sounds [Normal Sphincter Tone] : normal sphincter tone [No Mass] : no mass [Stool Occult Blood] : stool positive for occult blood [Normal Supraclavicular Nodes] : no supraclavicular lymphadenopathy [Normal Axillary Nodes] : no axillary lymphadenopathy [Normal Posterior Cervical Nodes] : no posterior cervical lymphadenopathy [Normal Anterior Cervical Nodes] : no anterior cervical lymphadenopathy [No CVA Tenderness] : no CVA  tenderness [No Spinal Tenderness] : no spinal tenderness [No Joint Swelling] : no joint swelling [Grossly Normal Strength/Tone] : grossly normal strength/tone [No Rash] : no rash [Coordination Grossly Intact] : coordination grossly intact [No Focal Deficits] : no focal deficits [Speech Grossly Normal] : speech grossly normal [Normal Affect] : the affect was normal [Alert and Oriented x3] : oriented to person, place, and time [Normal Mood] : the mood was normal [Declined Rectal Exam] : declined rectal exam [de-identified] : Obese female in stated age,  [de-identified] : B/l 1 + pitting edema, no calf tenderness, [FreeTextEntry1] : deferred, pt had colonoscopy w/n the year. [de-identified] : Well healed surgical scar of L anterior shoulder, both shoulders with good ROM, [de-identified] : presented to office today with rollator,

## 2024-09-26 NOTE — REVIEW OF SYSTEMS
[Lower Ext Edema] : lower extremity edema [Dyspnea on Exertion] : dyspnea on exertion [Nausea] : nausea [Constipation] : constipation [Nocturia] : nocturia [Frequency] : frequency [Joint Pain] : joint pain [Joint Stiffness] : joint stiffness [Unsteady Walking] : ataxia [Negative] : Heme/Lymph [Fever] : no fever [Chills] : no chills [Fatigue] : no fatigue [Recent Change In Weight] : ~T no recent weight change [Chest Pain] : no chest pain [Palpitations] : no palpitations [Shortness Of Breath] : no shortness of breath [Wheezing] : no wheezing [Cough] : no cough [Abdominal Pain] : no abdominal pain [Diarrhea] : diarrhea [Vomiting] : no vomiting [Heartburn] : no heartburn [Melena] : no melena [Dysuria] : no dysuria [Incontinence] : no incontinence [Hematuria] : no hematuria [Back Pain] : no back pain [Itching] : no itching [Mole Changes] : no mole changes [Skin Rash] : no skin rash [Headache] : no headache [Dizziness] : no dizziness [Fainting] : no fainting [Insomnia] : no insomnia [Anxiety] : no anxiety [Depression] : no depression [Easy Bleeding] : no easy bleeding [Easy Bruising] : no easy bruising [Swollen Glands] : no swollen glands [FreeTextEntry2] : She  [FreeTextEntry3] : Wears reading glasses, [FreeTextEntry4] : Allergy symptoms are active and controlled with meds, on Azelastine & Montelukast, [FreeTextEntry5] : occ ankle swelling if she stays on her feet more,  [FreeTextEntry6] : Has chronic TRENT, no change [FreeTextEntry7] : Constipation is better with Probiotic, mild nausea as in HPI,  [FreeTextEntry8] : Nocturia of 1 X per night,  [FreeTextEntry9] : L shoulder is still very painful with PT, still has pain from  [de-identified] : Uses cane at home, and walker when she goes out uses a rollator,, and is doing PT, has carpal tunnel syndrome, [de-identified] : Has CHANTELL, but not using CPAP, pt declined f/u with sleep specialist, on Cymbalta for depression,

## 2024-09-26 NOTE — ASSESSMENT
[FreeTextEntry1] : Patient is UTD with colonoscopy.  She will schedule for mammogram.  She declined Pap smear.  She does not need to repeat DEXA scan as they are affected by OA.  Patient was reminded to have routine eye exam and dental care.

## 2024-09-26 NOTE — PHYSICAL EXAM
[No Acute Distress] : no acute distress [Well Nourished] : well nourished [Well Developed] : well developed [Normal Sclera/Conjunctiva] : normal sclera/conjunctiva [PERRL] : pupils equal round and reactive to light [EOMI] : extraocular movements intact [Normal Outer Ear/Nose] : the outer ears and nose were normal in appearance [Normal Oropharynx] : the oropharynx was normal [Normal TMs] : both tympanic membranes were normal [Normal Nasal Mucosa] : the nasal mucosa was normal [No JVD] : no jugular venous distention [No Lymphadenopathy] : no lymphadenopathy [Supple] : supple [No Respiratory Distress] : no respiratory distress  [Clear to Auscultation] : lungs were clear to auscultation bilaterally [Normal Rate] : normal rate  [Regular Rhythm] : with a regular rhythm [Normal S1, S2] : normal S1 and S2 [No Carotid Bruits] : no carotid bruits [Pedal Pulses Present] : the pedal pulses are present [No Extremity Clubbing/Cyanosis] : no extremity clubbing/cyanosis [Normal Appearance] : normal in appearance [No Masses] : no palpable masses [No Nipple Discharge] : no nipple discharge [No Axillary Lymphadenopathy] : no axillary lymphadenopathy [Soft] : abdomen soft [Non Tender] : non-tender [Non-distended] : non-distended [Normal Bowel Sounds] : normal bowel sounds [Normal Sphincter Tone] : normal sphincter tone [No Mass] : no mass [Stool Occult Blood] : stool positive for occult blood [Normal Supraclavicular Nodes] : no supraclavicular lymphadenopathy [Normal Axillary Nodes] : no axillary lymphadenopathy [Normal Posterior Cervical Nodes] : no posterior cervical lymphadenopathy [Normal Anterior Cervical Nodes] : no anterior cervical lymphadenopathy [No CVA Tenderness] : no CVA  tenderness [No Spinal Tenderness] : no spinal tenderness [No Joint Swelling] : no joint swelling [Grossly Normal Strength/Tone] : grossly normal strength/tone [No Rash] : no rash [Coordination Grossly Intact] : coordination grossly intact [No Focal Deficits] : no focal deficits [Speech Grossly Normal] : speech grossly normal [Normal Affect] : the affect was normal [Alert and Oriented x3] : oriented to person, place, and time [Normal Mood] : the mood was normal [Declined Rectal Exam] : declined rectal exam [de-identified] : Obese female in stated age,  [de-identified] : B/l 1 + pitting edema, no calf tenderness, [FreeTextEntry1] : deferred, pt had colonoscopy w/n the year. [de-identified] : Well healed surgical scar of L anterior shoulder, both shoulders with good ROM, [de-identified] : presented to office today with rollator,

## 2024-09-26 NOTE — HISTORY OF PRESENT ILLNESS
[FreeTextEntry1] : Pt presented for PE.  Last PE was 1 year ago. [de-identified] : Pt had reverse arthroplasty of L shoulder in 6/2024.  Surgery went well and is still getting PT, it's still very painful and she takes Oxycodone for pain and she sees pain management.  The ROM is much better.  She fell in the store on Saturday 9/14/2024, she lost her balance when she was reaching for something.  She went to ED at [facility], she has CT of head and Xray of L shoulder, no fracture and went home same day.  She has soreness on her forehead.  The rest of her is unchanged.  She has mild nausea at the end of the day for the past week, no vomiting.  She still takes MTX every week, she remained on Cymbalta, but has not seen rheum since the shoulder surgery.  She will make apt soon.

## 2024-09-26 NOTE — HEALTH RISK ASSESSMENT
[Good] : ~his/her~  mood as  good [Yes] : Yes [Monthly or less (1 pt)] : Monthly or less (1 point) [1 or 2 (0 pts)] : 1 or 2 (0 points) [Never (0 pts)] : Never (0 points) [No] : In the past 12 months have you used drugs other than those required for medical reasons? No [One fall no injury in past year] : Patient reported one fall in the past year without injury [Assistive Device] : Patient uses an assistive device [Little interest or pleasure doing things] : 1) Little interest or pleasure doing things [Feeling down, depressed, or hopeless] : 2) Feeling down, depressed, or hopeless [0] : 2) Feeling down, depressed, or hopeless: Not at all (0) [PHQ-2 Negative - No further assessment needed] : PHQ-2 Negative - No further assessment needed [Never] : Never [NO] : No [Patient declined PAP Smear] : Patient declined PAP Smear [Patient declined colonoscopy] : Patient declined colonoscopy [None] : None [With Family] : lives with family [# of Members in Household ___] :  household currently consist of [unfilled] member(s) [Retired] : retired [College] : College [Single] : single [# Of Children ___] : has [unfilled] children [Feels Safe at Home] : Feels safe at home [Fully functional (bathing, dressing, toileting, transferring, walking, feeding)] : Fully functional (bathing, dressing, toileting, transferring, walking, feeding) [Smoke Detector] : smoke detector [Carbon Monoxide Detector] : carbon monoxide detector [Seat Belt] :  uses seat belt [With Patient/Caregiver] : , with patient/caregiver [Relationship: ___] : Relationship: [unfilled] [Audit-CScore] : 1 [de-identified] : No formal exercise, walks around, doing PT 2x per week, [de-identified] : cane or rollator, [[id number]] : 0 [EyeExamDate] : 03/24 [Change in mental status noted] : No change in mental status noted [Reports changes in hearing] : Reports no changes in hearing [Reports changes in vision] : Reports no changes in vision [Reports changes in dental health] : Reports no changes in dental health [MammogramDate] : 09/23 [MammogramComments] : R breast mammogram - 10/2022 [PapSmearDate] : >20 years [BoneDensityDate] : 09/19 [ColonoscopyDate] : 01/24 [ColonoscopyComments] : EUS - 10/13,  [de-identified] : Pt doesn't cook much, pt uses [employer], [de-identified] : Hearing is a little worse, [de-identified] : dentist - 5/2023 [AdvancecareDate] : 09/24

## 2024-09-26 NOTE — REVIEW OF SYSTEMS
[Lower Ext Edema] : lower extremity edema [Dyspnea on Exertion] : dyspnea on exertion [Nausea] : nausea [Constipation] : constipation [Nocturia] : nocturia [Frequency] : frequency [Joint Pain] : joint pain [Joint Stiffness] : joint stiffness [Unsteady Walking] : ataxia [Negative] : Heme/Lymph [Fever] : no fever [Chills] : no chills [Fatigue] : no fatigue [Recent Change In Weight] : ~T no recent weight change [Chest Pain] : no chest pain [Palpitations] : no palpitations [Shortness Of Breath] : no shortness of breath [Wheezing] : no wheezing [Cough] : no cough [Abdominal Pain] : no abdominal pain [Diarrhea] : diarrhea [Vomiting] : no vomiting [Heartburn] : no heartburn [Melena] : no melena [Dysuria] : no dysuria [Incontinence] : no incontinence [Hematuria] : no hematuria [Back Pain] : no back pain [Itching] : no itching [Mole Changes] : no mole changes [Skin Rash] : no skin rash [Headache] : no headache [Dizziness] : no dizziness [Fainting] : no fainting [Insomnia] : no insomnia [Anxiety] : no anxiety [Depression] : no depression [Easy Bleeding] : no easy bleeding [Easy Bruising] : no easy bruising [Swollen Glands] : no swollen glands [FreeTextEntry2] : She  [FreeTextEntry3] : Wears reading glasses, [FreeTextEntry4] : Allergy symptoms are active and controlled with meds, on Azelastine & Montelukast, [FreeTextEntry5] : occ ankle swelling if she stays on her feet more,  [FreeTextEntry6] : Has chronic TRENT, no change [FreeTextEntry7] : Constipation is better with Probiotic, mild nausea as in HPI,  [FreeTextEntry8] : Nocturia of 1 X per night,  [FreeTextEntry9] : L shoulder is still very painful with PT, still has pain from  [de-identified] : Uses cane at home, and walker when she goes out uses a rollator,, and is doing PT, has carpal tunnel syndrome, [de-identified] : Has CHANTELL, but not using CPAP, pt declined f/u with sleep specialist, on Cymbalta for depression,

## 2024-09-26 NOTE — HEALTH RISK ASSESSMENT
[Good] : ~his/her~  mood as  good [Yes] : Yes [Monthly or less (1 pt)] : Monthly or less (1 point) [1 or 2 (0 pts)] : 1 or 2 (0 points) [Never (0 pts)] : Never (0 points) [No] : In the past 12 months have you used drugs other than those required for medical reasons? No [One fall no injury in past year] : Patient reported one fall in the past year without injury [Assistive Device] : Patient uses an assistive device [Little interest or pleasure doing things] : 1) Little interest or pleasure doing things [Feeling down, depressed, or hopeless] : 2) Feeling down, depressed, or hopeless [0] : 2) Feeling down, depressed, or hopeless: Not at all (0) [PHQ-2 Negative - No further assessment needed] : PHQ-2 Negative - No further assessment needed [Never] : Never [NO] : No [Patient declined PAP Smear] : Patient declined PAP Smear [Patient declined colonoscopy] : Patient declined colonoscopy [None] : None [With Family] : lives with family [# of Members in Household ___] :  household currently consist of [unfilled] member(s) [Retired] : retired [College] : College [Single] : single [# Of Children ___] : has [unfilled] children [Feels Safe at Home] : Feels safe at home [Fully functional (bathing, dressing, toileting, transferring, walking, feeding)] : Fully functional (bathing, dressing, toileting, transferring, walking, feeding) [Smoke Detector] : smoke detector [Carbon Monoxide Detector] : carbon monoxide detector [Seat Belt] :  uses seat belt [With Patient/Caregiver] : , with patient/caregiver [Relationship: ___] : Relationship: [unfilled] [Audit-CScore] : 1 [de-identified] : No formal exercise, walks around, doing PT 2x per week, [de-identified] : cane or rollator, [[id number]] : 0 [EyeExamDate] : 03/24 [Change in mental status noted] : No change in mental status noted [Reports changes in hearing] : Reports no changes in hearing [Reports changes in vision] : Reports no changes in vision [Reports changes in dental health] : Reports no changes in dental health [MammogramDate] : 09/23 [MammogramComments] : R breast mammogram - 10/2022 [PapSmearDate] : >20 years [ColonoscopyDate] : 01/24 [BoneDensityDate] : 09/19 [ColonoscopyComments] : EUS - 10/13,  [de-identified] : Pt doesn't cook much, pt uses [employer], [de-identified] : Hearing is a little worse, [de-identified] : dentist - 5/2023 [AdvancecareDate] : 09/24

## 2024-09-30 ENCOUNTER — TRANSCRIPTION ENCOUNTER (OUTPATIENT)
Age: 74
End: 2024-09-30

## 2024-10-02 ENCOUNTER — NON-APPOINTMENT (OUTPATIENT)
Age: 74
End: 2024-10-02

## 2024-10-03 ENCOUNTER — TRANSCRIPTION ENCOUNTER (OUTPATIENT)
Age: 74
End: 2024-10-03

## 2024-10-03 ENCOUNTER — APPOINTMENT (OUTPATIENT)
Dept: ORTHOPEDIC SURGERY | Facility: CLINIC | Age: 74
End: 2024-10-03

## 2024-10-04 ENCOUNTER — NON-APPOINTMENT (OUTPATIENT)
Age: 74
End: 2024-10-04

## 2024-10-05 ENCOUNTER — LABORATORY RESULT (OUTPATIENT)
Age: 74
End: 2024-10-05

## 2024-10-08 ENCOUNTER — TRANSCRIPTION ENCOUNTER (OUTPATIENT)
Age: 74
End: 2024-10-08

## 2024-10-09 ENCOUNTER — NON-APPOINTMENT (OUTPATIENT)
Age: 74
End: 2024-10-09

## 2024-10-09 ENCOUNTER — TRANSCRIPTION ENCOUNTER (OUTPATIENT)
Age: 74
End: 2024-10-09

## 2024-10-11 ENCOUNTER — TRANSCRIPTION ENCOUNTER (OUTPATIENT)
Age: 74
End: 2024-10-11

## 2024-10-14 ENCOUNTER — TRANSCRIPTION ENCOUNTER (OUTPATIENT)
Age: 74
End: 2024-10-14

## 2024-10-14 DIAGNOSIS — R82.90 UNSPECIFIED ABNORMAL FINDINGS IN URINE: ICD-10-CM

## 2024-10-16 ENCOUNTER — LABORATORY RESULT (OUTPATIENT)
Age: 74
End: 2024-10-16

## 2024-10-17 ENCOUNTER — TRANSCRIPTION ENCOUNTER (OUTPATIENT)
Age: 74
End: 2024-10-17

## 2024-10-17 LAB
APPEARANCE: CLEAR
BILIRUBIN URINE: NEGATIVE
BLOOD URINE: NEGATIVE
COLOR: NORMAL
GLUCOSE QUALITATIVE U: NEGATIVE MG/DL
KETONES URINE: ABNORMAL MG/DL
LEUKOCYTE ESTERASE URINE: ABNORMAL
NITRITE URINE: NEGATIVE
PH URINE: 6
PROTEIN URINE: NEGATIVE MG/DL
SPECIFIC GRAVITY URINE: 1.02
UROBILINOGEN URINE: 0.2 MG/DL

## 2024-11-05 ENCOUNTER — APPOINTMENT (OUTPATIENT)
Dept: ORTHOPEDIC SURGERY | Facility: CLINIC | Age: 74
End: 2024-11-05
Payer: MEDICARE

## 2024-11-05 DIAGNOSIS — Z96.649 PRESENCE OF UNSPECIFIED ARTIFICIAL HIP JOINT: ICD-10-CM

## 2024-11-05 PROCEDURE — 99213 OFFICE O/P EST LOW 20 MIN: CPT

## 2024-11-05 NOTE — PHYSICAL EXAM
[de-identified] : Constitutional:  73 year old female, alert and oriented, cooperative, in no acute distress.  HEENT  NC/AT.  Appearance: symmetric  Neck/Back Straight without deformity or instability.  Good ROM.  Chest/Respiratory  Respiratory effort: no intercostal retractions or use of accessory muscles. Nonlabored Breathing  Mental Status:  Judgment, insight: intact Orientation: oriented to time, place, and person  Neurological: Sensory and Motor are grossly intact throughout  Left Hip Exam: Inspection/Appearance:      Incision well healed, no erythema or drainage  Tenderness:  	Sacroiliac: Negative  	Greater trochanter: Positive                 TERENCE Test: Negative                  FADIR Test: Negative   Range of Motion:                 Extension - 0  	Flexion - 100  	IR - 30 	ER - 40 	Abd - 40  	Add - 30   Stability: Normal without instability  Right Hip:  Tenderness:  	Sacroiliac: Negative  	Greater trochanter: Positive                 TERENCE Test: Negative                  FADIR Test: Negative   Range of Motion:                 Extension - 0  	Flexion - 100  	IR - 30 	ER - 40 	Abd - 40  	Add - 30    Neurologic Exam     Motor intact including 5/5 Extensor Hallucis Longus, 5/5 Flexor Hallucis Longus, 5/5 Tibialis Anterior and 5/5 Gastrocnemius     Sensation Intact to Light Touch including Saphenous, Sural, Superficial Peroneal, Deep Peroneal, Tibial nerve distributions  No pain with range of motion of the right hip or bilateral knees. No lumbar paraspinal muscle tenderness. [de-identified] : XRay:  XRays of the Pelvis (1 View) and Left Hip (2 Views) taken on 3/26/2024. XRays demonstrate a Left Total Hip Arthroplasty in good position and alignment. There is no obvious evidence of fracture, dislocation, osteolysis or loosening. (my personal interpretation) Components: Placely S-ROM

## 2024-11-05 NOTE — DISCUSSION/SUMMARY
[de-identified] : Regina Cedeno is a 73-year-old female who presents the office for follow up of her left total hip arthroplasty.  Patient underwent left MICHELLE in 2005 by Dr. Valladares.  Prior X-rays showed left total hip arthroplasty in good position and alignment.  Examination showed tenderness over the greater trochanter.  Discussed with the patient the examination and imaging findings.  Discussed with patient that her pain is possibly secondary to greater trochanteric bursitis. Patient was given a referral to physical therapy for her hip pain.  Patient will follow-up in 6 months for reevaluation and management.  Patient understanding and in agreement with the plan.  All questions answered.  Plan: -Physical therapy -Follow-up in 6 months for reevaluation and management

## 2024-11-05 NOTE — PHYSICAL EXAM
[de-identified] : Constitutional:  73 year old female, alert and oriented, cooperative, in no acute distress.  HEENT  NC/AT.  Appearance: symmetric  Neck/Back Straight without deformity or instability.  Good ROM.  Chest/Respiratory  Respiratory effort: no intercostal retractions or use of accessory muscles. Nonlabored Breathing  Mental Status:  Judgment, insight: intact Orientation: oriented to time, place, and person  Neurological: Sensory and Motor are grossly intact throughout  Left Hip Exam: Inspection/Appearance:      Incision well healed, no erythema or drainage  Tenderness:  	Sacroiliac: Negative  	Greater trochanter: Positive                 TERENCE Test: Negative                  FADIR Test: Negative   Range of Motion:                 Extension - 0  	Flexion - 100  	IR - 30 	ER - 40 	Abd - 40  	Add - 30   Stability: Normal without instability  Right Hip:  Tenderness:  	Sacroiliac: Negative  	Greater trochanter: Positive                 TERENCE Test: Negative                  FADIR Test: Negative   Range of Motion:                 Extension - 0  	Flexion - 100  	IR - 30 	ER - 40 	Abd - 40  	Add - 30    Neurologic Exam     Motor intact including 5/5 Extensor Hallucis Longus, 5/5 Flexor Hallucis Longus, 5/5 Tibialis Anterior and 5/5 Gastrocnemius     Sensation Intact to Light Touch including Saphenous, Sural, Superficial Peroneal, Deep Peroneal, Tibial nerve distributions  No pain with range of motion of the right hip or bilateral knees. No lumbar paraspinal muscle tenderness. [de-identified] : XRay:  XRays of the Pelvis (1 View) and Left Hip (2 Views) taken on 3/26/2024. XRays demonstrate a Left Total Hip Arthroplasty in good position and alignment. There is no obvious evidence of fracture, dislocation, osteolysis or loosening. (my personal interpretation) Components: Glycode S-ROM

## 2024-11-05 NOTE — DISCUSSION/SUMMARY
[de-identified] : Regina Cedeno is a 73-year-old female who presents the office for follow up of her left total hip arthroplasty.  Patient underwent left MICHELLE in 2005 by Dr. Valladares.  Prior X-rays showed left total hip arthroplasty in good position and alignment.  Examination showed tenderness over the greater trochanter.  Discussed with the patient the examination and imaging findings.  Discussed with patient that her pain is possibly secondary to greater trochanteric bursitis. Patient was given a referral to physical therapy for her hip pain.  Patient will follow-up in 6 months for reevaluation and management.  Patient understanding and in agreement with the plan.  All questions answered.  Plan: -Physical therapy -Follow-up in 6 months for reevaluation and management

## 2024-11-07 ENCOUNTER — APPOINTMENT (OUTPATIENT)
Dept: ORTHOPEDIC SURGERY | Facility: CLINIC | Age: 74
End: 2024-11-07
Payer: MEDICARE

## 2024-11-07 VITALS — BODY MASS INDEX: 36.07 KG/M2 | HEIGHT: 62 IN | WEIGHT: 196 LBS

## 2024-11-07 DIAGNOSIS — M19.012 PRIMARY OSTEOARTHRITIS, LEFT SHOULDER: ICD-10-CM

## 2024-11-07 PROCEDURE — 73030 X-RAY EXAM OF SHOULDER: CPT | Mod: LT

## 2024-11-07 PROCEDURE — 99213 OFFICE O/P EST LOW 20 MIN: CPT | Mod: 25

## 2024-11-07 NOTE — ADDENDUM
[FreeTextEntry1] : This note was written by Lis Sanches on 11/07/2024 acting solely as a scribe for Dr. Danilo Rizvi.   All medical record entries made by the Scribe were at my, Dr. Danilo Rizvi, direction and personally dictated by me on 11/07/2024. I have personally reviewed the chart and agree that the record accurately reflects my personal performance of the history, physical exam, assessment and plan.

## 2024-11-07 NOTE — HISTORY OF PRESENT ILLNESS
[de-identified] : 74-year-old female status post left revTSA 6/14/24. Patient has been attending PT however unable to go past two weeks due to financial issues. Takes Tylenol/ Oxycodone for pain. Denies post op complications. The patient reports minimal pain.

## 2024-11-07 NOTE — PHYSICAL EXAM
[de-identified] : Left shoulder exam  Inspection: No ecchymosis, no swelling Skin: Incisions C/D/I, no drainage, healed ROM: 170 FF, 90 abd, limited ER, IR to low lumbar Painful arc ROM: with further motion Tenderness: Mild tenderness throughout the shoulder girdle Strength: 4-/5 ER, 5/5 IR Vasc: 2+ radial pulse Neuro: AIN, PIN, Ulnar nerve intact to motor Sensation: Intact to light touch throughout. [de-identified] :  The following radiographs were ordered and read by me during this patients visit. I reviewed each radiograph in detail with the patient and discussed the findings as highlighted below.  3 views of the left shoulder were obtained, 11/07/2024, that show anatomic revTSA.

## 2024-11-07 NOTE — DISCUSSION/SUMMARY
[de-identified] : 74-year-old female status post left revTSA  Patient is doing well at this time following surgical intervention. Denies any postoperative complications. Progressing well with physical therapy, despite not being able to go for the last 2 weeks. Clinically, she has good functionality of the shoulder with some mild residual weakness. Discussion was had with the patient regarding the next phase of physical therapy as instructed.  Recommendations: 1. Continue HEP for terminal ROM exercises / strengthening and conditioning. 2. Meds: Tylenol/NSAID's as tolerated. 3. Ice PRN 4. Return to ADL's, activity as instructed.  Follow-up as needed

## 2024-11-09 ENCOUNTER — APPOINTMENT (OUTPATIENT)
Dept: MRI IMAGING | Facility: IMAGING CENTER | Age: 74
End: 2024-11-09

## 2024-11-11 ENCOUNTER — NON-APPOINTMENT (OUTPATIENT)
Age: 74
End: 2024-11-11

## 2024-11-15 ENCOUNTER — APPOINTMENT (OUTPATIENT)
Dept: CARDIOLOGY | Facility: CLINIC | Age: 74
End: 2024-11-15

## 2024-11-18 ENCOUNTER — RX CHANGE (OUTPATIENT)
Age: 74
End: 2024-11-18

## 2024-11-18 RX ORDER — BUDESONIDE AND FORMOTEROL FUMARATE DIHYDRATE 80; 4.5 UG/1; UG/1
80-4.5 AEROSOL RESPIRATORY (INHALATION)
Qty: 30.6 | Refills: 1 | Status: ACTIVE | COMMUNITY
Start: 1900-01-01 | End: 1900-01-01

## 2024-11-19 ENCOUNTER — NON-APPOINTMENT (OUTPATIENT)
Age: 74
End: 2024-11-19

## 2024-11-19 ENCOUNTER — APPOINTMENT (OUTPATIENT)
Dept: PAIN MANAGEMENT | Facility: CLINIC | Age: 74
End: 2024-11-19
Payer: MEDICARE

## 2024-11-19 VITALS
DIASTOLIC BLOOD PRESSURE: 71 MMHG | BODY MASS INDEX: 36.07 KG/M2 | SYSTOLIC BLOOD PRESSURE: 114 MMHG | WEIGHT: 196 LBS | HEART RATE: 81 BPM | HEIGHT: 62 IN

## 2024-11-19 PROCEDURE — 99214 OFFICE O/P EST MOD 30 MIN: CPT

## 2024-11-19 NOTE — PHYSICAL EXAM
[FreeTextEntry6] : 4/5 throughout  [FreeTextEntry8] : antalgic gait with rollator walker  [FreeTextEntry1] : left lumbar paraspinal muscle tenderness with spasm and trigger point

## 2024-11-19 NOTE — ASSESSMENT
[FreeTextEntry1] : 75 y/o F with multiple medical comorbidities including PMR, CKD, TARYN, OA/DJD s/p B/L TKA, L MICHELLE, reverse Left shoulder arthroplasty in June 2024 with persistent myofascial pain exacerbated by activity/movement in addition to diffuse chronic pain.    Discussed in detail the nature of chronic pain as well as treatment options including nonopioid pain management PT, therapeutic massage, stretching, exercise program, acupuncture, CBT as well as topical medications, non-opioid pain medications, Trigger point injections, MICHELLE. The patient had the opportunity to ask questions and all were answered to their satisfaction.  The patient verbalized understanding of the management plan and agreed with our recommendations.  Will try topical diclofenac 2% if insurance approves.  -On Celebrex from other prescriber but explained risks of AE from prolonged use.  -Previously failed Tramadol and on Duloxetine  -Will provide Oxycodone 2.5/325 1 tab daily prn prior to therapy was provided and will monitor closely.  -continue PT- [facility].  UDS performed Sep 25, 2024 c/w rx I-Stop reviewed,  reference #: [id number] No signs of aberrant behavior and will continue to monitor for signs of toxicity. Reminded to continue to avoid alcohol. Patient denies other prescribers.  Discussed OD prevention education and prescribed naloxone kit  Safe storage of medication was reviewed.  f/u in 3 months or sooner if needed.

## 2024-11-19 NOTE — HISTORY OF PRESENT ILLNESS
[FreeTextEntry1] : RICHIE CARTER is a 74 year RH female with a Pmhx of Anxiety, Depression, HTN, HLD, CKD, asthma, severe TARYN, GERD/PUD, OA/DJD, s/p L MICHELLE, B/L TKA, Polymyalgia Rheumatica, Cervical s/p C3-C7 fusion 2018 and Lumbar spondylosis with radiculopathy OA left shoulder s/p reverse arthroplasty 6/2024 with chronic persistent myofascial pain who presents today for follow up.   She continues to reports chronic diffuse pain in multiple joints shoulders, knees, hips, neck and back. Neck pain > left radiating to shoulder 4-5- /10, increased with activity and decreased with rest.   She has been using Oxycodone 2.5 mg up to 2x/day prn in addition to APAP and feels it has not been as helpful as the Oxycodone 5mg prn.  constipation improved and having BM daily.  + aspercreme but no other topicals.   Allergies: NKDA  Prior meds: Tramadol no help.  Current medications : Methotrexate, Duloxetine 90mg daily, Metoprolol 25 mg daily, Sucralfate, Ventolin, Symbicort, Montelukast, famotidine, HCTZ 25 mg daily, ASA 81 mg daily, Folic acid, B12, D3, MVI, Probiotic, Oxycodone 5 mg prn taking 2x/week on average prior to PT. Celebrex.   Social Hx : She lives in [place] and lives with her son who has schizophrenia.  She does not smoke, drink, denies illicits. Previously smoked marijuana in the past.

## 2024-11-30 ENCOUNTER — OUTPATIENT (OUTPATIENT)
Dept: OUTPATIENT SERVICES | Facility: HOSPITAL | Age: 74
LOS: 1 days | End: 2024-11-30
Payer: MEDICARE

## 2024-11-30 ENCOUNTER — APPOINTMENT (OUTPATIENT)
Dept: MRI IMAGING | Facility: IMAGING CENTER | Age: 74
End: 2024-11-30

## 2024-11-30 DIAGNOSIS — Z98.1 ARTHRODESIS STATUS: Chronic | ICD-10-CM

## 2024-11-30 DIAGNOSIS — Z96.651 PRESENCE OF RIGHT ARTIFICIAL KNEE JOINT: Chronic | ICD-10-CM

## 2024-11-30 DIAGNOSIS — Z96.652 PRESENCE OF LEFT ARTIFICIAL KNEE JOINT: Chronic | ICD-10-CM

## 2024-11-30 DIAGNOSIS — Z90.89 ACQUIRED ABSENCE OF OTHER ORGANS: Chronic | ICD-10-CM

## 2024-11-30 DIAGNOSIS — Z96.642 PRESENCE OF LEFT ARTIFICIAL HIP JOINT: Chronic | ICD-10-CM

## 2024-11-30 DIAGNOSIS — K86.89 OTHER SPECIFIED DISEASES OF PANCREAS: ICD-10-CM

## 2024-11-30 DIAGNOSIS — Z98.49 CATARACT EXTRACTION STATUS, UNSPECIFIED EYE: Chronic | ICD-10-CM

## 2024-11-30 PROCEDURE — 74183 MRI ABD W/O CNTR FLWD CNTR: CPT | Mod: 26

## 2024-11-30 PROCEDURE — A9585: CPT

## 2024-11-30 PROCEDURE — 74183 MRI ABD W/O CNTR FLWD CNTR: CPT

## 2024-12-02 ENCOUNTER — TRANSCRIPTION ENCOUNTER (OUTPATIENT)
Age: 74
End: 2024-12-02

## 2024-12-03 ENCOUNTER — APPOINTMENT (OUTPATIENT)
Dept: ORTHOPEDIC SURGERY | Facility: CLINIC | Age: 74
End: 2024-12-03
Payer: MEDICARE

## 2024-12-03 DIAGNOSIS — Z96.652 PRESENCE OF LEFT ARTIFICIAL KNEE JOINT: ICD-10-CM

## 2024-12-03 PROCEDURE — 73562 X-RAY EXAM OF KNEE 3: CPT | Mod: LT

## 2024-12-03 PROCEDURE — 99213 OFFICE O/P EST LOW 20 MIN: CPT | Mod: 25

## 2024-12-03 NOTE — DISCUSSION/SUMMARY
[de-identified] : Regina Cedeno is a 73-year-old female who presents the office for follow up of her left total knee arthroplasty.  Patient underwent left TKA in 2006 by Dr. Valladares.  Prior X-rays showed left total knee arthroplasty in good position and alignment.  Examination showed tenderness over the greater trochanter.  Discussed with the patient the examination and imaging findings.  Discussed with patient that her pain is possibly secondary to knee sprain or knee strain. She has no pain at this time. Discussed home exercises. Discussed follow up with PCP for vertigo, Patient will follow-up in 6 months for reevaluation and management.  Patient understanding and in agreement with the plan.  All questions answered.  Plan: -Home Exercises -Follow up with PCP -Follow-up in 6 months for reevaluation and management

## 2024-12-03 NOTE — PHYSICAL EXAM
[de-identified] : Constitutional:  74 year old female, alert and oriented, cooperative, in no acute distress.  HEENT  NC/AT.  Appearance: symmetric  Neck/Back Straight without deformity or instability.  Good ROM.  Chest/Respiratory  Respiratory effort: no intercostal retractions or use of accessory muscles. Nonlabored Breathing  Skin  On inspection, warm and dry without rashes or lesions.  Mental Status:  Judgment, insight: intact Orientation: oriented to time, place, and person  Neurological: Sensory and Motor are grossly intact throughout  Left Knee  Inspection:     Incision well healed. No erythema or drainage     No Effusion     Non-tender to palpation over tibial tubercle, patella, medial and lateral joint line, and pes insertion.  Range of Motion: 	Extension - 0 degrees 	Flexion - 120 degrees 	Extensor lag: None  Stability:      Demonstrates no Varus or Valgus instability      Negative Anterior or Posterior drawer.      No mid flexion instability  Patella: stable, tracks well.   Neurologic Exam     Motor intact including 5/5 Extensor Hallucis Longus, 5/5 Flexor Hallucis Longus, 5/5 Tibialis Anterior and 5/5 Gastrocnemius     Sensation Intact to Light Touch including Saphenous, Sural, Superficial Peroneal, Deep Peroneal, Tibial nerve distributions  Vascular Exam     Foot is warm and well perfused with 2+ Dorsalis Pedis Pulse   No pain with range of motion of the bilateral hips or right knee. No lumbar paraspinal muscle tenderness. [de-identified] : XRay:  XRays of the Left Knee (3 Views) taken in the office today and reviewed with the patient. XRays demonstrate a Left Total Knee Arthroplasty in good position and alignment. There is no obvious evidence of fracture, dislocation, osteolysis or loosening. (my personal interpretation)

## 2024-12-03 NOTE — REVIEW OF SYSTEMS
[Negative] : Endocrine [Joint Pain] : no joint pain [Joint Swelling] : no joint swelling [Fever] : no fever [Chills] : no chills

## 2024-12-03 NOTE — HISTORY OF PRESENT ILLNESS
[de-identified] : 12/3/2024  Regina Cedeno presents to the office for evaluation of her left total knee arthroplasty.  Patient under went left TKA in 2006 by Dr. Valladares.  She has been experiencing left knee pain since Saturday.  Patient was getting out of the shower, lifted her leg and had pain.  She does not have pain today.  Pain has resolved.  She tried oxycodone.  Patient can have some vertigo.  11/5/2024  Regina Cedeno presents to the office for follow-up of her bilateral hips.  Patient has been experiencing bilateral lateral hip pain and trochanteric bursitis.  She underwent a left TSA in June and is currently in physical therapy.  She has not gone to PT for her hips.  She did have a fall in September.  Patient is taking Celebrex.  She is planned for hallux fusion.  5/2/2024  Regina Cedeno presents to the office for follow-up of her left total hip arthroplasty.  Patient's left hip pain is manageable at this time.  Her metal ion levels showed cobalt: <1, chromium: 0.5.  No falls.  No fevers or chills.  3/26/2024 Regina Cedeno is a 73-year-old female who presents the office for evaluation of her left total hip arthroplasty.  Patient underwent left MICHELLE in 2005 by Dr. Valladares.  She did well overall.  About 5 to 6 years ago, patient started to have lateral hip pain and tenderness.  Patient is also planned for lumbar spine surgery by Dr. Sommer.  She received a prior spine injection, which did not help.  She is also considering left total shoulder arthroplasty.  She had a fall in 2020 1/2020, with head strike.  Patient has balance issues and is currently in physical therapy.  She can have leg paresthesias.  No fevers.  History: HTN, HLD, Asthma, TARYN, CKD

## 2024-12-16 ENCOUNTER — TRANSCRIPTION ENCOUNTER (OUTPATIENT)
Age: 74
End: 2024-12-16

## 2024-12-19 ENCOUNTER — INPATIENT (INPATIENT)
Facility: HOSPITAL | Age: 74
LOS: 1 days | Discharge: HOME | End: 2024-12-21
Attending: INTERNAL MEDICINE | Admitting: INTERNAL MEDICINE
Payer: MEDICARE

## 2024-12-19 VITALS
WEIGHT: 199.96 LBS | TEMPERATURE: 98 F | DIASTOLIC BLOOD PRESSURE: 79 MMHG | RESPIRATION RATE: 18 BRPM | OXYGEN SATURATION: 98 % | HEIGHT: 62 IN | HEART RATE: 81 BPM | SYSTOLIC BLOOD PRESSURE: 125 MMHG

## 2024-12-19 DIAGNOSIS — Z98.1 ARTHRODESIS STATUS: Chronic | ICD-10-CM

## 2024-12-19 DIAGNOSIS — J45.909 UNSPECIFIED ASTHMA, UNCOMPLICATED: ICD-10-CM

## 2024-12-19 DIAGNOSIS — I10 ESSENTIAL (PRIMARY) HYPERTENSION: ICD-10-CM

## 2024-12-19 DIAGNOSIS — F32.9 MAJOR DEPRESSIVE DISORDER, SINGLE EPISODE, UNSPECIFIED: ICD-10-CM

## 2024-12-19 DIAGNOSIS — M06.9 RHEUMATOID ARTHRITIS, UNSPECIFIED: ICD-10-CM

## 2024-12-19 DIAGNOSIS — Z87.11 PERSONAL HISTORY OF PEPTIC ULCER DISEASE: ICD-10-CM

## 2024-12-19 DIAGNOSIS — E87.6 HYPOKALEMIA: ICD-10-CM

## 2024-12-19 DIAGNOSIS — G47.33 OBSTRUCTIVE SLEEP APNEA (ADULT) (PEDIATRIC): ICD-10-CM

## 2024-12-19 DIAGNOSIS — Z96.651 PRESENCE OF RIGHT ARTIFICIAL KNEE JOINT: Chronic | ICD-10-CM

## 2024-12-19 DIAGNOSIS — Z90.89 ACQUIRED ABSENCE OF OTHER ORGANS: Chronic | ICD-10-CM

## 2024-12-19 DIAGNOSIS — Z98.49 CATARACT EXTRACTION STATUS, UNSPECIFIED EYE: Chronic | ICD-10-CM

## 2024-12-19 DIAGNOSIS — Z29.9 ENCOUNTER FOR PROPHYLACTIC MEASURES, UNSPECIFIED: ICD-10-CM

## 2024-12-19 DIAGNOSIS — E78.5 HYPERLIPIDEMIA, UNSPECIFIED: ICD-10-CM

## 2024-12-19 DIAGNOSIS — Z96.642 PRESENCE OF LEFT ARTIFICIAL HIP JOINT: Chronic | ICD-10-CM

## 2024-12-19 DIAGNOSIS — Z96.652 PRESENCE OF LEFT ARTIFICIAL KNEE JOINT: Chronic | ICD-10-CM

## 2024-12-19 LAB
ALBUMIN SERPL ELPH-MCNC: 2.9 G/DL — LOW (ref 3.3–5)
ALP SERPL-CCNC: 96 U/L — SIGNIFICANT CHANGE UP (ref 40–120)
ALT FLD-CCNC: 20 U/L — SIGNIFICANT CHANGE UP (ref 12–78)
ANION GAP SERPL CALC-SCNC: 7 MMOL/L — SIGNIFICANT CHANGE UP (ref 5–17)
APTT BLD: 29.2 SEC — SIGNIFICANT CHANGE UP (ref 24.5–35.6)
AST SERPL-CCNC: 25 U/L — SIGNIFICANT CHANGE UP (ref 15–37)
BASOPHILS # BLD AUTO: 0.01 K/UL — SIGNIFICANT CHANGE UP (ref 0–0.2)
BASOPHILS NFR BLD AUTO: 0.2 % — SIGNIFICANT CHANGE UP (ref 0–2)
BILIRUB SERPL-MCNC: 0.3 MG/DL — SIGNIFICANT CHANGE UP (ref 0.2–1.2)
BUN SERPL-MCNC: 17 MG/DL — SIGNIFICANT CHANGE UP (ref 7–23)
CALCIUM SERPL-MCNC: 8.8 MG/DL — SIGNIFICANT CHANGE UP (ref 8.5–10.1)
CHLORIDE SERPL-SCNC: 102 MMOL/L — SIGNIFICANT CHANGE UP (ref 96–108)
CO2 SERPL-SCNC: 32 MMOL/L — HIGH (ref 22–31)
CREAT SERPL-MCNC: 0.96 MG/DL — SIGNIFICANT CHANGE UP (ref 0.5–1.3)
EGFR: 62 ML/MIN/1.73M2 — SIGNIFICANT CHANGE UP
EGFR: 62 ML/MIN/1.73M2 — SIGNIFICANT CHANGE UP
EOSINOPHIL # BLD AUTO: 0.3 K/UL — SIGNIFICANT CHANGE UP (ref 0–0.5)
EOSINOPHIL NFR BLD AUTO: 6.8 % — HIGH (ref 0–6)
GLUCOSE SERPL-MCNC: 106 MG/DL — HIGH (ref 70–99)
HCT VFR BLD CALC: 32.3 % — LOW (ref 34.5–45)
HGB BLD-MCNC: 10.2 G/DL — LOW (ref 11.5–15.5)
IMM GRANULOCYTES NFR BLD AUTO: 0.2 % — SIGNIFICANT CHANGE UP (ref 0–0.9)
INR BLD: 1.07 RATIO — SIGNIFICANT CHANGE UP (ref 0.85–1.16)
LYMPHOCYTES # BLD AUTO: 1.62 K/UL — SIGNIFICANT CHANGE UP (ref 1–3.3)
LYMPHOCYTES # BLD AUTO: 36.7 % — SIGNIFICANT CHANGE UP (ref 13–44)
MCHC RBC-ENTMCNC: 29.5 PG — SIGNIFICANT CHANGE UP (ref 27–34)
MCHC RBC-ENTMCNC: 31.6 G/DL — LOW (ref 32–36)
MCV RBC AUTO: 93.4 FL — SIGNIFICANT CHANGE UP (ref 80–100)
MONOCYTES # BLD AUTO: 0.49 K/UL — SIGNIFICANT CHANGE UP (ref 0–0.9)
MONOCYTES NFR BLD AUTO: 11.1 % — SIGNIFICANT CHANGE UP (ref 2–14)
NEUTROPHILS # BLD AUTO: 1.99 K/UL — SIGNIFICANT CHANGE UP (ref 1.8–7.4)
NEUTROPHILS NFR BLD AUTO: 45 % — SIGNIFICANT CHANGE UP (ref 43–77)
NRBC # BLD: 0 /100 WBCS — SIGNIFICANT CHANGE UP (ref 0–0)
NRBC BLD-RTO: 0 /100 WBCS — SIGNIFICANT CHANGE UP (ref 0–0)
PLATELET # BLD AUTO: 216 K/UL — SIGNIFICANT CHANGE UP (ref 150–400)
POTASSIUM SERPL-MCNC: 3.2 MMOL/L — LOW (ref 3.5–5.3)
POTASSIUM SERPL-SCNC: 3.2 MMOL/L — LOW (ref 3.5–5.3)
PROT SERPL-MCNC: 7.8 GM/DL — SIGNIFICANT CHANGE UP (ref 6–8.3)
PROTHROM AB SERPL-ACNC: 12.4 SEC — SIGNIFICANT CHANGE UP (ref 9.9–13.4)
RBC # BLD: 3.46 M/UL — LOW (ref 3.8–5.2)
RBC # FLD: 14.9 % — HIGH (ref 10.3–14.5)
SODIUM SERPL-SCNC: 141 MMOL/L — SIGNIFICANT CHANGE UP (ref 135–145)
TROPONIN I, HIGH SENSITIVITY RESULT: 7.4 NG/L — SIGNIFICANT CHANGE UP
WBC # BLD: 4.42 K/UL — SIGNIFICANT CHANGE UP (ref 3.8–10.5)
WBC # FLD AUTO: 4.42 K/UL — SIGNIFICANT CHANGE UP (ref 3.8–10.5)

## 2024-12-19 RX ORDER — LACTOBACILLUS ACIDOPHILUS/PECT 75 MM-100
1 CAPSULE ORAL DAILY
Refills: 0 | Status: DISCONTINUED | OUTPATIENT
Start: 2024-12-19 | End: 2024-12-21

## 2024-12-19 RX ORDER — CLOPIDOGREL BISULFATE 75 MG/1
75 TABLET, FILM COATED ORAL DAILY
Refills: 0 | Status: DISCONTINUED | OUTPATIENT
Start: 2024-12-19 | End: 2024-12-21

## 2024-12-19 RX ORDER — ALBUTEROL SULFATE 2.5 MG/3ML
2 VIAL, NEBULIZER (ML) INHALATION EVERY 6 HOURS
Refills: 0 | Status: DISCONTINUED | OUTPATIENT
Start: 2024-12-19 | End: 2024-12-21

## 2024-12-19 RX ORDER — ATORVASTATIN CALCIUM 80 MG/1
40 TABLET, FILM COATED ORAL AT BEDTIME
Refills: 0 | Status: DISCONTINUED | OUTPATIENT
Start: 2024-12-19 | End: 2024-12-21

## 2024-12-19 RX ORDER — MAGNESIUM, ALUMINUM HYDROXIDE 200-200 MG
30 TABLET,CHEWABLE ORAL EVERY 4 HOURS
Refills: 0 | Status: DISCONTINUED | OUTPATIENT
Start: 2024-12-19 | End: 2024-12-21

## 2024-12-19 RX ORDER — OXYCODONE HYDROCHLORIDE 30 MG/1
5 TABLET ORAL EVERY 6 HOURS
Refills: 0 | Status: DISCONTINUED | OUTPATIENT
Start: 2024-12-19 | End: 2024-12-21

## 2024-12-19 RX ORDER — ASPIRIN 325 MG
325 TABLET ORAL ONCE
Refills: 0 | Status: COMPLETED | OUTPATIENT
Start: 2024-12-19 | End: 2024-12-19

## 2024-12-19 RX ORDER — METHOTREXATE 25 MG/ML
12.5 INJECTION, SOLUTION INTRA-ARTERIAL; INTRAMUSCULAR; INTRATHECAL; INTRAVENOUS
Refills: 0 | Status: DISCONTINUED | OUTPATIENT
Start: 2024-12-20 | End: 2024-12-21

## 2024-12-19 RX ORDER — B1/B2/B3/B5/B6/B12/VIT C/FOLIC 500-0.5 MG
1 TABLET ORAL DAILY
Refills: 0 | Status: DISCONTINUED | OUTPATIENT
Start: 2024-12-19 | End: 2024-12-21

## 2024-12-19 RX ORDER — DULOXETINE 20 MG/1
90 CAPSULE, DELAYED RELEASE ORAL DAILY
Refills: 0 | Status: DISCONTINUED | OUTPATIENT
Start: 2024-12-19 | End: 2024-12-21

## 2024-12-19 RX ORDER — MELATONIN 5 MG
3 TABLET ORAL AT BEDTIME
Refills: 0 | Status: DISCONTINUED | OUTPATIENT
Start: 2024-12-19 | End: 2024-12-21

## 2024-12-19 RX ORDER — FOLIC ACID 1 MG/1
1 TABLET ORAL DAILY
Refills: 0 | Status: DISCONTINUED | OUTPATIENT
Start: 2024-12-19 | End: 2024-12-21

## 2024-12-19 RX ORDER — ASPIRIN 325 MG
81 TABLET ORAL DAILY
Refills: 0 | Status: DISCONTINUED | OUTPATIENT
Start: 2024-12-20 | End: 2024-12-21

## 2024-12-19 RX ORDER — ACETAMINOPHEN 500 MG/5ML
650 LIQUID (ML) ORAL EVERY 6 HOURS
Refills: 0 | Status: DISCONTINUED | OUTPATIENT
Start: 2024-12-19 | End: 2024-12-21

## 2024-12-19 RX ORDER — SUCRALFATE 1 G
1 TABLET ORAL
Refills: 0 | Status: DISCONTINUED | OUTPATIENT
Start: 2024-12-19 | End: 2024-12-21

## 2024-12-19 RX ADMIN — ATORVASTATIN CALCIUM 40 MILLIGRAM(S): 80 TABLET, FILM COATED ORAL at 23:12

## 2024-12-19 RX ADMIN — CLOPIDOGREL BISULFATE 75 MILLIGRAM(S): 75 TABLET, FILM COATED ORAL at 23:12

## 2024-12-19 RX ADMIN — Medication 325 MILLIGRAM(S): at 23:12

## 2024-12-19 RX ADMIN — Medication 40 MILLIEQUIVALENT(S): at 23:11

## 2024-12-20 ENCOUNTER — RESULT REVIEW (OUTPATIENT)
Age: 74
End: 2024-12-20

## 2024-12-20 PROBLEM — M19.012 PRIMARY OSTEOARTHRITIS, LEFT SHOULDER: Chronic | Status: INACTIVE | Noted: 2024-06-06 | Resolved: 2024-12-19

## 2024-12-20 PROBLEM — E78.5 HYPERLIPIDEMIA, UNSPECIFIED: Chronic | Status: INACTIVE | Noted: 2024-06-06 | Resolved: 2024-12-19

## 2024-12-20 LAB
A1C WITH ESTIMATED AVERAGE GLUCOSE RESULT: 6.2 % — HIGH (ref 4–5.6)
ALBUMIN SERPL ELPH-MCNC: 2.8 G/DL — LOW (ref 3.3–5)
ALP SERPL-CCNC: 91 U/L — SIGNIFICANT CHANGE UP (ref 40–120)
ALT FLD-CCNC: 17 U/L — SIGNIFICANT CHANGE UP (ref 12–78)
ANION GAP SERPL CALC-SCNC: 7 MMOL/L — SIGNIFICANT CHANGE UP (ref 5–17)
APPEARANCE UR: CLEAR — SIGNIFICANT CHANGE UP
AST SERPL-CCNC: 18 U/L — SIGNIFICANT CHANGE UP (ref 15–37)
BILIRUB SERPL-MCNC: 0.4 MG/DL — SIGNIFICANT CHANGE UP (ref 0.2–1.2)
BILIRUB UR-MCNC: NEGATIVE — SIGNIFICANT CHANGE UP
BUN SERPL-MCNC: 13 MG/DL — SIGNIFICANT CHANGE UP (ref 7–23)
CALCIUM SERPL-MCNC: 8.9 MG/DL — SIGNIFICANT CHANGE UP (ref 8.5–10.1)
CHLORIDE SERPL-SCNC: 105 MMOL/L — SIGNIFICANT CHANGE UP (ref 96–108)
CHOLEST SERPL-MCNC: 177 MG/DL — SIGNIFICANT CHANGE UP
CO2 SERPL-SCNC: 30 MMOL/L — SIGNIFICANT CHANGE UP (ref 22–31)
COLOR SPEC: YELLOW — SIGNIFICANT CHANGE UP
CREAT SERPL-MCNC: 0.76 MG/DL — SIGNIFICANT CHANGE UP (ref 0.5–1.3)
DIFF PNL FLD: NEGATIVE — SIGNIFICANT CHANGE UP
EGFR: 82 ML/MIN/1.73M2 — SIGNIFICANT CHANGE UP
EGFR: 82 ML/MIN/1.73M2 — SIGNIFICANT CHANGE UP
ESTIMATED AVERAGE GLUCOSE: 131 MG/DL — HIGH (ref 68–114)
GLUCOSE SERPL-MCNC: 98 MG/DL — SIGNIFICANT CHANGE UP (ref 70–99)
GLUCOSE UR QL: NEGATIVE MG/DL — SIGNIFICANT CHANGE UP
HCT VFR BLD CALC: 32.2 % — LOW (ref 34.5–45)
HDLC SERPL-MCNC: 65 MG/DL — SIGNIFICANT CHANGE UP
HGB BLD-MCNC: 10.1 G/DL — LOW (ref 11.5–15.5)
KETONES UR-MCNC: NEGATIVE MG/DL — SIGNIFICANT CHANGE UP
LDLC SERPL-MCNC: 98 MG/DL — SIGNIFICANT CHANGE UP
LEUKOCYTE ESTERASE UR-ACNC: ABNORMAL
LIPID PNL WITH DIRECT LDL SERPL: 98 MG/DL — SIGNIFICANT CHANGE UP
MCHC RBC-ENTMCNC: 29.4 PG — SIGNIFICANT CHANGE UP (ref 27–34)
MCHC RBC-ENTMCNC: 31.4 G/DL — LOW (ref 32–36)
MCV RBC AUTO: 93.6 FL — SIGNIFICANT CHANGE UP (ref 80–100)
NITRITE UR-MCNC: NEGATIVE — SIGNIFICANT CHANGE UP
NONHDLC SERPL-MCNC: 112 MG/DL — SIGNIFICANT CHANGE UP
NRBC # BLD: 0 /100 WBCS — SIGNIFICANT CHANGE UP (ref 0–0)
NRBC BLD-RTO: 0 /100 WBCS — SIGNIFICANT CHANGE UP (ref 0–0)
PH UR: 8 — SIGNIFICANT CHANGE UP (ref 5–8)
PLATELET # BLD AUTO: 210 K/UL — SIGNIFICANT CHANGE UP (ref 150–400)
POTASSIUM SERPL-MCNC: 3.1 MMOL/L — LOW (ref 3.5–5.3)
POTASSIUM SERPL-SCNC: 3.1 MMOL/L — LOW (ref 3.5–5.3)
PROT SERPL-MCNC: 7.7 GM/DL — SIGNIFICANT CHANGE UP (ref 6–8.3)
PROT UR-MCNC: NEGATIVE MG/DL — SIGNIFICANT CHANGE UP
RBC # BLD: 3.44 M/UL — LOW (ref 3.8–5.2)
RBC # FLD: 14.9 % — HIGH (ref 10.3–14.5)
SODIUM SERPL-SCNC: 142 MMOL/L — SIGNIFICANT CHANGE UP (ref 135–145)
SP GR SPEC: >1.03 — HIGH (ref 1–1.03)
TRIGL SERPL-MCNC: 75 MG/DL — SIGNIFICANT CHANGE UP
UROBILINOGEN FLD QL: 0.2 MG/DL — SIGNIFICANT CHANGE UP (ref 0.2–1)
WBC # BLD: 4.12 K/UL — SIGNIFICANT CHANGE UP (ref 3.8–10.5)
WBC # FLD AUTO: 4.12 K/UL — SIGNIFICANT CHANGE UP (ref 3.8–10.5)

## 2024-12-20 RX ORDER — ENOXAPARIN SODIUM 100 MG/ML
40 INJECTION SUBCUTANEOUS EVERY 24 HOURS
Refills: 0 | Status: DISCONTINUED | OUTPATIENT
Start: 2024-12-20 | End: 2024-12-21

## 2024-12-20 RX ADMIN — Medication 650 MILLIGRAM(S): at 16:52

## 2024-12-20 RX ADMIN — DULOXETINE 90 MILLIGRAM(S): 20 CAPSULE, DELAYED RELEASE ORAL at 13:05

## 2024-12-20 RX ADMIN — Medication 81 MILLIGRAM(S): at 13:04

## 2024-12-20 RX ADMIN — Medication 3 MILLIGRAM(S): at 21:49

## 2024-12-20 RX ADMIN — Medication 40 MILLIEQUIVALENT(S): at 17:08

## 2024-12-20 RX ADMIN — FOLIC ACID 1 MILLIGRAM(S): 1 TABLET ORAL at 13:04

## 2024-12-20 RX ADMIN — CLOPIDOGREL BISULFATE 75 MILLIGRAM(S): 75 TABLET, FILM COATED ORAL at 13:04

## 2024-12-20 RX ADMIN — METHOTREXATE 12.5 MILLIGRAM(S): 25 INJECTION, SOLUTION INTRA-ARTERIAL; INTRAMUSCULAR; INTRATHECAL; INTRAVENOUS at 13:05

## 2024-12-20 RX ADMIN — Medication 1 GRAM(S): at 17:09

## 2024-12-20 RX ADMIN — Medication 650 MILLIGRAM(S): at 15:53

## 2024-12-20 RX ADMIN — Medication 1 TABLET(S): at 13:09

## 2024-12-20 RX ADMIN — Medication 1 DOSE(S): at 17:09

## 2024-12-20 RX ADMIN — Medication 20 MILLIGRAM(S): at 05:02

## 2024-12-20 RX ADMIN — Medication 1 TABLET(S): at 13:07

## 2024-12-20 RX ADMIN — ATORVASTATIN CALCIUM 40 MILLIGRAM(S): 80 TABLET, FILM COATED ORAL at 21:50

## 2024-12-20 RX ADMIN — Medication 20 MILLIGRAM(S): at 17:08

## 2024-12-20 RX ADMIN — ENOXAPARIN SODIUM 40 MILLIGRAM(S): 100 INJECTION SUBCUTANEOUS at 17:09

## 2024-12-20 RX ADMIN — Medication 1 GRAM(S): at 05:02

## 2024-12-20 RX ADMIN — Medication 1 DOSE(S): at 05:05

## 2024-12-20 RX ADMIN — Medication 40 MILLIEQUIVALENT(S): at 13:05

## 2024-12-21 ENCOUNTER — TRANSCRIPTION ENCOUNTER (OUTPATIENT)
Age: 74
End: 2024-12-21

## 2024-12-21 VITALS — HEART RATE: 104 BPM | OXYGEN SATURATION: 95 %

## 2024-12-21 RX ORDER — ATORVASTATIN CALCIUM 80 MG/1
1 TABLET, FILM COATED ORAL
Qty: 30 | Refills: 0
Start: 2024-12-21 | End: 2025-01-19

## 2024-12-21 RX ORDER — ASPIRIN 325 MG
1 TABLET ORAL
Qty: 30 | Refills: 0
Start: 2024-12-21 | End: 2025-01-19

## 2024-12-21 RX ORDER — ASPIRIN 325 MG
1 TABLET ORAL
Refills: 0 | DISCHARGE

## 2024-12-21 RX ADMIN — Medication 1 TABLET(S): at 11:44

## 2024-12-21 RX ADMIN — CLOPIDOGREL BISULFATE 75 MILLIGRAM(S): 75 TABLET, FILM COATED ORAL at 11:46

## 2024-12-21 RX ADMIN — Medication 1 TABLET(S): at 11:45

## 2024-12-21 RX ADMIN — Medication 650 MILLIGRAM(S): at 09:59

## 2024-12-21 RX ADMIN — Medication 650 MILLIGRAM(S): at 11:00

## 2024-12-21 RX ADMIN — Medication 20 MILLIGRAM(S): at 16:36

## 2024-12-21 RX ADMIN — Medication 1 DOSE(S): at 16:37

## 2024-12-21 RX ADMIN — ENOXAPARIN SODIUM 40 MILLIGRAM(S): 100 INJECTION SUBCUTANEOUS at 16:36

## 2024-12-21 RX ADMIN — Medication 1 GRAM(S): at 05:04

## 2024-12-21 RX ADMIN — DULOXETINE 90 MILLIGRAM(S): 20 CAPSULE, DELAYED RELEASE ORAL at 11:44

## 2024-12-21 RX ADMIN — FOLIC ACID 1 MILLIGRAM(S): 1 TABLET ORAL at 11:45

## 2024-12-21 RX ADMIN — Medication 20 MILLIGRAM(S): at 05:02

## 2024-12-21 RX ADMIN — Medication 81 MILLIGRAM(S): at 11:45

## 2024-12-21 RX ADMIN — Medication 1 GRAM(S): at 16:35

## 2024-12-21 RX ADMIN — Medication 1 DOSE(S): at 05:05

## 2024-12-23 ENCOUNTER — TRANSCRIPTION ENCOUNTER (OUTPATIENT)
Age: 74
End: 2024-12-23

## 2024-12-24 ENCOUNTER — TRANSCRIPTION ENCOUNTER (OUTPATIENT)
Age: 74
End: 2024-12-24

## 2024-12-28 NOTE — DISCUSSION/SUMMARY
[FreeTextEntry1] :   Summary: On 12/19/2024 she had an episode of "speech disturbance and left-sided shaking?  Her presentation is consistent with right brain dysfunction, probably right hemispheric.  Etiology remains uncertain.  A TIA is possible but the mechanism is unknown and there is no evidence of large vessel occlusive disease or infarction, and TTE was unremarkable.  CTP showed a small area of mild hypoperfusion in the right centrum semiovale which may be of no clinical significance, could potentially reflect mild ischemia, or perhaps could reflect a postictal state.  Another possibility is a seizure, despite the normal EEG (12/20/2024).  Suggest 48-hour ambulatory EEG?  Dopplers  Diagnosis unclear-as a precaution, continue aspirin but stop Plavix after about 3 weeks?

## 2024-12-28 NOTE — HISTORY OF PRESENT ILLNESS
[FreeTextEntry1] : 74-year-old lady  On 12/19/2024 she was admitted to [facility] with "slurring of speech, left-sided body shaking".  MRI brain (12/19/2024) to my eye showed mild-moderate periventricular and subcortical white matter hyperintensities of presumed vascular origin.  CTA neck  (12/19/2024) to my eye showed calcified plaque at the right carotid bifurcation without significant stenosis.  For technical reasons, the extracranial vertebral arteries were not well seen.  CTA head (12/19/2024) to my eye was unremarkable.  CTP (12/19/2024).  CBF <30% = 0 cc; Tmax >6 seconds = 13 cc involving the right centrum semiovale.  TTE (12/20/2024): Mild TR.  EEG (12/20/2024) was normal.

## 2024-12-31 ENCOUNTER — APPOINTMENT (OUTPATIENT)
Age: 74
End: 2024-12-31
Payer: MEDICARE

## 2024-12-31 DIAGNOSIS — Z79.82 LONG TERM (CURRENT) USE OF ASPIRIN: ICD-10-CM

## 2024-12-31 DIAGNOSIS — H40.9 UNSPECIFIED GLAUCOMA: ICD-10-CM

## 2024-12-31 DIAGNOSIS — I10 ESSENTIAL (PRIMARY) HYPERTENSION: ICD-10-CM

## 2024-12-31 DIAGNOSIS — R47.81 SLURRED SPEECH: ICD-10-CM

## 2024-12-31 DIAGNOSIS — E87.6 HYPOKALEMIA: ICD-10-CM

## 2024-12-31 DIAGNOSIS — Z91.018 ALLERGY TO OTHER FOODS: ICD-10-CM

## 2024-12-31 DIAGNOSIS — F33.9 MAJOR DEPRESSIVE DISORDER, RECURRENT, UNSPECIFIED: ICD-10-CM

## 2024-12-31 DIAGNOSIS — G47.33 OBSTRUCTIVE SLEEP APNEA (ADULT) (PEDIATRIC): ICD-10-CM

## 2024-12-31 DIAGNOSIS — M06.9 RHEUMATOID ARTHRITIS, UNSPECIFIED: ICD-10-CM

## 2024-12-31 DIAGNOSIS — R73.09 OTHER ABNORMAL GLUCOSE: ICD-10-CM

## 2024-12-31 DIAGNOSIS — Z86.73 PERSONAL HISTORY OF TRANSIENT ISCHEMIC ATTACK (TIA), AND CEREBRAL INFARCTION W/OUT RESIDUAL DEFICITS: ICD-10-CM

## 2024-12-31 DIAGNOSIS — E66.9 OBESITY, UNSPECIFIED: ICD-10-CM

## 2024-12-31 DIAGNOSIS — Z88.1 ALLERGY STATUS TO OTHER ANTIBIOTIC AGENTS: ICD-10-CM

## 2024-12-31 DIAGNOSIS — M19.90 UNSPECIFIED OSTEOARTHRITIS, UNSPECIFIED SITE: ICD-10-CM

## 2024-12-31 DIAGNOSIS — R29.6 REPEATED FALLS: ICD-10-CM

## 2024-12-31 DIAGNOSIS — J45.909 UNSPECIFIED ASTHMA, UNCOMPLICATED: ICD-10-CM

## 2024-12-31 DIAGNOSIS — Z96.653 PRESENCE OF ARTIFICIAL KNEE JOINT, BILATERAL: ICD-10-CM

## 2024-12-31 DIAGNOSIS — R73.03 PREDIABETES: ICD-10-CM

## 2024-12-31 DIAGNOSIS — Z96.651 PRESENCE OF RIGHT ARTIFICIAL KNEE JOINT: ICD-10-CM

## 2024-12-31 DIAGNOSIS — K21.9 GASTRO-ESOPHAGEAL REFLUX DISEASE WITHOUT ESOPHAGITIS: ICD-10-CM

## 2024-12-31 DIAGNOSIS — Z96.642 PRESENCE OF LEFT ARTIFICIAL HIP JOINT: ICD-10-CM

## 2024-12-31 DIAGNOSIS — F32.9 MAJOR DEPRESSIVE DISORDER, SINGLE EPISODE, UNSPECIFIED: ICD-10-CM

## 2024-12-31 DIAGNOSIS — E78.00 PURE HYPERCHOLESTEROLEMIA, UNSPECIFIED: ICD-10-CM

## 2024-12-31 PROCEDURE — 99349 HOME/RES VST EST MOD MDM 40: CPT | Mod: 95

## 2024-12-31 NOTE — ASSESSMENT
[FreeTextEntry1] : Ms. Cedeno is a 73 y/o woman with multiple comorbidities with recent hospitalization for TIA. She should continue current medication regimen. Patient to follow up with neurology in regard to atorvastatin dosage. Encourage lifestyle modifications for pre-diabetes management. Explore options for nutritional support and physical therapy continuation.

## 2024-12-31 NOTE — COUNSELING
[Fall prevention counseling provided] : Fall prevention counseling provided [Adequate lighting] : Adequate lighting [No throw rugs] : No throw rugs [Use proper foot wear] : Use proper foot wear [Use recommended devices] : Use recommended devices [Behavioral health counseling provided] : Behavioral health counseling provided

## 2024-12-31 NOTE — HEALTH RISK ASSESSMENT
[Any fall with injury in past year] : Patient reported fall with injury in the past year [Other reason not done] : Other reason not done

## 2024-12-31 NOTE — HISTORY OF PRESENT ILLNESS
[Home] : at home, [unfilled] , at the time of the visit. [Other Location: e.g. Home (Enter Location, City,State)___] : at [unfilled] [Verbal consent obtained from patient] : the patient, [unfilled] [FreeTextEntry1] : [facility] Hospitalization follow up [de-identified] : Ms. Cedeno is a 73 y/o woman with Asthma, Depression, Gastric ulcer, GERD, HLD, HTN, RA, and Left shoulder arthroplasty who presented to [facility] with slurring speech and left-sided shaking. Admitted for neurological workup. Neurology consulted. Negative workup. Diagnosed with TIA: Atorvastatin increased to 40mg and Asa 81mg. Discharged home.   Ms. Cedeno is being seen today for [facility] Hospitalization follow-up. Recent hospitalization for TIA. She reports that her son called 911 and symptoms had resolved by the time EMS arrived. The patient denies any recurrence of symptoms since discharge. Reports ongoing pain in left shoulder from recent surgery and pain in big toe related to bunion. Medications reviewed: She is currently taking 20mg of atorvastatin instead of 40mg.

## 2024-12-31 NOTE — HISTORY OF PRESENT ILLNESS
[Home] : at home, [unfilled] , at the time of the visit. [Other Location: e.g. Home (Enter Location, City,State)___] : at [unfilled] [Verbal consent obtained from patient] : the patient, [unfilled] [FreeTextEntry1] : [facility] Hospitalization follow up [de-identified] : Ms. Cedeno is a 73 y/o woman with Asthma, Depression, Gastric ulcer, GERD, HLD, HTN, RA, and Left shoulder arthroplasty who presented to [facility] with slurring speech and left-sided shaking. Admitted for neurological workup. Neurology consulted. Negative workup. Diagnosed with TIA: Atorvastatin increased to 40mg and Asa 81mg. Discharged home.   Ms. Cedeno is being seen today for [facility] Hospitalization follow-up. Recent hospitalization for TIA. She reports that her son called 911 and symptoms had resolved by the time EMS arrived. The patient denies any recurrence of symptoms since discharge. Reports ongoing pain in left shoulder from recent surgery and pain in big toe related to bunion. Medications reviewed: She is currently taking 20mg of atorvastatin instead of 40mg.

## 2024-12-31 NOTE — REVIEW OF SYSTEMS
[Back Pain] : back pain [Unsteady Walking] : ataxia [Depression] : depression [Negative] : Integumentary [de-identified] : forgetful

## 2024-12-31 NOTE — ASSESSMENT
[FreeTextEntry1] : Ms. Cedeno is a 75 y/o woman with multiple comorbidities with recent hospitalization for TIA. She should continue current medication regimen. Patient to follow up with neurology in regard to atorvastatin dosage. Encourage lifestyle modifications for pre-diabetes management. Explore options for nutritional support and physical therapy continuation.

## 2024-12-31 NOTE — REVIEW OF SYSTEMS
[Back Pain] : back pain [Unsteady Walking] : ataxia [Depression] : depression [Negative] : Integumentary [de-identified] : forgetful

## 2024-12-31 NOTE — PLAN
[FreeTextEntry1] : - Clarify atorvastatin dosage with neurologist at upcoming appointment (prescribed 40mg, taking 20mg)   - Follow up with neurologist on January 3rd as scheduled   - Monitor blood pressure at home, preferably in the morning before medications     - Encourage adherence to diabetic diet to manage pre-diabetes     - Explore options for Meals on Wheels or [employer] for nutritional support     - Discuss physical therapy options     - Continue weekly follow-ups for the next 30 days post-discharge     - Encourage increased physical activity as tolerated, such as walking in hallways     - Consider referral to dietician for nutritional counseling

## 2025-01-02 ENCOUNTER — APPOINTMENT (OUTPATIENT)
Dept: MAMMOGRAPHY | Facility: IMAGING CENTER | Age: 75
End: 2025-01-02
Payer: MEDICARE

## 2025-01-02 ENCOUNTER — RESULT REVIEW (OUTPATIENT)
Age: 75
End: 2025-01-02

## 2025-01-02 ENCOUNTER — OUTPATIENT (OUTPATIENT)
Dept: OUTPATIENT SERVICES | Facility: HOSPITAL | Age: 75
LOS: 1 days | End: 2025-01-02
Payer: MEDICARE

## 2025-01-02 DIAGNOSIS — Z90.89 ACQUIRED ABSENCE OF OTHER ORGANS: Chronic | ICD-10-CM

## 2025-01-02 DIAGNOSIS — Z00.8 ENCOUNTER FOR OTHER GENERAL EXAMINATION: ICD-10-CM

## 2025-01-02 DIAGNOSIS — Z98.49 CATARACT EXTRACTION STATUS, UNSPECIFIED EYE: Chronic | ICD-10-CM

## 2025-01-02 DIAGNOSIS — Z96.651 PRESENCE OF RIGHT ARTIFICIAL KNEE JOINT: Chronic | ICD-10-CM

## 2025-01-02 DIAGNOSIS — Z96.642 PRESENCE OF LEFT ARTIFICIAL HIP JOINT: Chronic | ICD-10-CM

## 2025-01-02 DIAGNOSIS — Z96.652 PRESENCE OF LEFT ARTIFICIAL KNEE JOINT: Chronic | ICD-10-CM

## 2025-01-02 DIAGNOSIS — Z98.1 ARTHRODESIS STATUS: Chronic | ICD-10-CM

## 2025-01-02 PROCEDURE — 77063 BREAST TOMOSYNTHESIS BI: CPT | Mod: 26

## 2025-01-02 PROCEDURE — 77067 SCR MAMMO BI INCL CAD: CPT

## 2025-01-02 PROCEDURE — 77063 BREAST TOMOSYNTHESIS BI: CPT

## 2025-01-02 PROCEDURE — 77067 SCR MAMMO BI INCL CAD: CPT | Mod: 26

## 2025-01-03 ENCOUNTER — APPOINTMENT (OUTPATIENT)
Dept: NEUROLOGY | Facility: CLINIC | Age: 75
End: 2025-01-03

## 2025-01-03 ENCOUNTER — TRANSCRIPTION ENCOUNTER (OUTPATIENT)
Age: 75
End: 2025-01-03

## 2025-01-03 VITALS — DIASTOLIC BLOOD PRESSURE: 74 MMHG | HEART RATE: 70 BPM | SYSTOLIC BLOOD PRESSURE: 114 MMHG

## 2025-01-06 ENCOUNTER — APPOINTMENT (OUTPATIENT)
Dept: RHEUMATOLOGY | Facility: CLINIC | Age: 75
End: 2025-01-06

## 2025-01-06 ENCOUNTER — TRANSCRIPTION ENCOUNTER (OUTPATIENT)
Age: 75
End: 2025-01-06

## 2025-01-14 ENCOUNTER — TRANSCRIPTION ENCOUNTER (OUTPATIENT)
Age: 75
End: 2025-01-14

## 2025-01-15 ENCOUNTER — APPOINTMENT (OUTPATIENT)
Dept: INTERNAL MEDICINE | Facility: CLINIC | Age: 75
End: 2025-01-15

## 2025-01-15 VITALS
WEIGHT: 195 LBS | BODY MASS INDEX: 35.88 KG/M2 | HEART RATE: 78 BPM | DIASTOLIC BLOOD PRESSURE: 70 MMHG | RESPIRATION RATE: 16 BRPM | OXYGEN SATURATION: 98 % | HEIGHT: 62 IN | TEMPERATURE: 98.6 F | SYSTOLIC BLOOD PRESSURE: 106 MMHG

## 2025-01-15 VITALS — DIASTOLIC BLOOD PRESSURE: 66 MMHG | SYSTOLIC BLOOD PRESSURE: 108 MMHG

## 2025-01-15 DIAGNOSIS — G45.9 TRANSIENT CEREBRAL ISCHEMIC ATTACK, UNSPECIFIED: ICD-10-CM

## 2025-01-15 DIAGNOSIS — I10 ESSENTIAL (PRIMARY) HYPERTENSION: ICD-10-CM

## 2025-01-15 PROCEDURE — G2211 COMPLEX E/M VISIT ADD ON: CPT

## 2025-01-15 PROCEDURE — 99213 OFFICE O/P EST LOW 20 MIN: CPT

## 2025-01-18 PROBLEM — G45.9 TRANSIENT CEREBRAL ISCHEMIA: Status: ACTIVE | Noted: 2025-01-18

## 2025-01-18 RX ORDER — OXYCODONE AND ACETAMINOPHEN 2.5; 325 MG/1; MG/1
2.5-325 TABLET ORAL
Qty: 30 | Refills: 0 | Status: ACTIVE | COMMUNITY
Start: 2024-09-25

## 2025-01-18 NOTE — PHYSICAL EXAM
[No Acute Distress] : no acute distress [Well Nourished] : well nourished [Well Developed] : well developed [Supple] : supple [No Respiratory Distress] : no respiratory distress  [Clear to Auscultation] : lungs were clear to auscultation bilaterally [Normal Rate] : normal rate  [Regular Rhythm] : with a regular rhythm [Normal S1, S2] : normal S1 and S2 [No Edema] : there was no peripheral edema [Soft] : abdomen soft [Non Tender] : non-tender [Normal Bowel Sounds] : normal bowel sounds [No Joint Swelling] : no joint swelling [Normal Gait] : normal gait [Speech Grossly Normal] : speech grossly normal [Alert and Oriented x3] : oriented to person, place, and time [de-identified] : Obese elderly female, [de-identified] : ambulate with a rollator,

## 2025-01-18 NOTE — PHYSICAL EXAM
[No Acute Distress] : no acute distress [Well Nourished] : well nourished [Well Developed] : well developed [Supple] : supple [No Respiratory Distress] : no respiratory distress  [Clear to Auscultation] : lungs were clear to auscultation bilaterally [Normal Rate] : normal rate  [Regular Rhythm] : with a regular rhythm [Normal S1, S2] : normal S1 and S2 [No Edema] : there was no peripheral edema [Soft] : abdomen soft [Non Tender] : non-tender [Normal Bowel Sounds] : normal bowel sounds [No Joint Swelling] : no joint swelling [Normal Gait] : normal gait [Speech Grossly Normal] : speech grossly normal [Alert and Oriented x3] : oriented to person, place, and time [de-identified] : Obese elderly female, [de-identified] : ambulate with a rollator,

## 2025-01-18 NOTE — PHYSICAL EXAM
[No Acute Distress] : no acute distress [Well Nourished] : well nourished [Well Developed] : well developed [Supple] : supple [No Respiratory Distress] : no respiratory distress  [Clear to Auscultation] : lungs were clear to auscultation bilaterally [Normal Rate] : normal rate  [Regular Rhythm] : with a regular rhythm [Normal S1, S2] : normal S1 and S2 [No Edema] : there was no peripheral edema [Soft] : abdomen soft [Non Tender] : non-tender [Normal Bowel Sounds] : normal bowel sounds [No Joint Swelling] : no joint swelling [Normal Gait] : normal gait [Speech Grossly Normal] : speech grossly normal [Alert and Oriented x3] : oriented to person, place, and time [de-identified] : Obese elderly female, [de-identified] : ambulate with a rollator,

## 2025-01-18 NOTE — ASSESSMENT
[FreeTextEntry1] : Pt has a 6 month f/u apt with me in 3/2025, will have her get routine labs done prior to visit.

## 2025-01-18 NOTE — HISTORY OF PRESENT ILLNESS
[Post-hospitalization from ___ Hospital] : Post-hospitalization from [unfilled] Hospital [Admitted on: ___] : The patient was admitted on [unfilled] [Discharged on ___] : discharged on [unfilled] [Discharge Summary] : discharge summary [Discharge Med List] : discharge medication list [Med Reconciliation] : medication reconciliation has been completed [FreeTextEntry2] : Pt had an episode of L arm shaking an inability to speak for a few minutes.  She was told she may had seizure and then probably TIA.  She was maintained on low dose ASA, but Atorvastatin dose increased from 20 mg to 40 mg.  She is back to her usual self, but need to make apt to see neurologist soon.  Her health was otherwise uneventful since last visit, she has no new complaint.

## 2025-01-29 ENCOUNTER — NON-APPOINTMENT (OUTPATIENT)
Age: 75
End: 2025-01-29

## 2025-01-29 ENCOUNTER — APPOINTMENT (OUTPATIENT)
Dept: NEUROLOGY | Facility: CLINIC | Age: 75
End: 2025-01-29
Payer: MEDICARE

## 2025-01-29 VITALS
SYSTOLIC BLOOD PRESSURE: 105 MMHG | WEIGHT: 195 LBS | DIASTOLIC BLOOD PRESSURE: 68 MMHG | BODY MASS INDEX: 35.67 KG/M2 | HEART RATE: 69 BPM

## 2025-01-29 DIAGNOSIS — G45.9 TRANSIENT CEREBRAL ISCHEMIC ATTACK, UNSPECIFIED: ICD-10-CM

## 2025-01-29 PROCEDURE — G2211 COMPLEX E/M VISIT ADD ON: CPT

## 2025-01-29 PROCEDURE — 99205 OFFICE O/P NEW HI 60 MIN: CPT

## 2025-01-29 NOTE — PHYSICAL EXAM
[FreeTextEntry1] : General physical examination: The patient is well-appearing. VS are stable There is no tenderness over the scalp or neck and no bruits over the eyes or at the neck. There is no proptosis, lid swelling, conjunctival injection, or chemosis. Cardiac exam shows a regular rate and no murmur. Neurologic examination: Mental status: The patient is alert, attentive, and oriented. Speech is clear and fluent with good repetition, comprehension, and naming. Recalls 3/3 objects at 5 minutes. Cranial nerves: CN II: Visual fields are full to confrontation. Pupils are 4 mm and briskly reactive to light. bilaterally. CN III, IV, VI: At primary gaze, there is no eye deviation.EOMI. No ptosis CN V: Facial sensation is intact bilaterally.  CN VII: Face is symmetric with normal eye closure and smile. CN VII: Hearing is normal to rubbing fingers CN IX, X: Palate elevates symmetrically. Phonation is normal. CN XI: Head turning and shoulder shrug are intact CN XII: Tongue is midline with normal movements and no atrophy. Motor: 4/5 in all extremities. limited effort on LUE due to shoulder pain Sensory: Light touch intact in fingers and toes. Coordination: Rapid alternating movements and fine finger movements are intact. Gait/Stance: Posture is normal. Gait slow and hesitant. Ambulates with a walker.

## 2025-01-29 NOTE — HISTORY OF PRESENT ILLNESS
[FreeTextEntry1] : Ms. Cedeno is a 74 year old right handed female PMHx  Asthma, depression, gastric ulcer, GERD, glaucoma, HLD, HTN, TARYN, RA, s/p L shoulder arthroplasty 6/14/24 who presents today for post hospitalization follow up after a possible TIA on 12/19/2024  Patient is a 74F who comes in with slurring of speech with L sided body shaking. She was on the phone and was reportedly slurring speech. Per patient, she said during the phone call, she felt drowsy, she dropped her phone. 2 minutes after, her L side of the body started shaking. She had difficulty talking. After 10-15 minutes, it stopped. She had right sided headache this morning briefly. Patient denies fever, chills, nausea, vomit, chest pain, shortness of breath, cough, lightheadedness, abdominal pain, diarrhea, constipation, dark/bloody stool, dysuria, hematuria.  MRI brain (12/19/2024) to my eye showed mild-moderate periventricular and subcortical white matter hyperintensities of presumed vascular origin. CTA neck  (12/19/2024) to my eye showed calcified plaque at the right carotid bifurcation without significant stenosis.  For technical reasons, the extracranial vertebral arteries were not well seen. CTA head (12/19/2024) to my eye was unremarkable. CTP (12/19/2024).  CBF <30% = 0 cc; Tmax >6 seconds = 13 cc involving the right centrum semiovale. TTE (12/20/2024): Mild TR. EEG (12/20/2024) was normal.  1/29/2025 Notes feeling well and endorses that symptoms only lasted about 5 minutes. She sees pain management here for back and shoulder pain. Notes she has had mechanical falls in the past and is interested in PT. Notes compliance with meds. Denies being on AP prior to TIA.

## 2025-01-29 NOTE — DISCUSSION/SUMMARY
[FreeTextEntry1] : Summary: On 12/19/2024 she had an episode of "speech disturbance and left-sided shaking?  Her presentation is consistent with right brain dysfunction, probably right hemispheric.  Etiology remains uncertain.  A TIA is possible but the mechanism is unknown and there is no evidence of large vessel occlusive disease or infarction, and TTE was unremarkable.  CTP showed a small area of mild hypoperfusion in the right centrum semiovale which may be of no clinical significance, could potentially reflect mild ischemia, or perhaps could reflect a postictal state.  Another possibility is a seizure, despite the normal EEG (12/20/2024) although unlikely   Although her Dx is unclear she would benefit from ASA 81 mg PO QD as long as there is no future contraindication. Also advised strict risk factor control including BP <140/90 and LDL <70. Discussed signs of stroke in detail. Follow up with cardiology for thorough cardiac evaluation. I have placed order for PT for balance and gait training. Discussed fall risk modifications.   I will see her in 6 months or sooner if needed.  [Antithrombotic therapy with ___] : antithrombotic therapy with  [unfilled] [Intensive Blood Pressure Control] : intensive blood pressure control [Lipid Lowering Therapy] : lipid lowering therapy [Patient encouraged to discuss with Primary MD] : I encouraged the patient to discuss these important issues with ~his/her~ primary care doctor [Goals and Counseling] : I have reviewed the goals of stroke risk factor modification. I counseled the patient on measures to reduce stroke risk, including the importance of medication compliance, risk factor control, exercise, healthy diet and avoidance of smoking. I reviewed stroke warning signs and symptoms and appropriate actions to take if such occur.

## 2025-02-02 ENCOUNTER — NON-APPOINTMENT (OUTPATIENT)
Age: 75
End: 2025-02-02

## 2025-02-03 ENCOUNTER — TRANSCRIPTION ENCOUNTER (OUTPATIENT)
Age: 75
End: 2025-02-03

## 2025-02-04 ENCOUNTER — TRANSCRIPTION ENCOUNTER (OUTPATIENT)
Age: 75
End: 2025-02-04

## 2025-02-05 ENCOUNTER — APPOINTMENT (OUTPATIENT)
Dept: OTOLARYNGOLOGY | Facility: CLINIC | Age: 75
End: 2025-02-05
Payer: MEDICARE

## 2025-02-05 VITALS — TEMPERATURE: 98 F | SYSTOLIC BLOOD PRESSURE: 112 MMHG | HEART RATE: 64 BPM | DIASTOLIC BLOOD PRESSURE: 68 MMHG

## 2025-02-05 DIAGNOSIS — H90.5 UNSPECIFIED SENSORINEURAL HEARING LOSS: ICD-10-CM

## 2025-02-05 DIAGNOSIS — H61.23 IMPACTED CERUMEN, BILATERAL: ICD-10-CM

## 2025-02-05 PROCEDURE — 92557 COMPREHENSIVE HEARING TEST: CPT

## 2025-02-05 PROCEDURE — 99203 OFFICE O/P NEW LOW 30 MIN: CPT | Mod: 25

## 2025-02-05 PROCEDURE — 92567 TYMPANOMETRY: CPT

## 2025-02-05 NOTE — ASSESSMENT
[FreeTextEntry1] : 73yo female with hx of cerumen impaction who is here with right ear clogged sensation for about a week  Ear exam bilateral cerumen impaction  -Bilateral wax removed from bilateral ear canals -Audio shows mild to mod SNHL, consider HAE, given clearance and list of vendors -F/u in 4 months to reclean ears before get impacted

## 2025-02-05 NOTE — END OF VISIT
[FreeTextEntry3] : I personally saw and examined Ms. RICHIE CARTER in detail this visit today. I personally reviewed the HPI, PMH, FH, SH, ROS and medications/allergies. I have spoken to TAVO Kahn regarding the history and have personally determined the assessment and plan of care, and documented this myself. I was present and participated in all key portions of the encounter and all procedures noted above. I have made changes in the body of the note where appropriate.   Attesting Faculty: See Attending Signature Below

## 2025-02-05 NOTE — PHYSICAL EXAM
[Normal] : mucosa is normal [Midline] : trachea located in midline position [de-identified] : bilateral cerumen impaction

## 2025-02-05 NOTE — PROCEDURE
[FreeTextEntry3] : Cerumen impaction removed with curette from bialteral ear canals. After removal of cerumen canal noted to be normal without edema, purulence or inflammation. Patient tolerated procedure well. Colace drops used to help soften the wax

## 2025-02-05 NOTE — CONSULT LETTER
[Dear  ___] : Dear  [unfilled], [Consult Letter:] : I had the pleasure of evaluating your patient, [unfilled]. [Please see my note below.] : Please see my note below. [Consult Closing:] : Thank you very much for allowing me to participate in the care of this patient.  If you have any questions, please do not hesitate to contact me. [Sincerely,] : Sincerely, [FreeTextEntry3] : Sandra Yin MD Otolaryngology and Cranial Base Surgery  Attending Physician- Department of Otolaryngology and Head & Neck Surgery  [facility] [facility] Office: [phone]  Fax: [phone]

## 2025-02-05 NOTE — HISTORY OF PRESENT ILLNESS
[de-identified] : 73yo female with hx of cerumen impaction. Notes that a couple weeks ago she took a shower, and the right ear clogged for about a week. Now she feels the hearing is back to baseline but wants to check if wax still there. She has SNHL in 2021 and was cleared for hearing aids but has not got them yet. Her hearing is not great, and she gets ringing in the ears

## 2025-02-06 ENCOUNTER — APPOINTMENT (OUTPATIENT)
Dept: CARDIOLOGY | Facility: CLINIC | Age: 75
End: 2025-02-06
Payer: MEDICARE

## 2025-02-06 ENCOUNTER — NON-APPOINTMENT (OUTPATIENT)
Age: 75
End: 2025-02-06

## 2025-02-06 VITALS
OXYGEN SATURATION: 95 % | WEIGHT: 195 LBS | HEART RATE: 80 BPM | BODY MASS INDEX: 35.88 KG/M2 | DIASTOLIC BLOOD PRESSURE: 70 MMHG | HEIGHT: 62 IN | SYSTOLIC BLOOD PRESSURE: 113 MMHG

## 2025-02-06 DIAGNOSIS — I10 ESSENTIAL (PRIMARY) HYPERTENSION: ICD-10-CM

## 2025-02-06 DIAGNOSIS — E78.5 HYPERLIPIDEMIA, UNSPECIFIED: ICD-10-CM

## 2025-02-06 PROCEDURE — 93000 ELECTROCARDIOGRAM COMPLETE: CPT

## 2025-02-06 PROCEDURE — 99214 OFFICE O/P EST MOD 30 MIN: CPT | Mod: 25

## 2025-02-06 NOTE — HISTORY OF PRESENT ILLNESS
[FreeTextEntry1] : 74 year old woman with history of HTN HLD  Had TIA 12/19/24- followed by neurology- although diagnosis is unclear. Testing is below and going to PT  MRI brain (12/19/2024) to my eye showed mild-moderate periventricular and subcortical white matter hyperintensities of presumed vascular origin. CTA neck (12/19/2024) to my eye showed calcified plaque at the right carotid bifurcation without significant stenosis. For technical reasons, the extracranial vertebral arteries were not well seen. CTA head (12/19/2024) to my eye was unremarkable. CTP (12/19/2024). CBF <30% = 0 cc; Tmax >6 seconds = 13 cc involving the right centrum semiovale. TTE (12/20/2024): Mild TR. EEG (12/20/2024) was normal.  #HTN- on HCTZ 25 mg daily, Metoprolol 25 mg BID BP really controlled, continue present meds #HLD- On Atorvastatin 40 mg daily, continue present meds ASA 81 mg daily

## 2025-02-06 NOTE — DISCUSSION/SUMMARY
[EKG obtained to assist in diagnosis and management of assessed problem(s)] : EKG obtained to assist in diagnosis and management of assessed problem(s) [FreeTextEntry1] : 74 year old woman with history of HTN HLD  Had TIA 12/19/24- followed by neurology- although diagnosis is unclear. Testing is below and going to PT  MRI brain (12/19/2024) to my eye showed mild-moderate periventricular and subcortical white matter hyperintensities of presumed vascular origin. CTA neck (12/19/2024) to my eye showed calcified plaque at the right carotid bifurcation without significant stenosis. For technical reasons, the extracranial vertebral arteries were not well seen. CTA head (12/19/2024) to my eye was unremarkable. CTP (12/19/2024). CBF <30% = 0 cc; Tmax >6 seconds = 13 cc involving the right centrum semiovale. TTE (12/20/2024): Mild TR. EEG (12/20/2024) was normal.  #HTN- on HCTZ 25 mg daily, Metoprolol 25 mg BID BP really controlled, continue present meds #HLD- On Atorvastatin 40 mg daily, continue present meds ASA 81 mg daily #FU in 6 months

## 2025-02-07 ENCOUNTER — APPOINTMENT (OUTPATIENT)
Dept: INTERNAL MEDICINE | Facility: CLINIC | Age: 75
End: 2025-02-07
Payer: MEDICARE

## 2025-02-07 DIAGNOSIS — Z91.89 OTHER SPECIFIED PERSONAL RISK FACTORS, NOT ELSEWHERE CLASSIFIED: ICD-10-CM

## 2025-02-07 DIAGNOSIS — M79.672 PAIN IN LEFT FOOT: ICD-10-CM

## 2025-02-07 DIAGNOSIS — R60.0 LOCALIZED EDEMA: ICD-10-CM

## 2025-02-07 DIAGNOSIS — R39.9 UNSPECIFIED SYMPTOMS AND SIGNS INVOLVING THE GENITOURINARY SYSTEM: ICD-10-CM

## 2025-02-07 DIAGNOSIS — R76.8 OTHER SPECIFIED ABNORMAL IMMUNOLOGICAL FINDINGS IN SERUM: ICD-10-CM

## 2025-02-07 PROCEDURE — G2211 COMPLEX E/M VISIT ADD ON: CPT | Mod: 2W

## 2025-02-07 PROCEDURE — 99213 OFFICE O/P EST LOW 20 MIN: CPT | Mod: 2W

## 2025-02-07 RX ORDER — CEFUROXIME AXETIL 500 MG/1
500 TABLET, FILM COATED ORAL
Qty: 14 | Refills: 0 | Status: ACTIVE | COMMUNITY
Start: 2025-02-07 | End: 1900-01-01

## 2025-02-07 NOTE — HISTORY OF PRESENT ILLNESS
[Home] : at home, [unfilled] , at the time of the visit. [Medical Office: ([facility])___] : at the medical office located in  [Telehealth (audio & video)] : This visit was provided via telehealth using real-time 2-way audio visual technology. [Verbal consent obtained from patient] : the patient, [unfilled] [FreeTextEntry8] : Pt c/o dysuria on & off for over a week, and some urinary hesitancy and urgency.  She has mild chills but no fever.  She increased fluid intake  and she feels that it helped as this get not get worse, but this is not getting better either.  Pt believes she has UTI and would like to have urine sample done.

## 2025-02-07 NOTE — HISTORY OF PRESENT ILLNESS
[Home] : at home, [unfilled] , at the time of the visit. [Medical Office: ([facility])___] : at the medical office located in  [Telehealth (audio & video)] : This visit was provided via telehealth using real-time 2-way audio visual technology. [Verbal consent obtained from patient] : the patient, [unfilled] [FreeTextEntry8] : Pt c/o dysuria on & off for over a week, and some urinary hesitancy and urgency.  She has mild chills but no fever.  She increased fluid intake  and she feels that it helped as this get not get worse, but this is not getting better either.  Pt believes she has UTI and would like to have urine sample done.

## 2025-02-07 NOTE — PHYSICAL EXAM
[No Acute Distress] : no acute distress [No Respiratory Distress] : no respiratory distress  [Speech Grossly Normal] : speech grossly normal [Alert and Oriented x3] : oriented to person, place, and time [de-identified] : female in stated age, exam limited due to telehealth encounter,

## 2025-02-07 NOTE — PHYSICAL EXAM
[No Acute Distress] : no acute distress [No Respiratory Distress] : no respiratory distress  [Speech Grossly Normal] : speech grossly normal [Alert and Oriented x3] : oriented to person, place, and time [de-identified] : female in stated age, exam limited due to telehealth encounter,

## 2025-02-10 ENCOUNTER — TRANSCRIPTION ENCOUNTER (OUTPATIENT)
Age: 75
End: 2025-02-10

## 2025-02-11 LAB
APPEARANCE: CLEAR
BACTERIA UR CULT: NORMAL
BACTERIA: NEGATIVE /HPF
BILIRUBIN URINE: NEGATIVE
BLOOD URINE: NEGATIVE
CAST: 1 /LPF
COLOR: YELLOW
EPITHELIAL CELLS: 3 /HPF
GLUCOSE QUALITATIVE U: NEGATIVE MG/DL
KETONES URINE: NEGATIVE MG/DL
LEUKOCYTE ESTERASE URINE: ABNORMAL
MICROSCOPIC-UA: NORMAL
NITRITE URINE: NEGATIVE
PH URINE: 7
PROTEIN URINE: NEGATIVE MG/DL
RED BLOOD CELLS URINE: 4 /HPF
SPECIFIC GRAVITY URINE: 1.02
UROBILINOGEN URINE: 0.2 MG/DL
WHITE BLOOD CELLS URINE: 5 /HPF

## 2025-02-14 ENCOUNTER — LABORATORY RESULT (OUTPATIENT)
Age: 75
End: 2025-02-14

## 2025-02-14 ENCOUNTER — APPOINTMENT (OUTPATIENT)
Dept: RHEUMATOLOGY | Facility: CLINIC | Age: 75
End: 2025-02-14
Payer: MEDICARE

## 2025-02-14 VITALS
OXYGEN SATURATION: 96 % | TEMPERATURE: 95 F | RESPIRATION RATE: 16 BRPM | DIASTOLIC BLOOD PRESSURE: 75 MMHG | HEIGHT: 62 IN | SYSTOLIC BLOOD PRESSURE: 133 MMHG | WEIGHT: 198 LBS | HEART RATE: 84 BPM | BODY MASS INDEX: 36.44 KG/M2

## 2025-02-14 DIAGNOSIS — M35.3 POLYMYALGIA RHEUMATICA: ICD-10-CM

## 2025-02-14 LAB
ALBUMIN SERPL ELPH-MCNC: 4 G/DL
ALP BLD-CCNC: 109 U/L
ALT SERPL-CCNC: 14 U/L
ANION GAP SERPL CALC-SCNC: 13 MMOL/L
AST SERPL-CCNC: 21 U/L
BILIRUB SERPL-MCNC: 0.2 MG/DL
BUN SERPL-MCNC: 20 MG/DL
CALCIUM SERPL-MCNC: 9.2 MG/DL
CHLORIDE SERPL-SCNC: 99 MMOL/L
CO2 SERPL-SCNC: 32 MMOL/L
CREAT SERPL-MCNC: 1.1 MG/DL
CRP SERPL-MCNC: 6 MG/L
EGFR: 53 ML/MIN/1.73M2
GLUCOSE SERPL-MCNC: 83 MG/DL
POTASSIUM SERPL-SCNC: 3.5 MMOL/L
PROT SERPL-MCNC: 7.7 G/DL
SODIUM SERPL-SCNC: 144 MMOL/L

## 2025-02-14 PROCEDURE — G2211 COMPLEX E/M VISIT ADD ON: CPT

## 2025-02-14 PROCEDURE — 99214 OFFICE O/P EST MOD 30 MIN: CPT

## 2025-02-15 LAB
APPEARANCE: CLEAR
BASOPHILS # BLD AUTO: 0.01 K/UL
BASOPHILS NFR BLD AUTO: 0.2 %
BILIRUBIN URINE: NEGATIVE
BLOOD URINE: NEGATIVE
COLOR: YELLOW
EOSINOPHIL # BLD AUTO: 0.39 K/UL
EOSINOPHIL NFR BLD AUTO: 6.7 %
ERYTHROCYTE [SEDIMENTATION RATE] IN BLOOD BY WESTERGREN METHOD: 115 MM/HR
GLUCOSE QUALITATIVE U: NEGATIVE MG/DL
HCT VFR BLD CALC: 34.8 %
HGB BLD-MCNC: 10.6 G/DL
IMM GRANULOCYTES NFR BLD AUTO: 0.2 %
KETONES URINE: NEGATIVE MG/DL
LEUKOCYTE ESTERASE URINE: ABNORMAL
LYMPHOCYTES # BLD AUTO: 2.19 K/UL
LYMPHOCYTES NFR BLD AUTO: 37.6 %
MAN DIFF?: NORMAL
MCHC RBC-ENTMCNC: 29.6 PG
MCHC RBC-ENTMCNC: 30.5 G/DL
MCV RBC AUTO: 97.2 FL
MONOCYTES # BLD AUTO: 0.5 K/UL
MONOCYTES NFR BLD AUTO: 8.6 %
NEUTROPHILS # BLD AUTO: 2.72 K/UL
NEUTROPHILS NFR BLD AUTO: 46.7 %
NITRITE URINE: NEGATIVE
PH URINE: 6
PLATELET # BLD AUTO: 256 K/UL
PROTEIN URINE: NEGATIVE MG/DL
RBC # BLD: 3.58 M/UL
RBC # FLD: 16.3 %
SPECIFIC GRAVITY URINE: 1.02
UROBILINOGEN URINE: 0.2 MG/DL
WBC # FLD AUTO: 5.82 K/UL

## 2025-02-18 NOTE — PHYSICAL EXAM
[General Appearance - Alert] : alert [General Appearance - In No Acute Distress] : in no acute distress [General Appearance - Well Nourished] : well nourished [Sclera] : the sclera and conjunctiva were normal [Nasal Cavity] : the nasal mucosa and septum were normal [Sensation] : the sensory exam was normal to light touch and pinprick [Motor Exam] : the motor exam was normal [Oriented To Time, Place, And Person] : oriented to person, place, and time [Auscultation Breath Sounds / Voice Sounds] : lungs were clear to auscultation bilaterally [Heart Rate And Rhythm] : heart rate was normal and rhythm regular [Heart Sounds] : normal S1 and S2 [Heart Sounds Gallop] : no gallops [Murmurs] : no murmurs [Heart Sounds Pericardial Friction Rub] : no pericardial rub [Full Pulse] : the pedal pulses are present [Edema] : there was no peripheral edema [Bowel Sounds] : normal bowel sounds [Abdomen Soft] : soft [Abdomen Tenderness] : non-tender [Abdomen Mass (___ Cm)] : no abdominal mass palpated [] : no hepato-splenomegaly [Abnormal Walk] : normal gait [Musculoskeletal - Swelling] : no joint swelling seen [Nail Clubbing] : no clubbing  or cyanosis of the fingernails [Motor Tone] : muscle strength and tone were normal [FreeTextEntry1] : limited rom of bilateral shoulders but no TTP

## 2025-02-18 NOTE — HISTORY OF PRESENT ILLNESS
[___ Month(s) Ago] : [unfilled] month(s) ago [FreeTextEntry1] : on mtx 5 tabs,  had a reversed left shoulder replacement in 06/2024 TIA in 12/2024 - recovered - no clear origin reports bilateral leg paresthesia when lying down only - has a hx of spinal stenosis chronically elevated inflammatory markers despite use of DMARDS shoulder pain is improved, but still with unchanged back pain.

## 2025-02-18 NOTE — PHYSICAL EXAM
[General Appearance - Alert] : alert [General Appearance - In No Acute Distress] : in no acute distress [General Appearance - Well Nourished] : well nourished [Sclera] : the sclera and conjunctiva were normal [Nasal Cavity] : the nasal mucosa and septum were normal [Sensation] : the sensory exam was normal to light touch and pinprick [Motor Exam] : the motor exam was normal [Oriented To Time, Place, And Person] : oriented to person, place, and time [Auscultation Breath Sounds / Voice Sounds] : lungs were clear to auscultation bilaterally [Heart Rate And Rhythm] : heart rate was normal and rhythm regular [Heart Sounds] : normal S1 and S2 [Heart Sounds Gallop] : no gallops [Murmurs] : no murmurs [Heart Sounds Pericardial Friction Rub] : no pericardial rub [Full Pulse] : the pedal pulses are present [Edema] : there was no peripheral edema [Bowel Sounds] : normal bowel sounds [Abdomen Soft] : soft [Abdomen Tenderness] : non-tender [] : no hepato-splenomegaly [Abdomen Mass (___ Cm)] : no abdominal mass palpated [Abnormal Walk] : normal gait [Nail Clubbing] : no clubbing  or cyanosis of the fingernails [Musculoskeletal - Swelling] : no joint swelling seen [Motor Tone] : muscle strength and tone were normal [FreeTextEntry1] : limited rom of bilateral shoulders but no TTP

## 2025-02-18 NOTE — REVIEW OF SYSTEMS
[As Noted in HPI] : as noted in HPI [Negative] : Heme/Lymph [FreeTextEntry9] : bilateral shoulder pain

## 2025-02-18 NOTE — ASSESSMENT
[FreeTextEntry1] : history of PMR based on shoulder pain and elevated inflammatory markers, chronically, despite use of steroids and methotrexate -  PET/CT  done in 2023consistent with mild inflammatory arthritis / PMR  Plan -cw MTX 5 tabs weekly (renally adjusted), folic acid 1mg daily - consider stopping given no change in symptoms since starting - s/p left shoulder replacement repeat all rheum serologies consider CTA after to r/o large vessel vasculitis   I reviewed previous labs results with patients. Laboratory tests ordered today Diagnosis and Prognosis discussed Continue with current medications medications refilled F/u 3-4 months

## 2025-02-21 LAB
ANTI-BETA2 GLYCOPROTEIN 1 IGG CONCENTRATION (ELFA): 1.5 U/ML
ANTI-BETA2 GLYCOPROTEIN 1 IGM CONCENTRATION (ELFA): 15 U/ML
ANTI-CARDIOLIPIN IGG CONCENTRATION (ELFA): 1.3 GPL
ANTI-CARDIOLIPIN IGM CONCENTRATION (ELFA): 11 MPL
ANTI-CENP IGG CONCENTRATION (ELFA): 0.6 U/ML
ANTI-CYCLIC CITRULLINATED PEPTIDE IGG CONCENTRATION (ELFA): 1.6 U/ML
ANTI-DOUBLE-STRANDED DNA IGG CONCENTRATION (ELISA): 34.82 IU/ML
ANTI-JO-1 IGG CONCENTRATION (ELFA): 0.3 U/ML
ANTI-NUCLEAR ANTIBODIES - CYTOPLASMIC PATTERN: NORMAL
ANTI-NUCLEAR ANTIBODIES - PRIMARY NUCLEAR PATTERN: NORMAL
ANTI-NUCLEAR ANTIBODIES - PRIMARY PATTERN TITER (IFA): NEGATIVE
ANTI-NUCLEAR ANTIBODIES IGG CONCENTRATION (ELISA): 18.46 UNITS
ANTI-RA33 IGA CONCENTRATION (ELFA): 6.8 U/ML
ANTI-RA33 IGG CONCENTRATION (ELFA): 9.8 U/ML
ANTI-RA33 IGM CONCENTRATION (ELFA): <23 U/ML
ANTI-RNA POL III IGG CONCENTRATION (ELFA): <0.7 U/ML
ANTI-RNP70 IGG CONCENTRATION (ELFA): 0.6 U/ML
ANTI-RO52 IGG CONCENTRATION (ELFA): 0.4 U/ML
ANTI-RO60 IGG CONCENTRATION (ELFA): 0.4 U/ML
ANTI-SCL-70 IGG CONCENTRATION (ELFA): <0.6 U/ML
ANTI-SMITH IGG CONCENTRATION (ELFA): 0.8 U/ML
ANTI-SS-B (LA) IGG CONCENTRATION (ELFA): 0.7 U/ML
ANTI-THYROGLOBULIN IGG CONCENTRATION (ELFA): 12 IU/ML
ANTI-THYROID PEROXIDASE IGG CONCENTRATION (ELFA): <4 IU/ML
ANTI-U1RNP IGG CONCENTRATION (ELFA): 3 U/ML
AVISE LUPUS RESULT: NORMAL
B-LYMPHOCYTE-BOUND C4D (BC4D) LEVEL (FC): NORMAL
ERYTHROCYTE-BOUND C4D (EC4D) LEVEL (FC): 3
RHEUMATOID FACTOR (IGA) CONCENTRATION (ELFA): 7.6 IU/ML
RHEUMATOID FACTOR (IGM) CONCENTRATION (ELFA): <0.6 IU/ML
T CELL RESULT: NORMAL
TC4D (FC): NORMAL
TIGG (FC): NORMAL
TIGM (FC): NORMAL

## 2025-02-26 RX ORDER — PREDNISONE 20 MG/1
20 TABLET ORAL
Qty: 10 | Refills: 0 | Status: ACTIVE | COMMUNITY
Start: 2025-02-26 | End: 1900-01-01

## 2025-02-28 DIAGNOSIS — M35.3 POLYMYALGIA RHEUMATICA: ICD-10-CM

## 2025-03-03 ENCOUNTER — TRANSCRIPTION ENCOUNTER (OUTPATIENT)
Age: 75
End: 2025-03-03

## 2025-03-04 DIAGNOSIS — M05.9 RHEUMATOID ARTHRITIS WITH RHEUMATOID FACTOR, UNSPECIFIED: ICD-10-CM

## 2025-03-04 LAB
HBV CORE IGG+IGM SER QL: NONREACTIVE
HBV SURFACE AB SER QL: ABNORMAL
HBV SURFACE AG SER QL: NONREACTIVE
HCV AB SER QL: NONREACTIVE
HCV S/CO RATIO: 0.08 S/CO

## 2025-03-07 ENCOUNTER — NON-APPOINTMENT (OUTPATIENT)
Age: 75
End: 2025-03-07

## 2025-03-07 ENCOUNTER — APPOINTMENT (OUTPATIENT)
Dept: PAIN MANAGEMENT | Facility: CLINIC | Age: 75
End: 2025-03-07
Payer: MEDICARE

## 2025-03-07 VITALS
BODY MASS INDEX: 36.44 KG/M2 | DIASTOLIC BLOOD PRESSURE: 63 MMHG | SYSTOLIC BLOOD PRESSURE: 113 MMHG | WEIGHT: 198 LBS | HEIGHT: 62 IN | HEART RATE: 68 BPM

## 2025-03-07 LAB
M TB IFN-G BLD-IMP: ABNORMAL
QUANTIFERON TB PLUS MITOGEN MINUS NIL: 0.32 IU/ML
QUANTIFERON TB PLUS NIL: 0.02 IU/ML
QUANTIFERON TB PLUS TB1 MINUS NIL: 0.01 IU/ML
QUANTIFERON TB PLUS TB2 MINUS NIL: 0 IU/ML

## 2025-03-07 PROCEDURE — 99214 OFFICE O/P EST MOD 30 MIN: CPT

## 2025-03-07 RX ORDER — NALOXONE HYDROCHLORIDE 4 MG/.1ML
4 SPRAY NASAL
Qty: 1 | Refills: 1 | Status: ACTIVE | COMMUNITY
Start: 2025-03-07 | End: 1900-01-01

## 2025-03-07 NOTE — HISTORY OF PRESENT ILLNESS
[FreeTextEntry1] : RICHIE CARTER is a 74 year RH female with a Pmhx of Anxiety, Depression, HTN, HLD, CKD, asthma, severe TARYN, GERD/PUD, OA/DJD, s/p L MICHELLE, B/L TKA, Polymyalgia Rheumatica, Cervical s/p C3-C7 fusion 2018 and Lumbar spondylosis with radiculopathy OA left shoulder s/p reverse arthroplasty 6/2024 with chronic persistent myofascial pain who presents today for follow up.  She continues to reports chronic diffuse pain in multiple joints shoulders, knees, hips, neck and back. Neck pain > left radiating to shoulder 4-5- /10, up tp 6-7/10 when medicine wears off . Pain increased with activity and decreased with rest.   She has been using Oxycodone 5 mg up to 2x/day prn in addition to APAP .  constipation improved and having BM daily.  + aspercreme but no other topicals.   Allergies: NKDA  Prior meds: Tramadol no help. Celebrex Current medications : Methotrexate, Duloxetine 90mg daily, Metoprolol 25 mg daily, Sucralfate, Ventolin, Symbicort, Montelukast, famotidine, HCTZ 25 mg daily, ASA 81 mg daily, Folic acid, B12, D3, MVI, Probiotic, Oxycodone 5 mg prn taking 2x/week, Prednisone 20mg   Social Hx : She lives in [place] and lives with her son who has schizophrenia.  She does not smoke, drink, denies illicits. Previously smoked marijuana in the past.

## 2025-03-07 NOTE — ASSESSMENT
[FreeTextEntry1] : 73 y/o F with multiple medical comorbidities including PMR, CKD, TARYN, OA/DJD s/p B/L TKA, L MICHELLE, reverse Left shoulder arthroplasty in June 2024 with persistent myofascial pain exacerbated by activity/movement in addition to diffuse chronic pain.    Discussed in detail the nature of chronic pain as well as treatment options including nonopioid pain management PT, therapeutic massage, stretching, exercise program, acupuncture, CBT as well as topical medications, non-opioid pain medications, Trigger point injections, MICHELLE. The patient had the opportunity to ask questions and all were answered to their satisfaction.  The patient verbalized understanding of the management plan and agreed with our recommendations.  Will try topical diclofenac 2% if insurance approves. Discussed Hempvana( Lidocaine/CBD) as well  -D/c Celebrex as on steroids  -Follow up with Rheum.  -Previously failed Tramadol and on Duloxetine  -Continue Oxycodone 2.5/325 1 tab daily prn prior to therapy was provided and will monitor closely.  -restart PT- [facility].  UDS performed Sep 25, 2024 c/w rx I-Stop reviewed reference # [id number]:  advised of AE No signs of aberrant behavior and will continue to monitor for signs of toxicity. Reminded to continue to avoid alcohol. Patient denies other prescribers.  Discussed OD prevention education and prescribed naloxone kit  Safe storage of medication was reviewed.  f/u in 3 months or sooner if needed.

## 2025-03-13 RX ORDER — TOCILIZUMAB 180 MG/ML
162 INJECTION, SOLUTION SUBCUTANEOUS
Qty: 2 | Refills: 5 | Status: DISCONTINUED | COMMUNITY
Start: 2025-03-04 | End: 2025-03-13

## 2025-03-17 ENCOUNTER — TRANSCRIPTION ENCOUNTER (OUTPATIENT)
Age: 75
End: 2025-03-17

## 2025-03-25 ENCOUNTER — APPOINTMENT (OUTPATIENT)
Dept: INTERNAL MEDICINE | Facility: CLINIC | Age: 75
End: 2025-03-25
Payer: MEDICARE

## 2025-03-25 VITALS
BODY MASS INDEX: 36.44 KG/M2 | HEIGHT: 62 IN | DIASTOLIC BLOOD PRESSURE: 69 MMHG | HEART RATE: 61 BPM | TEMPERATURE: 97.1 F | SYSTOLIC BLOOD PRESSURE: 111 MMHG | OXYGEN SATURATION: 97 % | WEIGHT: 198 LBS

## 2025-03-25 DIAGNOSIS — M35.3 POLYMYALGIA RHEUMATICA: ICD-10-CM

## 2025-03-25 DIAGNOSIS — F33.9 MAJOR DEPRESSIVE DISORDER, RECURRENT, UNSPECIFIED: ICD-10-CM

## 2025-03-25 DIAGNOSIS — E78.5 HYPERLIPIDEMIA, UNSPECIFIED: ICD-10-CM

## 2025-03-25 DIAGNOSIS — I10 ESSENTIAL (PRIMARY) HYPERTENSION: ICD-10-CM

## 2025-03-25 DIAGNOSIS — N18.30 CHRONIC KIDNEY DISEASE, STAGE 3 UNSPECIFIED: ICD-10-CM

## 2025-03-25 PROCEDURE — 99214 OFFICE O/P EST MOD 30 MIN: CPT | Mod: 25

## 2025-03-25 NOTE — HISTORY OF PRESENT ILLNESS
[FreeTextEntry1] : Pt presented for 6 month follow up of  [de-identified] : Pt is under the care of rheum for worsening PMR.  She was not able to tolerate the higher dose of Prednisone but able to tolerate the lower dose of Prednisone 10 mg, but she feels that it does not help her pain as much and so she is taking Endocet because she does not want to take too much Tylenol.  She c/o dryness and peeling on her fingertips.  She is getting meals on wheels.  She is not able to get a HHA.

## 2025-03-25 NOTE — PHYSICAL EXAM
[No Acute Distress] : no acute distress [Well Nourished] : well nourished [Well Developed] : well developed [Supple] : supple [No Respiratory Distress] : no respiratory distress  [Clear to Auscultation] : lungs were clear to auscultation bilaterally [Normal Rate] : normal rate  [Regular Rhythm] : with a regular rhythm [Normal S1, S2] : normal S1 and S2 [No Edema] : there was no peripheral edema [Soft] : abdomen soft [Non Tender] : non-tender [Normal Bowel Sounds] : normal bowel sounds [No CVA Tenderness] : no CVA  tenderness [No Spinal Tenderness] : no spinal tenderness [No Joint Swelling] : no joint swelling [Speech Grossly Normal] : speech grossly normal [Alert and Oriented x3] : oriented to person, place, and time [de-identified] : Obese female in stated age,  [de-identified] : Ambulate with a rollator,

## 2025-03-25 NOTE — PHYSICAL EXAM
[No Acute Distress] : no acute distress [Well Nourished] : well nourished [Well Developed] : well developed [Supple] : supple [No Respiratory Distress] : no respiratory distress  [Clear to Auscultation] : lungs were clear to auscultation bilaterally [Normal Rate] : normal rate  [Regular Rhythm] : with a regular rhythm [Normal S1, S2] : normal S1 and S2 [No Edema] : there was no peripheral edema [Soft] : abdomen soft [Non Tender] : non-tender [Normal Bowel Sounds] : normal bowel sounds [No CVA Tenderness] : no CVA  tenderness [No Spinal Tenderness] : no spinal tenderness [No Joint Swelling] : no joint swelling [Speech Grossly Normal] : speech grossly normal [Alert and Oriented x3] : oriented to person, place, and time [de-identified] : Obese female in stated age,  [de-identified] : Ambulate with a rollator,

## 2025-03-25 NOTE — HISTORY OF PRESENT ILLNESS
[FreeTextEntry1] : Pt presented for 6 month follow up of  [de-identified] : Pt is under the care of rheum for worsening PMR.  She was not able to tolerate the higher dose of Prednisone but able to tolerate the lower dose of Prednisone 10 mg, but she feels that it does not help her pain as much and so she is taking Endocet because she does not want to take too much Tylenol.  She c/o dryness and peeling on her fingertips.  She is getting meals on wheels.  She is not able to get a HHA.

## 2025-03-26 LAB
CHOLEST SERPL-MCNC: 193 MG/DL
HDLC SERPL-MCNC: 72 MG/DL
LDLC SERPL-MCNC: 100 MG/DL
NONHDLC SERPL-MCNC: 122 MG/DL
TRIGL SERPL-MCNC: 124 MG/DL

## 2025-03-28 ENCOUNTER — TRANSCRIPTION ENCOUNTER (OUTPATIENT)
Age: 75
End: 2025-03-28

## 2025-04-07 ENCOUNTER — TRANSCRIPTION ENCOUNTER (OUTPATIENT)
Age: 75
End: 2025-04-07

## 2025-04-14 ENCOUNTER — TRANSCRIPTION ENCOUNTER (OUTPATIENT)
Age: 75
End: 2025-04-14

## 2025-04-16 ENCOUNTER — NON-APPOINTMENT (OUTPATIENT)
Age: 75
End: 2025-04-16

## 2025-04-30 ENCOUNTER — TRANSCRIPTION ENCOUNTER (OUTPATIENT)
Age: 75
End: 2025-04-30

## 2025-05-01 ENCOUNTER — TRANSCRIPTION ENCOUNTER (OUTPATIENT)
Age: 75
End: 2025-05-01

## 2025-05-02 ENCOUNTER — TRANSCRIPTION ENCOUNTER (OUTPATIENT)
Age: 75
End: 2025-05-02

## 2025-05-02 RX ORDER — LEFLUNOMIDE 10 MG/1
10 TABLET, FILM COATED ORAL DAILY
Qty: 30 | Refills: 3 | Status: ACTIVE | COMMUNITY
Start: 2025-05-02 | End: 1900-01-01

## 2025-05-08 ENCOUNTER — APPOINTMENT (OUTPATIENT)
Dept: ORTHOPEDIC SURGERY | Facility: CLINIC | Age: 75
End: 2025-05-08
Payer: MEDICARE

## 2025-05-08 DIAGNOSIS — M19.012 PRIMARY OSTEOARTHRITIS, LEFT SHOULDER: ICD-10-CM

## 2025-05-08 PROCEDURE — 73030 X-RAY EXAM OF SHOULDER: CPT | Mod: LT

## 2025-05-08 PROCEDURE — 99213 OFFICE O/P EST LOW 20 MIN: CPT | Mod: 25

## 2025-05-09 NOTE — HISTORY OF PRESENT ILLNESS
[de-identified] : 75-year-old female status post left revTSA 6/14/24. She ran out of PT visits last week. Takes Tylenol/ Oxycodone for pain. Denies post op complications. The patient reports minimal pain. She has sustained a few more falls after last visit last fall was 4/23/25. She is under the care of Pain management.

## 2025-05-09 NOTE — PHYSICAL EXAM
[de-identified] : Left shoulder exam  Inspection: No ecchymosis, no swelling Skin: Incisions C/D/I, no drainage, healed ROM: 170 FF, 90 abd, limited ER, IR to low lumbar Painful arc ROM: none Tenderness: Mild tenderness throughout the shoulder girdle Strength: 4-/5 ER, 5/5 IR Vasc: 2+ radial pulse Neuro: AIN, PIN, Ulnar nerve intact to motor Sensation: Intact to light touch throughout. [de-identified] :  The following radiographs were ordered and read by me during this patients visit. I reviewed each radiograph in detail with the patient and discussed the findings as highlighted below.  3 views of the left shoulder were obtained, 05/08/2025, that show anatomic revTSA.

## 2025-05-09 NOTE — ADDENDUM
[FreeTextEntry1] : This note was written by Lis Sanches on 05/08/2025 acting solely as a scribe for Dr. Danilo Rizvi.   All medical record entries made by the Scribe were at my, Dr. Danilo Rizvi, direction and personally dictated by me on 05/08/2025. I have personally reviewed the chart and agree that the record accurately reflects my personal performance of the history, physical exam, assessment and plan.

## 2025-05-09 NOTE — PHYSICAL EXAM
[de-identified] : Left shoulder exam  Inspection: No ecchymosis, no swelling Skin: Incisions C/D/I, no drainage, healed ROM: 170 FF, 90 abd, limited ER, IR to low lumbar Painful arc ROM: none Tenderness: Mild tenderness throughout the shoulder girdle Strength: 4-/5 ER, 5/5 IR Vasc: 2+ radial pulse Neuro: AIN, PIN, Ulnar nerve intact to motor Sensation: Intact to light touch throughout. [de-identified] :  The following radiographs were ordered and read by me during this patients visit. I reviewed each radiograph in detail with the patient and discussed the findings as highlighted below.  3 views of the left shoulder were obtained, 05/08/2025, that show anatomic revTSA.

## 2025-05-09 NOTE — DISCUSSION/SUMMARY
[de-identified] : 75-year-old female status post left revTSA  Patient is doing well at this time following surgical intervention. Progressing well despite her falls since last visit, radiographs continue to show a revTSA. Discussed full return to ADLs and activity as she wishes. Discussed potential catastropic effects of falls with this implant, but thankfully patient is doing well.   Recommendations: Continue HEP for terminal ROM exercises / strengthening and conditioning. ADL's to tolerance.  Follow-up as needed

## 2025-05-09 NOTE — HISTORY OF PRESENT ILLNESS
[de-identified] : 75-year-old female status post left revTSA 6/14/24. She ran out of PT visits last week. Takes Tylenol/ Oxycodone for pain. Denies post op complications. The patient reports minimal pain. She has sustained a few more falls after last visit last fall was 4/23/25. She is under the care of Pain management.

## 2025-05-09 NOTE — DISCUSSION/SUMMARY
[de-identified] : 75-year-old female status post left revTSA  Patient is doing well at this time following surgical intervention. Progressing well despite her falls since last visit, radiographs continue to show a revTSA. Discussed full return to ADLs and activity as she wishes. Discussed potential catastropic effects of falls with this implant, but thankfully patient is doing well.   Recommendations: Continue HEP for terminal ROM exercises / strengthening and conditioning. ADL's to tolerance.  Follow-up as needed

## 2025-05-17 ENCOUNTER — EMERGENCY (EMERGENCY)
Facility: HOSPITAL | Age: 75
LOS: 0 days | Discharge: HOME | End: 2025-05-17
Attending: STUDENT IN AN ORGANIZED HEALTH CARE EDUCATION/TRAINING PROGRAM
Payer: MEDICARE

## 2025-05-17 VITALS
TEMPERATURE: 99 F | HEART RATE: 84 BPM | SYSTOLIC BLOOD PRESSURE: 113 MMHG | WEIGHT: 197.98 LBS | HEIGHT: 62 IN | RESPIRATION RATE: 16 BRPM | DIASTOLIC BLOOD PRESSURE: 75 MMHG | OXYGEN SATURATION: 98 %

## 2025-05-17 DIAGNOSIS — R35.0 FREQUENCY OF MICTURITION: ICD-10-CM

## 2025-05-17 DIAGNOSIS — Z96.652 PRESENCE OF LEFT ARTIFICIAL KNEE JOINT: ICD-10-CM

## 2025-05-17 DIAGNOSIS — E11.9 TYPE 2 DIABETES MELLITUS WITHOUT COMPLICATIONS: ICD-10-CM

## 2025-05-17 DIAGNOSIS — Z87.19 PERSONAL HISTORY OF OTHER DISEASES OF THE DIGESTIVE SYSTEM: ICD-10-CM

## 2025-05-17 DIAGNOSIS — Z98.1 ARTHRODESIS STATUS: Chronic | ICD-10-CM

## 2025-05-17 DIAGNOSIS — Z96.612 PRESENCE OF LEFT ARTIFICIAL SHOULDER JOINT: ICD-10-CM

## 2025-05-17 DIAGNOSIS — Z96.651 PRESENCE OF RIGHT ARTIFICIAL KNEE JOINT: Chronic | ICD-10-CM

## 2025-05-17 DIAGNOSIS — M25.552 PAIN IN LEFT HIP: ICD-10-CM
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
## 2025-05-17 DIAGNOSIS — Z96.642 PRESENCE OF LEFT ARTIFICIAL HIP JOINT: Chronic | ICD-10-CM

## 2025-05-17 DIAGNOSIS — M25.512 PAIN IN LEFT SHOULDER: ICD-10-CM

## 2025-05-17 DIAGNOSIS — Z90.89 ACQUIRED ABSENCE OF OTHER ORGANS: Chronic | ICD-10-CM

## 2025-05-17 DIAGNOSIS — J45.909 UNSPECIFIED ASTHMA, UNCOMPLICATED: ICD-10-CM

## 2025-05-17 DIAGNOSIS — Z91.018 ALLERGY TO OTHER FOODS: ICD-10-CM

## 2025-05-17 DIAGNOSIS — I10 ESSENTIAL (PRIMARY) HYPERTENSION: ICD-10-CM

## 2025-05-17 DIAGNOSIS — Z98.49 CATARACT EXTRACTION STATUS, UNSPECIFIED EYE: Chronic | ICD-10-CM

## 2025-05-17 DIAGNOSIS — Z88.1 ALLERGY STATUS TO OTHER ANTIBIOTIC AGENTS: ICD-10-CM

## 2025-05-17 DIAGNOSIS — Z96.642 PRESENCE OF LEFT ARTIFICIAL HIP JOINT: ICD-10-CM

## 2025-05-17 DIAGNOSIS — R42 DIZZINESS AND GIDDINESS: ICD-10-CM

## 2025-05-17 DIAGNOSIS — Z96.652 PRESENCE OF LEFT ARTIFICIAL KNEE JOINT: Chronic | ICD-10-CM

## 2025-05-17 DIAGNOSIS — N39.0 URINARY TRACT INFECTION, SITE NOT SPECIFIED: ICD-10-CM

## 2025-05-17 LAB
APPEARANCE UR: CLEAR — SIGNIFICANT CHANGE UP
BILIRUB UR-MCNC: NEGATIVE — SIGNIFICANT CHANGE UP
COLOR SPEC: YELLOW — SIGNIFICANT CHANGE UP
DIFF PNL FLD: NEGATIVE — SIGNIFICANT CHANGE UP
GLUCOSE UR QL: NEGATIVE MG/DL — SIGNIFICANT CHANGE UP
KETONES UR QL: NEGATIVE MG/DL — SIGNIFICANT CHANGE UP
LEUKOCYTE ESTERASE UR-ACNC: ABNORMAL
NITRITE UR-MCNC: NEGATIVE — SIGNIFICANT CHANGE UP
PH UR: 6.5 — SIGNIFICANT CHANGE UP (ref 5–8)
PROT UR-MCNC: NEGATIVE MG/DL — SIGNIFICANT CHANGE UP
SP GR SPEC: 1.01 — SIGNIFICANT CHANGE UP (ref 1–1.03)
UROBILINOGEN FLD QL: 0.2 MG/DL — SIGNIFICANT CHANGE UP (ref 0.2–1)

## 2025-05-17 PROCEDURE — 99284 EMERGENCY DEPT VISIT MOD MDM: CPT

## 2025-05-17 PROCEDURE — 73502 X-RAY EXAM HIP UNI 2-3 VIEWS: CPT | Mod: 26,LT

## 2025-05-17 PROCEDURE — 73030 X-RAY EXAM OF SHOULDER: CPT | Mod: 26,LT

## 2025-05-17 RX ORDER — CEPHALEXIN 250 MG/1
1 CAPSULE ORAL
Qty: 14 | Refills: 0
Start: 2025-05-17 | End: 2025-05-23

## 2025-05-21 NOTE — PHYSICAL EXAM
[de-identified] : Constitutional:  73 year old female, alert and oriented, cooperative, in no acute distress.  HEENT  NC/AT.  Appearance: symmetric  Neck/Back Straight without deformity or instability.  Good ROM.  Chest/Respiratory  Respiratory effort: no intercostal retractions or use of accessory muscles. Nonlabored Breathing  Mental Status:  Judgment, insight: intact Orientation: oriented to time, place, and person  Neurological: Sensory and Motor are grossly intact throughout  Left Hip Exam: Inspection/Appearance:      Incision well healed, no erythema or drainage  Tenderness:  	Sacroiliac: Negative  	Greater trochanter: Positive                 TERENCE Test: Negative                  FADIR Test: Negative   Range of Motion:                 Extension - 0  	Flexion - 100  	IR - 30 	ER - 40 	Abd - 40  	Add - 30   Stability: Normal without instability  Right Hip:  Tenderness:  	Sacroiliac: Negative  	Greater trochanter: Positive                 TERENCE Test: Negative                  FADIR Test: Negative   Range of Motion:                 Extension - 0  	Flexion - 100  	IR - 30 	ER - 40 	Abd - 40  	Add - 30    Neurologic Exam     Motor intact including 5/5 Extensor Hallucis Longus, 5/5 Flexor Hallucis Longus, 5/5 Tibialis Anterior and 5/5 Gastrocnemius     Sensation Intact to Light Touch including Saphenous, Sural, Superficial Peroneal, Deep Peroneal, Tibial nerve distributions  No pain with range of motion of the right hip or bilateral knees. No lumbar paraspinal muscle tenderness. [de-identified] : XRay:  XRays of the Pelvis (1 View) and Left Hip (2 Views) taken on 3/26/2024. XRays demonstrate a Left Total Hip Arthroplasty in good position and alignment. There is no obvious evidence of fracture, dislocation, osteolysis or loosening. (my personal interpretation) Components: FanBridge S-ROM

## 2025-05-21 NOTE — HISTORY OF PRESENT ILLNESS
[de-identified] : 5/22/2025 11/5/2024  Regina Cedeno presents to the office for follow-up of her bilateral hips.  Patient has been experiencing bilateral lateral hip pain and trochanteric bursitis.  She underwent a left TSA in June and is currently in physical therapy.  She has not gone to PT for her hips.  She did have a fall in September.  Patient is taking Celebrex.  She is planned for hallux fusion.  5/2/2024  Regina Cedeno presents to the office for follow-up of her left total hip arthroplasty.  Patient's left hip pain is manageable at this time.  Her metal ion levels showed cobalt: <1, chromium: 0.5.  No falls.  No fevers or chills.  3/26/2024 Regina Cedeno is a 73-year-old female who presents the office for evaluation of her left total hip arthroplasty.  Patient underwent left MICHELLE in 2005 by Dr. Valladares.  She did well overall.  About 5 to 6 years ago, patient started to have lateral hip pain and tenderness.  Patient is also planned for lumbar spine surgery by Dr. Sommer.  She received a prior spine injection, which did not help.  She is also considering left total shoulder arthroplasty.  She had a fall in 2020 1/2020, with head strike.  Patient has balance issues and is currently in physical therapy.  She can have leg paresthesias.  No fevers.  History: HTN, HLD, Asthma, TARYN

## 2025-05-21 NOTE — DISCUSSION/SUMMARY
[de-identified] : Regina Cedeno is a 73-year-old female who presents the office for follow up of her left total hip arthroplasty.  Patient underwent left MICHELLE in 2005 by Dr. Valladares.  Prior X-rays showed left total hip arthroplasty in good position and alignment.  Examination showed tenderness over the greater trochanter.  Discussed with the patient the examination and imaging findings.  Discussed with patient that her pain is possibly secondary to greater trochanteric bursitis. Patient was given a referral to physical therapy for her hip pain.  Patient will follow-up in 6 months for reevaluation and management.  Patient understanding and in agreement with the plan.  All questions answered.  Plan: -Physical therapy -Follow-up in 6 months for reevaluation and management

## 2025-05-22 ENCOUNTER — APPOINTMENT (OUTPATIENT)
Dept: ORTHOPEDIC SURGERY | Facility: CLINIC | Age: 75
End: 2025-05-22
Payer: MEDICARE

## 2025-05-22 ENCOUNTER — APPOINTMENT (OUTPATIENT)
Dept: ORTHOPEDIC SURGERY | Facility: CLINIC | Age: 75
End: 2025-05-22

## 2025-05-22 VITALS — HEIGHT: 62 IN | BODY MASS INDEX: 36.44 KG/M2 | WEIGHT: 198 LBS

## 2025-05-22 DIAGNOSIS — M54.16 RADICULOPATHY, LUMBAR REGION: ICD-10-CM

## 2025-05-22 DIAGNOSIS — M47.10 OTHER SPONDYLOSIS WITH MYELOPATHY, SITE UNSPECIFIED: ICD-10-CM

## 2025-05-22 PROCEDURE — 72040 X-RAY EXAM NECK SPINE 2-3 VW: CPT

## 2025-05-22 PROCEDURE — 72100 X-RAY EXAM L-S SPINE 2/3 VWS: CPT

## 2025-05-22 PROCEDURE — 99214 OFFICE O/P EST MOD 30 MIN: CPT | Mod: 25

## 2025-05-22 NOTE — PHYSICAL EXAM
[Walker] : ambulates with walker [Iqbal's Sign] : negative Iqbal's sign [Pronator Drift] : negative pronator drift [SLR] : negative straight leg raise [de-identified] : 5 out of 5 motor strength, sensation is intact and symmetrical full range of motion flexion extension and rotation, no palpatory tenderness full range of motion of hips knees shoulders and elbows (all four extremities), no atrophy, negative straight leg raise, no pathological reflexes, no swelling, normal ambulation, no apparent distress skin is intact, no palpable lymph nodes, no upper or lower extremity instability, alert and oriented x3 and normal mood. Normal finger-to nose test. No upper motor neuron findings. [de-identified] : XR AP Lat Cervical 05/22/2025 -C3-7 decompression and fusion, adequate- reviewed with patient.   XR AP Lat Lumbar 05/22/2025 -Left MICHELLE, disc degenerative disease- reviewed with patient.    XR Sacrum/Coccyx 5/22/25 - Left MICHELLE, adequate- reviewed with patient.   AP lateral cervical shows adequate C3-7 fusion posterior-reviewed with the patient.  X ray of thoracic spine done 10/06/2022-Thoracic degenerative disc disease-Results reviewed with the patient  XR AP Lat shoulder 04/17/2023 -bilateral arthritis- reviewed with the patient.  I reviewed, interpreted and clinically correlated the following outside imaging studies, EMG 9/1/23 Right median neuropathy at wrist consistent with mild carpal tunnel Ulnar innervated muscles in the left including extensor indices proprius muscles.

## 2025-05-22 NOTE — DISCUSSION/SUMMARY
[de-identified] : S/P C3-7 decompression and fusion on 2/27/2018. Thoracic degenerative disc disease. EMG C8 ganglion sensory issue. Right shoulder arthritis. Lumbar disc degenerative disease Cervical/lumbar strain and sprain.  Discussed all options. Referral for physical therapy. Cervical MRI referral. Lumbar MRI referral. F/U after MRIs. All options discussed including rest, medicine, home exercise, acupuncture, Chiropractic care, Physical Therapy, Pain management, and last resort surgery. All questions were answered, all alternatives discussed, and the patient is in complete agreement with the treatment plan which the patient contributed to and discussed with me through the shared decision-making process. Follow-up appointment as instructed. Any issues and the patient will call or come in sooner.

## 2025-05-22 NOTE — HISTORY OF PRESENT ILLNESS
[Stable] : stable [de-identified] : 75 year old female presents for evaluation of lower back pain and coccyx pain after a fall on 4/23/25 on 5/5/25.  S/P C3-7 decompression and fusion on 2/27/2018. Last seen in Sept 2023 for neck and mid back pain. She states she was using a step stool to get into a minivan, she lost her balance and she landed on her left side which was wedged between the front seat and back seats of the van. She also had another fall a few weeks later where she was sitting on a recliner and slid down off the recliner where her buttocks hit the floor.  She currently has pain at the buttocks that radiates down the legs posteriorly to the ankles.  Denies numbness/tingling. States that she has left leg weakness which she notes is weaker since the falls.  She takes oxycodone prescribed by pain management which she stopped due to dizziness.  Has not tried PT or chiropractic care.  She also complains of left shoulder pain, she is s/p left revTSA 6/14/24 with Dr. Rizvi. She saw Dr. Rizvi a few weeks ago and hardware is intact. She is currently on treatment for a UTI.   Ambulates with a rolling walker.  No fever chills sweats nausea vomiting no bowel or bladder dysfunction, no recent weight loss or gain no night pain. This history is in addition to the intake form that I personally reviewed.

## 2025-05-22 NOTE — ADDENDUM
FreeTextEntry1] : This note was written by Compa Durham on 05/22/2025 acting as scribe for Dr. Wilton Sommer M.D.  I, Wilton Sommer MD, have read and attest that all the information, medical decision making and discharge instructions within are true and accurate.

## 2025-05-29 ENCOUNTER — APPOINTMENT (OUTPATIENT)
Dept: OPHTHALMOLOGY | Facility: CLINIC | Age: 75
End: 2025-05-29

## 2025-05-30 ENCOUNTER — APPOINTMENT (OUTPATIENT)
Dept: MRI IMAGING | Facility: IMAGING CENTER | Age: 75
End: 2025-05-30
Payer: MEDICARE

## 2025-05-30 ENCOUNTER — OUTPATIENT (OUTPATIENT)
Dept: OUTPATIENT SERVICES | Facility: HOSPITAL | Age: 75
LOS: 1 days | End: 2025-05-30
Payer: MEDICARE

## 2025-05-30 DIAGNOSIS — Z98.49 CATARACT EXTRACTION STATUS, UNSPECIFIED EYE: Chronic | ICD-10-CM

## 2025-05-30 DIAGNOSIS — Z96.652 PRESENCE OF LEFT ARTIFICIAL KNEE JOINT: Chronic | ICD-10-CM

## 2025-05-30 DIAGNOSIS — Z98.1 ARTHRODESIS STATUS: Chronic | ICD-10-CM

## 2025-05-30 DIAGNOSIS — Z90.89 ACQUIRED ABSENCE OF OTHER ORGANS: Chronic | ICD-10-CM

## 2025-05-30 DIAGNOSIS — Z00.8 ENCOUNTER FOR OTHER GENERAL EXAMINATION: ICD-10-CM

## 2025-05-30 DIAGNOSIS — Z96.651 PRESENCE OF RIGHT ARTIFICIAL KNEE JOINT: Chronic | ICD-10-CM

## 2025-05-30 DIAGNOSIS — M54.12 RADICULOPATHY, CERVICAL REGION: ICD-10-CM

## 2025-05-30 DIAGNOSIS — Z96.642 PRESENCE OF LEFT ARTIFICIAL HIP JOINT: Chronic | ICD-10-CM

## 2025-05-30 PROCEDURE — 72148 MRI LUMBAR SPINE W/O DYE: CPT | Mod: 26

## 2025-05-30 PROCEDURE — 72141 MRI NECK SPINE W/O DYE: CPT | Mod: 26

## 2025-05-30 PROCEDURE — 72148 MRI LUMBAR SPINE W/O DYE: CPT

## 2025-05-30 PROCEDURE — 72141 MRI NECK SPINE W/O DYE: CPT

## 2025-06-02 ENCOUNTER — APPOINTMENT (OUTPATIENT)
Dept: NEUROLOGY | Facility: CLINIC | Age: 75
End: 2025-06-02

## 2025-06-03 ENCOUNTER — TRANSCRIPTION ENCOUNTER (OUTPATIENT)
Age: 75
End: 2025-06-03

## 2025-06-03 ENCOUNTER — APPOINTMENT (OUTPATIENT)
Dept: HOME HEALTH SERVICES | Facility: HOME HEALTH | Age: 75
End: 2025-06-03
Payer: MEDICARE

## 2025-06-03 VITALS
OXYGEN SATURATION: 96 % | HEART RATE: 67 BPM | SYSTOLIC BLOOD PRESSURE: 132 MMHG | RESPIRATION RATE: 16 BRPM | DIASTOLIC BLOOD PRESSURE: 80 MMHG

## 2025-06-03 DIAGNOSIS — R73.02 IMPAIRED GLUCOSE TOLERANCE (ORAL): ICD-10-CM

## 2025-06-03 DIAGNOSIS — D64.9 ANEMIA, UNSPECIFIED: ICD-10-CM

## 2025-06-03 DIAGNOSIS — I10 ESSENTIAL (PRIMARY) HYPERTENSION: ICD-10-CM

## 2025-06-03 DIAGNOSIS — E66.9 OBESITY, UNSPECIFIED: ICD-10-CM

## 2025-06-03 DIAGNOSIS — N18.30 CHRONIC KIDNEY DISEASE, STAGE 3 UNSPECIFIED: ICD-10-CM

## 2025-06-03 DIAGNOSIS — R82.90 UNSPECIFIED ABNORMAL FINDINGS IN URINE: ICD-10-CM

## 2025-06-03 DIAGNOSIS — Z63.6 DEPENDENT RELATIVE NEEDING CARE AT HOME: ICD-10-CM

## 2025-06-03 DIAGNOSIS — R39.9 UNSPECIFIED SYMPTOMS AND SIGNS INVOLVING THE GENITOURINARY SYSTEM: ICD-10-CM

## 2025-06-03 DIAGNOSIS — G45.9 TRANSIENT CEREBRAL ISCHEMIC ATTACK, UNSPECIFIED: ICD-10-CM

## 2025-06-03 DIAGNOSIS — R29.6 REPEATED FALLS: ICD-10-CM

## 2025-06-03 DIAGNOSIS — K86.89 OTHER SPECIFIED DISEASES OF PANCREAS: ICD-10-CM

## 2025-06-03 DIAGNOSIS — R73.09 OTHER ABNORMAL GLUCOSE: ICD-10-CM

## 2025-06-03 DIAGNOSIS — Z96.649 PRESENCE OF UNSPECIFIED ARTIFICIAL HIP JOINT: ICD-10-CM

## 2025-06-03 DIAGNOSIS — F41.8 OTHER SPECIFIED ANXIETY DISORDERS: ICD-10-CM

## 2025-06-03 DIAGNOSIS — Z01.818 ENCOUNTER FOR OTHER PREPROCEDURAL EXAMINATION: ICD-10-CM

## 2025-06-03 DIAGNOSIS — J45.909 UNSPECIFIED ASTHMA, UNCOMPLICATED: ICD-10-CM

## 2025-06-03 DIAGNOSIS — G47.33 OBSTRUCTIVE SLEEP APNEA (ADULT) (PEDIATRIC): ICD-10-CM

## 2025-06-03 PROBLEM — Z71.89 ADVANCE CARE PLANNING: Status: ACTIVE | Noted: 2025-06-03

## 2025-06-03 PROCEDURE — 99345 HOME/RES VST NEW HIGH MDM 75: CPT | Mod: 25,2W

## 2025-06-03 PROCEDURE — 99497 ADVNCD CARE PLAN 30 MIN: CPT | Mod: 2W

## 2025-06-03 RX ORDER — METOPROLOL SUCCINATE 25 MG/1
25 TABLET, EXTENDED RELEASE ORAL DAILY
Qty: 90 | Refills: 3 | Status: ACTIVE | COMMUNITY
Start: 2025-06-03

## 2025-06-03 SDOH — SOCIAL STABILITY - SOCIAL INSECURITY: DEPENDENT RELATIVE NEEDING CARE AT HOME: Z63.6

## 2025-06-03 NOTE — REASON FOR VISIT
[Initial Evaluation] : an initial evaluation [Pre-Visit Preparation] : pre-visit preparation was done [Intercurrent Specialty/Sub-specialty Visits] : the patient has intercurrent specialty/sub-specialty visits [Other:____] : [unfilled] [Home] : at home, [unfilled] , at the time of the visit. [Medical Office: ([facility])___] : at the medical office located in  [Telehealth (audio & video)] : This visit was provided via telehealth using real-time 2-way audio visual technology. [Verbal consent obtained from patient] : the patient, [unfilled]

## 2025-06-03 NOTE — COUNSELING
[Obese -  ( BMI  >29.9 )] : obese - ( BMI  >29.9 ) [TLC diet recommended] : TLC diet recommended [Non - Smoker] : non-smoker [Smoke/CO Detectors] : smoke/CO detectors [Use assistive device to avoid falls] : use assistive device to avoid falls [Remove clutter and unsafe carpeting to avoid falls] : remove clutter and unsafe carpeting to avoid falls [Date:___] : bone density screening completed on [unfilled] [Date: ___] : colonoscopy completed on [unfilled] [Full Code] : Code Status: Full Code [No Limitations] : Treatment Guidelines: No limitations [Long Term Intubation] : Intubation: Long term intubation [Last Verification Date: _____] : [facility] Completion/last verification date: [unfilled] [_____] : HCP: [unfilled]

## 2025-06-03 NOTE — REASON FOR VISIT
[Initial Evaluation] : an initial evaluation [Pre-Visit Preparation] : pre-visit preparation was done [Intercurrent Specialty/Sub-specialty Visits] : the patient has intercurrent specialty/sub-specialty visits [Other:____] : [unfilled] [Home] : at home, [unfilled] , at the time of the visit. [Medical Office: ([facility])___] : at the medical office located in  [Telehealth (audio & video)] : This visit was provided via telehealth using real-time 2-way audio visual technology. [Verbal consent obtained from patient] : the patient, [unfilled]

## 2025-06-03 NOTE — COUNSELING
[Obese -  ( BMI  >29.9 )] : obese - ( BMI  >29.9 ) [TLC diet recommended] : TLC diet recommended [Non - Smoker] : non-smoker [Smoke/CO Detectors] : smoke/CO detectors [Use assistive device to avoid falls] : use assistive device to avoid falls [Remove clutter and unsafe carpeting to avoid falls] : remove clutter and unsafe carpeting to avoid falls [Date:___] : bone density screening completed on [unfilled] [Date: ___] : colonoscopy completed on [unfilled] [Full Code] : Code Status: Full Code [No Limitations] : Treatment Guidelines: No limitations [Long Term Intubation] : Intubation: Long term intubation [[facility]: _____[facility]pletion/last verification date: [unfilled] [_____] : HCP: [unfilled]

## 2025-06-04 ENCOUNTER — NON-APPOINTMENT (OUTPATIENT)
Age: 75
End: 2025-06-04

## 2025-06-04 NOTE — REASON FOR VISIT
[Home] : at home, [unfilled] , at the time of the visit. [Medical Office: ([facility])___] : at the medical office located in  [Telehealth (audio & video)] : This visit was provided via telehealth using real-time 2-way audio visual technology. [Verbal consent obtained from patient] : the patient, [unfilled] [Follow-Up Visit] : a follow-up visit for

## 2025-06-04 NOTE — REASON FOR VISIT
[Home] : at home, [unfilled] , at the time of the visit. [Medical Office: ([facility])___] : at the medical office located in  [Telehealth (audio & video)] : This visit was provided via telehealth using real-time 2-way audio visual technology. [Verbal consent obtained from patient] : the patient, [unfilled] [Follow-Up Visit] : a follow-up visit for

## 2025-06-05 NOTE — H&P PST ADULT - MS GEN HX ROS MEA POS PC
You can access the FollowMyHealth Patient Portal offered by [facility] by registering at the following website: [email or web address]. By joining [facility]’s FollowMyHealth portal, you will also be able to view your health information using other applications (apps) compatible with our system.
stiffness/back pain/arthritis

## 2025-06-06 ENCOUNTER — APPOINTMENT (OUTPATIENT)
Dept: ORTHOPEDIC SURGERY | Facility: CLINIC | Age: 75
End: 2025-06-06
Payer: MEDICARE

## 2025-06-06 PROCEDURE — 99214 OFFICE O/P EST MOD 30 MIN: CPT | Mod: 2W

## 2025-06-06 NOTE — HEALTH RISK ASSESSMENT
[HRA Reviewed] : Health risk assessment reviewed [Independent] : using telephone [Some assistance needed] : managing finances [Any fall with injury in past year] : Patient reported fall with injury in the past year [Yes] : The patient does have visual impairment [TimeGetUpGo] : 30

## 2025-06-06 NOTE — PHYSICAL EXAM
[Walker] : ambulates with walker [Iqbal's Sign] : negative Iqbal's sign [Pronator Drift] : negative pronator drift [SLR] : negative straight leg raise [de-identified] : Adequate motor strength, sensation is intact and symmetrical full range of motion flexion extension and rotation, full range of motion of hips knees shoulders and elbows (all four extremities), no atrophy, negative straight leg raise, no swelling, normal ambulation, no apparent distress skin is intact, no upper or lower extremity instability, alert and oriented x3 and normal mood. Normal finger-to nose test. Adequate heal walk and toe walk.  [de-identified] : MR SPINE CERVICAL - ORDERED BY: ERNESTO HUANG   PROCEDURE DATE: 05/30/2025    INTERPRETATION: MR CERVICAL SPINE WITHOUT CONTRAST  TECHNIQUE: Multiplanar multisequence imaging of the cervical spine was performed without the administration of intravenous contrast.  CLINICAL INDICATION: Cervical radiculopathy. Shoulder/back pain. COMPARISON: CT stroke series 12/19/2024. MR brain 12/20/2024. ____________________ FINDINGS: Mildly degraded by motion artifact. Minimal upper cervical levoscoliosis with an apex near C3-C4. Mild cervicothoracic dextroscoliosis with an apex near C7-T1. Very mild anterolisthesis of T2 on T3. Minimal anterolisthesis of T1 on T2. Grade 1 anterolisthesis of C7 on T1. Very mild anterolisthesis of C5 on C6. Grade 1 anterolisthesis of C3 on C4. Overall there is straightening of the normal cervical lordosis. Cervical alignment otherwise maintained. Postsurgical changes of posterior elements at C3-C7 with hardware--note that MR is suboptimal for the assessment of hardware integrity. Multilevel endplate degenerative changes. Vertebral body heights are maintained. No acute fracture. No aggressive osseous lesions.  Normal cord caliber and signal. Dysmorphic appearance of the cervical cord at the level of C3-C4, C4-C5, and to a lesser extent at C5-C6.  Moderate amount of T2/STIR hyperintense signal in the visualized midbrain/carlos, probably sequelae of chronic microvascular ischemic disease.  C2-C3: Disc osteophyte complex. No significant spinal canal stenosis. No significant left neuroforaminal stenosis. Moderate right neuroforaminal stenosis. Mild hypertrophic facet joint degeneration.  C3-C4: Postsurgical changes. No significant spinal canal stenosis. Mild right neuroforaminal stenosis. Moderate left neuroforaminal stenosis. Mild hypertrophic facet joint degeneration.  C4-C5: Postsurgical changes. No significant spinal canal stenosis. Mild right neuroforaminal stenosis. Moderate left neuroforaminal stenosis. Mild hypertrophic facet joint degeneration.  C5-C6: Postsurgical changes. Disc osteophyte complex. No significant spinal canal stenosis. Mild right neuroforaminal stenosis. Mild to moderate left neuroforaminal stenosis. Mild hypertrophic facet joint degeneration.  C6-C7: Disc osteophyte complex. Very mild spinal canal stenosis. Mild right neuroforaminal stenosis. Moderate left neuroforaminal stenosis. Mild hypertrophic facet joint degeneration.  C7-T1: Grade 1 anterolisthesis of C7 on T1 with uncovering of the posterior margin of the disc. Disc osteophyte complex. Moderate to severe spinal canal stenosis. Severe right neuroforaminal stenosis. Severe left neuroforaminal stenosis. Mild hypertrophic facet joint degeneration.  T1-T2: Disc osteophyte complex. Mild spinal canal stenosis. Mild to moderate right neuroforaminal stenosis. Severe left neuroforaminal stenosis. Mild hypertrophic facet joint degeneration.  Paraspinal Tissues: Postsurgical changes of the dorsal paraspinal soft tissues. ____________________ IMPRESSION:  1. Postsurgical changes as detailed above. Note that MR is suboptimal for the assessment of hardware integrity. Consider further evaluation with CT of the cervical spine without contrast as clinically indicated. 2. Dysmorphic appearance of the cervical cord at C3-C4, C4-C5, and C5-C6. This is suspicious for adhesions. 3. Multilevel spondylosis as detailed above with at most moderate to severe spinal canal stenosis and severe neuroforaminal stenosis.  --- End of Report ---    MR SPINE LUMBAR  - ORDERED BY:  ERNESTO HUANG   PROCEDURE DATE:  05/30/2025    INTERPRETATION:  CLINICAL INFORMATION: Low back pain.  ADDITIONAL CLINICAL INFORMATION: Spondylolisthesis M43.16  TECHNIQUE: Multiplanar, multisequence MRI was performed of the lumbar spine. IV Contrast:  PRIOR STUDIES: MRI of the lumbar spine performed 12/1/2017.  FINDINGS:  BONES: There is no fracture or bone marrow edema pattern. There are mixed Modic changes including Modic type II and type III changes at the L4-L5 and L5-S1 level. DISCS: There is loss of normal T2 disc signal throughout the lumbar spine, most notable at the L2-L3, L3-L4, L4-5, L5-S1 level. There is moderate to severe loss of disc space at L4-5 and L5-S1. ALIGNMENT: There is grade 1 anterolisthesis at L4-L5 and mild retrolisthesis at L5-S1, unchanged. SACROILIAC JOINTS/SACRUM: There is no sacral fracture. The SI joints are partially visualized but are intact. CONUS AND CAUDA EQUINA: The distal cord and conus are normal in signal. Conus terminates at L1-L2. VISUALIZED INTRAPELVIC/INTRA-ABDOMINAL SOFT TISSUES: Bilateral renal cysts, unchanged. PARASPINAL SOFT TISSUES: Left total hip arthroplasty is partial included. Mild fatty infiltration of the lower paraspinal muscle bellies.   INDIVIDUAL LEVELS:  T12-L1: Mild disc bulging and bilateral facet arthropathy with indentation of the ventral thecal sac. No spinal canal or neuroforaminal stenosis.  L1-L2: Minimal disc bulging and moderate bilateral facet arthropathy without spinal canal or neuroforaminal stenosis. There is a lobulated perineural sleeve cyst at the right neural foramen measuring 1.6 x 9.3 x 2.0 cm (series 3, image 17 with chronic erosive changes at the right neural foramen, similar to prior study. L2-L3: Mild broad-based disc bulge and moderate bilateral facet arthropathy with thickening of ligamentum flavum results in moderate spinal canal stenosis and mild right and moderate left neural foraminal stenosis. L3-L4: Mild diffuse disc bulging and moderate bilateral facet arthropathy with thickening of ligamentum flavum results in mild to moderate spinal canal stenosis and moderate bilateral neural foraminal stenosis. L4-L5: Grade 1 anterolisthesis with severe bilateral facet arthropathy with high-grade hypertrophic changes. There is unroofing of the posterior disc. Findings result in severe spinal canal stenosis and severe bilateral neural foraminal stenosis with impingement of the exiting L4 nerves in the foramen bilaterally. L5-S1: Mild retrolisthesis with superimposed broad based disc osteophyte complex and moderate bilateral facet arthropathy with thickening of ligamentum flavum results in mild to moderate spinal canal stenosis with disc material contacting the traversing S1 nerves at the lateral recess and severe bilateral neural foraminal stenosis with impingement of the L5 exiting nerves at the foraminal/extraforaminal zones.   IMPRESSION:  MRI of the lumbar spine demonstrates multilevel spondylosis and facet arthropathy. Findings are compared to MRI of the lumbar spine performed 12/1/2017 and are overall slightly progressed in severity.  At L2-L3 there is moderate spinal canal stenosis with moderate left neural foraminal stenosis, mildly progressed from prior study.  At L3-L4, there is mild to moderate spinal canal stenosis and moderate bilateral neural foraminal stenosis, similar to prior study.  At L4-5, there is grade 1 spondylolisthesis with severe bilateral facet arthropathy, severe spinal canal stenosis, and severe bilateral neural foraminal stenosis, mildly progressed from prior study.  At L5-S1, there is severe bilateral neural foraminal stenosis, slightly progressed from prior study.  --- End of Report ---    XR AP Lat Cervical 05/22/2025 -C3-7 decompression and fusion, adequate- reviewed with patient.   XR AP Lat Lumbar 05/22/2025 -Left MICHELLE, disc degenerative disease- reviewed with patient.    XR Sacrum/Coccyx 5/22/25 - Left MICHELLE, adequate- reviewed with patient.   AP lateral cervical shows adequate C3-7 fusion posterior-reviewed with the patient.  X ray of thoracic spine done 10/06/2022-Thoracic degenerative disc disease-Results reviewed with the patient  XR AP Lat shoulder 04/17/2023 -bilateral arthritis- reviewed with the patient.  I reviewed, interpreted and clinically correlated the following outside imaging studies, EMG 9/1/23 Right median neuropathy at wrist consistent with mild carpal tunnel Ulnar innervated muscles in the left including extensor indices proprius muscles.

## 2025-06-06 NOTE — DISCUSSION/SUMMARY
[de-identified] : S/P C3-7 decompression and fusion on 2/27/2018. Thoracic degenerative disc disease. EMG C8 ganglion sensory issue. Right shoulder arthritis. Lumbar disc degenerative disease Cervical/lumbar strain and sprain.  Discussed all options. Discussed posterior decompression C7-T1. She should consider surgery. PT F/U 1 month. All options discussed including rest, medicine, home exercise, acupuncture, Chiropractic care, Physical Therapy, Pain management, and last resort surgery. All questions were answered, all alternatives discussed, and the patient is in complete agreement with the treatment plan which the patient contributed to and discussed with me through the shared decision-making process. Follow-up appointment as instructed. Any issues and the patient will call or come in sooner.

## 2025-06-06 NOTE — REVIEW OF SYSTEMS
[Joint Pain] : joint pain [Joint Stiffness] : joint stiffness [Negative] : Heme/Lymph [FreeTextEntry6] : rare wheezing

## 2025-06-06 NOTE — PHYSICAL EXAM
[No Acute Distress] : no acute distress [Normal Sclera/Conjunctiva] : normal sclera/conjunctiva [Normal Outer Ear/Nose] : the ears and nose were normal in appearance [No JVD] : no jugular venous distention [No Respiratory Distress] : no respiratory distress [Normal Rate] : heart rate was normal  [Breast Exam Declined] : patient declined to have breast exam done [Normal Bowel Sounds] : normal bowel sounds [Patient Refused] : rectal exam was refused by the patient [No CVA Tenderness] : no ~M costovertebral angle tenderness [No Rash] : no rash [Cranial Nerves Intact] : cranial nerves 2-12 were intact [Oriented x3] : oriented to person, place, and time [de-identified] : obese abdomen [de-identified] : slow to get up from chair. unsteady gait w/ cane.

## 2025-06-06 NOTE — PHYSICAL EXAM
[Walker] : ambulates with walker [Iqbal's Sign] : negative Iqbal's sign [Pronator Drift] : negative pronator drift [SLR] : negative straight leg raise [de-identified] : Adequate motor strength, sensation is intact and symmetrical full range of motion flexion extension and rotation, full range of motion of hips knees shoulders and elbows (all four extremities), no atrophy, negative straight leg raise, no swelling, normal ambulation, no apparent distress skin is intact, no upper or lower extremity instability, alert and oriented x3 and normal mood. Normal finger-to nose test. Adequate heal walk and toe walk.  [de-identified] : MR SPINE CERVICAL - ORDERED BY: ERNESTO HUANG   PROCEDURE DATE: 05/30/2025    INTERPRETATION: MR CERVICAL SPINE WITHOUT CONTRAST  TECHNIQUE: Multiplanar multisequence imaging of the cervical spine was performed without the administration of intravenous contrast.  CLINICAL INDICATION: Cervical radiculopathy. Shoulder/back pain. COMPARISON: CT stroke series 12/19/2024. MR brain 12/20/2024. ____________________ FINDINGS: Mildly degraded by motion artifact. Minimal upper cervical levoscoliosis with an apex near C3-C4. Mild cervicothoracic dextroscoliosis with an apex near C7-T1. Very mild anterolisthesis of T2 on T3. Minimal anterolisthesis of T1 on T2. Grade 1 anterolisthesis of C7 on T1. Very mild anterolisthesis of C5 on C6. Grade 1 anterolisthesis of C3 on C4. Overall there is straightening of the normal cervical lordosis. Cervical alignment otherwise maintained. Postsurgical changes of posterior elements at C3-C7 with hardware--note that MR is suboptimal for the assessment of hardware integrity. Multilevel endplate degenerative changes. Vertebral body heights are maintained. No acute fracture. No aggressive osseous lesions.  Normal cord caliber and signal. Dysmorphic appearance of the cervical cord at the level of C3-C4, C4-C5, and to a lesser extent at C5-C6.  Moderate amount of T2/STIR hyperintense signal in the visualized midbrain/carlos, probably sequelae of chronic microvascular ischemic disease.  C2-C3: Disc osteophyte complex. No significant spinal canal stenosis. No significant left neuroforaminal stenosis. Moderate right neuroforaminal stenosis. Mild hypertrophic facet joint degeneration.  C3-C4: Postsurgical changes. No significant spinal canal stenosis. Mild right neuroforaminal stenosis. Moderate left neuroforaminal stenosis. Mild hypertrophic facet joint degeneration.  C4-C5: Postsurgical changes. No significant spinal canal stenosis. Mild right neuroforaminal stenosis. Moderate left neuroforaminal stenosis. Mild hypertrophic facet joint degeneration.  C5-C6: Postsurgical changes. Disc osteophyte complex. No significant spinal canal stenosis. Mild right neuroforaminal stenosis. Mild to moderate left neuroforaminal stenosis. Mild hypertrophic facet joint degeneration.  C6-C7: Disc osteophyte complex. Very mild spinal canal stenosis. Mild right neuroforaminal stenosis. Moderate left neuroforaminal stenosis. Mild hypertrophic facet joint degeneration.  C7-T1: Grade 1 anterolisthesis of C7 on T1 with uncovering of the posterior margin of the disc. Disc osteophyte complex. Moderate to severe spinal canal stenosis. Severe right neuroforaminal stenosis. Severe left neuroforaminal stenosis. Mild hypertrophic facet joint degeneration.  T1-T2: Disc osteophyte complex. Mild spinal canal stenosis. Mild to moderate right neuroforaminal stenosis. Severe left neuroforaminal stenosis. Mild hypertrophic facet joint degeneration.  Paraspinal Tissues: Postsurgical changes of the dorsal paraspinal soft tissues. ____________________ IMPRESSION:  1. Postsurgical changes as detailed above. Note that MR is suboptimal for the assessment of hardware integrity. Consider further evaluation with CT of the cervical spine without contrast as clinically indicated. 2. Dysmorphic appearance of the cervical cord at C3-C4, C4-C5, and C5-C6. This is suspicious for adhesions. 3. Multilevel spondylosis as detailed above with at most moderate to severe spinal canal stenosis and severe neuroforaminal stenosis.  --- End of Report ---    MR SPINE LUMBAR  - ORDERED BY:  ERNESTO HUANG   PROCEDURE DATE:  05/30/2025    INTERPRETATION:  CLINICAL INFORMATION: Low back pain.  ADDITIONAL CLINICAL INFORMATION: Spondylolisthesis M43.16  TECHNIQUE: Multiplanar, multisequence MRI was performed of the lumbar spine. IV Contrast:  PRIOR STUDIES: MRI of the lumbar spine performed 12/1/2017.  FINDINGS:  BONES: There is no fracture or bone marrow edema pattern. There are mixed Modic changes including Modic type II and type III changes at the L4-L5 and L5-S1 level. DISCS: There is loss of normal T2 disc signal throughout the lumbar spine, most notable at the L2-L3, L3-L4, L4-5, L5-S1 level. There is moderate to severe loss of disc space at L4-5 and L5-S1. ALIGNMENT: There is grade 1 anterolisthesis at L4-L5 and mild retrolisthesis at L5-S1, unchanged. SACROILIAC JOINTS/SACRUM: There is no sacral fracture. The SI joints are partially visualized but are intact. CONUS AND CAUDA EQUINA: The distal cord and conus are normal in signal. Conus terminates at L1-L2. VISUALIZED INTRAPELVIC/INTRA-ABDOMINAL SOFT TISSUES: Bilateral renal cysts, unchanged. PARASPINAL SOFT TISSUES: Left total hip arthroplasty is partial included. Mild fatty infiltration of the lower paraspinal muscle bellies.   INDIVIDUAL LEVELS:  T12-L1: Mild disc bulging and bilateral facet arthropathy with indentation of the ventral thecal sac. No spinal canal or neuroforaminal stenosis.  L1-L2: Minimal disc bulging and moderate bilateral facet arthropathy without spinal canal or neuroforaminal stenosis. There is a lobulated perineural sleeve cyst at the right neural foramen measuring 1.6 x 9.3 x 2.0 cm (series 3, image 17 with chronic erosive changes at the right neural foramen, similar to prior study. L2-L3: Mild broad-based disc bulge and moderate bilateral facet arthropathy with thickening of ligamentum flavum results in moderate spinal canal stenosis and mild right and moderate left neural foraminal stenosis. L3-L4: Mild diffuse disc bulging and moderate bilateral facet arthropathy with thickening of ligamentum flavum results in mild to moderate spinal canal stenosis and moderate bilateral neural foraminal stenosis. L4-L5: Grade 1 anterolisthesis with severe bilateral facet arthropathy with high-grade hypertrophic changes. There is unroofing of the posterior disc. Findings result in severe spinal canal stenosis and severe bilateral neural foraminal stenosis with impingement of the exiting L4 nerves in the foramen bilaterally. L5-S1: Mild retrolisthesis with superimposed broad based disc osteophyte complex and moderate bilateral facet arthropathy with thickening of ligamentum flavum results in mild to moderate spinal canal stenosis with disc material contacting the traversing S1 nerves at the lateral recess and severe bilateral neural foraminal stenosis with impingement of the L5 exiting nerves at the foraminal/extraforaminal zones.   IMPRESSION:  MRI of the lumbar spine demonstrates multilevel spondylosis and facet arthropathy. Findings are compared to MRI of the lumbar spine performed 12/1/2017 and are overall slightly progressed in severity.  At L2-L3 there is moderate spinal canal stenosis with moderate left neural foraminal stenosis, mildly progressed from prior study.  At L3-L4, there is mild to moderate spinal canal stenosis and moderate bilateral neural foraminal stenosis, similar to prior study.  At L4-5, there is grade 1 spondylolisthesis with severe bilateral facet arthropathy, severe spinal canal stenosis, and severe bilateral neural foraminal stenosis, mildly progressed from prior study.  At L5-S1, there is severe bilateral neural foraminal stenosis, slightly progressed from prior study.  --- End of Report ---    XR AP Lat Cervical 05/22/2025 -C3-7 decompression and fusion, adequate- reviewed with patient.   XR AP Lat Lumbar 05/22/2025 -Left MICHELLE, disc degenerative disease- reviewed with patient.    XR Sacrum/Coccyx 5/22/25 - Left MICHELLE, adequate- reviewed with patient.   AP lateral cervical shows adequate C3-7 fusion posterior-reviewed with the patient.  X ray of thoracic spine done 10/06/2022-Thoracic degenerative disc disease-Results reviewed with the patient  XR AP Lat shoulder 04/17/2023 -bilateral arthritis- reviewed with the patient.  I reviewed, interpreted and clinically correlated the following outside imaging studies, EMG 9/1/23 Right median neuropathy at wrist consistent with mild carpal tunnel Ulnar innervated muscles in the left including extensor indices proprius muscles.

## 2025-06-06 NOTE — CHRONIC CARE ASSESSMENT
[Inadequate social support] : inadequate social support [PPS Score: ____] : Palliative Performance Scale (PPS) Score: [unfilled] [de-identified] : as tolerated [de-identified] : as tolerated

## 2025-06-06 NOTE — END OF VISIT
[FreeTextEntry3] :   All medical record entries made by my scribe were at my, NP Berta Degroot, direction and personally dictated by me. I have reviewed the chart and agree that the record accurately reflects my personal performance of the history, physical exam, assessment and plan. I have also personally directed, reviewed, and agreed with the chart.

## 2025-06-06 NOTE — HISTORY OF PRESENT ILLNESS
[Stable] : stable [de-identified] : 75 year old female presents for evaluation of lower back pain and coccyx pain after a fall on 4/23/25 on 5/5/25.  S/P C3-7 decompression and fusion on 2/27/2018. Last seen in Sept 2023 for neck and mid back pain. She states she was using a step stool to get into a minivan, she lost her balance and she landed on her left side which was wedged between the front seat and back seats of the van. She also had another fall a few weeks later where she was sitting on a recliner and slid down off the recliner where her buttocks hit the floor.  She currently has pain at the buttocks that radiates down the legs posteriorly to the ankles.  Denies numbness/tingling. States that she has left leg weakness which she notes is weaker since the falls.  She takes oxycodone prescribed by pain management which she stopped due to dizziness.  Has not tried PT or chiropractic care.  She also complains of left shoulder pain, she is s/p left revTSA 6/14/24 with Dr. Rizvi. She saw Dr. Rizvi a few weeks ago and hardware is intact. She is currently on treatment for a UTI.   Ambulates with a rolling walker.  Presents today for MRI Cervical and MRI Lumbar review.  No fever chills sweats nausea vomiting no bowel or bladder dysfunction, no recent weight loss or gain no night pain. This history is in addition to the intake form that I personally reviewed.

## 2025-06-06 NOTE — HISTORY OF PRESENT ILLNESS
[Stable] : stable [de-identified] : 75 year old female presents for evaluation of lower back pain and coccyx pain after a fall on 4/23/25 on 5/5/25.  S/P C3-7 decompression and fusion on 2/27/2018. Last seen in Sept 2023 for neck and mid back pain. She states she was using a step stool to get into a minivan, she lost her balance and she landed on her left side which was wedged between the front seat and back seats of the van. She also had another fall a few weeks later where she was sitting on a recliner and slid down off the recliner where her buttocks hit the floor.  She currently has pain at the buttocks that radiates down the legs posteriorly to the ankles.  Denies numbness/tingling. States that she has left leg weakness which she notes is weaker since the falls.  She takes oxycodone prescribed by pain management which she stopped due to dizziness.  Has not tried PT or chiropractic care.  She also complains of left shoulder pain, she is s/p left revTSA 6/14/24 with Dr. Rizvi. She saw Dr. Rizvi a few weeks ago and hardware is intact. She is currently on treatment for a UTI.   Ambulates with a rolling walker.  Presents today for MRI Cervical and MRI Lumbar review.  No fever chills sweats nausea vomiting no bowel or bladder dysfunction, no recent weight loss or gain no night pain. This history is in addition to the intake form that I personally reviewed.

## 2025-06-06 NOTE — HISTORY OF PRESENT ILLNESS
[Stable] : stable [de-identified] : 75 year old female presents for evaluation of lower back pain and coccyx pain after a fall on 4/23/25 on 5/5/25.  S/P C3-7 decompression and fusion on 2/27/2018. Last seen in Sept 2023 for neck and mid back pain. She states she was using a step stool to get into a minivan, she lost her balance and she landed on her left side which was wedged between the front seat and back seats of the van. She also had another fall a few weeks later where she was sitting on a recliner and slid down off the recliner where her buttocks hit the floor.  She currently has pain at the buttocks that radiates down the legs posteriorly to the ankles.  Denies numbness/tingling. States that she has left leg weakness which she notes is weaker since the falls.  She takes oxycodone prescribed by pain management which she stopped due to dizziness.  Has not tried PT or chiropractic care.  She also complains of left shoulder pain, she is s/p left revTSA 6/14/24 with Dr. Rizvi. She saw Dr. Rizvi a few weeks ago and hardware is intact. She is currently on treatment for a UTI.   Ambulates with a rolling walker.  Presents today for MRI Cervical and MRI Lumbar review.  No fever chills sweats nausea vomiting no bowel or bladder dysfunction, no recent weight loss or gain no night pain. This history is in addition to the intake form that I personally reviewed.

## 2025-06-06 NOTE — HISTORY OF PRESENT ILLNESS
[[facility]s Co-Management Patient] : [unfilled] is a [facility] Calls co-management patient [Patient] : patient [FreeTextEntry2] :  Ms. RICHIE CEDENO is being seen today for an initial evaluation for enrollment in [facility]. Ms. CEDENO has multiple medical conditions including: anemia, anxiety and depression, asthma, CKD3, RA, PMR (no GCA), cervical spine stenosis, severe lumbar stenosis, knee OA (s/p bilat TKR), L hip OA (s/p L THR), pancreatic duct dilatation, TARYN (not wearing CPAP).   Patient [will] be co-management with community PCP.  Patient with no acute concerns today. Reports chronic back and shoulder pain. Recently diagnosed w/ UTI at [facility] and started on abx, has completed course.  Asthma - albuterol rarely. inhaler daily. never intubated.  CKD 3 -  Dyslipidemia -  GERD - Pepcid BID TIA - 12/2024, L side shaking. speech slurred. Obesity - TARYN - doesn't wear CPAP. open to trying it again.  RA - not taking Leflunomide due to diarrhea. Polymyalgia Rheumatica -   Depression and anxiety - Cymbalta. helping.  Poor mobility - use RW, unsteady, spinal surgery. right shoulder no cartilage.  issues spine surgery - years. can't sleep on sides.    Social - lives with son. apartment, freight entrance w/ ramps. Has difficulty w/ steps.   Allergies - none   DME Supplies At home and Needed - has RW and cane.  Outpatient Providers Rheumatology - Dr. Esteban Podiatrist - Dr. CECE [facility] - Dr. Nickerson PCP - Dr. Zamora Cardiology - Dr. Mathis GI - Dr. John   Last Fall - 5/25, fell from recliner chair when getting up too quickly.   Last Hospitalization - [facility] 5/17 found to have UTI, given antibiotics   Family Members who we should contact - kentrell Cedeno   Healthcare Maintenance Colonoscopy - last year cologuard. previously instructedto have 2/year Mammogram - yearly, 2025 PAP - were normal Bone Density - normal    MOLST /HCP - HCP is [facility]. Full Code.  Physical exam performed by RN in home.

## 2025-06-06 NOTE — CHRONIC CARE ASSESSMENT
[Inadequate social support] : inadequate social support [PPS Score: ____] : Palliative Performance Scale (PPS) Score: [unfilled] [de-identified] : as tolerated [de-identified] : as tolerated

## 2025-06-06 NOTE — HISTORY OF PRESENT ILLNESS
[House Calls Co-Management Patient] : [unfilled] is a House Calls co-management patient [Patient] : patient [FreeTextEntry2] :  Ms. RICHIE CEDENO is being seen today for an initial evaluation for enrollment in [facility]. Ms. CEDENO has multiple medical conditions including: anemia, anxiety and depression, asthma, CKD3, RA, PMR (no GCA), cervical spine stenosis, severe lumbar stenosis, knee OA (s/p bilat TKR), L hip OA (s/p L THR), pancreatic duct dilatation, TARYN (not wearing CPAP).   Patient [will] be co-management with community PCP.  Patient with no acute concerns today. Reports chronic back and shoulder pain. Recently diagnosed w/ UTI at [place] and started on abx, has completed course.  Asthma - albuterol rarely. inhaler daily. never intubated.  CKD 3 -  Dyslipidemia -  GERD - Pepcid BID TIA - 12/2024, L side shaking. speech slurred. Obesity - TARYN - doesn't wear CPAP. open to trying it again.  RA - not taking Leflunomide due to diarrhea. Polymyalgia Rheumatica -   Depression and anxiety - Cymbalta. helping.  Poor mobility - use RW, unsteady, spinal surgery. right shoulder no cartilage.  issues spine surgery - years. can't sleep on sides.    Social - lives with son. apartment, freight entrance w/ ramps. Has difficulty w/ steps.   Allergies - none   DME Supplies At home and Needed - has RW and cane.  Outpatient Providers Rheumatology - Dr. Esteban Podiatrist - Dr. CECE [facility] - Dr. Nickerson PCP - Dr. Zamora Cardiology - Dr. Mathis GI - Dr. John   Last Fall - 5/25, fell from recliner chair when getting up too quickly.   Last Hospitalization - [place] 5/17 found to have UTI, given antibiotics   Family Members who we should contact - kentrell Cedeno   Healthcare Maintenance Colonoscopy - last year cologuard. previously instructedto have 2/year Mammogram - yearly, 2025 PAP - were normal Bone Density - normal    MOLST /HCP - HCP is [facility]. Full Code.  Physical exam performed by RN in home.

## 2025-06-06 NOTE — ASSESSMENT
[FreeTextEntry1] : - unable to order cpap through DME as sleep study many years ago. will have to f/u with pulmonology for repeat sleep study, patient made aware.

## 2025-06-06 NOTE — DISCUSSION/SUMMARY
[de-identified] : S/P C3-7 decompression and fusion on 2/27/2018. Thoracic degenerative disc disease. EMG C8 ganglion sensory issue. Right shoulder arthritis. Lumbar disc degenerative disease Cervical/lumbar strain and sprain.  Discussed all options. Discussed posterior decompression C7-T1. She should consider surgery. PT F/U 1 month. All options discussed including rest, medicine, home exercise, acupuncture, Chiropractic care, Physical Therapy, Pain management, and last resort surgery. All questions were answered, all alternatives discussed, and the patient is in complete agreement with the treatment plan which the patient contributed to and discussed with me through the shared decision-making process. Follow-up appointment as instructed. Any issues and the patient will call or come in sooner.

## 2025-06-06 NOTE — DISCUSSION/SUMMARY
[de-identified] : S/P C3-7 decompression and fusion on 2/27/2018. Thoracic degenerative disc disease. EMG C8 ganglion sensory issue. Right shoulder arthritis. Lumbar disc degenerative disease Cervical/lumbar strain and sprain.  Discussed all options. Discussed posterior decompression C7-T1. She should consider surgery. PT F/U 1 month. All options discussed including rest, medicine, home exercise, acupuncture, Chiropractic care, Physical Therapy, Pain management, and last resort surgery. All questions were answered, all alternatives discussed, and the patient is in complete agreement with the treatment plan which the patient contributed to and discussed with me through the shared decision-making process. Follow-up appointment as instructed. Any issues and the patient will call or come in sooner.

## 2025-06-06 NOTE — PHYSICAL EXAM
[No Acute Distress] : no acute distress [Normal Sclera/Conjunctiva] : normal sclera/conjunctiva [Normal Outer Ear/Nose] : the ears and nose were normal in appearance [No JVD] : no jugular venous distention [No Respiratory Distress] : no respiratory distress [Normal Rate] : heart rate was normal  [Breast Exam Declined] : patient declined to have breast exam done [Normal Bowel Sounds] : normal bowel sounds [Patient Refused] : rectal exam was refused by the patient [No CVA Tenderness] : no ~M costovertebral angle tenderness [No Rash] : no rash [Cranial Nerves Intact] : cranial nerves 2-12 were intact [Oriented x3] : oriented to person, place, and time [de-identified] : obese abdomen [de-identified] : slow to get up from chair. unsteady gait w/ cane.

## 2025-06-06 NOTE — PHYSICAL EXAM
[Walker] : ambulates with walker [Iqbal's Sign] : negative Iqbal's sign [Pronator Drift] : negative pronator drift [SLR] : negative straight leg raise [de-identified] : Adequate motor strength, sensation is intact and symmetrical full range of motion flexion extension and rotation, full range of motion of hips knees shoulders and elbows (all four extremities), no atrophy, negative straight leg raise, no swelling, normal ambulation, no apparent distress skin is intact, no upper or lower extremity instability, alert and oriented x3 and normal mood. Normal finger-to nose test. Adequate heal walk and toe walk.  [de-identified] : MR SPINE CERVICAL - ORDERED BY: ERNESTO HUANG   PROCEDURE DATE: 05/30/2025    INTERPRETATION: MR CERVICAL SPINE WITHOUT CONTRAST  TECHNIQUE: Multiplanar multisequence imaging of the cervical spine was performed without the administration of intravenous contrast.  CLINICAL INDICATION: Cervical radiculopathy. Shoulder/back pain. COMPARISON: CT stroke series 12/19/2024. MR brain 12/20/2024. ____________________ FINDINGS: Mildly degraded by motion artifact. Minimal upper cervical levoscoliosis with an apex near C3-C4. Mild cervicothoracic dextroscoliosis with an apex near C7-T1. Very mild anterolisthesis of T2 on T3. Minimal anterolisthesis of T1 on T2. Grade 1 anterolisthesis of C7 on T1. Very mild anterolisthesis of C5 on C6. Grade 1 anterolisthesis of C3 on C4. Overall there is straightening of the normal cervical lordosis. Cervical alignment otherwise maintained. Postsurgical changes of posterior elements at C3-C7 with hardware--note that MR is suboptimal for the assessment of hardware integrity. Multilevel endplate degenerative changes. Vertebral body heights are maintained. No acute fracture. No aggressive osseous lesions.  Normal cord caliber and signal. Dysmorphic appearance of the cervical cord at the level of C3-C4, C4-C5, and to a lesser extent at C5-C6.  Moderate amount of T2/STIR hyperintense signal in the visualized midbrain/carlos, probably sequelae of chronic microvascular ischemic disease.  C2-C3: Disc osteophyte complex. No significant spinal canal stenosis. No significant left neuroforaminal stenosis. Moderate right neuroforaminal stenosis. Mild hypertrophic facet joint degeneration.  C3-C4: Postsurgical changes. No significant spinal canal stenosis. Mild right neuroforaminal stenosis. Moderate left neuroforaminal stenosis. Mild hypertrophic facet joint degeneration.  C4-C5: Postsurgical changes. No significant spinal canal stenosis. Mild right neuroforaminal stenosis. Moderate left neuroforaminal stenosis. Mild hypertrophic facet joint degeneration.  C5-C6: Postsurgical changes. Disc osteophyte complex. No significant spinal canal stenosis. Mild right neuroforaminal stenosis. Mild to moderate left neuroforaminal stenosis. Mild hypertrophic facet joint degeneration.  C6-C7: Disc osteophyte complex. Very mild spinal canal stenosis. Mild right neuroforaminal stenosis. Moderate left neuroforaminal stenosis. Mild hypertrophic facet joint degeneration.  C7-T1: Grade 1 anterolisthesis of C7 on T1 with uncovering of the posterior margin of the disc. Disc osteophyte complex. Moderate to severe spinal canal stenosis. Severe right neuroforaminal stenosis. Severe left neuroforaminal stenosis. Mild hypertrophic facet joint degeneration.  T1-T2: Disc osteophyte complex. Mild spinal canal stenosis. Mild to moderate right neuroforaminal stenosis. Severe left neuroforaminal stenosis. Mild hypertrophic facet joint degeneration.  Paraspinal Tissues: Postsurgical changes of the dorsal paraspinal soft tissues. ____________________ IMPRESSION:  1. Postsurgical changes as detailed above. Note that MR is suboptimal for the assessment of hardware integrity. Consider further evaluation with CT of the cervical spine without contrast as clinically indicated. 2. Dysmorphic appearance of the cervical cord at C3-C4, C4-C5, and C5-C6. This is suspicious for adhesions. 3. Multilevel spondylosis as detailed above with at most moderate to severe spinal canal stenosis and severe neuroforaminal stenosis.  --- End of Report ---    MR SPINE LUMBAR  - ORDERED BY:  ERNESTO HUANG   PROCEDURE DATE:  05/30/2025    INTERPRETATION:  CLINICAL INFORMATION: Low back pain.  ADDITIONAL CLINICAL INFORMATION: Spondylolisthesis M43.16  TECHNIQUE: Multiplanar, multisequence MRI was performed of the lumbar spine. IV Contrast:  PRIOR STUDIES: MRI of the lumbar spine performed 12/1/2017.  FINDINGS:  BONES: There is no fracture or bone marrow edema pattern. There are mixed Modic changes including Modic type II and type III changes at the L4-L5 and L5-S1 level. DISCS: There is loss of normal T2 disc signal throughout the lumbar spine, most notable at the L2-L3, L3-L4, L4-5, L5-S1 level. There is moderate to severe loss of disc space at L4-5 and L5-S1. ALIGNMENT: There is grade 1 anterolisthesis at L4-L5 and mild retrolisthesis at L5-S1, unchanged. SACROILIAC JOINTS/SACRUM: There is no sacral fracture. The SI joints are partially visualized but are intact. CONUS AND CAUDA EQUINA: The distal cord and conus are normal in signal. Conus terminates at L1-L2. VISUALIZED INTRAPELVIC/INTRA-ABDOMINAL SOFT TISSUES: Bilateral renal cysts, unchanged. PARASPINAL SOFT TISSUES: Left total hip arthroplasty is partial included. Mild fatty infiltration of the lower paraspinal muscle bellies.   INDIVIDUAL LEVELS:  T12-L1: Mild disc bulging and bilateral facet arthropathy with indentation of the ventral thecal sac. No spinal canal or neuroforaminal stenosis.  L1-L2: Minimal disc bulging and moderate bilateral facet arthropathy without spinal canal or neuroforaminal stenosis. There is a lobulated perineural sleeve cyst at the right neural foramen measuring 1.6 x 9.3 x 2.0 cm (series 3, image 17 with chronic erosive changes at the right neural foramen, similar to prior study. L2-L3: Mild broad-based disc bulge and moderate bilateral facet arthropathy with thickening of ligamentum flavum results in moderate spinal canal stenosis and mild right and moderate left neural foraminal stenosis. L3-L4: Mild diffuse disc bulging and moderate bilateral facet arthropathy with thickening of ligamentum flavum results in mild to moderate spinal canal stenosis and moderate bilateral neural foraminal stenosis. L4-L5: Grade 1 anterolisthesis with severe bilateral facet arthropathy with high-grade hypertrophic changes. There is unroofing of the posterior disc. Findings result in severe spinal canal stenosis and severe bilateral neural foraminal stenosis with impingement of the exiting L4 nerves in the foramen bilaterally. L5-S1: Mild retrolisthesis with superimposed broad based disc osteophyte complex and moderate bilateral facet arthropathy with thickening of ligamentum flavum results in mild to moderate spinal canal stenosis with disc material contacting the traversing S1 nerves at the lateral recess and severe bilateral neural foraminal stenosis with impingement of the L5 exiting nerves at the foraminal/extraforaminal zones.   IMPRESSION:  MRI of the lumbar spine demonstrates multilevel spondylosis and facet arthropathy. Findings are compared to MRI of the lumbar spine performed 12/1/2017 and are overall slightly progressed in severity.  At L2-L3 there is moderate spinal canal stenosis with moderate left neural foraminal stenosis, mildly progressed from prior study.  At L3-L4, there is mild to moderate spinal canal stenosis and moderate bilateral neural foraminal stenosis, similar to prior study.  At L4-5, there is grade 1 spondylolisthesis with severe bilateral facet arthropathy, severe spinal canal stenosis, and severe bilateral neural foraminal stenosis, mildly progressed from prior study.  At L5-S1, there is severe bilateral neural foraminal stenosis, slightly progressed from prior study.  --- End of Report ---    XR AP Lat Cervical 05/22/2025 -C3-7 decompression and fusion, adequate- reviewed with patient.   XR AP Lat Lumbar 05/22/2025 -Left MICHELLE, disc degenerative disease- reviewed with patient.    XR Sacrum/Coccyx 5/22/25 - Left MICHELLE, adequate- reviewed with patient.   AP lateral cervical shows adequate C3-7 fusion posterior-reviewed with the patient.  X ray of thoracic spine done 10/06/2022-Thoracic degenerative disc disease-Results reviewed with the patient  XR AP Lat shoulder 04/17/2023 -bilateral arthritis- reviewed with the patient.  I reviewed, interpreted and clinically correlated the following outside imaging studies, EMG 9/1/23 Right median neuropathy at wrist consistent with mild carpal tunnel Ulnar innervated muscles in the left including extensor indices proprius muscles.

## 2025-06-06 NOTE — ADDENDUM
[FreeTextEntry1] :   Lara LU assisted in documentation on 06/03/2025  acting as a scribe for NP Berta Degroot.

## 2025-06-09 ENCOUNTER — NON-APPOINTMENT (OUTPATIENT)
Age: 75
End: 2025-06-09

## 2025-06-16 ENCOUNTER — TRANSCRIPTION ENCOUNTER (OUTPATIENT)
Age: 75
End: 2025-06-16

## 2025-06-17 ENCOUNTER — NON-APPOINTMENT (OUTPATIENT)
Age: 75
End: 2025-06-17

## 2025-06-17 ENCOUNTER — TRANSCRIPTION ENCOUNTER (OUTPATIENT)
Age: 75
End: 2025-06-17

## 2025-06-17 ENCOUNTER — APPOINTMENT (OUTPATIENT)
Dept: PAIN MANAGEMENT | Facility: CLINIC | Age: 75
End: 2025-06-17
Payer: MEDICARE

## 2025-06-17 PROCEDURE — 99213 OFFICE O/P EST LOW 20 MIN: CPT | Mod: 2W

## 2025-06-23 ENCOUNTER — APPOINTMENT (OUTPATIENT)
Dept: OTOLARYNGOLOGY | Facility: CLINIC | Age: 75
End: 2025-06-23

## 2025-06-23 ENCOUNTER — NON-APPOINTMENT (OUTPATIENT)
Age: 75
End: 2025-06-23

## 2025-06-24 ENCOUNTER — TRANSCRIPTION ENCOUNTER (OUTPATIENT)
Age: 75
End: 2025-06-24

## 2025-06-26 NOTE — HISTORY OF PRESENT ILLNESS
[de-identified] : 11/5/2024  Regina Cedeno presents to the office for follow-up of her bilateral hips.  Patient has been experiencing bilateral lateral hip pain and trochanteric bursitis.  She underwent a left TSA in June and is currently in physical therapy.  She has not gone to PT for her hips.  She did have a fall in September.  Patient is taking Celebrex.  She is planned for hallux fusion.  5/2/2024  Regina Cedeno presents to the office for follow-up of her left total hip arthroplasty.  Patient's left hip pain is manageable at this time.  Her metal ion levels showed cobalt: <1, chromium: 0.5.  No falls.  No fevers or chills.  3/26/2024 Regina Cedeno is a 73-year-old female who presents the office for evaluation of her left total hip arthroplasty.  Patient underwent left MICHELLE in 2005 by Dr. Valladares.  She did well overall.  About 5 to 6 years ago, patient started to have lateral hip pain and tenderness.  Patient is also planned for lumbar spine surgery by Dr. Sommer.  She received a prior spine injection, which did not help.  She is also considering left total shoulder arthroplasty.  She had a fall in 2020 1/2020, with head strike.  Patient has balance issues and is currently in physical therapy.  She can have leg paresthesias.  No fevers.  History: HTN, HLD, Asthma, TARYN
[de-identified] : 11/5/2024  Regina Cedeno presents to the office for follow-up of her bilateral hips.  Patient has been experiencing bilateral lateral hip pain and trochanteric bursitis.  She underwent a left TSA in June and is currently in physical therapy.  She has not gone to PT for her hips.  She did have a fall in September.  Patient is taking Celebrex.  She is planned for hallux fusion.  5/2/2024  Regina Cedeno presents to the office for follow-up of her left total hip arthroplasty.  Patient's left hip pain is manageable at this time.  Her metal ion levels showed cobalt: <1, chromium: 0.5.  No falls.  No fevers or chills.  3/26/2024 Regina Cedeno is a 73-year-old female who presents the office for evaluation of her left total hip arthroplasty.  Patient underwent left MICHELLE in 2005 by Dr. Valladares.  She did well overall.  About 5 to 6 years ago, patient started to have lateral hip pain and tenderness.  Patient is also planned for lumbar spine surgery by Dr. Sommer.  She received a prior spine injection, which did not help.  She is also considering left total shoulder arthroplasty.  She had a fall in 2020 1/2020, with head strike.  Patient has balance issues and is currently in physical therapy.  She can have leg paresthesias.  No fevers.  History: HTN, HLD, Asthma, TARYN
Attending Attestation (For Attendings USE Only)...

## 2025-06-30 ENCOUNTER — TRANSCRIPTION ENCOUNTER (OUTPATIENT)
Age: 75
End: 2025-06-30

## 2025-07-02 ENCOUNTER — NON-APPOINTMENT (OUTPATIENT)
Age: 75
End: 2025-07-02

## 2025-07-16 NOTE — REASON FOR VISIT
[Home] : at home, [unfilled] , at the time of the visit. [Medical Office: ([facility])___] : at the medical office located in  [Telehealth (audio & video)] : This visit was provided via telehealth using real-time 2-way audio visual technology. [Verbal consent obtained from patient] : the patient, [unfilled] [Follow-Up Visit] : a follow-up visit for

## 2025-07-16 NOTE — REASON FOR VISIT
[Home] : at home, [unfilled] , at the time of the visit. [Medical Office: ([facility])___] : at the medical office located in  [Telehealth (audio & video)] : This visit was provided via telehealth using real-time 2-way audio visual technology. [Verbal consent obtained from patient] : the patient, [unfilled] [Follow-Up Visit] : a follow-up visit for

## 2025-07-18 ENCOUNTER — APPOINTMENT (OUTPATIENT)
Dept: ORTHOPEDIC SURGERY | Facility: CLINIC | Age: 75
End: 2025-07-18
Payer: MEDICARE

## 2025-07-18 PROCEDURE — 99214 OFFICE O/P EST MOD 30 MIN: CPT | Mod: 2W

## 2025-07-18 NOTE — PHYSICAL EXAM
[Walker] : ambulates with walker [Iqbal's Sign] : negative Iqbal's sign [Pronator Drift] : negative pronator drift [SLR] : negative straight leg raise [de-identified] : Adequate motor strength, sensation is intact and symmetrical full range of motion flexion extension and rotation, full range of motion of hips knees shoulders and elbows (all four extremities), no atrophy, negative straight leg raise, no swelling, normal ambulation, no apparent distress skin is intact, no upper or lower extremity instability, alert and oriented x3 and normal mood. Normal finger-to nose test. Adequate heal walk and toe walk.  [de-identified] : MR SPINE CERVICAL - ORDERED BY: ERNESTO HUANG   PROCEDURE DATE: 05/30/2025    INTERPRETATION: MR CERVICAL SPINE WITHOUT CONTRAST  TECHNIQUE: Multiplanar multisequence imaging of the cervical spine was performed without the administration of intravenous contrast.  CLINICAL INDICATION: Cervical radiculopathy. Shoulder/back pain. COMPARISON: CT stroke series 12/19/2024. MR brain 12/20/2024. ____________________ FINDINGS: Mildly degraded by motion artifact. Minimal upper cervical levoscoliosis with an apex near C3-C4. Mild cervicothoracic dextroscoliosis with an apex near C7-T1. Very mild anterolisthesis of T2 on T3. Minimal anterolisthesis of T1 on T2. Grade 1 anterolisthesis of C7 on T1. Very mild anterolisthesis of C5 on C6. Grade 1 anterolisthesis of C3 on C4. Overall there is straightening of the normal cervical lordosis. Cervical alignment otherwise maintained. Postsurgical changes of posterior elements at C3-C7 with hardware--note that MR is suboptimal for the assessment of hardware integrity. Multilevel endplate degenerative changes. Vertebral body heights are maintained. No acute fracture. No aggressive osseous lesions.  Normal cord caliber and signal. Dysmorphic appearance of the cervical cord at the level of C3-C4, C4-C5, and to a lesser extent at C5-C6.  Moderate amount of T2/STIR hyperintense signal in the visualized midbrain/carlos, probably sequelae of chronic microvascular ischemic disease.  C2-C3: Disc osteophyte complex. No significant spinal canal stenosis. No significant left neuroforaminal stenosis. Moderate right neuroforaminal stenosis. Mild hypertrophic facet joint degeneration.  C3-C4: Postsurgical changes. No significant spinal canal stenosis. Mild right neuroforaminal stenosis. Moderate left neuroforaminal stenosis. Mild hypertrophic facet joint degeneration.  C4-C5: Postsurgical changes. No significant spinal canal stenosis. Mild right neuroforaminal stenosis. Moderate left neuroforaminal stenosis. Mild hypertrophic facet joint degeneration.  C5-C6: Postsurgical changes. Disc osteophyte complex. No significant spinal canal stenosis. Mild right neuroforaminal stenosis. Mild to moderate left neuroforaminal stenosis. Mild hypertrophic facet joint degeneration.  C6-C7: Disc osteophyte complex. Very mild spinal canal stenosis. Mild right neuroforaminal stenosis. Moderate left neuroforaminal stenosis. Mild hypertrophic facet joint degeneration.  C7-T1: Grade 1 anterolisthesis of C7 on T1 with uncovering of the posterior margin of the disc. Disc osteophyte complex. Moderate to severe spinal canal stenosis. Severe right neuroforaminal stenosis. Severe left neuroforaminal stenosis. Mild hypertrophic facet joint degeneration.  T1-T2: Disc osteophyte complex. Mild spinal canal stenosis. Mild to moderate right neuroforaminal stenosis. Severe left neuroforaminal stenosis. Mild hypertrophic facet joint degeneration.  Paraspinal Tissues: Postsurgical changes of the dorsal paraspinal soft tissues. ____________________ IMPRESSION:  1. Postsurgical changes as detailed above. Note that MR is suboptimal for the assessment of hardware integrity. Consider further evaluation with CT of the cervical spine without contrast as clinically indicated. 2. Dysmorphic appearance of the cervical cord at C3-C4, C4-C5, and C5-C6. This is suspicious for adhesions. 3. Multilevel spondylosis as detailed above with at most moderate to severe spinal canal stenosis and severe neuroforaminal stenosis.  --- End of Report ---    MR SPINE LUMBAR  - ORDERED BY:  ERNESTO HUANG   PROCEDURE DATE:  05/30/2025    INTERPRETATION:  CLINICAL INFORMATION: Low back pain.  ADDITIONAL CLINICAL INFORMATION: Spondylolisthesis M43.16  TECHNIQUE: Multiplanar, multisequence MRI was performed of the lumbar spine. IV Contrast:  PRIOR STUDIES: MRI of the lumbar spine performed 12/1/2017.  FINDINGS:  BONES: There is no fracture or bone marrow edema pattern. There are mixed Modic changes including Modic type II and type III changes at the L4-L5 and L5-S1 level. DISCS: There is loss of normal T2 disc signal throughout the lumbar spine, most notable at the L2-L3, L3-L4, L4-5, L5-S1 level. There is moderate to severe loss of disc space at L4-5 and L5-S1. ALIGNMENT: There is grade 1 anterolisthesis at L4-L5 and mild retrolisthesis at L5-S1, unchanged. SACROILIAC JOINTS/SACRUM: There is no sacral fracture. The SI joints are partially visualized but are intact. CONUS AND CAUDA EQUINA: The distal cord and conus are normal in signal. Conus terminates at L1-L2. VISUALIZED INTRAPELVIC/INTRA-ABDOMINAL SOFT TISSUES: Bilateral renal cysts, unchanged. PARASPINAL SOFT TISSUES: Left total hip arthroplasty is partial included. Mild fatty infiltration of the lower paraspinal muscle bellies.   INDIVIDUAL LEVELS:  T12-L1: Mild disc bulging and bilateral facet arthropathy with indentation of the ventral thecal sac. No spinal canal or neuroforaminal stenosis.  L1-L2: Minimal disc bulging and moderate bilateral facet arthropathy without spinal canal or neuroforaminal stenosis. There is a lobulated perineural sleeve cyst at the right neural foramen measuring 1.6 x 9.3 x 2.0 cm (series 3, image 17 with chronic erosive changes at the right neural foramen, similar to prior study. L2-L3: Mild broad-based disc bulge and moderate bilateral facet arthropathy with thickening of ligamentum flavum results in moderate spinal canal stenosis and mild right and moderate left neural foraminal stenosis. L3-L4: Mild diffuse disc bulging and moderate bilateral facet arthropathy with thickening of ligamentum flavum results in mild to moderate spinal canal stenosis and moderate bilateral neural foraminal stenosis. L4-L5: Grade 1 anterolisthesis with severe bilateral facet arthropathy with high-grade hypertrophic changes. There is unroofing of the posterior disc. Findings result in severe spinal canal stenosis and severe bilateral neural foraminal stenosis with impingement of the exiting L4 nerves in the foramen bilaterally. L5-S1: Mild retrolisthesis with superimposed broad based disc osteophyte complex and moderate bilateral facet arthropathy with thickening of ligamentum flavum results in mild to moderate spinal canal stenosis with disc material contacting the traversing S1 nerves at the lateral recess and severe bilateral neural foraminal stenosis with impingement of the L5 exiting nerves at the foraminal/extraforaminal zones.   IMPRESSION:  MRI of the lumbar spine demonstrates multilevel spondylosis and facet arthropathy. Findings are compared to MRI of the lumbar spine performed 12/1/2017 and are overall slightly progressed in severity.  At L2-L3 there is moderate spinal canal stenosis with moderate left neural foraminal stenosis, mildly progressed from prior study.  At L3-L4, there is mild to moderate spinal canal stenosis and moderate bilateral neural foraminal stenosis, similar to prior study.  At L4-5, there is grade 1 spondylolisthesis with severe bilateral facet arthropathy, severe spinal canal stenosis, and severe bilateral neural foraminal stenosis, mildly progressed from prior study.  At L5-S1, there is severe bilateral neural foraminal stenosis, slightly progressed from prior study.  --- End of Report ---    XR AP Lat Cervical 05/22/2025 -C3-7 decompression and fusion, adequate- reviewed with patient.   XR AP Lat Lumbar 05/22/2025 -Left MICHELLE, disc degenerative disease- reviewed with patient.    XR Sacrum/Coccyx 5/22/25 - Left MICHELLE, adequate- reviewed with patient.   AP lateral cervical shows adequate C3-7 fusion posterior-reviewed with the patient.  X ray of thoracic spine done 10/06/2022-Thoracic degenerative disc disease-Results reviewed with the patient  XR AP Lat shoulder 04/17/2023 -bilateral arthritis- reviewed with the patient.  I reviewed, interpreted and clinically correlated the following outside imaging studies, EMG 9/1/23 Right median neuropathy at wrist consistent with mild carpal tunnel Ulnar innervated muscles in the left including extensor indices proprius muscles.

## 2025-07-18 NOTE — HISTORY OF PRESENT ILLNESS
[Stable] : stable [de-identified] : 75 year old female presents for evaluation of lower back pain and coccyx pain after a fall on 4/23/25 on 5/5/25.  S/P C3-7 decompression and fusion on 2/27/2018. She states she was using a step stool to get into a minivan, she lost her balance and she landed on her left side which was wedged between the front seat and back seats of the van. She also had another fall a few weeks later where she was sitting on a recliner and slid down off the recliner where her buttocks hit the floor.  She currently has pain at the buttocks that radiates down the legs posteriorly to the ankles.  Denies numbness/tingling. States that she has left leg weakness which she notes is weaker since the falls.  She takes oxycodone prescribed by pain management which she stopped due to dizziness.  She also complains of left shoulder pain, she is s/p left revTSA 6/14/24 with Dr. Rizvi. She saw Dr. Rizvi a few weeks ago and hardware is intact. She is currently on treatment for a UTI.   Was referred to PT at last visit.  Ambulates with a rolling walker.  Has MRI Cervical and MRI Lumbar.  No fever chills sweats nausea vomiting no bowel or bladder dysfunction, no recent weight loss or gain no night pain. This history is in addition to the intake form that I personally reviewed.

## 2025-07-18 NOTE — DISCUSSION/SUMMARY
[de-identified] : S/P C3-7 decompression and fusion on 2/27/2018. Thoracic degenerative disc disease. EMG C8 ganglion sensory issue. Right shoulder arthritis. Lumbar disc degenerative disease Cervical/lumbar strain and sprain.  Discussed all options. Discussed posterior decompression C7-T1. She should consider surgery. Doing PT. Discussed surgery. Patient wants to hold for C7-T1 decompression. She is doing all of her ADLs. F/U 6 weeks. All options discussed including rest, medicine, home exercise, acupuncture, Chiropractic care, Physical Therapy, Pain management, and last resort surgery. All questions were answered, all alternatives discussed, and the patient is in complete agreement with the treatment plan which the patient contributed to and discussed with me through the shared decision-making process. Follow-up appointment as instructed. Any issues and the patient will call or come in sooner.

## 2025-07-18 NOTE — PHYSICAL EXAM
[Walker] : ambulates with walker [Iqbal's Sign] : negative Iqbal's sign [Pronator Drift] : negative pronator drift [SLR] : negative straight leg raise [de-identified] : Adequate motor strength, sensation is intact and symmetrical full range of motion flexion extension and rotation, full range of motion of hips knees shoulders and elbows (all four extremities), no atrophy, negative straight leg raise, no swelling, normal ambulation, no apparent distress skin is intact, no upper or lower extremity instability, alert and oriented x3 and normal mood. Normal finger-to nose test. Adequate heal walk and toe walk.  [de-identified] : MR SPINE CERVICAL - ORDERED BY: ERNESTO HUANG   PROCEDURE DATE: 05/30/2025    INTERPRETATION: MR CERVICAL SPINE WITHOUT CONTRAST  TECHNIQUE: Multiplanar multisequence imaging of the cervical spine was performed without the administration of intravenous contrast.  CLINICAL INDICATION: Cervical radiculopathy. Shoulder/back pain. COMPARISON: CT stroke series 12/19/2024. MR brain 12/20/2024. ____________________ FINDINGS: Mildly degraded by motion artifact. Minimal upper cervical levoscoliosis with an apex near C3-C4. Mild cervicothoracic dextroscoliosis with an apex near C7-T1. Very mild anterolisthesis of T2 on T3. Minimal anterolisthesis of T1 on T2. Grade 1 anterolisthesis of C7 on T1. Very mild anterolisthesis of C5 on C6. Grade 1 anterolisthesis of C3 on C4. Overall there is straightening of the normal cervical lordosis. Cervical alignment otherwise maintained. Postsurgical changes of posterior elements at C3-C7 with hardware--note that MR is suboptimal for the assessment of hardware integrity. Multilevel endplate degenerative changes. Vertebral body heights are maintained. No acute fracture. No aggressive osseous lesions.  Normal cord caliber and signal. Dysmorphic appearance of the cervical cord at the level of C3-C4, C4-C5, and to a lesser extent at C5-C6.  Moderate amount of T2/STIR hyperintense signal in the visualized midbrain/carlos, probably sequelae of chronic microvascular ischemic disease.  C2-C3: Disc osteophyte complex. No significant spinal canal stenosis. No significant left neuroforaminal stenosis. Moderate right neuroforaminal stenosis. Mild hypertrophic facet joint degeneration.  C3-C4: Postsurgical changes. No significant spinal canal stenosis. Mild right neuroforaminal stenosis. Moderate left neuroforaminal stenosis. Mild hypertrophic facet joint degeneration.  C4-C5: Postsurgical changes. No significant spinal canal stenosis. Mild right neuroforaminal stenosis. Moderate left neuroforaminal stenosis. Mild hypertrophic facet joint degeneration.  C5-C6: Postsurgical changes. Disc osteophyte complex. No significant spinal canal stenosis. Mild right neuroforaminal stenosis. Mild to moderate left neuroforaminal stenosis. Mild hypertrophic facet joint degeneration.  C6-C7: Disc osteophyte complex. Very mild spinal canal stenosis. Mild right neuroforaminal stenosis. Moderate left neuroforaminal stenosis. Mild hypertrophic facet joint degeneration.  C7-T1: Grade 1 anterolisthesis of C7 on T1 with uncovering of the posterior margin of the disc. Disc osteophyte complex. Moderate to severe spinal canal stenosis. Severe right neuroforaminal stenosis. Severe left neuroforaminal stenosis. Mild hypertrophic facet joint degeneration.  T1-T2: Disc osteophyte complex. Mild spinal canal stenosis. Mild to moderate right neuroforaminal stenosis. Severe left neuroforaminal stenosis. Mild hypertrophic facet joint degeneration.  Paraspinal Tissues: Postsurgical changes of the dorsal paraspinal soft tissues. ____________________ IMPRESSION:  1. Postsurgical changes as detailed above. Note that MR is suboptimal for the assessment of hardware integrity. Consider further evaluation with CT of the cervical spine without contrast as clinically indicated. 2. Dysmorphic appearance of the cervical cord at C3-C4, C4-C5, and C5-C6. This is suspicious for adhesions. 3. Multilevel spondylosis as detailed above with at most moderate to severe spinal canal stenosis and severe neuroforaminal stenosis.  --- End of Report ---    MR SPINE LUMBAR  - ORDERED BY:  ERNESTO HUANG   PROCEDURE DATE:  05/30/2025    INTERPRETATION:  CLINICAL INFORMATION: Low back pain.  ADDITIONAL CLINICAL INFORMATION: Spondylolisthesis M43.16  TECHNIQUE: Multiplanar, multisequence MRI was performed of the lumbar spine. IV Contrast:  PRIOR STUDIES: MRI of the lumbar spine performed 12/1/2017.  FINDINGS:  BONES: There is no fracture or bone marrow edema pattern. There are mixed Modic changes including Modic type II and type III changes at the L4-L5 and L5-S1 level. DISCS: There is loss of normal T2 disc signal throughout the lumbar spine, most notable at the L2-L3, L3-L4, L4-5, L5-S1 level. There is moderate to severe loss of disc space at L4-5 and L5-S1. ALIGNMENT: There is grade 1 anterolisthesis at L4-L5 and mild retrolisthesis at L5-S1, unchanged. SACROILIAC JOINTS/SACRUM: There is no sacral fracture. The SI joints are partially visualized but are intact. CONUS AND CAUDA EQUINA: The distal cord and conus are normal in signal. Conus terminates at L1-L2. VISUALIZED INTRAPELVIC/INTRA-ABDOMINAL SOFT TISSUES: Bilateral renal cysts, unchanged. PARASPINAL SOFT TISSUES: Left total hip arthroplasty is partial included. Mild fatty infiltration of the lower paraspinal muscle bellies.   INDIVIDUAL LEVELS:  T12-L1: Mild disc bulging and bilateral facet arthropathy with indentation of the ventral thecal sac. No spinal canal or neuroforaminal stenosis.  L1-L2: Minimal disc bulging and moderate bilateral facet arthropathy without spinal canal or neuroforaminal stenosis. There is a lobulated perineural sleeve cyst at the right neural foramen measuring 1.6 x 9.3 x 2.0 cm (series 3, image 17 with chronic erosive changes at the right neural foramen, similar to prior study. L2-L3: Mild broad-based disc bulge and moderate bilateral facet arthropathy with thickening of ligamentum flavum results in moderate spinal canal stenosis and mild right and moderate left neural foraminal stenosis. L3-L4: Mild diffuse disc bulging and moderate bilateral facet arthropathy with thickening of ligamentum flavum results in mild to moderate spinal canal stenosis and moderate bilateral neural foraminal stenosis. L4-L5: Grade 1 anterolisthesis with severe bilateral facet arthropathy with high-grade hypertrophic changes. There is unroofing of the posterior disc. Findings result in severe spinal canal stenosis and severe bilateral neural foraminal stenosis with impingement of the exiting L4 nerves in the foramen bilaterally. L5-S1: Mild retrolisthesis with superimposed broad based disc osteophyte complex and moderate bilateral facet arthropathy with thickening of ligamentum flavum results in mild to moderate spinal canal stenosis with disc material contacting the traversing S1 nerves at the lateral recess and severe bilateral neural foraminal stenosis with impingement of the L5 exiting nerves at the foraminal/extraforaminal zones.   IMPRESSION:  MRI of the lumbar spine demonstrates multilevel spondylosis and facet arthropathy. Findings are compared to MRI of the lumbar spine performed 12/1/2017 and are overall slightly progressed in severity.  At L2-L3 there is moderate spinal canal stenosis with moderate left neural foraminal stenosis, mildly progressed from prior study.  At L3-L4, there is mild to moderate spinal canal stenosis and moderate bilateral neural foraminal stenosis, similar to prior study.  At L4-5, there is grade 1 spondylolisthesis with severe bilateral facet arthropathy, severe spinal canal stenosis, and severe bilateral neural foraminal stenosis, mildly progressed from prior study.  At L5-S1, there is severe bilateral neural foraminal stenosis, slightly progressed from prior study.  --- End of Report ---    XR AP Lat Cervical 05/22/2025 -C3-7 decompression and fusion, adequate- reviewed with patient.   XR AP Lat Lumbar 05/22/2025 -Left MICHELLE, disc degenerative disease- reviewed with patient.    XR Sacrum/Coccyx 5/22/25 - Left MICHELLE, adequate- reviewed with patient.   AP lateral cervical shows adequate C3-7 fusion posterior-reviewed with the patient.  X ray of thoracic spine done 10/06/2022-Thoracic degenerative disc disease-Results reviewed with the patient  XR AP Lat shoulder 04/17/2023 -bilateral arthritis- reviewed with the patient.  I reviewed, interpreted and clinically correlated the following outside imaging studies, EMG 9/1/23 Right median neuropathy at wrist consistent with mild carpal tunnel Ulnar innervated muscles in the left including extensor indices proprius muscles.

## 2025-07-18 NOTE — HISTORY OF PRESENT ILLNESS
[Stable] : stable [de-identified] : 75 year old female presents for evaluation of lower back pain and coccyx pain after a fall on 4/23/25 on 5/5/25.  S/P C3-7 decompression and fusion on 2/27/2018. She states she was using a step stool to get into a minivan, she lost her balance and she landed on her left side which was wedged between the front seat and back seats of the van. She also had another fall a few weeks later where she was sitting on a recliner and slid down off the recliner where her buttocks hit the floor.  She currently has pain at the buttocks that radiates down the legs posteriorly to the ankles.  Denies numbness/tingling. States that she has left leg weakness which she notes is weaker since the falls.  She takes oxycodone prescribed by pain management which she stopped due to dizziness.  She also complains of left shoulder pain, she is s/p left revTSA 6/14/24 with Dr. Rizvi. She saw Dr. Rizvi a few weeks ago and hardware is intact. She is currently on treatment for a UTI.   Was referred to PT at last visit.  Ambulates with a rolling walker.  Has MRI Cervical and MRI Lumbar.  No fever chills sweats nausea vomiting no bowel or bladder dysfunction, no recent weight loss or gain no night pain. This history is in addition to the intake form that I personally reviewed.

## 2025-07-18 NOTE — DISCUSSION/SUMMARY
[de-identified] : S/P C3-7 decompression and fusion on 2/27/2018. Thoracic degenerative disc disease. EMG C8 ganglion sensory issue. Right shoulder arthritis. Lumbar disc degenerative disease Cervical/lumbar strain and sprain.  Discussed all options. Discussed posterior decompression C7-T1. She should consider surgery. Doing PT. Discussed surgery. Patient wants to hold for C7-T1 decompression. She is doing all of her ADLs. F/U 6 weeks. All options discussed including rest, medicine, home exercise, acupuncture, Chiropractic care, Physical Therapy, Pain management, and last resort surgery. All questions were answered, all alternatives discussed, and the patient is in complete agreement with the treatment plan which the patient contributed to and discussed with me through the shared decision-making process. Follow-up appointment as instructed. Any issues and the patient will call or come in sooner.

## 2025-07-21 ENCOUNTER — APPOINTMENT (OUTPATIENT)
Dept: NEUROLOGY | Facility: CLINIC | Age: 75
End: 2025-07-21

## 2025-07-22 ENCOUNTER — NON-APPOINTMENT (OUTPATIENT)
Age: 75
End: 2025-07-22

## 2025-07-22 DIAGNOSIS — M25.551 PAIN IN RIGHT HIP: ICD-10-CM

## 2025-07-22 DIAGNOSIS — M25.552 PAIN IN RIGHT HIP: ICD-10-CM

## 2025-08-05 ENCOUNTER — TRANSCRIPTION ENCOUNTER (OUTPATIENT)
Age: 75
End: 2025-08-05

## 2025-08-07 ENCOUNTER — TRANSCRIPTION ENCOUNTER (OUTPATIENT)
Age: 75
End: 2025-08-07

## 2025-08-08 ENCOUNTER — TRANSCRIPTION ENCOUNTER (OUTPATIENT)
Age: 75
End: 2025-08-08

## 2025-08-08 ENCOUNTER — APPOINTMENT (OUTPATIENT)
Dept: CARDIOLOGY | Facility: CLINIC | Age: 75
End: 2025-08-08
Payer: MEDICARE

## 2025-08-08 VITALS
SYSTOLIC BLOOD PRESSURE: 99 MMHG | HEART RATE: 76 BPM | OXYGEN SATURATION: 96 % | HEIGHT: 62 IN | DIASTOLIC BLOOD PRESSURE: 62 MMHG | RESPIRATION RATE: 16 BRPM | TEMPERATURE: 98.2 F | BODY MASS INDEX: 36.09 KG/M2 | WEIGHT: 196.13 LBS

## 2025-08-08 VITALS — OXYGEN SATURATION: 95 % | DIASTOLIC BLOOD PRESSURE: 63 MMHG | HEART RATE: 78 BPM | SYSTOLIC BLOOD PRESSURE: 97 MMHG

## 2025-08-08 DIAGNOSIS — E78.5 HYPERLIPIDEMIA, UNSPECIFIED: ICD-10-CM

## 2025-08-08 DIAGNOSIS — I10 ESSENTIAL (PRIMARY) HYPERTENSION: ICD-10-CM

## 2025-08-08 DIAGNOSIS — Z86.73 PERSONAL HISTORY OF TRANSIENT ISCHEMIC ATTACK (TIA), AND CEREBRAL INFARCTION W/OUT RESIDUAL DEFICITS: ICD-10-CM

## 2025-08-08 PROCEDURE — 93000 ELECTROCARDIOGRAM COMPLETE: CPT

## 2025-08-08 PROCEDURE — 99214 OFFICE O/P EST MOD 30 MIN: CPT | Mod: 25

## 2025-08-14 ENCOUNTER — NON-APPOINTMENT (OUTPATIENT)
Age: 75
End: 2025-08-14

## 2025-08-14 DIAGNOSIS — R39.9 UNSPECIFIED SYMPTOMS AND SIGNS INVOLVING THE GENITOURINARY SYSTEM: ICD-10-CM

## 2025-08-14 RX ORDER — CIPROFLOXACIN HYDROCHLORIDE 500 MG/1
500 TABLET, FILM COATED ORAL TWICE DAILY
Qty: 14 | Refills: 0 | Status: ACTIVE | COMMUNITY
Start: 2025-08-14 | End: 1900-01-01

## 2025-08-18 ENCOUNTER — LABORATORY RESULT (OUTPATIENT)
Age: 75
End: 2025-08-18

## 2025-08-29 ENCOUNTER — APPOINTMENT (OUTPATIENT)
Dept: ORTHOPEDIC SURGERY | Facility: CLINIC | Age: 75
End: 2025-08-29

## 2025-09-04 LAB
ALBUMIN SERPL ELPH-MCNC: 4.1 G/DL
ALP BLD-CCNC: 110 U/L
ALT SERPL-CCNC: 15 U/L
ANION GAP SERPL CALC-SCNC: 17 MMOL/L
AST SERPL-CCNC: 25 U/L
BASOPHILS # BLD AUTO: 0.02 K/UL
BASOPHILS NFR BLD AUTO: 0.2 %
BILIRUB SERPL-MCNC: 0.3 MG/DL
BUN SERPL-MCNC: 17 MG/DL
CALCIUM SERPL-MCNC: 9.4 MG/DL
CHLORIDE SERPL-SCNC: 99 MMOL/L
CHOLEST SERPL-MCNC: 170 MG/DL
CO2 SERPL-SCNC: 28 MMOL/L
CREAT SERPL-MCNC: 1.07 MG/DL
CRP SERPL-MCNC: 6 MG/L
EGFRCR SERPLBLD CKD-EPI 2021: 54 ML/MIN/1.73M2
EOSINOPHIL # BLD AUTO: 0.21 K/UL
EOSINOPHIL NFR BLD AUTO: 2.6 %
ERYTHROCYTE [SEDIMENTATION RATE] IN BLOOD BY WESTERGREN METHOD: 91 MM/HR
GLUCOSE SERPL-MCNC: 124 MG/DL
HCT VFR BLD CALC: 37.4 %
HDLC SERPL-MCNC: 61 MG/DL
HGB BLD-MCNC: 11.5 G/DL
IMM GRANULOCYTES NFR BLD AUTO: 0.1 %
LDLC SERPL-MCNC: 90 MG/DL
LYMPHOCYTES # BLD AUTO: 2.18 K/UL
LYMPHOCYTES NFR BLD AUTO: 26.8 %
MAN DIFF?: NORMAL
MCHC RBC-ENTMCNC: 28.3 PG
MCHC RBC-ENTMCNC: 30.7 G/DL
MCV RBC AUTO: 91.9 FL
MONOCYTES # BLD AUTO: 0.49 K/UL
MONOCYTES NFR BLD AUTO: 6 %
NEUTROPHILS # BLD AUTO: 5.23 K/UL
NEUTROPHILS NFR BLD AUTO: 64.3 %
NONHDLC SERPL-MCNC: 110 MG/DL
PLATELET # BLD AUTO: 247 K/UL
POTASSIUM SERPL-SCNC: 3.6 MMOL/L
PROT SERPL-MCNC: 7.4 G/DL
RBC # BLD: 4.07 M/UL
RBC # FLD: 14.7 %
SODIUM SERPL-SCNC: 144 MMOL/L
T4 FREE SERPL-MCNC: 1.1 NG/DL
TRIGL SERPL-MCNC: 110 MG/DL
TSH SERPL-ACNC: 1.1 UIU/ML
WBC # FLD AUTO: 8.14 K/UL

## 2025-09-05 LAB
ESTIMATED AVERAGE GLUCOSE: 140 MG/DL
HBA1C MFR BLD HPLC: 6.5 %

## 2025-09-08 ENCOUNTER — TRANSCRIPTION ENCOUNTER (OUTPATIENT)
Age: 75
End: 2025-09-08

## 2025-09-08 ENCOUNTER — APPOINTMENT (OUTPATIENT)
Dept: HOME HEALTH SERVICES | Facility: HOME HEALTH | Age: 75
End: 2025-09-08

## 2025-09-09 ENCOUNTER — APPOINTMENT (OUTPATIENT)
Dept: HOME HEALTH SERVICES | Facility: HOME HEALTH | Age: 75
End: 2025-09-09
Payer: MEDICARE

## 2025-09-09 ENCOUNTER — TRANSCRIPTION ENCOUNTER (OUTPATIENT)
Age: 75
End: 2025-09-09

## 2025-09-09 DIAGNOSIS — F43.0 ACUTE STRESS REACTION: ICD-10-CM

## 2025-09-09 DIAGNOSIS — Z87.898 PERSONAL HISTORY OF OTHER SPECIFIED CONDITIONS: ICD-10-CM

## 2025-09-09 DIAGNOSIS — Z86.2 PERSONAL HISTORY OF DISEASES OF THE BLOOD AND BLOOD-FORMING ORGANS AND CERTAIN DISORDERS INVOLVING THE IMMUNE MECHANISM: ICD-10-CM

## 2025-09-09 DIAGNOSIS — R92.8 OTHER ABNORMAL AND INCONCLUSIVE FINDINGS ON DIAGNOSTIC IMAGING OF BREAST: ICD-10-CM

## 2025-09-09 DIAGNOSIS — G89.29 PAIN IN UNSPECIFIED SHOULDER: ICD-10-CM

## 2025-09-09 DIAGNOSIS — R39.9 UNSPECIFIED SYMPTOMS AND SIGNS INVOLVING THE GENITOURINARY SYSTEM: ICD-10-CM

## 2025-09-09 DIAGNOSIS — M25.519 PAIN IN UNSPECIFIED SHOULDER: ICD-10-CM

## 2025-09-09 DIAGNOSIS — M77.42 METATARSALGIA, LEFT FOOT: ICD-10-CM

## 2025-09-09 DIAGNOSIS — M65.321 TRIGGER FINGER, RIGHT INDEX FINGER: ICD-10-CM

## 2025-09-09 DIAGNOSIS — I99.8 OTHER DISORDER OF CIRCULATORY SYSTEM: ICD-10-CM

## 2025-09-09 DIAGNOSIS — Z96.651 PRESENCE OF RIGHT ARTIFICIAL KNEE JOINT: ICD-10-CM

## 2025-09-09 DIAGNOSIS — M25.559 PAIN IN UNSPECIFIED HIP: ICD-10-CM

## 2025-09-09 DIAGNOSIS — Z92.29 PERSONAL HISTORY OF OTHER DRUG THERAPY: ICD-10-CM

## 2025-09-09 DIAGNOSIS — Z96.652 PRESENCE OF LEFT ARTIFICIAL KNEE JOINT: ICD-10-CM

## 2025-09-09 DIAGNOSIS — M25.473 EFFUSION, UNSPECIFIED ANKLE: ICD-10-CM

## 2025-09-09 DIAGNOSIS — D12.6 BENIGN NEOPLASM OF COLON, UNSPECIFIED: ICD-10-CM

## 2025-09-09 DIAGNOSIS — Z12.12 ENCOUNTER FOR SCREENING FOR MALIGNANT NEOPLASM OF COLON: ICD-10-CM

## 2025-09-09 DIAGNOSIS — Z12.39 ENCOUNTER FOR OTHER SCREENING FOR MALIGNANT NEOPLASM OF BREAST: ICD-10-CM

## 2025-09-09 DIAGNOSIS — R10.2 PELVIC AND PERINEAL PAIN: ICD-10-CM

## 2025-09-09 DIAGNOSIS — L84 CORNS AND CALLOSITIES: ICD-10-CM

## 2025-09-09 DIAGNOSIS — Z12.11 ENCOUNTER FOR SCREENING FOR MALIGNANT NEOPLASM OF COLON: ICD-10-CM

## 2025-09-09 PROBLEM — E11.9 DIET-CONTROLLED DIABETES MELLITUS: Status: ACTIVE | Noted: 2025-09-09

## 2025-09-09 PROCEDURE — 99349 HOME/RES VST EST MOD MDM 40: CPT

## 2025-09-09 RX ORDER — DICLOFENAC SODIUM 10 MG/G
1 GEL TOPICAL
Qty: 2 | Refills: 3 | Status: ACTIVE | COMMUNITY
Start: 2025-09-09 | End: 1900-01-01

## 2025-09-11 PROBLEM — M25.559 HIP PAIN, ACUTE: Status: ACTIVE | Noted: 2025-09-09

## 2025-09-16 ENCOUNTER — APPOINTMENT (OUTPATIENT)
Dept: INTERNAL MEDICINE | Facility: CLINIC | Age: 75
End: 2025-09-16
Payer: MEDICARE

## 2025-09-16 VITALS — SYSTOLIC BLOOD PRESSURE: 112 MMHG | DIASTOLIC BLOOD PRESSURE: 60 MMHG

## 2025-09-16 VITALS
HEART RATE: 73 BPM | BODY MASS INDEX: 36.07 KG/M2 | DIASTOLIC BLOOD PRESSURE: 68 MMHG | TEMPERATURE: 98 F | SYSTOLIC BLOOD PRESSURE: 110 MMHG | OXYGEN SATURATION: 96 % | WEIGHT: 196 LBS | HEIGHT: 62 IN

## 2025-09-16 DIAGNOSIS — Z12.11 ENCOUNTER FOR SCREENING FOR MALIGNANT NEOPLASM OF COLON: ICD-10-CM

## 2025-09-16 DIAGNOSIS — E78.5 HYPERLIPIDEMIA, UNSPECIFIED: ICD-10-CM

## 2025-09-16 DIAGNOSIS — N18.30 CHRONIC KIDNEY DISEASE, STAGE 3 UNSPECIFIED: ICD-10-CM

## 2025-09-16 DIAGNOSIS — K21.9 GASTRO-ESOPHAGEAL REFLUX DISEASE W/OUT ESOPHAGITIS: ICD-10-CM

## 2025-09-16 DIAGNOSIS — G47.33 OBSTRUCTIVE SLEEP APNEA (ADULT) (PEDIATRIC): ICD-10-CM

## 2025-09-16 DIAGNOSIS — Z23 ENCOUNTER FOR IMMUNIZATION: ICD-10-CM

## 2025-09-16 DIAGNOSIS — F33.9 MAJOR DEPRESSIVE DISORDER, RECURRENT, UNSPECIFIED: ICD-10-CM

## 2025-09-16 DIAGNOSIS — J45.909 UNSPECIFIED ASTHMA, UNCOMPLICATED: ICD-10-CM

## 2025-09-16 DIAGNOSIS — R70.0 ELEVATED ERYTHROCYTE SEDIMENTATION RATE: ICD-10-CM

## 2025-09-16 DIAGNOSIS — I10 ESSENTIAL (PRIMARY) HYPERTENSION: ICD-10-CM

## 2025-09-16 DIAGNOSIS — M35.3 POLYMYALGIA RHEUMATICA: ICD-10-CM

## 2025-09-16 DIAGNOSIS — E11.9 TYPE 2 DIABETES MELLITUS W/OUT COMPLICATIONS: ICD-10-CM

## 2025-09-16 DIAGNOSIS — R26.81 UNSTEADINESS ON FEET: ICD-10-CM

## 2025-09-16 DIAGNOSIS — Z00.00 ENCOUNTER FOR GENERAL ADULT MEDICAL EXAMINATION W/OUT ABNORMAL FINDINGS: ICD-10-CM

## 2025-09-16 PROCEDURE — G0008: CPT

## 2025-09-16 PROCEDURE — 90662 IIV NO PRSV INCREASED AG IM: CPT

## 2025-09-16 PROCEDURE — 82270 OCCULT BLOOD FECES: CPT

## 2025-09-16 PROCEDURE — 99214 OFFICE O/P EST MOD 30 MIN: CPT | Mod: 25

## 2025-09-16 PROCEDURE — G0439: CPT

## (undated) DEVICE — CLAMP BX HOT RAD JAW 3

## (undated) DEVICE — SOL IRR POUR H2O 1500ML

## (undated) DEVICE — SALIVA EJECTOR (BLUE)

## (undated) DEVICE — SOL IRR BAG NS 0.9% 3000ML

## (undated) DEVICE — CATH IV SAFE BC 22G X 1" (BLUE)

## (undated) DEVICE — DRSG BANDAID 0.75X3"

## (undated) DEVICE — POSITIONER STRAP ARMBOARD VELCRO TS-30

## (undated) DEVICE — LUBRICATING JELLY HR ONE SHOT 3G

## (undated) DEVICE — SUT FIBERWIRE #2 38" STRAND 1 BLUE T-5 TAPER

## (undated) DEVICE — HANDPIECE INTERPULSE W/ COAXIAL FAN SPRAY TIP

## (undated) DEVICE — TUBING IV SET GRAVITY 3Y 100" MACRO

## (undated) DEVICE — DRAPE IOBAN 33" X 23"

## (undated) DEVICE — LABELS BLANK W PEN

## (undated) DEVICE — GOWN LG

## (undated) DEVICE — UNDERPAD LINEN SAVER 17 X 24"

## (undated) DEVICE — SYR LUER LOK 20CC

## (undated) DEVICE — ELCTR GROUNDING PAD ADULT COVIDIEN

## (undated) DEVICE — PACK IV START WITH CHG

## (undated) DEVICE — ELCTR ECG CONDUCTIVE ADHESIVE

## (undated) DEVICE — STRYKER MIXEVAC 3 BONE CEMENT MIXER

## (undated) DEVICE — POLY TRAP ETRAP

## (undated) DEVICE — DRILL BIT BIOMET 2.7MM

## (undated) DEVICE — CONTAINER FORMALIN 10% 20ML

## (undated) DEVICE — SYR CATH TIP 2 OZ

## (undated) DEVICE — SAW BLADE STRYKER THCK LONG 1.24X83.5X18.5MM

## (undated) DEVICE — CONTAINER FORMALIN 80ML YELLOW

## (undated) DEVICE — ELCTR BOVIE PENCIL SMOKE EVACUATION

## (undated) DEVICE — NDL HYPO SAFE 22G X 1.5" (BLACK)

## (undated) DEVICE — DRSG 2X2

## (undated) DEVICE — BIOPSY FORCEP RADIAL JAW 4 STANDARD WITH NEEDLE

## (undated) DEVICE — DRAPE 3/4 SHEET 52X76"

## (undated) DEVICE — SUT VICRYL 2-0 27" FS-1 UNDYED

## (undated) DEVICE — DRSG STOCKINETTE IMPERVIOUS XL

## (undated) DEVICE — DRILL BIT BIOMET GLENOID

## (undated) DEVICE — DRSG CURITY GAUZE SPONGE 4 X 4" 12-PLY NON-STERILE

## (undated) DEVICE — PACK TOTAL JOINT

## (undated) DEVICE — DRAPE SPLIT SHEET 77" X 120"

## (undated) DEVICE — SUT ETHIBOND 5 4-30" CCS

## (undated) DEVICE — SAW BLADE STRYKER SAGITTAL LONG 18MM

## (undated) DEVICE — TUBING SUCTION NONCONDUCTIVE 6MM X 12FT

## (undated) DEVICE — WARMING BLANKET LOWER ADULT

## (undated) DEVICE — DRILL BIT BIOMET 3.2MM

## (undated) DEVICE — BASIN EMESIS 10IN GRADUATED MAUVE

## (undated) DEVICE — DRSG STERISTRIPS 0.5 X 4"

## (undated) DEVICE — SUT MONOCRYL 3-0 27" PS-2 UNDYED

## (undated) DEVICE — BITE BLOCK ADULT 20 X 27MM (GREEN)

## (undated) DEVICE — PREP CHLORAPREP HI-LITE ORANGE 26ML

## (undated) DEVICE — TUBING MEDI-VAC W MAXIGRIP CONNECTORS 1/4"X6'

## (undated) DEVICE — DENTURE CUP PINK

## (undated) DEVICE — BIOPSY FORCEP COLD DISP

## (undated) DEVICE — VENODYNE/SCD SLEEVE CALF MEDIUM

## (undated) DEVICE — SUT VICRYL PLUS 0 27" OS-6 UNDYED

## (undated) DEVICE — LINE BREATHE SAMPLNG

## (undated) DEVICE — SNARE EXACTO COLD 9MMX230CM

## (undated) DEVICE — TAPE SILK 3"

## (undated) DEVICE — DRAPE MAYO STAND 23"

## (undated) DEVICE — PACK LIJ BASIC ORTHO